# Patient Record
Sex: FEMALE | Race: BLACK OR AFRICAN AMERICAN | NOT HISPANIC OR LATINO | Employment: OTHER | URBAN - METROPOLITAN AREA
[De-identification: names, ages, dates, MRNs, and addresses within clinical notes are randomized per-mention and may not be internally consistent; named-entity substitution may affect disease eponyms.]

---

## 2020-03-04 ENCOUNTER — OFFICE VISIT (OUTPATIENT)
Dept: GASTROENTEROLOGY | Facility: CLINIC | Age: 55
End: 2020-03-04
Payer: MEDICARE

## 2020-03-04 VITALS
DIASTOLIC BLOOD PRESSURE: 65 MMHG | WEIGHT: 178 LBS | SYSTOLIC BLOOD PRESSURE: 112 MMHG | BODY MASS INDEX: 29.66 KG/M2 | HEIGHT: 65 IN | HEART RATE: 74 BPM | RESPIRATION RATE: 16 BRPM

## 2020-03-04 DIAGNOSIS — K21.9 GASTROESOPHAGEAL REFLUX DISEASE WITHOUT ESOPHAGITIS: ICD-10-CM

## 2020-03-04 DIAGNOSIS — R10.9 ABDOMINAL PAIN, UNSPECIFIED ABDOMINAL LOCATION: ICD-10-CM

## 2020-03-04 DIAGNOSIS — R19.7 DIARRHEA, UNSPECIFIED TYPE: Primary | ICD-10-CM

## 2020-03-04 DIAGNOSIS — Z90.49 HISTORY OF CHOLECYSTECTOMY: ICD-10-CM

## 2020-03-04 DIAGNOSIS — R11.2 NAUSEA AND VOMITING, INTRACTABILITY OF VOMITING NOT SPECIFIED, UNSPECIFIED VOMITING TYPE: ICD-10-CM

## 2020-03-04 PROCEDURE — 99204 PR OFFICE/OUTPT VISIT, NEW, LEVL IV, 45-59 MIN: ICD-10-PCS | Mod: S$PBB,,, | Performed by: INTERNAL MEDICINE

## 2020-03-04 PROCEDURE — 99999 PR PBB SHADOW E&M-NEW PATIENT-LVL III: CPT | Mod: PBBFAC,,, | Performed by: INTERNAL MEDICINE

## 2020-03-04 PROCEDURE — 99203 OFFICE O/P NEW LOW 30 MIN: CPT | Mod: PBBFAC,PN | Performed by: INTERNAL MEDICINE

## 2020-03-04 PROCEDURE — 99204 OFFICE O/P NEW MOD 45 MIN: CPT | Mod: S$PBB,,, | Performed by: INTERNAL MEDICINE

## 2020-03-04 PROCEDURE — 99999 PR PBB SHADOW E&M-NEW PATIENT-LVL III: ICD-10-PCS | Mod: PBBFAC,,, | Performed by: INTERNAL MEDICINE

## 2020-03-04 RX ORDER — ISOPROPYL ALCOHOL 70 ML/100ML
SWAB TOPICAL
COMMUNITY
Start: 2019-11-27 | End: 2021-09-22

## 2020-03-04 RX ORDER — METFORMIN HYDROCHLORIDE 500 MG/1
500 TABLET, EXTENDED RELEASE ORAL 2 TIMES DAILY WITH MEALS
COMMUNITY
Start: 2019-12-18

## 2020-03-04 RX ORDER — CANDESARTAN 32 MG/1
32 TABLET ORAL DAILY
COMMUNITY
Start: 2020-01-28

## 2020-03-04 RX ORDER — CETIRIZINE HYDROCHLORIDE 10 MG/1
TABLET ORAL
COMMUNITY
Start: 2020-01-24 | End: 2021-09-22

## 2020-03-04 RX ORDER — NORTRIPTYLINE HYDROCHLORIDE 25 MG/1
75 CAPSULE ORAL DAILY
COMMUNITY
Start: 2020-01-24 | End: 2022-05-06

## 2020-03-04 RX ORDER — BLOOD-GLUCOSE METER
KIT MISCELLANEOUS
COMMUNITY
Start: 2019-11-27 | End: 2021-09-22

## 2020-03-04 RX ORDER — ONDANSETRON 8 MG/1
TABLET, ORALLY DISINTEGRATING ORAL
COMMUNITY
Start: 2020-02-24 | End: 2021-01-07 | Stop reason: SDUPTHER

## 2020-03-04 RX ORDER — ROSUVASTATIN CALCIUM 40 MG/1
40 TABLET, FILM COATED ORAL DAILY
COMMUNITY
Start: 2020-02-07 | End: 2022-03-09 | Stop reason: SDUPTHER

## 2020-03-04 RX ORDER — AMLODIPINE BESYLATE 10 MG/1
10 TABLET ORAL DAILY
COMMUNITY
Start: 2019-12-23

## 2020-03-04 RX ORDER — ISOSORBIDE MONONITRATE 30 MG/1
30 TABLET, EXTENDED RELEASE ORAL DAILY
Status: ON HOLD | COMMUNITY
Start: 2020-01-28 | End: 2022-06-28 | Stop reason: HOSPADM

## 2020-03-04 RX ORDER — PRAZOSIN HYDROCHLORIDE 2 MG/1
2 CAPSULE ORAL DAILY
COMMUNITY

## 2020-03-04 RX ORDER — MECOBAL/LEVOMEFOLAT CA/B6 PHOS 2-3-35 MG
TABLET ORAL
Status: ON HOLD | COMMUNITY
Start: 2020-01-16 | End: 2023-04-10

## 2020-03-04 RX ORDER — DONEPEZIL HYDROCHLORIDE 10 MG/1
TABLET, FILM COATED ORAL
COMMUNITY
Start: 2020-02-07 | End: 2022-03-09

## 2020-03-04 RX ORDER — FLUTICASONE PROPIONATE AND SALMETEROL XINAFOATE 230; 21 UG/1; UG/1
AEROSOL, METERED RESPIRATORY (INHALATION)
COMMUNITY

## 2020-03-04 RX ORDER — ACETAMINOPHEN AND PHENYLEPHRINE HCL 325; 5 MG/1; MG/1
TABLET ORAL
COMMUNITY

## 2020-03-04 RX ORDER — CARVEDILOL 12.5 MG/1
TABLET ORAL
Status: ON HOLD | COMMUNITY
Start: 2019-12-23 | End: 2021-06-02 | Stop reason: CLARIF

## 2020-03-04 RX ORDER — SALINE NASAL SPRAY 1.5 OZ
SOLUTION NASAL
COMMUNITY
Start: 2019-12-18 | End: 2023-12-04

## 2020-03-04 RX ORDER — LINACLOTIDE 72 UG/1
CAPSULE, GELATIN COATED ORAL
COMMUNITY
Start: 2020-02-14 | End: 2020-03-05

## 2020-03-04 RX ORDER — ACYCLOVIR 400 MG/1
TABLET ORAL
COMMUNITY
End: 2022-03-04 | Stop reason: SDUPTHER

## 2020-03-04 RX ORDER — ERGOCALCIFEROL 1.25 MG/1
CAPSULE ORAL
COMMUNITY

## 2020-03-04 RX ORDER — ACETAMINOPHEN 500 MG/1
500 TABLET, FILM COATED ORAL
COMMUNITY
Start: 2019-12-03

## 2020-03-04 RX ORDER — ASPIRIN 81 MG/1
81 TABLET ORAL DAILY
COMMUNITY
Start: 2020-02-09

## 2020-03-04 RX ORDER — DIAZEPAM 2 MG/1
TABLET ORAL
Status: ON HOLD | COMMUNITY
End: 2021-06-02 | Stop reason: CLARIF

## 2020-03-04 RX ORDER — METHYLPHENIDATE HYDROCHLORIDE 10 MG/1
TABLET ORAL
COMMUNITY
Start: 2020-02-07 | End: 2021-09-22

## 2020-03-04 RX ORDER — FLUTICASONE PROPIONATE 50 MCG
SPRAY, SUSPENSION (ML) NASAL
COMMUNITY
Start: 2019-12-23

## 2020-03-04 RX ORDER — TRIAMTERENE AND HYDROCHLOROTHIAZIDE 75; 50 MG/1; MG/1
TABLET ORAL
COMMUNITY
Start: 2020-01-08 | End: 2022-03-09

## 2020-03-04 RX ORDER — MONTELUKAST SODIUM 4 MG/1
2 TABLET, CHEWABLE ORAL DAILY
Qty: 180 TABLET | Refills: 3 | Status: SHIPPED | OUTPATIENT
Start: 2020-03-04 | End: 2021-09-22

## 2020-03-04 RX ORDER — IPRATROPIUM BROMIDE 21 UG/1
SPRAY, METERED NASAL
COMMUNITY
Start: 2019-12-18 | End: 2021-09-22

## 2020-03-04 RX ORDER — TIZANIDINE 4 MG/1
4 TABLET ORAL EVERY 8 HOURS
COMMUNITY
Start: 2019-12-23 | End: 2023-12-04

## 2020-03-04 RX ORDER — CARBAMAZEPINE 400 MG/1
400 TABLET, EXTENDED RELEASE ORAL 2 TIMES DAILY
COMMUNITY
Start: 2019-12-10

## 2020-03-04 RX ORDER — DICLOFENAC SODIUM 10 MG/G
GEL TOPICAL
COMMUNITY

## 2020-03-04 RX ORDER — LIDOCAINE 50 MG/G
PATCH TOPICAL
COMMUNITY
Start: 2019-12-03

## 2020-03-04 RX ORDER — ZOLPIDEM TARTRATE 10 MG/1
TABLET ORAL
COMMUNITY
Start: 2020-02-17 | End: 2021-09-22

## 2020-03-04 NOTE — PROGRESS NOTES
Subjective:       Patient ID: Luz Arias is a 54 y.o. female.    Chief Complaint: Emesis; Nausea; and Bowel Urgency    She states she has been having upper abdominal discomfort symptoms of increasing iseverity.  In 2016 she had upper endoscopy showing hiatal hernia esophagitis and gastritis.  Colonoscopy was initially normal.  She has had a colonoscopy and upper endoscopy.  The colonoscopy was normal.  She has had frequent diarrhea with urgency and occasional incontinence.  She has severe epigastric pain with pyrosis and dyspepsia.  She describes as having gastroparesis and is scheduled to see a dietitian.  She has had nausea and vomiting without a precipitating cause.  She has had diarrhea.  She states frequent when she is is on the toilet with the urge to defecate with diarrhea sure of nausea and vomiting. She denies fever chills hematemesis hematochezia jaundice or bleeding.  She does not relate her symptoms to a specific food such as milk.  She cannot pinpoint a precipitating factor.  Recently she has been on doxycycline for sinusitis.  She has had a cholecystectomy and an appendectomy.  She is on the omeprazole 40 mg b.i.d..  She takes Maalox at night.  She does not associate her symptoms with a specific foods such as spicy foods.  She tries not to eat late in the evening.  She denies nocturnal aspiration fever chills. She has been told she probably has IBS.  Her weight remains stable. The only records available are from [facility] and they are the upper endoscopy and colonoscopy.  She describes persistent epigastric burning as substernal burning.  Her current medications have not resolved her symptoms but she and her  state that the current medications have been helpful.  She does not know whether the Maalox that she takes at night has influence the diarrhea.      Allergies:  Review of patient's allergies indicates:   Allergen Reactions    Pregabalin     Protonix [pantoprazole]     Sulfa  (sulfonamide antibiotics)        Medications:    Current Outpatient Medications:     acyclovir (ZOVIRAX) 400 MG tablet, acyclovir 400 mg tablet, Disp: , Rfl:     amLODIPine (NORVASC) 10 MG tablet, , Disp: , Rfl:     aspirin (ECOTRIN) 81 MG EC tablet, , Disp: , Rfl:     biotin 10,000 mcg Cap, Take by mouth., Disp: , Rfl:     candesartan (ATACAND) 32 MG tablet, , Disp: , Rfl:     carBAMazepine (TEGRETOL XR) 400 MG Tb12, , Disp: , Rfl:     carvediloL (COREG) 12.5 MG tablet, , Disp: , Rfl:     cetirizine (ZYRTEC) 10 MG tablet, , Disp: , Rfl:     CRESTOR 40 mg Tab, , Disp: , Rfl:     DEEP SEA NASAL 0.65 % nasal spray, , Disp: , Rfl:     diazePAM (VALIUM) 2 MG tablet, diazepam 2 mg tablet, Disp: , Rfl:     diclofenac sodium (VOLTAREN) 1 % Gel, Voltaren 1 % topical gel, Disp: , Rfl:     donepezil (ARICEPT) 10 MG tablet, , Disp: , Rfl:     EASY TOUCH ALCOHOL PREP PADS PadM, , Disp: , Rfl:     ergocalciferol (ERGOCALCIFEROL) 50,000 unit Cap, ergocalciferol (vitamin D2) 50,000 unit capsule, Disp: , Rfl:     fluticasone propionate (FLONASE) 50 mcg/actuation nasal spray, , Disp: , Rfl:     fluticasone-salmeterol 230-21 mcg/dose (ADVAIR HFA) 230-21 mcg/actuation HFAA inhaler, Advair  mcg-21 mcg/actuation aerosol inhaler, Disp: , Rfl:     FREESTYLE LITE STRIPS Strp, , Disp: , Rfl:     ipratropium (ATROVENT) 0.03 % nasal spray, , Disp: , Rfl:     isosorbide mononitrate (IMDUR) 30 MG 24 hr tablet, , Disp: , Rfl:     L-METHYL-B6-B12 3-35-2 mg Tab, , Disp: , Rfl:     lidocaine (LIDODERM) 5 %, , Disp: , Rfl:     metFORMIN (GLUCOPHAGE-XR) 500 MG XR 24hr tablet, , Disp: , Rfl:     methylphenidate HCl (RITALIN) 10 MG tablet, , Disp: , Rfl:     nortriptyline (PAMELOR) 25 MG capsule, , Disp: , Rfl:     ondansetron (ZOFRAN-ODT) 8 MG TbDL, , Disp: , Rfl:     prazosin (MINIPRESS) 2 MG Cap, prazosin 2 mg capsule, Disp: , Rfl:     tiZANidine (ZANAFLEX) 4 MG tablet, , Disp: , Rfl:      triamterene-hydrochlorothiazide 75-50 mg (MAXZIDE) 75-50 mg per tablet, , Disp: , Rfl:     TYLENOL EXTRA STRENGTH 500 mg tablet, , Disp: , Rfl:     zolpidem (AMBIEN) 10 mg Tab, , Disp: , Rfl:     colestipoL (COLESTID) 1 gram Tab, Take 2 tablets (2 g total) by mouth once daily., Disp: 180 tablet, Rfl: 3    Past Medical History:   Diagnosis Date    Dysphagia     Gastroparesis     GERD (gastroesophageal reflux disease)     Irritable bowel syndrome        Past Surgical History:   Procedure Laterality Date    COLONOSCOPY  05/17/2019    UPPER GASTROINTESTINAL ENDOSCOPY  05/17/2019         Review of Systems   Constitutional: Negative for appetite change, fever and unexpected weight change.   HENT: Negative for trouble swallowing.         No jaundice.   Respiratory: Negative for cough, shortness of breath and wheezing.         She has never used tobacco alcohol.  She denies aspiration or hemoptysis.  She denies chronic cough or sputum production.  She denies significant dyspnea although she occasionally will have some dyspnea and uses or pulmonary inhalers.  She cannot pinpoint a precipitating factor.   Cardiovascular: Negative for chest pain.        She denies exertional chest pain but she is not physically active.  She denies rhythm disturbance.  She states her hyperlipidemia and hypertension are well controlled.   Gastrointestinal: Positive for abdominal pain, diarrhea and nausea. Negative for abdominal distention, anal bleeding, blood in stool and constipation.        For some reason she was given the Linzess in the past.  She can't remember why.  She is not taking it now.   Endocrine:        She is on the metformin.  She is on the diabetic diet.  She states her diabetes is well controlled.   Musculoskeletal: Positive for arthralgias and back pain. Negative for neck pain.   Skin: Negative for pallor and rash.   Neurological: Negative for dizziness, seizures, syncope, speech difficulty, weakness and numbness.         A stroke syndrome with mild weakness the right side. She can ambulate with a cane without falling.   Hematological: Negative for adenopathy.   Psychiatric/Behavioral: Negative for confusion.       Objective:      Physical Exam   Constitutional: She is oriented to person, place, and time. She appears well-developed and well-nourished.   Well-nourished well-hydrated nonicteric  female.  She is oriented 3.  She can relate her history and answer questions appropriately.   HENT:   Head: Normocephalic.   Eyes: Pupils are equal, round, and reactive to light. EOM are normal.   Neck: Normal range of motion. Neck supple. No tracheal deviation present. No thyromegaly present.   Cardiovascular: Normal rate, regular rhythm and normal heart sounds.   Pulmonary/Chest: Effort normal and breath sounds normal.   Abdominal: Soft. She exhibits no distension and no mass. There is tenderness. There is no rebound and no guarding.   The abdomen is soft with minimal tenderness in the epigastrium.  Organomegaly masses are not detected.  Bowel sounds are normal.    Musculoskeletal: Normal range of motion.   She ambulates with a cane.  She ambulates slowly.  She can go from the standing sitting position. She needs assistance to get on the exam table.   Lymphadenopathy:     She has no cervical adenopathy.   Neurological: She is alert and oriented to person, place, and time. No cranial nerve deficit.   Skin: Skin is warm and dry.   Psychiatric: She has a normal mood and affect. Her behavior is normal.   Vitals reviewed.        Plan:       Diarrhea, unspecified type  -     Gastrointestinal Pathogens Panel, PCR; Future; Expected date: 03/04/2020  -     Calprotectin; Future; Expected date: 03/04/2020  -     CBC auto differential; Future; Expected date: 03/04/2020  -     Comprehensive metabolic panel; Future; Expected date: 03/04/2020  -     Pancreatic elastase, fecal; Future; Expected date: 03/04/2020  -     C-reactive protein;  Future; Expected date: 03/04/2020  -     colestipoL (COLESTID) 1 gram Tab; Take 2 tablets (2 g total) by mouth once daily.  Dispense: 180 tablet; Refill: 3    Abdominal pain, unspecified abdominal location    Nausea and vomiting, intractability of vomiting not specified, unspecified vomiting type    Gastroesophageal reflux disease without esophagitis    History of cholecystectomy     she will continue her reflux regimen current medications vitamins.  She follows up with her neurologist.  I have requested the [facility] records.  Because of the diarrhea and cholecystectomy she is started on the Colestid.  She may need to stop the Maalox at night.  She is not taking the Linzess.  She continues the diabetic diet and will make a food diary and avoid the offending foods.

## 2020-03-04 NOTE — PATIENT INSTRUCTIONS
The [facility] records have been requested.  Will have stool studies and to evaluate and nausea vomiting and diarrhea She will continue the omeprazole 40 mg twice a day.  She will make a food diary and avoid the offending foods but will stay on the diabetic diet.  She started on the Colestid 1 g to twice a day.

## 2020-03-05 ENCOUNTER — PATIENT OUTREACH (OUTPATIENT)
Dept: ADMINISTRATIVE | Facility: HOSPITAL | Age: 55
End: 2020-03-05

## 2020-03-05 PROBLEM — R10.9 ABDOMINAL PAIN: Status: ACTIVE | Noted: 2020-03-05

## 2020-03-05 PROBLEM — R11.2 NAUSEA AND VOMITING: Status: ACTIVE | Noted: 2020-03-05

## 2020-03-05 PROBLEM — R19.7 DIARRHEA: Status: ACTIVE | Noted: 2020-03-05

## 2020-03-05 PROBLEM — K21.9 GASTROESOPHAGEAL REFLUX DISEASE WITHOUT ESOPHAGITIS: Status: ACTIVE | Noted: 2020-03-05

## 2020-04-03 ENCOUNTER — TELEPHONE (OUTPATIENT)
Dept: GASTROENTEROLOGY | Facility: CLINIC | Age: 55
End: 2020-04-03

## 2020-04-03 NOTE — TELEPHONE ENCOUNTER
Notified patient that MD ordered labs are over due and encouraged patient to present to the lab to complete same.

## 2020-04-22 ENCOUNTER — TELEPHONE (OUTPATIENT)
Dept: GASTROENTEROLOGY | Facility: CLINIC | Age: 55
End: 2020-04-22

## 2020-04-22 NOTE — TELEPHONE ENCOUNTER
Returned pt phone call. No answer. LVM for pt explaining Dr. López is not doing virtual visits, but we can get her in with Dr. López in office tomorrow. Left cb number.

## 2020-04-22 NOTE — TELEPHONE ENCOUNTER
----- Message from Chelsie Thrasher sent at 4/22/2020  1:39 PM CDT -----  Type: Needs Medical Advice  Who Called:  Patient  Symptoms (please be specific):  Trouble controlling bowels  How long has patient had these symptoms:  Over a month  Pharmacy name and phone #:    [facility] [place] [place] - [address]  [address]  [address] [place] [address]  Phone: [phone] Fax: [phone]    Best Call Back Number: [phone] (home)     Additional Information: Please call to advise. Says she has a smart phone and would like to have a virtual visit if the doctor thinks it best.

## 2020-04-24 ENCOUNTER — LAB VISIT (OUTPATIENT)
Dept: LAB | Facility: HOSPITAL | Age: 55
End: 2020-04-24
Attending: INTERNAL MEDICINE
Payer: MEDICARE

## 2020-04-24 DIAGNOSIS — R19.7 DIARRHEA, UNSPECIFIED TYPE: ICD-10-CM

## 2020-04-24 LAB
ALBUMIN SERPL BCP-MCNC: 4.8 G/DL (ref 3.5–5.2)
ALP SERPL-CCNC: 67 U/L (ref 55–135)
ALT SERPL W/O P-5'-P-CCNC: 39 U/L (ref 10–44)
ANION GAP SERPL CALC-SCNC: 11 MMOL/L (ref 8–16)
AST SERPL-CCNC: 44 U/L (ref 10–40)
BASOPHILS # BLD AUTO: 0.02 K/UL (ref 0–0.2)
BASOPHILS NFR BLD: 0.3 % (ref 0–1.9)
BILIRUB SERPL-MCNC: 0.6 MG/DL (ref 0.1–1)
BUN SERPL-MCNC: 16 MG/DL (ref 6–20)
CALCIUM SERPL-MCNC: 9.3 MG/DL (ref 8.7–10.5)
CHLORIDE SERPL-SCNC: 100 MMOL/L (ref 95–110)
CO2 SERPL-SCNC: 25 MMOL/L (ref 23–29)
CREAT SERPL-MCNC: 0.7 MG/DL (ref 0.5–1.4)
DIFFERENTIAL METHOD: ABNORMAL
EOSINOPHIL # BLD AUTO: 0.1 K/UL (ref 0–0.5)
EOSINOPHIL NFR BLD: 0.9 % (ref 0–8)
ERYTHROCYTE [DISTWIDTH] IN BLOOD BY AUTOMATED COUNT: 13.2 % (ref 11.5–14.5)
EST. GFR  (AFRICAN AMERICAN): >60 ML/MIN/1.73 M^2
EST. GFR  (NON AFRICAN AMERICAN): >60 ML/MIN/1.73 M^2
GLUCOSE SERPL-MCNC: 164 MG/DL (ref 70–110)
HCT VFR BLD AUTO: 30.4 % (ref 37–48.5)
HGB BLD-MCNC: 9.6 G/DL (ref 12–16)
IMM GRANULOCYTES # BLD AUTO: 0.01 K/UL (ref 0–0.04)
IMM GRANULOCYTES NFR BLD AUTO: 0.1 % (ref 0–0.5)
LYMPHOCYTES # BLD AUTO: 3.6 K/UL (ref 1–4.8)
LYMPHOCYTES NFR BLD: 50.7 % (ref 18–48)
MCH RBC QN AUTO: 27.2 PG (ref 27–31)
MCHC RBC AUTO-ENTMCNC: 31.6 G/DL (ref 32–36)
MCV RBC AUTO: 86 FL (ref 82–98)
MONOCYTES # BLD AUTO: 0.4 K/UL (ref 0.3–1)
MONOCYTES NFR BLD: 6.1 % (ref 4–15)
NEUTROPHILS # BLD AUTO: 3 K/UL (ref 1.8–7.7)
NEUTROPHILS NFR BLD: 41.9 % (ref 38–73)
NRBC BLD-RTO: 0 /100 WBC
PLATELET # BLD AUTO: 301 K/UL (ref 150–350)
PMV BLD AUTO: 8.9 FL (ref 9.2–12.9)
POTASSIUM SERPL-SCNC: 3.8 MMOL/L (ref 3.5–5.1)
PROT SERPL-MCNC: 8.2 G/DL (ref 6–8.4)
RBC # BLD AUTO: 3.53 M/UL (ref 4–5.4)
SODIUM SERPL-SCNC: 136 MMOL/L (ref 136–145)
WBC # BLD AUTO: 7.04 K/UL (ref 3.9–12.7)

## 2020-04-24 PROCEDURE — 80053 COMPREHEN METABOLIC PANEL: CPT

## 2020-04-24 PROCEDURE — 85025 COMPLETE CBC W/AUTO DIFF WBC: CPT

## 2020-04-27 DIAGNOSIS — E53.8 FOLATE DEFICIENCY: ICD-10-CM

## 2020-04-27 DIAGNOSIS — D64.9 ANEMIA, UNSPECIFIED TYPE: Primary | ICD-10-CM

## 2020-04-27 DIAGNOSIS — E53.8 B12 DEFICIENCY: ICD-10-CM

## 2020-04-27 DIAGNOSIS — Z12.11 COLON CANCER SCREENING: ICD-10-CM

## 2020-04-29 ENCOUNTER — LAB VISIT (OUTPATIENT)
Dept: LAB | Facility: HOSPITAL | Age: 55
End: 2020-04-29
Attending: INTERNAL MEDICINE
Payer: MEDICARE

## 2020-04-29 ENCOUNTER — OFFICE VISIT (OUTPATIENT)
Dept: GASTROENTEROLOGY | Facility: CLINIC | Age: 55
End: 2020-04-29
Payer: MEDICARE

## 2020-04-29 VITALS
DIASTOLIC BLOOD PRESSURE: 61 MMHG | OXYGEN SATURATION: 95 % | HEART RATE: 90 BPM | WEIGHT: 179 LBS | SYSTOLIC BLOOD PRESSURE: 115 MMHG | RESPIRATION RATE: 16 BRPM | BODY MASS INDEX: 29.82 KG/M2 | TEMPERATURE: 98 F | HEIGHT: 65 IN

## 2020-04-29 DIAGNOSIS — R11.2 NAUSEA AND VOMITING, INTRACTABILITY OF VOMITING NOT SPECIFIED, UNSPECIFIED VOMITING TYPE: ICD-10-CM

## 2020-04-29 DIAGNOSIS — D64.9 ANEMIA, UNSPECIFIED TYPE: ICD-10-CM

## 2020-04-29 DIAGNOSIS — R15.9 INCONTINENCE OF FECES, UNSPECIFIED FECAL INCONTINENCE TYPE: ICD-10-CM

## 2020-04-29 DIAGNOSIS — K92.1 BLOODY STOOL: ICD-10-CM

## 2020-04-29 DIAGNOSIS — R19.7 DIARRHEA, UNSPECIFIED TYPE: ICD-10-CM

## 2020-04-29 DIAGNOSIS — E53.8 B12 DEFICIENCY: ICD-10-CM

## 2020-04-29 DIAGNOSIS — K21.9 GASTROESOPHAGEAL REFLUX DISEASE WITHOUT ESOPHAGITIS: ICD-10-CM

## 2020-04-29 DIAGNOSIS — E53.8 FOLATE DEFICIENCY: ICD-10-CM

## 2020-04-29 DIAGNOSIS — K21.9 GASTROESOPHAGEAL REFLUX DISEASE, ESOPHAGITIS PRESENCE NOT SPECIFIED: ICD-10-CM

## 2020-04-29 DIAGNOSIS — D64.9 ANEMIA: ICD-10-CM

## 2020-04-29 DIAGNOSIS — Z12.11 COLON CANCER SCREENING: ICD-10-CM

## 2020-04-29 DIAGNOSIS — Z90.49 HISTORY OF CHOLECYSTECTOMY: ICD-10-CM

## 2020-04-29 DIAGNOSIS — Z80.0 FAMILY HX OF COLON CANCER: ICD-10-CM

## 2020-04-29 DIAGNOSIS — D64.9 ANEMIA, UNSPECIFIED TYPE: Primary | ICD-10-CM

## 2020-04-29 LAB
FOLATE SERPL-MCNC: 16 NG/ML (ref 4–24)
IRON SERPL-MCNC: 48 UG/DL (ref 30–160)
SATURATED IRON: 11 % (ref 20–50)
TOTAL IRON BINDING CAPACITY: 420 UG/DL (ref 250–450)
TRANSFERRIN SERPL-MCNC: 284 MG/DL (ref 200–375)
VIT B12 SERPL-MCNC: >2000 PG/ML (ref 210–950)

## 2020-04-29 PROCEDURE — 99213 PR OFFICE/OUTPT VISIT, EST, LEVL III, 20-29 MIN: ICD-10-PCS | Mod: S$PBB,,, | Performed by: INTERNAL MEDICINE

## 2020-04-29 PROCEDURE — 99213 OFFICE O/P EST LOW 20 MIN: CPT | Mod: PBBFAC,PN | Performed by: INTERNAL MEDICINE

## 2020-04-29 PROCEDURE — 82746 ASSAY OF FOLIC ACID SERUM: CPT

## 2020-04-29 PROCEDURE — 83540 ASSAY OF IRON: CPT

## 2020-04-29 PROCEDURE — 99999 PR PBB SHADOW E&M-EST. PATIENT-LVL III: CPT | Mod: PBBFAC,,, | Performed by: INTERNAL MEDICINE

## 2020-04-29 PROCEDURE — 99999 PR PBB SHADOW E&M-EST. PATIENT-LVL III: ICD-10-PCS | Mod: PBBFAC,,, | Performed by: INTERNAL MEDICINE

## 2020-04-29 PROCEDURE — 99213 OFFICE O/P EST LOW 20 MIN: CPT | Mod: S$PBB,,, | Performed by: INTERNAL MEDICINE

## 2020-04-29 PROCEDURE — 36415 COLL VENOUS BLD VENIPUNCTURE: CPT

## 2020-04-29 PROCEDURE — 82607 VITAMIN B-12: CPT

## 2020-04-29 PROCEDURE — 83516 IMMUNOASSAY NONANTIBODY: CPT | Mod: 59

## 2020-04-29 RX ORDER — ALBUTEROL SULFATE 5 MG/ML
2.5 SOLUTION RESPIRATORY (INHALATION) EVERY 6 HOURS PRN
COMMUNITY
End: 2023-01-31

## 2020-04-29 NOTE — PATIENT INSTRUCTIONS
She was found to be anemic.  She will obtain the labs as requested.  She has a history gastroesophageal reflux and will continue the reflux regimen.  She continues her diabetic diet and current medications.  She will make a food diary and attempt to pinpoint a precipitating factor for the diarrhea.  She will continue the Colestid is ordered.  She has had abdominal pain that is not related to the Colestid usage.  She continues her vitamins and minerals.

## 2020-04-29 NOTE — PROGRESS NOTES
Subjective:       Patient ID: Luz Arias is a 54 y.o. female.    Chief Complaint: Follow-up    She complains of diarrhea.  Sometimes with urgency and occasional episodes incontinence.  She has had a colonoscopy which was unrevealing.  Upper endoscopy revealed gastroesophageal reflux.  She was found to be anemic.  She has not obtained the further labs as ordered.  She will do so today she states.  She denies hematemesis hematochezia jaundice or bleeding.  She is followed at [facility].  She states that they regulate her diabetes and is well controlled.  She has been on the metformin and she does not state that any of her medications have cause the diarrhea.  She does not ingest milk.  She is on the diabetic diet.  Her weights remain stable.  She denies fever chills.  She only takes medications as prescribed.      Allergies:  Review of patient's allergies indicates:   Allergen Reactions    Pregabalin     Protonix [pantoprazole]     Sulfa (sulfonamide antibiotics)        Medications:    Current Outpatient Medications:     acyclovir (ZOVIRAX) 400 MG tablet, acyclovir 400 mg tablet, Disp: , Rfl:     albuterol (PROVENTIL) 5 mg/mL nebulizer solution, Take 2.5 mg by nebulization every 6 (six) hours as needed for Wheezing. Rescue, Disp: , Rfl:     amLODIPine (NORVASC) 10 MG tablet, , Disp: , Rfl:     aspirin (ECOTRIN) 81 MG EC tablet, , Disp: , Rfl:     biotin 10,000 mcg Cap, Take by mouth., Disp: , Rfl:     candesartan (ATACAND) 32 MG tablet, , Disp: , Rfl:     carBAMazepine (TEGRETOL XR) 400 MG Tb12, , Disp: , Rfl:     carvediloL (COREG) 12.5 MG tablet, , Disp: , Rfl:     cetirizine (ZYRTEC) 10 MG tablet, , Disp: , Rfl:     colestipoL (COLESTID) 1 gram Tab, Take 2 tablets (2 g total) by mouth once daily., Disp: 180 tablet, Rfl: 3    CRESTOR 40 mg Tab, , Disp: , Rfl:     DEEP SEA NASAL 0.65 % nasal spray, , Disp: , Rfl:     diazePAM (VALIUM) 2 MG tablet, diazepam 2 mg tablet, Disp: , Rfl:      diclofenac sodium (VOLTAREN) 1 % Gel, Voltaren 1 % topical gel, Disp: , Rfl:     donepezil (ARICEPT) 10 MG tablet, , Disp: , Rfl:     EASY TOUCH ALCOHOL PREP PADS PadM, , Disp: , Rfl:     ergocalciferol (ERGOCALCIFEROL) 50,000 unit Cap, ergocalciferol (vitamin D2) 50,000 unit capsule, Disp: , Rfl:     fluticasone propionate (FLONASE) 50 mcg/actuation nasal spray, , Disp: , Rfl:     fluticasone-salmeterol 230-21 mcg/dose (ADVAIR HFA) 230-21 mcg/actuation HFAA inhaler, Advair  mcg-21 mcg/actuation aerosol inhaler, Disp: , Rfl:     FREESTYLE LITE STRIPS Strp, , Disp: , Rfl:     ipratropium (ATROVENT) 0.03 % nasal spray, , Disp: , Rfl:     isosorbide mononitrate (IMDUR) 30 MG 24 hr tablet, , Disp: , Rfl:     L-METHYL-B6-B12 3-35-2 mg Tab, , Disp: , Rfl:     lidocaine (LIDODERM) 5 %, , Disp: , Rfl:     metFORMIN (GLUCOPHAGE-XR) 500 MG XR 24hr tablet, , Disp: , Rfl:     methylphenidate HCl (RITALIN) 10 MG tablet, , Disp: , Rfl:     nortriptyline (PAMELOR) 25 MG capsule, , Disp: , Rfl:     ondansetron (ZOFRAN-ODT) 8 MG TbDL, , Disp: , Rfl:     prazosin (MINIPRESS) 2 MG Cap, prazosin 2 mg capsule, Disp: , Rfl:     tiZANidine (ZANAFLEX) 4 MG tablet, , Disp: , Rfl:     triamterene-hydrochlorothiazide 75-50 mg (MAXZIDE) 75-50 mg per tablet, , Disp: , Rfl:     TYLENOL EXTRA STRENGTH 500 mg tablet, , Disp: , Rfl:     zolpidem (AMBIEN) 10 mg Tab, , Disp: , Rfl:     Past Medical History:   Diagnosis Date    Chronic fatigue     Chronic pain     Depression     Diabetes     Dysphagia     Gastritis     Gastroparesis     GERD (gastroesophageal reflux disease)     Hypertension     Irritable bowel syndrome     Stroke        Past Surgical History:   Procedure Laterality Date    COLONOSCOPY  05/17/2019    UPPER GASTROINTESTINAL ENDOSCOPY  05/17/2019         Review of Systems   Constitutional: Negative for appetite change, fever and unexpected weight change.   HENT: Negative for trouble swallowing.          No jaundice.   Respiratory: Negative for cough, shortness of breath and wheezing.         She has never been a cigarette smoker.  She does not use alcohol.  She denies aspiration or hemoptysis.  She has episodes of dyspnea that her not well defined but respond to her pulmonary inhalers.  She denies significant coughing or sputum production.  She does not associate her pulmonary symptoms with oral intake of food or liquids.   Cardiovascular: Negative for chest pain.        She denies exertional chest pain or rhythm disturbance.  She is not particularly physically active because of her stroke syndrome.  She states her hypertension hyperlipidemia well controlled.   Gastrointestinal: Positive for abdominal pain, diarrhea and nausea. Negative for abdominal distention, anal bleeding, blood in stool, constipation, rectal pain and vomiting.        She has occasion quadrant discomfort which signals a bowel movement.  She has occasional nausea without vomiting.  She cannot pinpoint a precipitating factor.  She believes that she was told that she might have gastroparesis although I have been unable to obtain the records.   Endocrine:        Her PCP regulates her diabetes.  She believes that it is well controlled.  I have requested the labs.  She is on the ADA diet.   Musculoskeletal: Positive for arthralgias and gait problem. Negative for neck pain.   Skin: Negative for pallor and rash.   Neurological: Negative for dizziness, seizures, syncope, speech difficulty, weakness and numbness.        History of a stroke.  She ambulates with a cane and.  Her family member states that she is able to think normally.  She is able to perform her usual activities.  She has not had falling.   Hematological: Negative for adenopathy.   Psychiatric/Behavioral: Negative for confusion.       Objective:      Physical Exam   Constitutional: She is oriented to person, place, and time. She appears well-developed and well-nourished.    Well-nourished well-hydrated nonicteric afebrile  female.  She is oriented x3.  She can relate her history and answer questions appropriately.   HENT:   Head: Normocephalic.   Eyes: Pupils are equal, round, and reactive to light. EOM are normal.   Neck: Normal range of motion. Neck supple. No tracheal deviation present. No thyromegaly present.   Cardiovascular: Normal rate, regular rhythm and normal heart sounds.   Pulmonary/Chest: Effort normal and breath sounds normal.   Abdominal: Soft. Bowel sounds are normal. She exhibits no distension and no mass. There is no tenderness. There is no rebound and no guarding. No hernia.   Abdomen is soft without tenderness masses organomegaly.  Bowel sounds are normal.   Musculoskeletal:   He walks with the cane.  She ambulates slowly.  She appears to have a steady gait.  She can go from the sitting to the standing position without difficulty.   Lymphadenopathy:     She has no cervical adenopathy.   Neurological: She is alert and oriented to person, place, and time. No cranial nerve deficit.   Skin: Skin is warm and dry.   Psychiatric: She has a normal mood and affect. Her behavior is normal.   Vitals reviewed.        Plan:       Anemia, unspecified type    Diarrhea, unspecified type  -     Celiac Disease Panel; Future; Expected date: 04/29/2020  -     Pancreatic elastase, fecal; Future; Expected date: 04/29/2020  -     Gastrointestinal Pathogens Panel, PCR; Future; Expected date: 04/29/2020    History of cholecystectomy    Gastroesophageal reflux disease without esophagitis    Nausea and vomiting, intractability of vomiting not specified, unspecified vomiting type     she will continue her reflux regimen.  She continues her diabetic diet.  She will make a food diary and avoid the offending foods.  She follows up with her PCP for treatment for diabetes.  She will discuss with the neurologist her multiple medications.  She has polypharmacy and some of the  medications may cause gastrointestinal motility disturbances.  Evaluation of her diarrhea is in progress.  She will go to the lab to obtain the ordered test.  She will continue the Colestid vitamins and minerals.

## 2020-04-30 RX ORDER — SODIUM, POTASSIUM,MAG SULFATES 17.5-3.13G
1 SOLUTION, RECONSTITUTED, ORAL ORAL DAILY
Qty: 1 KIT | Refills: 0 | Status: SHIPPED | OUTPATIENT
Start: 2020-04-30 | End: 2020-05-02

## 2020-05-01 DIAGNOSIS — Z01.818 PRE-OP EXAMINATION: Primary | ICD-10-CM

## 2020-05-03 ENCOUNTER — LAB VISIT (OUTPATIENT)
Dept: FAMILY MEDICINE | Facility: CLINIC | Age: 55
End: 2020-05-03
Payer: MEDICARE

## 2020-05-03 DIAGNOSIS — Z01.818 PRE-OP EXAMINATION: ICD-10-CM

## 2020-05-03 PROCEDURE — U0002 COVID-19 LAB TEST NON-CDC: HCPCS

## 2020-05-04 ENCOUNTER — LAB VISIT (OUTPATIENT)
Dept: LAB | Facility: HOSPITAL | Age: 55
End: 2020-05-04
Attending: INTERNAL MEDICINE
Payer: MEDICARE

## 2020-05-04 ENCOUNTER — TELEPHONE (OUTPATIENT)
Dept: GASTROENTEROLOGY | Facility: CLINIC | Age: 55
End: 2020-05-04

## 2020-05-04 DIAGNOSIS — R19.7 DIARRHEA, UNSPECIFIED TYPE: ICD-10-CM

## 2020-05-04 LAB
GLIADIN PEPTIDE IGA SER-ACNC: 4 UNITS
GLIADIN PEPTIDE IGG SER-ACNC: 1 UNITS
IGA SERPL-MCNC: 208 MG/DL (ref 70–400)
SARS-COV-2 RNA RESP QL NAA+PROBE: NOT DETECTED
TTG IGA SER-ACNC: 6 UNITS
TTG IGG SER-ACNC: 2 UNITS

## 2020-05-04 PROCEDURE — 82656 EL-1 FECAL QUAL/SEMIQ: CPT

## 2020-05-04 PROCEDURE — 87507 IADNA-DNA/RNA PROBE TQ 12-25: CPT

## 2020-05-04 NOTE — TELEPHONE ENCOUNTER
Spoke with pt. She stated she did not drop a FIT off, but the orange top container. Pt told she did collect her feces in the correct container, but the system shows it has not been collected. Pt stated she will return to office to  another collection container.

## 2020-05-04 NOTE — TELEPHONE ENCOUNTER
Spoke with lab. They stated they are not seeing PCR or elastase as being dropped off in their system. They stated registration is supposed to link orders when pt drops off sample.    Lab also stated pt had attempted to drop off FIT at hospital. Orders were cancelled R/T unable to bill.

## 2020-05-04 NOTE — TELEPHONE ENCOUNTER
----- Message from Elizabeth Kelly LPN sent at 5/1/2020  2:17 PM CDT -----  Contact: pt  Pt states lab called her and stated specimen was collected in the wrong cup, she would like to know what she needs to do next. Please advise   ----- Message -----  From: Slim Mills  Sent: 5/1/2020   1:36 PM CDT  To: Rene Mccauley Staff    Type: Needs Medical Advice    Who Called:  pt    Best Call Back Number: [phone]  Additional Information: pt states that the lab called her and stated that the stool sample she provided was not in the right cup. Please call to advise.

## 2020-05-05 ENCOUNTER — ANESTHESIA (OUTPATIENT)
Dept: SURGERY | Facility: HOSPITAL | Age: 55
End: 2020-05-05
Payer: MEDICARE

## 2020-05-05 ENCOUNTER — ANESTHESIA EVENT (OUTPATIENT)
Dept: SURGERY | Facility: HOSPITAL | Age: 55
End: 2020-05-05
Payer: MEDICARE

## 2020-05-05 ENCOUNTER — HOSPITAL ENCOUNTER (OUTPATIENT)
Facility: HOSPITAL | Age: 55
Discharge: HOME | End: 2020-05-05
Attending: INTERNAL MEDICINE | Admitting: INTERNAL MEDICINE
Payer: MEDICARE

## 2020-05-05 VITALS
TEMPERATURE: 98 F | RESPIRATION RATE: 18 BRPM | HEIGHT: 65 IN | SYSTOLIC BLOOD PRESSURE: 108 MMHG | OXYGEN SATURATION: 98 % | DIASTOLIC BLOOD PRESSURE: 68 MMHG | BODY MASS INDEX: 28.99 KG/M2 | HEART RATE: 82 BPM | WEIGHT: 174 LBS

## 2020-05-05 DIAGNOSIS — D64.9 ANEMIA, UNSPECIFIED TYPE: ICD-10-CM

## 2020-05-05 DIAGNOSIS — Z80.0 FAMILY HX OF COLON CANCER: ICD-10-CM

## 2020-05-05 DIAGNOSIS — K92.1 BLOODY STOOL: ICD-10-CM

## 2020-05-05 DIAGNOSIS — Z01.818 PRE-OP EXAM: ICD-10-CM

## 2020-05-05 DIAGNOSIS — D64.9 ANEMIA: ICD-10-CM

## 2020-05-05 DIAGNOSIS — R15.9 INCONTINENCE OF FECES, UNSPECIFIED FECAL INCONTINENCE TYPE: ICD-10-CM

## 2020-05-05 DIAGNOSIS — K21.9 GASTROESOPHAGEAL REFLUX DISEASE, ESOPHAGITIS PRESENCE NOT SPECIFIED: ICD-10-CM

## 2020-05-05 LAB — POCT GLUCOSE: 174 MG/DL (ref 70–110)

## 2020-05-05 PROCEDURE — 63600175 PHARM REV CODE 636 W HCPCS: Performed by: NURSE ANESTHETIST, CERTIFIED REGISTERED

## 2020-05-05 PROCEDURE — D9220A PRA ANESTHESIA: ICD-10-PCS | Mod: ANES,,, | Performed by: ANESTHESIOLOGY

## 2020-05-05 PROCEDURE — 93010 ELECTROCARDIOGRAM REPORT: CPT | Mod: ,,, | Performed by: INTERNAL MEDICINE

## 2020-05-05 PROCEDURE — 88342 IMHCHEM/IMCYTCHM 1ST ANTB: CPT | Mod: 59 | Performed by: PATHOLOGY

## 2020-05-05 PROCEDURE — 25000003 PHARM REV CODE 250: Performed by: NURSE ANESTHETIST, CERTIFIED REGISTERED

## 2020-05-05 PROCEDURE — S0028 INJECTION, FAMOTIDINE, 20 MG: HCPCS

## 2020-05-05 PROCEDURE — D9220A PRA ANESTHESIA: Mod: CRNA,,, | Performed by: NURSE ANESTHETIST, CERTIFIED REGISTERED

## 2020-05-05 PROCEDURE — 88342 IMHCHEM/IMCYTCHM 1ST ANTB: CPT | Mod: 26,,, | Performed by: PATHOLOGY

## 2020-05-05 PROCEDURE — 88305 TISSUE EXAM BY PATHOLOGIST: CPT | Performed by: PATHOLOGY

## 2020-05-05 PROCEDURE — D9220A PRA ANESTHESIA: Mod: ANES,,, | Performed by: ANESTHESIOLOGY

## 2020-05-05 PROCEDURE — 88305 TISSUE EXAM BY PATHOLOGIST: ICD-10-PCS | Mod: 26,,, | Performed by: PATHOLOGY

## 2020-05-05 PROCEDURE — 45380 COLONOSCOPY AND BIOPSY: CPT | Performed by: INTERNAL MEDICINE

## 2020-05-05 PROCEDURE — 88342 CHG IMMUNOCYTOCHEMISTRY: ICD-10-PCS | Mod: 26,,, | Performed by: PATHOLOGY

## 2020-05-05 PROCEDURE — 37000008 HC ANESTHESIA 1ST 15 MINUTES: Performed by: INTERNAL MEDICINE

## 2020-05-05 PROCEDURE — 43239 EGD BIOPSY SINGLE/MULTIPLE: CPT | Performed by: INTERNAL MEDICINE

## 2020-05-05 PROCEDURE — 93005 ELECTROCARDIOGRAM TRACING: CPT

## 2020-05-05 PROCEDURE — 25000003 PHARM REV CODE 250

## 2020-05-05 PROCEDURE — 43239 PR EGD, FLEX, W/BIOPSY, SGL/MULTI: ICD-10-PCS | Mod: 59,,, | Performed by: INTERNAL MEDICINE

## 2020-05-05 PROCEDURE — 37000009 HC ANESTHESIA EA ADD 15 MINS: Performed by: INTERNAL MEDICINE

## 2020-05-05 PROCEDURE — 88305 TISSUE EXAM BY PATHOLOGIST: CPT | Mod: 26,,, | Performed by: PATHOLOGY

## 2020-05-05 PROCEDURE — 93010 EKG 12-LEAD: ICD-10-PCS | Mod: ,,, | Performed by: INTERNAL MEDICINE

## 2020-05-05 PROCEDURE — 87507 IADNA-DNA/RNA PROBE TQ 12-25: CPT

## 2020-05-05 PROCEDURE — D9220A PRA ANESTHESIA: ICD-10-PCS | Mod: CRNA,,, | Performed by: NURSE ANESTHETIST, CERTIFIED REGISTERED

## 2020-05-05 PROCEDURE — 45380 PR COLONOSCOPY,BIOPSY: ICD-10-PCS | Mod: ,,, | Performed by: INTERNAL MEDICINE

## 2020-05-05 PROCEDURE — 43239 EGD BIOPSY SINGLE/MULTIPLE: CPT | Mod: 59,,, | Performed by: INTERNAL MEDICINE

## 2020-05-05 PROCEDURE — 45380 COLONOSCOPY AND BIOPSY: CPT | Mod: ,,, | Performed by: INTERNAL MEDICINE

## 2020-05-05 PROCEDURE — 27201423 OPTIME MED/SURG SUP & DEVICES STERILE SUPPLY: Performed by: INTERNAL MEDICINE

## 2020-05-05 RX ORDER — SODIUM CHLORIDE, SODIUM LACTATE, POTASSIUM CHLORIDE, CALCIUM CHLORIDE 600; 310; 30; 20 MG/100ML; MG/100ML; MG/100ML; MG/100ML
125 INJECTION, SOLUTION INTRAVENOUS CONTINUOUS
Status: DISCONTINUED | OUTPATIENT
Start: 2020-05-05 | End: 2020-05-05 | Stop reason: HOSPADM

## 2020-05-05 RX ORDER — FAMOTIDINE 10 MG/ML
20 INJECTION INTRAVENOUS ONCE
Status: CANCELLED | OUTPATIENT
Start: 2020-05-05 | End: 2020-05-05

## 2020-05-05 RX ORDER — SODIUM CHLORIDE, SODIUM LACTATE, POTASSIUM CHLORIDE, CALCIUM CHLORIDE 600; 310; 30; 20 MG/100ML; MG/100ML; MG/100ML; MG/100ML
INJECTION, SOLUTION INTRAVENOUS CONTINUOUS
Status: CANCELLED | OUTPATIENT
Start: 2020-05-05

## 2020-05-05 RX ORDER — FAMOTIDINE 10 MG/ML
INJECTION INTRAVENOUS
Status: COMPLETED
Start: 2020-05-05 | End: 2020-05-05

## 2020-05-05 RX ORDER — MIDAZOLAM HYDROCHLORIDE 1 MG/ML
INJECTION, SOLUTION INTRAMUSCULAR; INTRAVENOUS
Status: DISCONTINUED | OUTPATIENT
Start: 2020-05-05 | End: 2020-05-05

## 2020-05-05 RX ORDER — SODIUM CHLORIDE, SODIUM LACTATE, POTASSIUM CHLORIDE, CALCIUM CHLORIDE 600; 310; 30; 20 MG/100ML; MG/100ML; MG/100ML; MG/100ML
INJECTION, SOLUTION INTRAVENOUS CONTINUOUS PRN
Status: DISCONTINUED | OUTPATIENT
Start: 2020-05-05 | End: 2020-05-05

## 2020-05-05 RX ORDER — ONDANSETRON 2 MG/ML
4 INJECTION INTRAMUSCULAR; INTRAVENOUS DAILY PRN
Status: DISCONTINUED | OUTPATIENT
Start: 2020-05-05 | End: 2020-05-05 | Stop reason: HOSPADM

## 2020-05-05 RX ORDER — PROPOFOL 10 MG/ML
VIAL (ML) INTRAVENOUS
Status: DISCONTINUED | OUTPATIENT
Start: 2020-05-05 | End: 2020-05-05

## 2020-05-05 RX ORDER — LIDOCAINE HYDROCHLORIDE 20 MG/ML
INJECTION, SOLUTION EPIDURAL; INFILTRATION; INTRACAUDAL; PERINEURAL
Status: DISCONTINUED | OUTPATIENT
Start: 2020-05-05 | End: 2020-05-05

## 2020-05-05 RX ORDER — DIPHENHYDRAMINE HYDROCHLORIDE 50 MG/ML
12.5 INJECTION INTRAMUSCULAR; INTRAVENOUS
Status: DISCONTINUED | OUTPATIENT
Start: 2020-05-05 | End: 2020-05-05 | Stop reason: HOSPADM

## 2020-05-05 RX ORDER — LIDOCAINE HYDROCHLORIDE 10 MG/ML
1 INJECTION, SOLUTION EPIDURAL; INFILTRATION; INTRACAUDAL; PERINEURAL ONCE
Status: CANCELLED | OUTPATIENT
Start: 2020-05-05 | End: 2020-05-05

## 2020-05-05 RX ADMIN — PROPOFOL 30 MG: 10 INJECTION, EMULSION INTRAVENOUS at 09:05

## 2020-05-05 RX ADMIN — MIDAZOLAM HYDROCHLORIDE 2 MG: 1 INJECTION, SOLUTION INTRAMUSCULAR; INTRAVENOUS at 09:05

## 2020-05-05 RX ADMIN — SODIUM CHLORIDE, POTASSIUM CHLORIDE, SODIUM LACTATE AND CALCIUM CHLORIDE: 600; 310; 30; 20 INJECTION, SOLUTION INTRAVENOUS at 09:05

## 2020-05-05 RX ADMIN — LIDOCAINE HYDROCHLORIDE 100 MG: 20 INJECTION, SOLUTION EPIDURAL; INFILTRATION; INTRACAUDAL; PERINEURAL at 09:05

## 2020-05-05 RX ADMIN — PROPOFOL 20 MG: 10 INJECTION, EMULSION INTRAVENOUS at 09:05

## 2020-05-05 RX ADMIN — FAMOTIDINE 20 MG: 10 INJECTION INTRAVENOUS at 08:05

## 2020-05-05 RX ADMIN — PROPOFOL 100 MG: 10 INJECTION, EMULSION INTRAVENOUS at 09:05

## 2020-05-05 NOTE — [employer] PATIENT INSTRUCTIONS
Discharge Summary/Instructions after an Endoscopic Procedure  Patient Name: Luz Arias  Patient MRN: [id number]  Patient YOB: 1965  Tuesday, May 5, 2020  Garrick López MD  RESTRICTIONS:  During your procedure today, you received medications for sedation.  These   medications may affect your judgment, balance and coordination.  Therefore,   for 24 hours, you have the following restrictions:   - DO NOT drive a car, operate machinery, make legal/financial decisions,   sign important papers or drink alcohol.    ACTIVITY:  Today: no heavy lifting, straining or running due to procedural   sedation/anesthesia.  The following day: return to full activity including work.  DIET:  Eat and drink normally unless instructed otherwise.     TREATMENT FOR COMMON SIDE EFFECTS:  - Mild abdominal pain, nausea, belching, bloating or excessive gas:  rest,   eat lightly and use a heating pad.  - Sore Throat: treat with throat lozenges and/or gargle with warm salt   water.  - Because air was used during the procedure, expelling large amounts of air   from your rectum or belching is normal.  - If a bowel prep was taken, you may not have a bowel movement for 1-3 days.    This is normal.  SYMPTOMS TO WATCH FOR AND REPORT TO YOUR PHYSICIAN:  1. Abdominal pain or bloating, other than gas cramps.  2. Chest pain.  3. Back pain.  4. Signs of infection such as: chills or fever occurring within 24 hours   after the procedure.  5. Rectal bleeding, which would show as bright red, maroon, or black stools.   (A tablespoon of blood from the rectum is not serious, especially if   hemorrhoids are present.)  6. Vomiting.  7. Weakness or dizziness.  GO DIRECTLY TO THE NEAREST EMERGENCY ROOM IF YOU HAVE ANY OF THE FOLLOWING:      Difficulty breathing              Chills and/or fever over 101 F   Persistent vomiting and/or vomiting blood   Severe abdominal pain   Severe chest pain   Black, tarry stools   Bleeding- more than one tablespoon   Any other  symptom or condition that you feel may need urgent attention  Your doctor recommends these additional instructions:  If any biopsies were taken, your doctors clinic will contact you in 1 to 2   weeks with any results.  - Discharge patient to home (ambulatory).   - Resume previous diet.  For questions, problems or results please call your physician - Garrick López MD at Work:  [phone].  [facility] EMERGENCY ROOM PHONE NUMBER: [phone]  IF A COMPLICATION OR EMERGENCY SITUATION ARISES AND YOU ARE UNABLE TO REACH   YOUR PHYSICIAN - GO DIRECTLY TO THE EMERGENCY ROOM.  MD Garrick Gonzalez MD  5/5/2020 9:31:21 AM  This report has been verified and signed electronically.  [employer]

## 2020-05-05 NOTE — DISCHARGE SUMMARY
Upper endoscopy revealed gastroesophageal reflux and gastritis.  The biopsies are pending for H pylori.  Colonoscopy revealed mild hyperemia the sigmoid colon.  Biopsies pending.  She will continue her current medications diet and activities.  She will continue her reflux regimen.  She has a followup upon the office.  She will make a food diary and avoid the offending foods.  Sure add more fiber to the diet.

## 2020-05-05 NOTE — ANESTHESIA POSTPROCEDURE EVALUATION
Anesthesia Post Evaluation    Patient: Luz Arias    Procedure(s) Performed: Procedure(s) (LRB):  EGD (ESOPHAGOGASTRODUODENOSCOPY) (N/A)  COLONOSCOPY (N/A)    Final Anesthesia Type: general    Patient location during evaluation: PACU  Patient participation: Yes- Able to Participate  Level of consciousness: awake and alert  Post-procedure vital signs: reviewed and stable  Pain management: adequate  Airway patency: patent    PONV status at discharge: No PONV  Anesthetic complications: no      Cardiovascular status: blood pressure returned to baseline  Respiratory status: unassisted  Hydration status: euvolemic  Follow-up not needed.          Vitals Value Taken Time   /68 5/5/2020 10:00 AM   Temp 36.8 °C (98.2 °F) 5/5/2020  8:19 AM   Pulse 82 5/5/2020 10:00 AM   Resp 18 5/5/2020 10:00 AM   SpO2 98 % 5/5/2020 10:00 AM         Event Time     Out of Recovery 09:45:00          Pain/Yamila Score: Yamila Score: 10 (5/5/2020 10:00 AM)  Modified Yamila Score: 20 (5/5/2020 10:00 AM)

## 2020-05-05 NOTE — [employer] PATIENT INSTRUCTIONS
Discharge Summary/Instructions after an Endoscopic Procedure  Patient Name: Luz Arias  Patient MRN: [id number]  Patient YOB: 1965  Tuesday, May 5, 2020  Garrick López MD  RESTRICTIONS:  During your procedure today, you received medications for sedation.  These   medications may affect your judgment, balance and coordination.  Therefore,   for 24 hours, you have the following restrictions:   - DO NOT drive a car, operate machinery, make legal/financial decisions,   sign important papers or drink alcohol.    ACTIVITY:  Today: no heavy lifting, straining or running due to procedural   sedation/anesthesia.  The following day: return to full activity including work.  DIET:  Eat and drink normally unless instructed otherwise.     TREATMENT FOR COMMON SIDE EFFECTS:  - Mild abdominal pain, nausea, belching, bloating or excessive gas:  rest,   eat lightly and use a heating pad.  - Sore Throat: treat with throat lozenges and/or gargle with warm salt   water.  - Because air was used during the procedure, expelling large amounts of air   from your rectum or belching is normal.  - If a bowel prep was taken, you may not have a bowel movement for 1-3 days.    This is normal.  SYMPTOMS TO WATCH FOR AND REPORT TO YOUR PHYSICIAN:  1. Abdominal pain or bloating, other than gas cramps.  2. Chest pain.  3. Back pain.  4. Signs of infection such as: chills or fever occurring within 24 hours   after the procedure.  5. Rectal bleeding, which would show as bright red, maroon, or black stools.   (A tablespoon of blood from the rectum is not serious, especially if   hemorrhoids are present.)  6. Vomiting.  7. Weakness or dizziness.  GO DIRECTLY TO THE NEAREST EMERGENCY ROOM IF YOU HAVE ANY OF THE FOLLOWING:      Difficulty breathing              Chills and/or fever over 101 F   Persistent vomiting and/or vomiting blood   Severe abdominal pain   Severe chest pain   Black, tarry stools   Bleeding- more than one tablespoon   Any other  symptom or condition that you feel may need urgent attention  Your doctor recommends these additional instructions:  If any biopsies were taken, your doctors clinic will contact you in 1 to 2   weeks with any results.  - Discharge patient to home (ambulatory).   - Resume previous diet.  For questions, problems or results please call your physician - Garrick López MD at Work:  [phone].  [facility] EMERGENCY ROOM PHONE NUMBER: [phone]  IF A COMPLICATION OR EMERGENCY SITUATION ARISES AND YOU ARE UNABLE TO REACH   YOUR PHYSICIAN - GO DIRECTLY TO THE EMERGENCY ROOM.  MD Garrick Gonzalez MD  5/5/2020 9:36:02 AM  This report has been verified and signed electronically.  [employer]

## 2020-05-05 NOTE — DISCHARGE INSTRUCTIONS
Lower GI Endoscopy     During endoscopy, a long, flexible tube is used to view the inside of your lower GI tract.      Lower GI endoscopy allows your healthcare provider to view your lower gastrointestinal (GI) tract. Your entire colon and rectum can be examined (colonoscopy). Or just the rectum and sigmoid colon can be examined (sigmoidoscopy).  Before the exam  Follow these and any other instructions you are given before your endoscopy. If you dont follow the healthcare providers instructions carefully, the test may need to be cancelled or done over.  · For a colonoscopy, you may be told not to eat and to drink only clear liquids for 1 to 3 days before the exam. Usually it is clear liquids for one day and sometimes other dietary changes even before that, based on your discussion with your healthcare provider.   · Take any laxatives that are prescribed for you. An enema may also be prescribed.  · Arrange for someone to drive you home after the exam if you will be sedated.  · Tell your healthcare provider before the exam if you are taking any medicines, vitamins, supplements, recreational drugs, or have any medical problems.  · Discuss possible alternatives to the procedure, and risks, with your healthcare provider.   The procedure  · Colonoscopy can take 30 minutes or longer. Sigmoidoscopy often takes about 20 minutes. The length of the procedure depends a great deal on how clean your intestines are, the reason for the procedure, and what treatments must be done.   · You lie on the stretcher or bed on your left side.  · For colonoscopy, you are given sedating (relaxing) medicine through an IV line. Sigmoidoscopy usually doesnt need sedation.  · The endoscope is inserted into your rectum. You may feel pressure and cramping. If you feel pain, tell your healthcare provider. You may receive more sedation, which includes pain medicine and an anti-anxiety medicine.   · The endoscope carries images of your colon to a  video screen. Prints of the images may be taken as a record of your exam.  · Biopsies (tissue samples), polyp removal, or other treatments may be performed.   · When the procedure is done, you rest for a time. You may have some discomfort right after the procedure from trapped air. This can be relieved by changing position and passing the air. If you have been sedated, you must have an adult drive you home.  When to call your healthcare provider  Call if you have any of the following after the procedure:  · Pain in your belly  · Fever  · Rectal bleeding   Date Last Reviewed: 7/1/2016 © 2000-2017 [employer]. [address] [place] [address], [place] [address]. All rights reserved. This information is not intended as a substitute for professional medical care. Always follow your healthcare professional's instructions.        Upper GI Endoscopy     During endoscopy, a long, flexible tube is used to view the inside of your upper GI tract.      Upper GI endoscopy allows your healthcare provider to look directly into the beginning of your gastrointestinal (GI) tract. The esophagus, stomach, and duodenum (the first part of the small intestine) make up the upper GI tract.   Before the exam  Follow these and any other instructions you are given before your endoscopy. If you dont follow the healthcare providers instructions carefully, the test may need to be canceled or done over:  · Don't eat or drink anything after midnight the night before your exam. If your exam is in the afternoon, drink only clear liquids in the morning. Don't eat or drink anything for 8 hours before the exam. In some cases, you may be able to take medicines with sips of water until 2 hours before the procedure. Speak with your healthcare provider about this.   · Bring your X-rays and any other test results you have.  · Because you will be sedated, arrange for an adult to drive you home after the exam.  · Tell your healthcare provider before the  exam if you are taking any medicines or have any medical problems.  The procedure  Here is what to expect:  · You will lie on the endoscopy table. Usually patients lie on the left side.  · You will be monitored and given oxygen.  · Your throat may be numbed with a spray or gargle. You are given medicine through an intravenous (IV) line that will help you relax and remain comfortable. You may be awake or asleep during the procedure.  · The healthcare provider will put the endoscope in your mouth and down your esophagus. It is thinner than most pieces of food that you swallow. It will not affect your breathing. The medicine helps keep you from gagging.  · Air is put into your GI tract to expand it. It can make you burp.  · During the procedure, the healthcare provider can take biopsies (tissue samples), remove abnormalities, such as polyps, or treat abnormalities through a variety of devices placed through the endoscope. You will not feel this.   · The endoscope carries images of your upper GI tract to a video screen. If you are awake, you may be able to look at the images.  · After the procedure is done, you will rest for a time. An adult must drive you home.  When to call your healthcare provider  Contact your healthcare provider if you have:  · Black or tarry stools, or blood in your stool  · Fever  · Pain in your belly that does not go away  · Nausea and vomiting, or vomiting blood   Date Last Reviewed: 7/1/2016  © 3048-1072 [employer]. [address] [place] [address]. All rights reserved. This information is not intended as a substitute for professional medical care. Always follow your healthcare professional's instructions.        Discharge Instructions: After Your Surgery  Youve just had surgery. During surgery, you were given medicine called anesthesia to keep you relaxed and free of pain. After surgery, you may have some pain or nausea. This is common. Here are some tips for feeling  better and getting well after surgery.     Stay on schedule with your medicine.   Going home  Your healthcare provider will show you how to take care of yourself when you go home. He or she will also answer your questions. Have an adult family member or friend drive you home. For the first 24 hours after your surgery:  · Do not drive or use heavy equipment.  · Do not make important decisions or sign legal papers.  · Do not drink alcohol.  · Have someone stay with you, if needed. He or she can watch for problems and help keep you safe.  Be sure to go to all follow-up visits with your healthcare provider. And rest after your surgery for as long as your healthcare provider tells you to.  Coping with pain  If you have pain after surgery, pain medicine will help you feel better. Take it as told, before pain becomes severe. Also, ask your healthcare provider or pharmacist about other ways to control pain. This might be with heat, ice, or relaxation. And follow any other instructions your surgeon or nurse gives you.  Tips for taking pain medicine  To get the best relief possible, remember these points:  · Pain medicines can upset your stomach. Taking them with a little food may help.  · Most pain relievers taken by mouth need at least 20 to 30 minutes to start to work.  · Taking medicine on a schedule can help you remember to take it. Try to time your medicine so that you can take it before starting an activity. This might be before you get dressed, go for a walk, or sit down for dinner.  · Constipation is a common side effect of pain medicines. Call your healthcare provider before taking any medicines such as laxatives or stool softeners to help ease constipation. Also ask if you should skip any foods. Drinking lots of fluids and eating foods such as fruits and vegetables that are high in fiber can also help. Remember, do not take laxatives unless your surgeon has prescribed them.  · Drinking alcohol and taking pain  medicine can cause dizziness and slow your breathing. It can even be deadly. Do not drink alcohol while taking pain medicine.  · Pain medicine can make you react more slowly to things. Do not drive or run machinery while taking pain medicine.  Your healthcare provider may tell you to take acetaminophen to help ease your pain. Ask him or her how much you are supposed to take each day. Acetaminophen or other pain relievers may interact with your prescription medicines or other over-the-counter (OTC) medicines. Some prescription medicines have acetaminophen and other ingredients. Using both prescription and OTC acetaminophen for pain can cause you to overdose. Read the labels on your OTC medicines with care. This will help you to clearly know the list of ingredients, how much to take, and any warnings. It may also help you not take too much acetaminophen. If you have questions or do not understand the information, ask your pharmacist or healthcare provider to explain it to you before you take the OTC medicine.  Managing nausea  Some people have an upset stomach after surgery. This is often because of anesthesia, pain, or pain medicine, or the stress of surgery. These tips will help you handle nausea and eat healthy foods as you get better. If you were on a special food plan before surgery, ask your healthcare provider if you should follow it while you get better. These tips may help:  · Do not push yourself to eat. Your body will tell you when to eat and how much.  · Start off with clear liquids and soup. They are easier to digest.  · Next try semi-solid foods, such as mashed potatoes, applesauce, and gelatin, as you feel ready.  · Slowly move to solid foods. Dont eat fatty, rich, or spicy foods at first.  · Do not force yourself to have 3 large meals a day. Instead eat smaller amounts more often.  · Take pain medicines with a small amount of solid food, such as crackers or toast, to avoid nausea.     Call your  surgeon if  · You still have pain an hour after taking medicine. The medicine may not be strong enough.  · You feel too sleepy, dizzy, or groggy. The medicine may be too strong.  · You have side effects like nausea, vomiting, or skin changes, such as rash, itching, or hives.       If you have obstructive sleep apnea  You were given anesthesia medicine during surgery to keep you comfortable and free of pain. After surgery, you may have more apnea spells because of this medicine and other medicines you were given. The spells may last longer than usual.   At home:  · Keep using the continuous positive airway pressure (CPAP) device when you sleep. Unless your healthcare provider tells you not to, use it when you sleep, day or night. CPAP is a common device used to treat obstructive sleep apnea.  · Talk with your provider before taking any pain medicine, muscle relaxants, or sedatives. Your provider will tell you about the possible dangers of taking these medicines.  Date Last Reviewed: 12/1/2016  © 3878-8905 [employer]. [address] [place] [address], [place] [address]. All rights reserved. This information is not intended as a substitute for professional medical care. Always follow your healthcare professional's instructions.

## 2020-05-05 NOTE — DISCHARGE SUMMARY
She underwent upper endoscopy and colonoscopy.  She has a history of dyspepsia pyrosis and anemia.  She has had chronic diarrhea.  She is a diabetic.  She has had a stroke syndrome and she has polypharmacy.  Her symptoms are not related to a specific food or medications that she can identify.

## 2020-05-05 NOTE — ANESTHESIA PREPROCEDURE EVALUATION
05/05/2020  Luz Arias is a 54 y.o., female.    Anesthesia Evaluation    I have reviewed the Patient Summary Reports.    I have reviewed the Nursing Notes.   I have reviewed the Medications.     Review of Systems  Social:  Non-Smoker    Hematology/Oncology:  Hematology Normal   Oncology Normal     EENT/Dental:EENT/Dental Normal   Cardiovascular:   Hypertension    Pulmonary:  Pulmonary Normal    Hepatic/GI:   GERD    Neurological:   CVA, residual symptoms   Chronic Pain Syndrome   Endocrine:   Diabetes    Psych:   Psychiatric History          Physical Exam  General:  Well nourished    Airway/Jaw/Neck:  Airway Findings: Mouth Opening: Normal Tongue: Normal  Mallampati: III  TM Distance: Normal, at least 6 cm  Jaw/Neck Findings:  Neck ROM: Normal ROM       Chest/Lungs:  Chest/Lungs Findings: Clear to auscultation     Heart/Vascular:  Heart Findings: Rate: Normal  Rhythm: Regular Rhythm        Mental Status:  Mental Status Findings:  Cooperative, Alert and Oriented         Anesthesia Plan  Type of Anesthesia, risks & benefits discussed:  Anesthesia Type:  general  Patient's Preference:   Intra-op Monitoring Plan: standard ASA monitors  Intra-op Monitoring Plan Comments:   Post Op Pain Control Plan: IV/PO Opioids PRN  Post Op Pain Control Plan Comments:   Induction:   IV  Beta Blocker:  Patient is not currently on a Beta-Blocker (No further documentation required).       Informed Consent: Patient understands risks and agrees with Anesthesia plan.  Questions answered. Anesthesia consent signed with patient.  ASA Score: 4     Day of Surgery Review of History & Physical: I have interviewed and examined the patient. I have reviewed the patient's H&P dated:            Ready For Surgery From Anesthesia Perspective.

## 2020-05-05 NOTE — PLAN OF CARE
Pt to PACU from OR,iv pt awake, Alert, denies pain,warm blanket give, iv intact , infusing, will continue to monitor

## 2020-05-05 NOTE — TRANSFER OF CARE
"Anesthesia Transfer of Care Note    Patient: Luz Arias    Procedure(s) Performed: Procedure(s) (LRB):  EGD (ESOPHAGOGASTRODUODENOSCOPY) (N/A)  COLONOSCOPY (N/A)    Patient location: PACU    Anesthesia Type: general    Transport from OR: Transported from OR on room air with adequate spontaneous ventilation    Post pain: adequate analgesia    Post assessment: no apparent anesthetic complications and tolerated procedure well    Post vital signs: stable    Level of consciousness: awake, alert and oriented    Nausea/Vomiting: no nausea/vomiting    Complications: none    Transfer of care protocol was followed      Last vitals:   Visit Vitals  BP (!) 150/66 (BP Location: Right arm, Patient Position: Lying)   Pulse 90   Temp 36.8 °C (98.2 °F) (Oral)   Resp 17   Ht 5' 5" (1.651 m)   Wt 78.9 kg (174 lb)   SpO2 100%   Breastfeeding? No   BMI 28.96 kg/m²     "

## 2020-05-05 NOTE — OP NOTE
Procedure.  Esophageal gastroduodenoscopy with biopsies.  Indications.  Dyspepsia pyrosis nausea and anemia.  She has good health knowledge.  She understands the procedure.  Specifically she realizes there is an extremely small incidence of bleeding perforation or aspiration.  Anesthesia.  Monitored anesthesia coverage.  Procedure.  With the patient in left lateral supine position in the procedure room.  The Olympus video upper endoscope was passed without difficulty.  The hyperemic gastroesophageal junction was at 37-38 cm.  The scope was passed into the stomach.  The cardia body and antrum was diffusely hyperemic.  There was minimal retained secretions in the stomach.  The friable pre-pyloric mucosa was biopsied for histology.  There was minimal deformity of the pylorus.  First and parts of the duodenum were normal.  Patient tolerated the procedure well.  Impression 1.  Esophagitis LA grade A 2.  Gastritis.  Procedure.  Colonoscopy.  Indications.  Diarrhea with left lower quadrant discomfort history of anemia.  She has good health knowledge and she understands the procedure.  Specifically she realizes there is an extremely small incidence of bleeding perforation or aspiration.  Anesthesia.  Monitored anesthesia.  Procedure.  With the patient in the procedure room.  The perianal area was examined there were palpable hemorrhoids.  The lives Olympus video upper endoscope was passed to the cecum identified by the ileocecal valve and triradiate fold.  Washing was required.  There was very careful circumferential inspection mucosa as the scope was withdrawn.  Stool was obtained for analysis.  The sigmoid colon was hyperemic and friable and biopsies were obtained.  The scope was retroflexed the rectum and the hemorrhoids were identified.  Patient tolerated the procedure well.  Impression 1.  Segmental hyperemia of the sigmoid colon possibly colitis 2.  Hemorrhoids.

## 2020-05-05 NOTE — H&P
"Office Visit     4/29/2020  [facility]astro      Garrick López MD   Gastroenterology   Anemia, unspecified type +6 more   Dx   Follow-up ; Referred by Aaareferral Self   Reason for Visit    Additional Documentation     Vitals:    /61    Pulse 90    Temp 98.4 °F (36.9 °C) (Oral)    Resp 16    Ht 5' 5" (1.651 m)    Wt 81.2 kg (179 lb)    SpO2 95%    BMI 29.79 kg/m²    BSA 1.93 m²    Pain Sc   4 (Loc: Abdomen) (Edu: Yes)    Flowsheets:    Anthropometrics       SmartForms:     [facility] AMB - FALL RISK       Encounter Info:    Billing Info,    History,    Allergies,    Detailed Report       Instructions         Follow up in about 1 month (around 5/29/2020).   She was found to be anemic.  She will obtain the labs as requested.  She has a history gastroesophageal reflux and will continue the reflux regimen.  She continues her diabetic diet and current medications.  She will make a food diary and attempt to pinpoint a precipitating factor for the diarrhea.  She will continue the Colestid is ordered.  She has had abdominal pain that is not related to the Colestid usage.  She continues her vitamins and minerals.          After Visit Summary (Printed 4/29/2020)   Progress Notes        Subjective:       Patient ID: Luz Arias is a 54 y.o. female.     Chief Complaint: Follow-up     She complains of diarrhea.  Sometimes with urgency and occasional episodes incontinence.  She has had a colonoscopy which was unrevealing.  Upper endoscopy revealed gastroesophageal reflux.  She was found to be anemic.  She has not obtained the further labs as ordered.  She will do so today she states.  She denies hematemesis hematochezia jaundice or bleeding.  She is followed at [facility].  She states that they regulate her diabetes and is well controlled.  She has been on the metformin and she does not state that any of her medications have cause the diarrhea.  She does not ingest milk.  She is on the " diabetic diet.  Her weights remain stable.  She denies fever chills.  She only takes medications as prescribed.        Allergies:       Review of patient's allergies indicates:   Allergen Reactions    Pregabalin      Protonix [pantoprazole]      Sulfa (sulfonamide antibiotics)           Medications:     Current Outpatient Medications:     acyclovir (ZOVIRAX) 400 MG tablet, acyclovir 400 mg tablet, Disp: , Rfl:     albuterol (PROVENTIL) 5 mg/mL nebulizer solution, Take 2.5 mg by nebulization every 6 (six) hours as needed for Wheezing. Rescue, Disp: , Rfl:     amLODIPine (NORVASC) 10 MG tablet, , Disp: , Rfl:     aspirin (ECOTRIN) 81 MG EC tablet, , Disp: , Rfl:     biotin 10,000 mcg Cap, Take by mouth., Disp: , Rfl:     candesartan (ATACAND) 32 MG tablet, , Disp: , Rfl:     carBAMazepine (TEGRETOL XR) 400 MG Tb12, , Disp: , Rfl:     carvediloL (COREG) 12.5 MG tablet, , Disp: , Rfl:     cetirizine (ZYRTEC) 10 MG tablet, , Disp: , Rfl:     colestipoL (COLESTID) 1 gram Tab, Take 2 tablets (2 g total) by mouth once daily., Disp: 180 tablet, Rfl: 3    CRESTOR 40 mg Tab, , Disp: , Rfl:     DEEP SEA NASAL 0.65 % nasal spray, , Disp: , Rfl:     diazePAM (VALIUM) 2 MG tablet, diazepam 2 mg tablet, Disp: , Rfl:     diclofenac sodium (VOLTAREN) 1 % Gel, Voltaren 1 % topical gel, Disp: , Rfl:     donepezil (ARICEPT) 10 MG tablet, , Disp: , Rfl:     EASY TOUCH ALCOHOL PREP PADS PadM, , Disp: , Rfl:     ergocalciferol (ERGOCALCIFEROL) 50,000 unit Cap, ergocalciferol (vitamin D2) 50,000 unit capsule, Disp: , Rfl:     fluticasone propionate (FLONASE) 50 mcg/actuation nasal spray, , Disp: , Rfl:     fluticasone-salmeterol 230-21 mcg/dose (ADVAIR HFA) 230-21 mcg/actuation HFAA inhaler, Advair  mcg-21 mcg/actuation aerosol inhaler, Disp: , Rfl:     FREESTYLE LITE STRIPS Strp, , Disp: , Rfl:     ipratropium (ATROVENT) 0.03 % nasal spray, , Disp: , Rfl:     isosorbide mononitrate (IMDUR) 30 MG 24 hr  tablet, , Disp: , Rfl:     L-METHYL-B6-B12 3-35-2 mg Tab, , Disp: , Rfl:     lidocaine (LIDODERM) 5 %, , Disp: , Rfl:     metFORMIN (GLUCOPHAGE-XR) 500 MG XR 24hr tablet, , Disp: , Rfl:     methylphenidate HCl (RITALIN) 10 MG tablet, , Disp: , Rfl:     nortriptyline (PAMELOR) 25 MG capsule, , Disp: , Rfl:     ondansetron (ZOFRAN-ODT) 8 MG TbDL, , Disp: , Rfl:     prazosin (MINIPRESS) 2 MG Cap, prazosin 2 mg capsule, Disp: , Rfl:     tiZANidine (ZANAFLEX) 4 MG tablet, , Disp: , Rfl:     triamterene-hydrochlorothiazide 75-50 mg (MAXZIDE) 75-50 mg per tablet, , Disp: , Rfl:     TYLENOL EXTRA STRENGTH 500 mg tablet, , Disp: , Rfl:     zolpidem (AMBIEN) 10 mg Tab, , Disp: , Rfl:           Past Medical History:   Diagnosis Date    Chronic fatigue      Chronic pain      Depression      Diabetes      Dysphagia      Gastritis      Gastroparesis      GERD (gastroesophageal reflux disease)      Hypertension      Irritable bowel syndrome      Stroke                 Past Surgical History:   Procedure Laterality Date    COLONOSCOPY   05/17/2019    UPPER GASTROINTESTINAL ENDOSCOPY   05/17/2019            Review of Systems   Constitutional: Negative for appetite change, fever and unexpected weight change.   HENT: Negative for trouble swallowing.         No jaundice.   Respiratory: Negative for cough, shortness of breath and wheezing.         She has never been a cigarette smoker.  She does not use alcohol.  She denies aspiration or hemoptysis.  She has episodes of dyspnea that her not well defined but respond to her pulmonary inhalers.  She denies significant coughing or sputum production.  She does not associate her pulmonary symptoms with oral intake of food or liquids.   Cardiovascular: Negative for chest pain.        She denies exertional chest pain or rhythm disturbance.  She is not particularly physically active because of her stroke syndrome.  She states her hypertension hyperlipidemia well  controlled.   Gastrointestinal: Positive for abdominal pain, diarrhea and nausea. Negative for abdominal distention, anal bleeding, blood in stool, constipation, rectal pain and vomiting.        She has occasion quadrant discomfort which signals a bowel movement.  She has occasional nausea without vomiting.  She cannot pinpoint a precipitating factor.  She believes that she was told that she might have gastroparesis although I have been unable to obtain the records.   Endocrine:        Her PCP regulates her diabetes.  She believes that it is well controlled.  I have requested the labs.  She is on the ADA diet.   Musculoskeletal: Positive for arthralgias and gait problem. Negative for neck pain.   Skin: Negative for pallor and rash.   Neurological: Negative for dizziness, seizures, syncope, speech difficulty, weakness and numbness.        History of a stroke.  She ambulates with a cane and.  Her family member states that she is able to think normally.  She is able to perform her usual activities.  She has not had falling.   Hematological: Negative for adenopathy.   Psychiatric/Behavioral: Negative for confusion.       Objective:   Physical Exam   Constitutional: She is oriented to person, place, and time. She appears well-developed and well-nourished.   Well-nourished well-hydrated nonicteric afebrile  female.  She is oriented x3.  She can relate her history and answer questions appropriately.   HENT:   Head: Normocephalic.   Eyes: Pupils are equal, round, and reactive to light. EOM are normal.   Neck: Normal range of motion. Neck supple. No tracheal deviation present. No thyromegaly present.   Cardiovascular: Normal rate, regular rhythm and normal heart sounds.   Pulmonary/Chest: Effort normal and breath sounds normal.   Abdominal: Soft. Bowel sounds are normal. She exhibits no distension and no mass. There is no tenderness. There is no rebound and no guarding. No hernia.   Abdomen is soft without  tenderness masses organomegaly.  Bowel sounds are normal.   Musculoskeletal:   He walks with the cane.  She ambulates slowly.  She appears to have a steady gait.  She can go from the sitting to the standing position without difficulty.   Lymphadenopathy:     She has no cervical adenopathy.   Neurological: She is alert and oriented to person, place, and time. No cranial nerve deficit.   Skin: Skin is warm and dry.   Psychiatric: She has a normal mood and affect. Her behavior is normal.   Vitals reviewed.         Plan:       Anemia, unspecified type     Diarrhea, unspecified type  -     Celiac Disease Panel; Future; Expected date: 04/29/2020  -     Pancreatic elastase, fecal; Future; Expected date: 04/29/2020  -     Gastrointestinal Pathogens Panel, PCR; Future; Expected date: 04/29/2020     History of cholecystectomy     Gastroesophageal reflux disease without esophagitis     Nausea and vomiting, intractability of vomiting not specified, unspecified vomiting type      she will continue her reflux regimen.  She continues her diabetic diet.  She will make a food diary and avoid the offending foods.  She follows up with her PCP for treatment for diabetes.  She will discuss with the neurologist her multiple medications.  She has polypharmacy and some of the medications may cause gastrointestinal motility disturbances.  Evaluation of her diarrhea is in progress.  She will go to the lab to obtain the ordered test.  She will continue the Colestid vitamins and minerals.          Other Notes   All notes       Addendum Note from Garrick López MD (Gastroenterology)       Addendum Note from Maile Son LPN   Not recorded   All Charges for This Encounter     Code Description Service Date Service Provider Modifiers Qty   03644453 HC E&M-EST. PATIENT - LVL III 4/29/2020 Garrick López MD PBBFAC, PN 1   [id number] OR PBB SHADOW E&M-EST. PATIENT-LVL III 4/29/2020 Garrick López MD PBBFAC 1   50250 OR OFFICE/OUTPT VISIT,EST,LEVL III  4/29/2020 Garrick López MD S$PBB 1   Level of Service     Level of Service   FL OFFICE/OUTPT VISIT,ESTLEVL III [64928]   BestPractice Advisories     Click to view BestPractice Advisory history   AVS Reports     Date/Time Report Action User   4/29/2020  9:50 AM After Visit Summary Printed Maile Son LPN   4/29/2020  9:49 AM After Visit Summary Automatically Generated Maile Son LPN   4/29/2020  9:44 AM After Visit Summary Automatically Generated Garrick López MD   Encounter-Level Documents - 04/29/2020:     After Visit Summary - Document on 4/29/2020 9:50 AM by Maile Son LPN: After Visit Summary   After Visit Summary - Document on 4/29/2020 9:49 AM by Maile Son LPN: After Visit Summary   After Visit Summary - Document on 4/29/2020 9:44 AM by Garrick López MD: After Visit Summary   Orders Placed         Celiac Disease Panel       Gastrointestinal Pathogens Panel, PCR       Pancreatic elastase, fecal      All Encounter Results    Medication Changes         sodium, potassium,mag sulfates 17.5-3.13-1.6 gram 177 mLs Oral Daily      Medication List    Fall Risk     Patient Mobility Status: Ambulatory w/ assistance   Number of falls in the past 12 months?: 1   Fall Risk?: No   Visit Diagnoses         Anemia, unspecified type      Diarrhea, unspecified type      History of cholecystectomy      Gastroesophageal reflux disease without esophagitis      Nausea and vomiting, intractability of vomiting not specified, unspecified vomiting type      Colon cancer screening      Incontinence of feces, unspecified fecal incontinence type      Problem List    She is here for upper endoscopy and colonoscopy.  She has anemia gastrointestinal pain with reflux and diarrhea.  She has good health knowledge.  She understands the procedures of upper endoscopy and colonoscopy.  Specifically she realizes there is an extremely small incidence of bleeding perforation or aspiration.  History and physical are unchanged.  Physical  examination reveals a a well-nourished well-hydrated thin nonicteric  female.  She is normocephalic.  Pupils are normal.  Lungs reveal symmetrical respirations.  Heart reveals regular rhythm.  The abdomen is soft with mild tenderness in the epigastrium.  Organomegaly masses are not detected.  Bowel sounds are normal.  Neurologic reveals a weakness on the right.  She is oriented x3.

## 2020-05-07 LAB
GPP - ADENOVIRUS 40/41: NOT DETECTED
GPP - CAMPYLOBACTER: NOT DETECTED
GPP - CLOSTRIDIUM DIFFICILE TOXIN A/B: NOT DETECTED
GPP - CRYPTOSPORIDIUM: NOT DETECTED
GPP - E COLI O157: NOT DETECTED
GPP - ENTAMOEBA HISTOLYTICA: NOT DETECTED
GPP - ENTEROTOXIGENIC E COLI (ETEC): NOT DETECTED
GPP - GIARDIA LAMBLIA: NOT DETECTED
GPP - NOROVIRUS GI/GII: NOT DETECTED
GPP - ROTAVIRUS A: NOT DETECTED
GPP - SALMONELLA: NOT DETECTED
GPP - SHIGELLA: NOT DETECTED
GPP - VIBRIO CHOLERA: NOT DETECTED
GPP - YERSINIA ENTEROCOLITICA: NOT DETECTED
LACTATE PLASV-SCNC: NOT DETECTED MMOL/L

## 2020-05-10 LAB — ELASTASE 1, FECAL: >500 MCG/G

## 2020-05-13 LAB
FINAL PATHOLOGIC DIAGNOSIS: NORMAL
GROSS: NORMAL
SUPPLEMENTAL DIAGNOSIS: NORMAL

## 2020-05-28 ENCOUNTER — OFFICE VISIT (OUTPATIENT)
Dept: GASTROENTEROLOGY | Facility: CLINIC | Age: 55
End: 2020-05-28
Payer: MEDICARE

## 2020-05-28 VITALS
SYSTOLIC BLOOD PRESSURE: 128 MMHG | DIASTOLIC BLOOD PRESSURE: 66 MMHG | BODY MASS INDEX: 28.32 KG/M2 | WEIGHT: 170 LBS | HEIGHT: 65 IN | TEMPERATURE: 97 F | OXYGEN SATURATION: 99 % | HEART RATE: 72 BPM | RESPIRATION RATE: 16 BRPM

## 2020-05-28 DIAGNOSIS — R19.7 DIARRHEA, UNSPECIFIED TYPE: ICD-10-CM

## 2020-05-28 DIAGNOSIS — R11.0 NAUSEA: ICD-10-CM

## 2020-05-28 DIAGNOSIS — K21.9 GASTROESOPHAGEAL REFLUX DISEASE, ESOPHAGITIS PRESENCE NOT SPECIFIED: Primary | ICD-10-CM

## 2020-05-28 DIAGNOSIS — Z80.0 FAMILY HX OF COLON CANCER: ICD-10-CM

## 2020-05-28 DIAGNOSIS — Z90.49 HISTORY OF CHOLECYSTECTOMY: ICD-10-CM

## 2020-05-28 DIAGNOSIS — R11.2 NAUSEA AND VOMITING, INTRACTABILITY OF VOMITING NOT SPECIFIED, UNSPECIFIED VOMITING TYPE: ICD-10-CM

## 2020-05-28 PROCEDURE — 99213 PR OFFICE/OUTPT VISIT, EST, LEVL III, 20-29 MIN: ICD-10-PCS | Mod: S$PBB,,, | Performed by: INTERNAL MEDICINE

## 2020-05-28 PROCEDURE — 99999 PR PBB SHADOW E&M-EST. PATIENT-LVL IV: CPT | Mod: PBBFAC,,, | Performed by: INTERNAL MEDICINE

## 2020-05-28 PROCEDURE — 99213 OFFICE O/P EST LOW 20 MIN: CPT | Mod: S$PBB,,, | Performed by: INTERNAL MEDICINE

## 2020-05-28 PROCEDURE — 99214 OFFICE O/P EST MOD 30 MIN: CPT | Mod: PBBFAC,PN | Performed by: INTERNAL MEDICINE

## 2020-05-28 PROCEDURE — 99999 PR PBB SHADOW E&M-EST. PATIENT-LVL IV: ICD-10-PCS | Mod: PBBFAC,,, | Performed by: INTERNAL MEDICINE

## 2020-05-28 RX ORDER — FAMOTIDINE 40 MG/1
40 TABLET, FILM COATED ORAL DAILY
Qty: 90 TABLET | Refills: 3 | Status: SHIPPED | OUTPATIENT
Start: 2020-05-28 | End: 2020-11-17 | Stop reason: SDUPTHER

## 2020-05-28 RX ORDER — AMITRIPTYLINE HYDROCHLORIDE 25 MG/1
TABLET, FILM COATED ORAL
Status: ON HOLD | COMMUNITY
Start: 2020-05-22 | End: 2021-06-02 | Stop reason: CLARIF

## 2020-05-28 RX ORDER — METOPROLOL TARTRATE 50 MG/1
TABLET ORAL
COMMUNITY
Start: 2020-05-22 | End: 2020-06-30

## 2020-05-28 RX ORDER — ONDANSETRON 4 MG/1
4 TABLET, FILM COATED ORAL EVERY 6 HOURS PRN
Qty: 50 TABLET | Refills: 2 | Status: SHIPPED | OUTPATIENT
Start: 2020-05-28 | End: 2020-11-17 | Stop reason: SDUPTHER

## 2020-05-28 NOTE — PATIENT INSTRUCTIONS
Upper endoscopy revealed gastroesophageal reflux.  The gastric biopsies were negative for H pylori.  Colonoscopy and stool studies were negative.  She has started to analyze her medications and stopped an antidepressant and the diarrhea is better.  She continues the Colestid.  She continues the Zofran for the nausea.  She started on the Pepcid for the reflux.  An ultrasound the upper abdomen is scheduled because of the persistent nausea.

## 2020-06-02 NOTE — PROGRESS NOTES
Subjective:       Patient ID: Luz Arias is a 54 y.o. female.    Chief Complaint: Follow-up (still having pain in stomach and nausea. No vomit or incont.)    She still has occasional diarrhea.  She stopped her antidepressant and is better.  She states her diabetes is well controlled.  She has been on the metformin.  She does not believe this caused symptoms.  All of her stool studies have been negative.  She has had a cholecystectomy.  She is on the Colestid.  She has had occasional vomiting but she denies dysphagia aspiration.  Upper endoscopy revealed gastroesophageal reflux.  She denies hematemesis hematochezia jaundice or bleeding.  She has iron deficiency but her hemoglobin is normal.  She has a long history of anemia.  The celiac disease panel was negative.  She is not as physically active.  She has been somewhat depressed.  She has 3-4 bowel movements per day.  She has tried increase fiber in the diet.  She made a food diary and is avoiding the offending foods although she stays on the diabetic diet.  [facility] at the [facility] obtains her labs and treats her diabetes.  The records were requested.      Allergies:  Review of patient's allergies indicates:   Allergen Reactions    Pregabalin     Protonix [pantoprazole]     Sulfa (sulfonamide antibiotics)        Medications:    Current Outpatient Medications:     acyclovir (ZOVIRAX) 400 MG tablet, acyclovir 400 mg tablet, Disp: , Rfl:     albuterol (PROVENTIL) 5 mg/mL nebulizer solution, Take 2.5 mg by nebulization every 6 (six) hours as needed for Wheezing. Rescue, Disp: , Rfl:     amitriptyline (ELAVIL) 25 MG tablet, , Disp: , Rfl:     amLODIPine (NORVASC) 10 MG tablet, , Disp: , Rfl:     aspirin (ECOTRIN) 81 MG EC tablet, , Disp: , Rfl:     biotin 10,000 mcg Cap, Take by mouth., Disp: , Rfl:     candesartan (ATACAND) 32 MG tablet, , Disp: , Rfl:     carBAMazepine (TEGRETOL XR) 400 MG Tb12, , Disp: , Rfl:     carvediloL (COREG) 12.5 MG tablet, ,  Disp: , Rfl:     cetirizine (ZYRTEC) 10 MG tablet, , Disp: , Rfl:     colestipoL (COLESTID) 1 gram Tab, Take 2 tablets (2 g total) by mouth once daily., Disp: 180 tablet, Rfl: 3    CRESTOR 40 mg Tab, , Disp: , Rfl:     DEEP SEA NASAL 0.65 % nasal spray, , Disp: , Rfl:     diazePAM (VALIUM) 2 MG tablet, diazepam 2 mg tablet, Disp: , Rfl:     diclofenac sodium (VOLTAREN) 1 % Gel, Voltaren 1 % topical gel, Disp: , Rfl:     donepezil (ARICEPT) 10 MG tablet, , Disp: , Rfl:     EASY TOUCH ALCOHOL PREP PADS PadM, , Disp: , Rfl:     ergocalciferol (ERGOCALCIFEROL) 50,000 unit Cap, ergocalciferol (vitamin D2) 50,000 unit capsule, Disp: , Rfl:     fluticasone propionate (FLONASE) 50 mcg/actuation nasal spray, , Disp: , Rfl:     fluticasone-salmeterol 230-21 mcg/dose (ADVAIR HFA) 230-21 mcg/actuation HFAA inhaler, Advair  mcg-21 mcg/actuation aerosol inhaler, Disp: , Rfl:     FREESTYLE LITE STRIPS Strp, , Disp: , Rfl:     ipratropium (ATROVENT) 0.03 % nasal spray, , Disp: , Rfl:     isosorbide mononitrate (IMDUR) 30 MG 24 hr tablet, , Disp: , Rfl:     L-METHYL-B6-B12 3-35-2 mg Tab, , Disp: , Rfl:     lidocaine (LIDODERM) 5 %, , Disp: , Rfl:     mag hydrox/aluminum hyd/simeth (MAALOX ORAL), , Disp: , Rfl:     metFORMIN (GLUCOPHAGE-XR) 500 MG XR 24hr tablet, , Disp: , Rfl:     methylphenidate HCl (RITALIN) 10 MG tablet, , Disp: , Rfl:     metoprolol tartrate (LOPRESSOR) 50 MG tablet, , Disp: , Rfl:     nortriptyline (PAMELOR) 25 MG capsule, , Disp: , Rfl:     ondansetron (ZOFRAN-ODT) 8 MG TbDL, , Disp: , Rfl:     prazosin (MINIPRESS) 2 MG Cap, prazosin 2 mg capsule, Disp: , Rfl:     tiZANidine (ZANAFLEX) 4 MG tablet, , Disp: , Rfl:     triamterene-hydrochlorothiazide 75-50 mg (MAXZIDE) 75-50 mg per tablet, , Disp: , Rfl:     TYLENOL EXTRA STRENGTH 500 mg tablet, , Disp: , Rfl:     zolpidem (AMBIEN) 10 mg Tab, , Disp: , Rfl:     famotidine (PEPCID) 40 MG tablet, Take 1 tablet (40 mg total) by  mouth once daily., Disp: 90 tablet, Rfl: 3    ondansetron (ZOFRAN) 4 MG tablet, Take 1 tablet (4 mg total) by mouth every 6 (six) hours as needed for Nausea., Disp: 50 tablet, Rfl: 2    Past Medical History:   Diagnosis Date    Chronic fatigue     Chronic pain     Depression     Diabetes     Dysphagia     Gastritis     Gastroparesis     GERD (gastroesophageal reflux disease)     Hypertension     Irritable bowel syndrome     Stroke        Past Surgical History:   Procedure Laterality Date    COLONOSCOPY  05/17/2019    COLONOSCOPY N/A 5/5/2020    Procedure: COLONOSCOPY;  Surgeon: Garrick López MD;  Location: [facility];  Service: Endoscopy;  Laterality: N/A;  with bx and stool specimen    ESOPHAGOGASTRODUODENOSCOPY N/A 5/5/2020    Procedure: EGD (ESOPHAGOGASTRODUODENOSCOPY);  Surgeon: Garrick López MD;  Location: [facility];  Service: Endoscopy;  Laterality: N/A;  with biopsy    UPPER GASTROINTESTINAL ENDOSCOPY  05/17/2019         Review of Systems   Constitutional: Negative for appetite change, fever and unexpected weight change.   HENT: Negative for trouble swallowing.         No jaundice.   Respiratory: Negative for cough, shortness of breath and wheezing.         She denies tobacco alcohol usage.  She denies aspiration him this.  She denies significant sputum production.  She has occasional episodes of dyspnea and has a pulmonary inhalers.  She believes possibly this is asthma.   Cardiovascular: Negative for chest pain.        She denies exertional chest pain or rhythm disturbance.  She states her hypertension is well controlled.  She states her hyperlipidemia is well controlled.   Endocrine:        She is on the ADA diet.  She states her diabetes hypertension hyperlipidemia well controlled.  They [facility]  She will continue the medications but I have requested the records.   Musculoskeletal: Negative for back pain and neck pain.   Skin: Negative for pallor and  rash.   Neurological: Negative for dizziness, seizures, syncope, speech difficulty, weakness and numbness.        She has had a stroke in the past.  She also or family states that she has been depressed and tired.  She is able perform her usual activity with minimal assistance she states.   Hematological: Negative for adenopathy.   Psychiatric/Behavioral: Negative for confusion.        Objective:      Physical Exam   Constitutional: She is oriented to person, place, and time. She appears well-developed and well-nourished.   Well-nourished well-hydrated afebrile nonicteric  female.  She is oriented x3.  She is normocephalic.  Pupils are normal.  She can relate her history and answer questions appropriately.   HENT:   Head: Normocephalic.   Eyes: Pupils are equal, round, and reactive to light. EOM are normal.   Neck: Normal range of motion. Neck supple. No tracheal deviation present. No thyromegaly present.   Cardiovascular: Normal rate, regular rhythm and normal heart sounds.   Pulmonary/Chest: Effort normal and breath sounds normal.   Abdominal: Soft. Bowel sounds are normal. She exhibits no distension and no mass. There is no tenderness. There is no rebound and no guarding. No hernia.   Musculoskeletal:   She ambulates slowly with assistance.  She can go from the sitting the standing position without difficulty.   Lymphadenopathy:     She has no cervical adenopathy.   Neurological: She is alert and oriented to person, place, and time. No cranial nerve deficit.   Skin: Skin is warm and dry.   Psychiatric: She has a normal mood and affect. Her behavior is normal.   Vitals reviewed.        Plan:       Gastroesophageal reflux disease, esophagitis presence not specified  -     US Abdomen Complete; Future; Expected date: 05/28/2020  -     famotidine (PEPCID) 40 MG tablet; Take 1 tablet (40 mg total) by mouth once daily.  Dispense: 90 tablet; Refill: 3    Nausea  -     ondansetron (ZOFRAN) 4 MG tablet;  Take 1 tablet (4 mg total) by mouth every 6 (six) hours as needed for Nausea.  Dispense: 50 tablet; Refill: 2    Diarrhea, unspecified type    Nausea and vomiting, intractability of vomiting not specified, unspecified vomiting type    History of cholecystectomy    Family hx of colon cancer     She will continue her reflux regimen.  She continues her current medications vitamins and minerals.  She continues the Colestid.  She will avoid the Maalox.  She will use the Tums.  She continues her current medications.  She continues the ADA diet and will make a food diary and avoid the offending foods.  Additionally she and her family are evaluating her medications.  Recently she stopped an antidepressant and is helped her GI symptoms in that she is having less diarrhea.  She will add more fiber to the diet.  She follows up with her PCP and I have requested the records and labs.

## 2020-06-09 ENCOUNTER — HOSPITAL ENCOUNTER (OUTPATIENT)
Dept: RADIOLOGY | Facility: HOSPITAL | Age: 55
Discharge: HOME | End: 2020-06-09
Attending: INTERNAL MEDICINE
Payer: MEDICARE

## 2020-06-09 DIAGNOSIS — K21.9 GASTROESOPHAGEAL REFLUX DISEASE, ESOPHAGITIS PRESENCE NOT SPECIFIED: ICD-10-CM

## 2020-06-09 PROCEDURE — 76700 US ABDOMEN COMPLETE: ICD-10-PCS | Mod: 26,,, | Performed by: RADIOLOGY

## 2020-06-09 PROCEDURE — 76700 US EXAM ABDOM COMPLETE: CPT | Mod: 26,,, | Performed by: RADIOLOGY

## 2020-06-09 PROCEDURE — 76700 US EXAM ABDOM COMPLETE: CPT | Mod: TC,PN

## 2020-06-30 ENCOUNTER — OFFICE VISIT (OUTPATIENT)
Dept: GASTROENTEROLOGY | Facility: CLINIC | Age: 55
End: 2020-06-30
Payer: MEDICARE

## 2020-06-30 VITALS
SYSTOLIC BLOOD PRESSURE: 136 MMHG | HEART RATE: 90 BPM | HEIGHT: 65 IN | OXYGEN SATURATION: 97 % | RESPIRATION RATE: 18 BRPM | BODY MASS INDEX: 30.16 KG/M2 | WEIGHT: 181 LBS | DIASTOLIC BLOOD PRESSURE: 68 MMHG | TEMPERATURE: 98 F

## 2020-06-30 DIAGNOSIS — K21.9 GASTROESOPHAGEAL REFLUX DISEASE WITHOUT ESOPHAGITIS: ICD-10-CM

## 2020-06-30 DIAGNOSIS — K21.9 GASTROESOPHAGEAL REFLUX DISEASE, ESOPHAGITIS PRESENCE NOT SPECIFIED: Primary | ICD-10-CM

## 2020-06-30 DIAGNOSIS — Z90.49 HISTORY OF CHOLECYSTECTOMY: ICD-10-CM

## 2020-06-30 DIAGNOSIS — K64.9 HEMORRHOIDS, UNSPECIFIED HEMORRHOID TYPE: Primary | ICD-10-CM

## 2020-06-30 DIAGNOSIS — R19.7 DIARRHEA, UNSPECIFIED TYPE: ICD-10-CM

## 2020-06-30 DIAGNOSIS — K57.30 DIVERTICULA OF COLON: ICD-10-CM

## 2020-06-30 PROCEDURE — 99999 PR PBB SHADOW E&M-EST. PATIENT-LVL V: CPT | Mod: PBBFAC,,, | Performed by: INTERNAL MEDICINE

## 2020-06-30 PROCEDURE — 99215 OFFICE O/P EST HI 40 MIN: CPT | Mod: PBBFAC,PN | Performed by: INTERNAL MEDICINE

## 2020-06-30 PROCEDURE — 99213 OFFICE O/P EST LOW 20 MIN: CPT | Mod: S$PBB,,, | Performed by: INTERNAL MEDICINE

## 2020-06-30 PROCEDURE — 99999 PR PBB SHADOW E&M-EST. PATIENT-LVL V: ICD-10-PCS | Mod: PBBFAC,,, | Performed by: INTERNAL MEDICINE

## 2020-06-30 PROCEDURE — 99213 PR OFFICE/OUTPT VISIT, EST, LEVL III, 20-29 MIN: ICD-10-PCS | Mod: S$PBB,,, | Performed by: INTERNAL MEDICINE

## 2020-06-30 RX ORDER — SUCRALFATE 1 G/1
1 TABLET ORAL NIGHTLY
Qty: 90 TABLET | Refills: 3 | Status: SHIPPED | OUTPATIENT
Start: 2020-06-30 | End: 2020-11-17 | Stop reason: SDUPTHER

## 2020-06-30 RX ORDER — HYDROCORTISONE ACETATE 25 MG/1
25 SUPPOSITORY RECTAL 2 TIMES DAILY PRN
Qty: 20 SUPPOSITORY | Refills: 0 | Status: SHIPPED | OUTPATIENT
Start: 2020-06-30 | End: 2020-07-10

## 2020-06-30 RX ORDER — METOPROLOL TARTRATE 25 MG/1
50 TABLET, FILM COATED ORAL
Status: ON HOLD | COMMUNITY
Start: 2020-06-05 | End: 2022-06-28 | Stop reason: HOSPADM

## 2020-06-30 NOTE — TELEPHONE ENCOUNTER
----- Message from Libby Rogers sent at 6/30/2020  3:02 PM CDT -----  Contact: Patient  Type: Needs Medical Advice  Who Called:  Patient  Pharmacy name and phone #:    [facility]  Best Call Back Number:   Additional Information: Calling to find out if she can get the suppositories called in to her pharmacy that was discussed earlier.

## 2020-06-30 NOTE — PATIENT INSTRUCTIONS
She will continue her reflux regimen.  She started on the Carafate 1 g HS.  She will try to avoid the liquid antacids because of their side effects.  She continues her other medications.  She will make a food diary and avoid the offending foods particularly fried and fatty foods.  She continues her diabetic diet.  She will add more fiber to the diet.  The constipation is better.  She follows up with her neurologist and continues her vitamins and minerals.

## 2020-06-30 NOTE — PROGRESS NOTES
Subjective:       Patient ID: Luz Arias is a 54 y.o. female.    Chief Complaint: Follow-up (1m) and Hemorrhoids (req Rx for relief, prep H is not working.)    She has occasional pyrosis.  She will stop the liquid antacids because they have caused abdominal discomfort.  She states that she has difficulty with fried and fatty foods and is trying to avoid the offending foods.  She is on her diabetic diet.  She states her glucose ranges in the 150s .  She denies the polys.  She states the constipation is markedly decreased.  She still is having semi solid bowel movements.  She does not believe she is ingesting adequate fiber.  She denies fever chills dysphagia aspiration hematemesis hematochezia jaundice or bleeding.      Allergies:  Review of patient's allergies indicates:   Allergen Reactions    Pregabalin     Protonix [pantoprazole]     Sulfa (sulfonamide antibiotics)        Medications:    Current Outpatient Medications:     acyclovir (ZOVIRAX) 400 MG tablet, acyclovir 400 mg tablet, Disp: , Rfl:     albuterol (PROVENTIL) 5 mg/mL nebulizer solution, Take 2.5 mg by nebulization every 6 (six) hours as needed for Wheezing. Rescue, Disp: , Rfl:     amitriptyline (ELAVIL) 25 MG tablet, , Disp: , Rfl:     amLODIPine (NORVASC) 10 MG tablet, , Disp: , Rfl:     aspirin (ECOTRIN) 81 MG EC tablet, , Disp: , Rfl:     biotin 10,000 mcg Cap, Take by mouth., Disp: , Rfl:     candesartan (ATACAND) 32 MG tablet, , Disp: , Rfl:     carBAMazepine (TEGRETOL XR) 400 MG Tb12, , Disp: , Rfl:     carvediloL (COREG) 12.5 MG tablet, , Disp: , Rfl:     cetirizine (ZYRTEC) 10 MG tablet, , Disp: , Rfl:     colestipoL (COLESTID) 1 gram Tab, Take 2 tablets (2 g total) by mouth once daily., Disp: 180 tablet, Rfl: 3    CRESTOR 40 mg Tab, , Disp: , Rfl:     DEEP SEA NASAL 0.65 % nasal spray, , Disp: , Rfl:     diazePAM (VALIUM) 2 MG tablet, diazepam 2 mg tablet, Disp: , Rfl:     diclofenac sodium (VOLTAREN) 1 % Gel, Voltaren 1 %  topical gel, Disp: , Rfl:     donepezil (ARICEPT) 10 MG tablet, , Disp: , Rfl:     EASY TOUCH ALCOHOL PREP PADS PadM, , Disp: , Rfl:     ergocalciferol (ERGOCALCIFEROL) 50,000 unit Cap, ergocalciferol (vitamin D2) 50,000 unit capsule, Disp: , Rfl:     famotidine (PEPCID) 40 MG tablet, Take 1 tablet (40 mg total) by mouth once daily., Disp: 90 tablet, Rfl: 3    fluticasone propionate (FLONASE) 50 mcg/actuation nasal spray, , Disp: , Rfl:     fluticasone-salmeterol 230-21 mcg/dose (ADVAIR HFA) 230-21 mcg/actuation HFAA inhaler, Advair  mcg-21 mcg/actuation aerosol inhaler, Disp: , Rfl:     FREESTYLE LITE STRIPS Strp, , Disp: , Rfl:     ipratropium (ATROVENT) 0.03 % nasal spray, , Disp: , Rfl:     isosorbide mononitrate (IMDUR) 30 MG 24 hr tablet, , Disp: , Rfl:     L-METHYL-B6-B12 3-35-2 mg Tab, , Disp: , Rfl:     lidocaine (LIDODERM) 5 %, , Disp: , Rfl:     mag hydrox/aluminum hyd/simeth (MAALOX ORAL), , Disp: , Rfl:     metFORMIN (GLUCOPHAGE-XR) 500 MG XR 24hr tablet, , Disp: , Rfl:     methylphenidate HCl (RITALIN) 10 MG tablet, , Disp: , Rfl:     metoprolol tartrate (LOPRESSOR) 25 MG tablet, , Disp: , Rfl:     nortriptyline (PAMELOR) 25 MG capsule, , Disp: , Rfl:     ondansetron (ZOFRAN) 4 MG tablet, Take 1 tablet (4 mg total) by mouth every 6 (six) hours as needed for Nausea., Disp: 50 tablet, Rfl: 2    ondansetron (ZOFRAN-ODT) 8 MG TbDL, , Disp: , Rfl:     prazosin (MINIPRESS) 2 MG Cap, prazosin 2 mg capsule, Disp: , Rfl:     tiZANidine (ZANAFLEX) 4 MG tablet, , Disp: , Rfl:     triamterene-hydrochlorothiazide 75-50 mg (MAXZIDE) 75-50 mg per tablet, , Disp: , Rfl:     TYLENOL EXTRA STRENGTH 500 mg tablet, , Disp: , Rfl:     zolpidem (AMBIEN) 10 mg Tab, , Disp: , Rfl:     sucralfate (CARAFATE) 1 gram tablet, Take 1 tablet (1 g total) by mouth every evening., Disp: 90 tablet, Rfl: 3    Past Medical History:   Diagnosis Date    Chronic fatigue     Chronic pain     Depression      Diabetes     Dysphagia     Gastritis     Gastroparesis     GERD (gastroesophageal reflux disease)     Hypertension     Irritable bowel syndrome     Stroke        Past Surgical History:   Procedure Laterality Date    COLONOSCOPY  05/17/2019    COLONOSCOPY N/A 5/5/2020    Procedure: COLONOSCOPY;  Surgeon: Garrick López MD;  Location: [facility] ENDO;  Service: Endoscopy;  Laterality: N/A;  with bx and stool specimen    ESOPHAGOGASTRODUODENOSCOPY N/A 5/5/2020    Procedure: EGD (ESOPHAGOGASTRODUODENOSCOPY);  Surgeon: Garrick López MD;  Location: [facility];  Service: Endoscopy;  Laterality: N/A;  with biopsy    UPPER GASTROINTESTINAL ENDOSCOPY  05/17/2019         Review of Systems   Constitutional: Negative for appetite change, fever and unexpected weight change.   HENT: Negative for trouble swallowing.         No jaundice.   Respiratory: Negative for cough, shortness of breath and wheezing.         She has never tobacco she denies current tobacco or alcohol usage.  She denies dysphagia aspiration hemoptysis chronic cough chronic sputum production.  She denies dyspnea on exertion but is not as physically active.   Cardiovascular: Negative for chest pain.        She denies exertional chest pain.  She states her hypertension hyperlipidemia well controlled.  She denies rhythm disturbance.   Gastrointestinal: Positive for diarrhea and nausea. Negative for abdominal distention, abdominal pain, anal bleeding, blood in stool, constipation, rectal pain and vomiting.        He denies abdominal pain.  She has occasional nausea without vomiting but cannot pinpoint a precipitating factor.   Musculoskeletal: Positive for gait problem. Negative for back pain and neck pain.        She ambulates with the cane.  She has had a stroke but neurologically she is doing well.  Her family states that she has a little bit forgetful and has some trouble with memory but otherwise can function normally.   Skin: Negative for pallor and rash.    Neurological: Negative for dizziness, seizures, syncope, speech difficulty, weakness and numbness.        She has had a stroke up but her family states that she has had low the difficulty with memory but otherwise is functioning normally.  She can ambulate with a cane and she denies falling.   Hematological: Negative for adenopathy.   Psychiatric/Behavioral: Negative for confusion.       Objective:      Physical Exam  Vitals signs reviewed.   Constitutional:       Appearance: She is well-developed.      Comments: Well-nourished well-hydrated nonicteric afebrile  female.  She is oriented x3.  She can relate her history and answer questions appropriately.  She is sitting comfortably in the chair.  She is normocephalic.  Pupils are normal.   HENT:      Head: Normocephalic.   Eyes:      Pupils: Pupils are equal, round, and reactive to light.   Neck:      Musculoskeletal: Normal range of motion and neck supple.      Thyroid: No thyromegaly.      Trachea: No tracheal deviation.   Cardiovascular:      Rate and Rhythm: Normal rate and regular rhythm.      Heart sounds: Normal heart sounds.   Pulmonary:      Effort: Pulmonary effort is normal.      Breath sounds: Normal breath sounds.   Abdominal:      General: Bowel sounds are normal. There is no distension.      Palpations: Abdomen is soft.      Tenderness: There is no abdominal tenderness. There is no right CVA tenderness or guarding.   Musculoskeletal:      Comments: She ambulates slowly with a cane.  She can go from the sitting to the standing position   Lymphadenopathy:      Cervical: No cervical adenopathy.   Skin:     General: Skin is warm and dry.   Neurological:      Mental Status: She is alert and oriented to person, place, and time.      Cranial Nerves: No cranial nerve deficit.   Psychiatric:         Behavior: Behavior normal.           Plan:       Gastroesophageal reflux disease, esophagitis presence not specified  -     sucralfate (CARAFATE)  1 gram tablet; Take 1 tablet (1 g total) by mouth every evening.  Dispense: 90 tablet; Refill: 3    Diarrhea, unspecified type    Gastroesophageal reflux disease without esophagitis    History of cholecystectomy    Diverticula of colon     She will continue her diabetic diet.  She will make a food diary and avoid the offending foods such as spicy and fatty foods.  She will add fiber supplements to the diet.  She started on the Carafate at night for breakthrough symptoms.  She will avoid the liquid and had antacids because of side effects.  She continues her reflux regimen current medications vitamins and minerals.  She will follow-up with her neurologist.

## 2020-09-25 ENCOUNTER — PATIENT MESSAGE (OUTPATIENT)
Dept: GASTROENTEROLOGY | Facility: CLINIC | Age: 55
End: 2020-09-25

## 2020-11-17 ENCOUNTER — OFFICE VISIT (OUTPATIENT)
Dept: GASTROENTEROLOGY | Facility: CLINIC | Age: 55
End: 2020-11-17
Payer: MEDICARE

## 2020-11-17 VITALS
TEMPERATURE: 98 F | BODY MASS INDEX: 30.69 KG/M2 | HEIGHT: 65 IN | RESPIRATION RATE: 16 BRPM | HEART RATE: 76 BPM | WEIGHT: 184.19 LBS | DIASTOLIC BLOOD PRESSURE: 68 MMHG | OXYGEN SATURATION: 98 % | SYSTOLIC BLOOD PRESSURE: 117 MMHG

## 2020-11-17 DIAGNOSIS — K21.9 GASTROESOPHAGEAL REFLUX DISEASE, UNSPECIFIED WHETHER ESOPHAGITIS PRESENT: ICD-10-CM

## 2020-11-17 DIAGNOSIS — Z90.49 HISTORY OF CHOLECYSTECTOMY: ICD-10-CM

## 2020-11-17 DIAGNOSIS — K57.30 DIVERTICULA OF COLON: ICD-10-CM

## 2020-11-17 DIAGNOSIS — K21.9 GASTROESOPHAGEAL REFLUX DISEASE, UNSPECIFIED WHETHER ESOPHAGITIS PRESENT: Primary | ICD-10-CM

## 2020-11-17 DIAGNOSIS — R11.0 NAUSEA: ICD-10-CM

## 2020-11-17 PROCEDURE — 99214 OFFICE O/P EST MOD 30 MIN: CPT | Mod: PBBFAC,PN | Performed by: INTERNAL MEDICINE

## 2020-11-17 PROCEDURE — 99214 OFFICE O/P EST MOD 30 MIN: CPT | Mod: S$PBB,,, | Performed by: INTERNAL MEDICINE

## 2020-11-17 PROCEDURE — 99999 PR PBB SHADOW E&M-EST. PATIENT-LVL IV: CPT | Mod: PBBFAC,,, | Performed by: INTERNAL MEDICINE

## 2020-11-17 PROCEDURE — 99214 PR OFFICE/OUTPT VISIT, EST, LEVL IV, 30-39 MIN: ICD-10-PCS | Mod: S$PBB,,, | Performed by: INTERNAL MEDICINE

## 2020-11-17 PROCEDURE — 99999 PR PBB SHADOW E&M-EST. PATIENT-LVL IV: ICD-10-PCS | Mod: PBBFAC,,, | Performed by: INTERNAL MEDICINE

## 2020-11-17 RX ORDER — ONDANSETRON 4 MG/1
4 TABLET, FILM COATED ORAL EVERY 6 HOURS PRN
Qty: 50 TABLET | Refills: 2 | Status: SHIPPED | OUTPATIENT
Start: 2020-11-17 | End: 2021-09-22

## 2020-11-17 RX ORDER — SUCRALFATE 1 G/1
1 TABLET ORAL NIGHTLY
Qty: 90 TABLET | Refills: 3 | Status: SHIPPED | OUTPATIENT
Start: 2020-11-17 | End: 2021-09-22

## 2020-11-17 RX ORDER — FAMOTIDINE 40 MG/1
40 TABLET, FILM COATED ORAL DAILY
Qty: 90 TABLET | Refills: 3 | Status: SHIPPED | OUTPATIENT
Start: 2020-11-17 | End: 2021-05-18 | Stop reason: SDUPTHER

## 2020-11-17 NOTE — PROGRESS NOTES
Subjective:       Patient ID: Luz Arias is a 55 y.o. female.    Chief Complaint: Other (nausea and vomiting/medication management)    She has history of diverticulosis and bowel irregularity namely diarrhea..  She has a history gastroesophageal reflux and gastritis.  She denies dysphagia.  She has chronic nausea but denies vomiting.  She denies hematemesis hematochezia jaundice or bleeding.  Upper endoscopy revealed gastroesophageal reflux with gastritis.  The biopsies were negative for H pylori.  Colonoscopy revealed hemorrhoids.  Stool analysis been negative although in April the fecal elastase was decreased but went was later repeated it was normal.  The stool analysis otherwise was normal.  She tried to use and acids for the pyrosis and this increase the constipation.  She tried increase the fiber in her diet.  The ultrasound revealed she had a cholecystectomy and a fatty liver.  She states that she had a gastric attack .  Last night.  Apparently she ingested peds and spicy foods and then was awake and during her sleep with atypical chest pain.  She had nausea with regurgitation but denies vomiting or aspiration.  She took Mylanta which helped her.  She has been evaluated by her cardiologist.  Three months ago she was told that her heart is fine.  She is now having constipation which sometimes alternates with the diarrhea.  The Mylanta helped her last night.  The Carafate has caused gastrointestinal upset and she stopped it.  The Pepcid has helped the symptoms in general.      Allergies:  Review of patient's allergies indicates:   Allergen Reactions    Pregabalin     Protonix [pantoprazole]     Sulfa (sulfonamide antibiotics)        Medications:    Current Outpatient Medications:     albuterol (PROVENTIL) 5 mg/mL nebulizer solution, Take 2.5 mg by nebulization every 6 (six) hours as needed for Wheezing. Rescue, Disp: , Rfl:     amitriptyline (ELAVIL) 25 MG tablet, , Disp: , Rfl:     amLODIPine  (NORVASC) 10 MG tablet, , Disp: , Rfl:     aspirin (ECOTRIN) 81 MG EC tablet, , Disp: , Rfl:     biotin 10,000 mcg Cap, Take by mouth., Disp: , Rfl:     candesartan (ATACAND) 32 MG tablet, , Disp: , Rfl:     carBAMazepine (TEGRETOL XR) 400 MG Tb12, , Disp: , Rfl:     carvediloL (COREG) 12.5 MG tablet, , Disp: , Rfl:     cetirizine (ZYRTEC) 10 MG tablet, , Disp: , Rfl:     colestipoL (COLESTID) 1 gram Tab, Take 2 tablets (2 g total) by mouth once daily., Disp: 180 tablet, Rfl: 3    CRESTOR 40 mg Tab, , Disp: , Rfl:     DEEP SEA NASAL 0.65 % nasal spray, , Disp: , Rfl:     diazePAM (VALIUM) 2 MG tablet, diazepam 2 mg tablet, Disp: , Rfl:     diclofenac sodium (VOLTAREN) 1 % Gel, Voltaren 1 % topical gel, Disp: , Rfl:     donepezil (ARICEPT) 10 MG tablet, , Disp: , Rfl:     EASY TOUCH ALCOHOL PREP PADS PadM, , Disp: , Rfl:     ergocalciferol (ERGOCALCIFEROL) 50,000 unit Cap, ergocalciferol (vitamin D2) 50,000 unit capsule, Disp: , Rfl:     fluticasone propionate (FLONASE) 50 mcg/actuation nasal spray, , Disp: , Rfl:     fluticasone-salmeterol 230-21 mcg/dose (ADVAIR HFA) 230-21 mcg/actuation HFAA inhaler, Advair  mcg-21 mcg/actuation aerosol inhaler, Disp: , Rfl:     FREESTYLE LITE STRIPS Strp, , Disp: , Rfl:     ipratropium (ATROVENT) 0.03 % nasal spray, , Disp: , Rfl:     isosorbide mononitrate (IMDUR) 30 MG 24 hr tablet, , Disp: , Rfl:     L-METHYL-B6-B12 3-35-2 mg Tab, , Disp: , Rfl:     lidocaine (LIDODERM) 5 %, , Disp: , Rfl:     mag hydrox/aluminum hyd/simeth (MAALOX ORAL), , Disp: , Rfl:     metFORMIN (GLUCOPHAGE-XR) 500 MG XR 24hr tablet, , Disp: , Rfl:     methylphenidate HCl (RITALIN) 10 MG tablet, , Disp: , Rfl:     metoprolol tartrate (LOPRESSOR) 25 MG tablet, , Disp: , Rfl:     nortriptyline (PAMELOR) 25 MG capsule, , Disp: , Rfl:     ondansetron (ZOFRAN-ODT) 8 MG TbDL, , Disp: , Rfl:     prazosin (MINIPRESS) 2 MG Cap, prazosin 2 mg capsule, Disp: , Rfl:     tiZANidine  (ZANAFLEX) 4 MG tablet, , Disp: , Rfl:     triamterene-hydrochlorothiazide 75-50 mg (MAXZIDE) 75-50 mg per tablet, , Disp: , Rfl:     TYLENOL EXTRA STRENGTH 500 mg tablet, , Disp: , Rfl:     zolpidem (AMBIEN) 10 mg Tab, , Disp: , Rfl:     acyclovir (ZOVIRAX) 400 MG tablet, acyclovir 400 mg tablet, Disp: , Rfl:     famotidine (PEPCID) 40 MG tablet, Take 1 tablet (40 mg total) by mouth once daily., Disp: 90 tablet, Rfl: 3    ondansetron (ZOFRAN) 4 MG tablet, Take 1 tablet (4 mg total) by mouth every 6 (six) hours as needed for Nausea., Disp: 50 tablet, Rfl: 2    sucralfate (CARAFATE) 1 gram tablet, Take 1 tablet (1 g total) by mouth every evening., Disp: 90 tablet, Rfl: 3    Past Medical History:   Diagnosis Date    Chronic fatigue     Chronic pain     Depression     Diabetes     Dysphagia     Gastritis     Gastroparesis     GERD (gastroesophageal reflux disease)     Hypertension     Irritable bowel syndrome     Stroke        Past Surgical History:   Procedure Laterality Date    COLONOSCOPY  05/17/2019    COLONOSCOPY N/A 5/5/2020    Procedure: COLONOSCOPY;  Surgeon: Garrick López MD;  Location: [facility];  Service: Endoscopy;  Laterality: N/A;  with bx and stool specimen    ESOPHAGOGASTRODUODENOSCOPY N/A 5/5/2020    Procedure: EGD (ESOPHAGOGASTRODUODENOSCOPY);  Surgeon: Garrick López MD;  Location: [facility];  Service: Endoscopy;  Laterality: N/A;  with biopsy    UPPER GASTROINTESTINAL ENDOSCOPY  05/17/2019         Review of Systems   Constitutional: Negative for appetite change, fever and unexpected weight change.   HENT: Negative for trouble swallowing.         No jaundice.   Respiratory: Negative for cough, shortness of breath and wheezing.         She has never been a tobacco user.  She does consume minimal alcohol.  She denies dysphagia aspiration.  She denies significant coughing or sputum production.  She denies dyspnea on exertion but is not physically active.   Cardiovascular: Negative  for chest pain.        She denies exertional chest pain or rhythm disturbance.   Gastrointestinal: Positive for constipation, diarrhea and nausea. Negative for abdominal distention, abdominal pain, anal bleeding, blood in stool and rectal pain.   Endocrine:        She states her diabetes is well controlled.  She tries stay on the diabetic diet and take her medicines.  She does not believe her medications were diet have influenced her symptoms.   Musculoskeletal: Negative for back pain and neck pain.   Skin: Negative for pallor and rash.   Neurological: Negative for dizziness, seizures, syncope, speech difficulty, weakness and numbness.        She has had a stroke but she ambulates with a cane.  Her family states that she is neurologically doing well.   Hematological: Negative for adenopathy.   Psychiatric/Behavioral: Negative for confusion.       Objective:      Physical Exam  Vitals signs reviewed.   Constitutional:       Appearance: She is well-developed.      Comments: Well-nourished well-hydrated afebrile nonicteric  female.  She is sitting comfortably in the chair.  She is breathing normally.  She denies chest pain or GI symptoms.  She appears to be oriented x3 and can relate her history and answer questions appropriately.  She is normocephalic.  Pupils are   HENT:      Head: Normocephalic.   Eyes:      Pupils: Pupils are equal, round, and reactive to light.   Neck:      Musculoskeletal: Normal range of motion and neck supple.      Thyroid: No thyromegaly.      Trachea: No tracheal deviation.   Cardiovascular:      Rate and Rhythm: Normal rate and regular rhythm.      Heart sounds: Normal heart sounds.   Pulmonary:      Effort: Pulmonary effort is normal.      Breath sounds: Normal breath sounds.   Abdominal:      General: There is no distension.      Palpations: There is no mass.      Tenderness: There is no abdominal tenderness. There is no right CVA tenderness, guarding or rebound.       Hernia: No hernia is present.   Musculoskeletal: Normal range of motion.      Comments: She ambulates slowly.  She can go from the sitting the standing position without difficulty.   Lymphadenopathy:      Cervical: No cervical adenopathy.   Skin:     General: Skin is warm and dry.   Neurological:      Mental Status: She is alert and oriented to person, place, and time.      Cranial Nerves: No cranial nerve deficit.   Psychiatric:         Behavior: Behavior normal.           Plan:       Gastroesophageal reflux disease, unspecified whether esophagitis present  -     [place] Upper GI; Future; Expected date: 11/17/2020  -     [place] Esophagram Complete; Future; Expected date: 11/17/2020    Diverticula of colon    History of cholecystectomy     She will continue her reflux regimen.  She will avoid the offending foods particularly the fatty foods and also the tomato gravies.  I have encouraged weight reduction.  She will decrease her caloric intake.  She continues her current medications vitamins and minerals.  She has diverticulosis but is having daily bowel movements with her bowel program.  She will follow-up with her cardiologist.  Initially a barium swallow and upper GI series.  She will elevate the head of the bed.  She avoids oral intake with than 2-3 hours of lying supine.  She continues her current medications vitamins and minerals.

## 2020-11-17 NOTE — PATIENT INSTRUCTIONS
She will continue her reflux regimen and diabetic diet.  She will make a food diary and avoid the offending foods particularly tomato gravies.  She will avoid oral intake within 3-4 hours of going to sleep.  She make sure that she elevates the head of the bed.  She can use the Mylanta for breakthrough symptoms and stop the Carafate.  She will continue her other medications.  She is scheduled for barium swallow and upper GI series to evaluate the recent episode of abdominal and substernal chest pain.  She follows up with her cardiologist.

## 2020-12-29 ENCOUNTER — HOSPITAL ENCOUNTER (OUTPATIENT)
Dept: RADIOLOGY | Facility: HOSPITAL | Age: 55
Discharge: HOME | End: 2020-12-29
Attending: INTERNAL MEDICINE
Payer: MEDICARE

## 2020-12-29 DIAGNOSIS — K21.9 GASTROESOPHAGEAL REFLUX DISEASE, UNSPECIFIED WHETHER ESOPHAGITIS PRESENT: ICD-10-CM

## 2020-12-29 PROCEDURE — 74246 X-RAY XM UPR GI TRC 2CNTRST: CPT | Mod: 26,,, | Performed by: RADIOLOGY

## 2020-12-29 PROCEDURE — A9698 NON-RAD CONTRAST MATERIALNOC: HCPCS | Performed by: INTERNAL MEDICINE

## 2020-12-29 PROCEDURE — 25500020 PHARM REV CODE 255: Performed by: INTERNAL MEDICINE

## 2020-12-29 PROCEDURE — 74246 FL UPPER GI AIR CONTRAST WITH ESOPHAGRAM: ICD-10-PCS | Mod: 26,,, | Performed by: RADIOLOGY

## 2020-12-29 PROCEDURE — 74246 X-RAY XM UPR GI TRC 2CNTRST: CPT | Mod: TC,FY

## 2020-12-29 RX ADMIN — BARIUM SULFATE 100 ML: 0.6 SUSPENSION ORAL at 11:12

## 2020-12-29 RX ADMIN — BARIUM SULFATE 135 ML: 980 POWDER, FOR SUSPENSION ORAL at 11:12

## 2021-01-07 ENCOUNTER — OFFICE VISIT (OUTPATIENT)
Dept: GASTROENTEROLOGY | Facility: CLINIC | Age: 56
End: 2021-01-07
Payer: MEDICARE

## 2021-01-07 VITALS
DIASTOLIC BLOOD PRESSURE: 73 MMHG | BODY MASS INDEX: 30.82 KG/M2 | TEMPERATURE: 98 F | SYSTOLIC BLOOD PRESSURE: 113 MMHG | HEIGHT: 65 IN | WEIGHT: 185 LBS | HEART RATE: 77 BPM

## 2021-01-07 DIAGNOSIS — K21.9 GASTROESOPHAGEAL REFLUX DISEASE, UNSPECIFIED WHETHER ESOPHAGITIS PRESENT: ICD-10-CM

## 2021-01-07 DIAGNOSIS — Z90.49 HISTORY OF CHOLECYSTECTOMY: ICD-10-CM

## 2021-01-07 DIAGNOSIS — R11.0 NAUSEA: ICD-10-CM

## 2021-01-07 DIAGNOSIS — K59.00 CONSTIPATION, UNSPECIFIED CONSTIPATION TYPE: Primary | ICD-10-CM

## 2021-01-07 PROCEDURE — 99214 OFFICE O/P EST MOD 30 MIN: CPT | Mod: S$PBB,,, | Performed by: INTERNAL MEDICINE

## 2021-01-07 PROCEDURE — 99999 PR PBB SHADOW E&M-EST. PATIENT-LVL V: CPT | Mod: PBBFAC,,, | Performed by: INTERNAL MEDICINE

## 2021-01-07 PROCEDURE — 99214 PR OFFICE/OUTPT VISIT, EST, LEVL IV, 30-39 MIN: ICD-10-PCS | Mod: S$PBB,,, | Performed by: INTERNAL MEDICINE

## 2021-01-07 PROCEDURE — 99215 OFFICE O/P EST HI 40 MIN: CPT | Mod: PBBFAC,PN | Performed by: INTERNAL MEDICINE

## 2021-01-07 PROCEDURE — 99999 PR PBB SHADOW E&M-EST. PATIENT-LVL V: ICD-10-PCS | Mod: PBBFAC,,, | Performed by: INTERNAL MEDICINE

## 2021-01-07 RX ORDER — ONDANSETRON 8 MG/1
8 TABLET, ORALLY DISINTEGRATING ORAL EVERY 6 HOURS PRN
Qty: 50 TABLET | Refills: 2 | Status: SHIPPED | OUTPATIENT
Start: 2021-01-07 | End: 2021-01-07 | Stop reason: SDUPTHER

## 2021-01-07 RX ORDER — HYDROCORTISONE ACETATE 25 MG/1
25 SUPPOSITORY RECTAL 2 TIMES DAILY
Qty: 20 SUPPOSITORY | Refills: 2 | Status: SHIPPED | OUTPATIENT
Start: 2021-01-07 | End: 2021-01-17

## 2021-01-07 RX ORDER — OMEPRAZOLE 40 MG/1
CAPSULE, DELAYED RELEASE ORAL
COMMUNITY
Start: 2020-10-26 | End: 2021-02-09 | Stop reason: SDUPTHER

## 2021-01-08 DIAGNOSIS — R11.0 NAUSEA: ICD-10-CM

## 2021-01-08 PROBLEM — K59.00 CONSTIPATION: Status: ACTIVE | Noted: 2021-01-08

## 2021-01-08 RX ORDER — ONDANSETRON 8 MG/1
8 TABLET, ORALLY DISINTEGRATING ORAL EVERY 6 HOURS PRN
Qty: 50 TABLET | Refills: 2 | Status: SHIPPED | OUTPATIENT
Start: 2021-01-08 | End: 2021-01-08 | Stop reason: SDUPTHER

## 2021-01-08 RX ORDER — ONDANSETRON 8 MG/1
8 TABLET, ORALLY DISINTEGRATING ORAL EVERY 6 HOURS PRN
Qty: 50 TABLET | Refills: 2 | Status: SHIPPED | OUTPATIENT
Start: 2021-01-08 | End: 2022-03-09 | Stop reason: SDUPTHER

## 2021-01-08 RX ORDER — POLYETHYLENE GLYCOL 3350 17 G/17G
17 POWDER, FOR SOLUTION ORAL DAILY
Qty: 1530 G | Refills: 1 | Status: SHIPPED | OUTPATIENT
Start: 2021-01-08 | End: 2021-05-18 | Stop reason: SDUPTHER

## 2021-02-09 ENCOUNTER — OFFICE VISIT (OUTPATIENT)
Dept: GASTROENTEROLOGY | Facility: CLINIC | Age: 56
End: 2021-02-09
Payer: MEDICARE

## 2021-02-09 VITALS
RESPIRATION RATE: 15 BRPM | WEIGHT: 185 LBS | SYSTOLIC BLOOD PRESSURE: 123 MMHG | BODY MASS INDEX: 30.82 KG/M2 | TEMPERATURE: 97 F | DIASTOLIC BLOOD PRESSURE: 72 MMHG | OXYGEN SATURATION: 98 % | HEIGHT: 65 IN | HEART RATE: 75 BPM

## 2021-02-09 DIAGNOSIS — K21.9 GASTROESOPHAGEAL REFLUX DISEASE, UNSPECIFIED WHETHER ESOPHAGITIS PRESENT: Primary | ICD-10-CM

## 2021-02-09 DIAGNOSIS — K57.30 DIVERTICULA OF COLON: ICD-10-CM

## 2021-02-09 DIAGNOSIS — K59.00 CONSTIPATION, UNSPECIFIED CONSTIPATION TYPE: ICD-10-CM

## 2021-02-09 DIAGNOSIS — R11.0 NAUSEA: ICD-10-CM

## 2021-02-09 PROBLEM — R19.7 DIARRHEA: Status: RESOLVED | Noted: 2020-03-05 | Resolved: 2021-02-09

## 2021-02-09 PROCEDURE — 99999 PR PBB SHADOW E&M-EST. PATIENT-LVL V: ICD-10-PCS | Mod: PBBFAC,,, | Performed by: INTERNAL MEDICINE

## 2021-02-09 PROCEDURE — 99214 OFFICE O/P EST MOD 30 MIN: CPT | Mod: S$PBB,,, | Performed by: INTERNAL MEDICINE

## 2021-02-09 PROCEDURE — 99215 OFFICE O/P EST HI 40 MIN: CPT | Mod: PBBFAC,PN | Performed by: INTERNAL MEDICINE

## 2021-02-09 PROCEDURE — 99999 PR PBB SHADOW E&M-EST. PATIENT-LVL V: CPT | Mod: PBBFAC,,, | Performed by: INTERNAL MEDICINE

## 2021-02-09 PROCEDURE — 99214 PR OFFICE/OUTPT VISIT, EST, LEVL IV, 30-39 MIN: ICD-10-PCS | Mod: S$PBB,,, | Performed by: INTERNAL MEDICINE

## 2021-02-09 RX ORDER — OMEPRAZOLE 40 MG/1
40 CAPSULE, DELAYED RELEASE ORAL EVERY MORNING
Qty: 90 CAPSULE | Refills: 3 | Status: SHIPPED | OUTPATIENT
Start: 2021-02-09 | End: 2021-05-18 | Stop reason: SDUPTHER

## 2021-02-09 RX ORDER — METOPROLOL SUCCINATE 50 MG/1
TABLET, EXTENDED RELEASE ORAL
COMMUNITY
Start: 2021-01-26 | End: 2021-09-22

## 2021-04-28 ENCOUNTER — TELEPHONE (OUTPATIENT)
Dept: GASTROENTEROLOGY | Facility: CLINIC | Age: 56
End: 2021-04-28

## 2021-05-18 ENCOUNTER — OFFICE VISIT (OUTPATIENT)
Dept: GASTROENTEROLOGY | Facility: CLINIC | Age: 56
End: 2021-05-18
Payer: MEDICARE

## 2021-05-18 VITALS
HEIGHT: 65 IN | BODY MASS INDEX: 28.93 KG/M2 | RESPIRATION RATE: 15 BRPM | OXYGEN SATURATION: 99 % | DIASTOLIC BLOOD PRESSURE: 68 MMHG | WEIGHT: 173.63 LBS | HEART RATE: 72 BPM | SYSTOLIC BLOOD PRESSURE: 98 MMHG

## 2021-05-18 DIAGNOSIS — K21.9 GASTROESOPHAGEAL REFLUX DISEASE, UNSPECIFIED WHETHER ESOPHAGITIS PRESENT: Primary | ICD-10-CM

## 2021-05-18 DIAGNOSIS — Z90.49 HISTORY OF CHOLECYSTECTOMY: ICD-10-CM

## 2021-05-18 DIAGNOSIS — Z01.818 PREOP TESTING: Primary | ICD-10-CM

## 2021-05-18 DIAGNOSIS — K21.9 GERD (GASTROESOPHAGEAL REFLUX DISEASE): ICD-10-CM

## 2021-05-18 DIAGNOSIS — K59.00 CONSTIPATION, UNSPECIFIED CONSTIPATION TYPE: ICD-10-CM

## 2021-05-18 DIAGNOSIS — R11.0 NAUSEA: ICD-10-CM

## 2021-05-18 DIAGNOSIS — R10.9 ABDOMINAL PAIN, UNSPECIFIED ABDOMINAL LOCATION: ICD-10-CM

## 2021-05-18 DIAGNOSIS — K57.30 DIVERTICULA OF COLON: ICD-10-CM

## 2021-05-18 PROCEDURE — 99214 OFFICE O/P EST MOD 30 MIN: CPT | Mod: S$PBB,,, | Performed by: INTERNAL MEDICINE

## 2021-05-18 PROCEDURE — 99214 PR OFFICE/OUTPT VISIT, EST, LEVL IV, 30-39 MIN: ICD-10-PCS | Mod: S$PBB,,, | Performed by: INTERNAL MEDICINE

## 2021-05-18 PROCEDURE — 99999 PR PBB SHADOW E&M-EST. PATIENT-LVL V: ICD-10-PCS | Mod: PBBFAC,,, | Performed by: INTERNAL MEDICINE

## 2021-05-18 PROCEDURE — 99999 PR PBB SHADOW E&M-EST. PATIENT-LVL V: CPT | Mod: PBBFAC,,, | Performed by: INTERNAL MEDICINE

## 2021-05-18 PROCEDURE — 99215 OFFICE O/P EST HI 40 MIN: CPT | Mod: PBBFAC,PN | Performed by: INTERNAL MEDICINE

## 2021-05-18 RX ORDER — BENZONATATE 100 MG/1
100 CAPSULE ORAL 3 TIMES DAILY PRN
COMMUNITY
Start: 2021-04-22

## 2021-05-18 RX ORDER — POLYETHYLENE GLYCOL 3350 17 G/17G
17 POWDER, FOR SOLUTION ORAL DAILY
Qty: 1530 G | Refills: 3 | Status: SHIPPED | OUTPATIENT
Start: 2021-05-18 | End: 2022-03-09 | Stop reason: SDUPTHER

## 2021-05-18 RX ORDER — SODIUM CHLORIDE 0.9 % (FLUSH) 0.9 %
10 SYRINGE (ML) INJECTION
Status: CANCELLED | OUTPATIENT
Start: 2021-05-18

## 2021-05-18 RX ORDER — FAMOTIDINE 40 MG/1
40 TABLET, FILM COATED ORAL DAILY
Qty: 90 TABLET | Refills: 3 | Status: SHIPPED | OUTPATIENT
Start: 2021-05-18 | End: 2021-06-22

## 2021-05-18 RX ORDER — OMEPRAZOLE 40 MG/1
40 CAPSULE, DELAYED RELEASE ORAL EVERY MORNING
Qty: 90 CAPSULE | Refills: 3 | Status: SHIPPED | OUTPATIENT
Start: 2021-05-18 | End: 2021-05-31

## 2021-05-18 RX ORDER — POLYETHYLENE GLYCOL 3350 17 G/17G
17 POWDER, FOR SOLUTION ORAL DAILY
Qty: 1530 G | Refills: 1 | Status: SHIPPED | OUTPATIENT
Start: 2021-05-18 | End: 2021-11-14

## 2021-05-18 RX ORDER — ALBUTEROL SULFATE 90 UG/1
AEROSOL, METERED RESPIRATORY (INHALATION)
COMMUNITY
Start: 2021-04-22 | End: 2023-01-31

## 2021-05-18 RX ORDER — OMEPRAZOLE 20 MG/1
20 CAPSULE, DELAYED RELEASE ORAL 2 TIMES DAILY
Qty: 60 CAPSULE | Refills: 11 | Status: SHIPPED | OUTPATIENT
Start: 2021-05-18 | End: 2021-05-31 | Stop reason: HOSPADM

## 2021-05-18 RX ORDER — FAMOTIDINE 40 MG/1
40 TABLET, FILM COATED ORAL NIGHTLY
Qty: 30 TABLET | Refills: 11 | Status: SHIPPED | OUTPATIENT
Start: 2021-05-18 | End: 2021-06-22

## 2021-05-30 ENCOUNTER — HOSPITAL ENCOUNTER (OUTPATIENT)
Dept: CARDIOLOGY | Facility: HOSPITAL | Age: 56
Discharge: HOME | End: 2021-05-30
Attending: INTERNAL MEDICINE
Payer: MEDICARE

## 2021-05-30 ENCOUNTER — LAB VISIT (OUTPATIENT)
Dept: FAMILY MEDICINE | Facility: CLINIC | Age: 56
End: 2021-05-30
Payer: MEDICARE

## 2021-05-30 DIAGNOSIS — Z01.818 PREOP TESTING: ICD-10-CM

## 2021-05-30 PROCEDURE — 93005 ELECTROCARDIOGRAM TRACING: CPT

## 2021-05-30 PROCEDURE — U0003 INFECTIOUS AGENT DETECTION BY NUCLEIC ACID (DNA OR RNA); SEVERE ACUTE RESPIRATORY SYNDROME CORONAVIRUS 2 (SARS-COV-2) (CORONAVIRUS DISEASE [COVID-19]), AMPLIFIED PROBE TECHNIQUE, MAKING USE OF HIGH THROUGHPUT TECHNOLOGIES AS DESCRIBED BY CMS-2020-01-R: HCPCS | Performed by: INTERNAL MEDICINE

## 2021-05-30 PROCEDURE — U0005 INFEC AGEN DETEC AMPLI PROBE: HCPCS | Performed by: INTERNAL MEDICINE

## 2021-05-31 DIAGNOSIS — K21.9 GASTROESOPHAGEAL REFLUX DISEASE, UNSPECIFIED WHETHER ESOPHAGITIS PRESENT: ICD-10-CM

## 2021-05-31 LAB — SARS-COV-2 RNA RESP QL NAA+PROBE: NOT DETECTED

## 2021-06-01 RX ORDER — OMEPRAZOLE 40 MG/1
40 CAPSULE, DELAYED RELEASE ORAL 2 TIMES DAILY
Qty: 180 CAPSULE | Refills: 3 | Status: SHIPPED | OUTPATIENT
Start: 2021-06-01 | End: 2021-06-22

## 2021-06-02 ENCOUNTER — ANESTHESIA (OUTPATIENT)
Dept: SURGERY | Facility: HOSPITAL | Age: 56
End: 2021-06-02
Payer: MEDICARE

## 2021-06-02 ENCOUNTER — HOSPITAL ENCOUNTER (OUTPATIENT)
Facility: HOSPITAL | Age: 56
Discharge: HOME | End: 2021-06-02
Attending: INTERNAL MEDICINE | Admitting: INTERNAL MEDICINE
Payer: MEDICARE

## 2021-06-02 ENCOUNTER — ANESTHESIA EVENT (OUTPATIENT)
Dept: SURGERY | Facility: HOSPITAL | Age: 56
End: 2021-06-02
Payer: MEDICARE

## 2021-06-02 VITALS
HEART RATE: 64 BPM | DIASTOLIC BLOOD PRESSURE: 63 MMHG | SYSTOLIC BLOOD PRESSURE: 102 MMHG | BODY MASS INDEX: 30.82 KG/M2 | HEIGHT: 65 IN | TEMPERATURE: 99 F | WEIGHT: 185 LBS | RESPIRATION RATE: 20 BRPM | OXYGEN SATURATION: 97 %

## 2021-06-02 DIAGNOSIS — K21.9 GERD (GASTROESOPHAGEAL REFLUX DISEASE): ICD-10-CM

## 2021-06-02 DIAGNOSIS — K21.9 GASTROESOPHAGEAL REFLUX DISEASE, UNSPECIFIED WHETHER ESOPHAGITIS PRESENT: ICD-10-CM

## 2021-06-02 LAB — POCT GLUCOSE: 148 MG/DL (ref 70–110)

## 2021-06-02 PROCEDURE — 88342 IMHCHEM/IMCYTCHM 1ST ANTB: CPT | Mod: 91 | Performed by: PATHOLOGY

## 2021-06-02 PROCEDURE — 88341 IMHCHEM/IMCYTCHM EA ADD ANTB: CPT | Performed by: PATHOLOGY

## 2021-06-02 PROCEDURE — 37000008 HC ANESTHESIA 1ST 15 MINUTES: Performed by: INTERNAL MEDICINE

## 2021-06-02 PROCEDURE — 27201423 OPTIME MED/SURG SUP & DEVICES STERILE SUPPLY: Performed by: INTERNAL MEDICINE

## 2021-06-02 PROCEDURE — 88305 TISSUE EXAM BY PATHOLOGIST: ICD-10-PCS | Mod: 26,,, | Performed by: PATHOLOGY

## 2021-06-02 PROCEDURE — 63600175 PHARM REV CODE 636 W HCPCS: Performed by: ANESTHESIOLOGY

## 2021-06-02 PROCEDURE — 88342 IMHCHEM/IMCYTCHM 1ST ANTB: CPT | Mod: 26,,, | Performed by: PATHOLOGY

## 2021-06-02 PROCEDURE — D9220A PRA ANESTHESIA: ICD-10-PCS | Mod: ,,, | Performed by: ANESTHESIOLOGY

## 2021-06-02 PROCEDURE — 88305 TISSUE EXAM BY PATHOLOGIST: CPT | Performed by: PATHOLOGY

## 2021-06-02 PROCEDURE — 43239 PR EGD, FLEX, W/BIOPSY, SGL/MULTI: ICD-10-PCS | Mod: ,,, | Performed by: INTERNAL MEDICINE

## 2021-06-02 PROCEDURE — 43239 EGD BIOPSY SINGLE/MULTIPLE: CPT | Mod: ,,, | Performed by: INTERNAL MEDICINE

## 2021-06-02 PROCEDURE — 37000009 HC ANESTHESIA EA ADD 15 MINS: Performed by: INTERNAL MEDICINE

## 2021-06-02 PROCEDURE — 25000003 PHARM REV CODE 250: Performed by: NURSE ANESTHETIST, CERTIFIED REGISTERED

## 2021-06-02 PROCEDURE — 88305 TISSUE EXAM BY PATHOLOGIST: CPT | Mod: 26,,, | Performed by: PATHOLOGY

## 2021-06-02 PROCEDURE — 88342 CHG IMMUNOCYTOCHEMISTRY: ICD-10-PCS | Mod: 26,,, | Performed by: PATHOLOGY

## 2021-06-02 PROCEDURE — 63600175 PHARM REV CODE 636 W HCPCS: Performed by: NURSE ANESTHETIST, CERTIFIED REGISTERED

## 2021-06-02 PROCEDURE — 88341 IMHCHEM/IMCYTCHM EA ADD ANTB: CPT | Mod: 26,,, | Performed by: PATHOLOGY

## 2021-06-02 PROCEDURE — 43239 EGD BIOPSY SINGLE/MULTIPLE: CPT | Performed by: INTERNAL MEDICINE

## 2021-06-02 PROCEDURE — 25000003 PHARM REV CODE 250

## 2021-06-02 PROCEDURE — 88341 PR IHC OR ICC EACH ADD'L SINGLE ANTIBODY  STAINPR: ICD-10-PCS | Mod: 26,,, | Performed by: PATHOLOGY

## 2021-06-02 PROCEDURE — D9220A PRA ANESTHESIA: Mod: ,,, | Performed by: ANESTHESIOLOGY

## 2021-06-02 PROCEDURE — 88342 IMHCHEM/IMCYTCHM 1ST ANTB: CPT | Performed by: PATHOLOGY

## 2021-06-02 RX ORDER — SODIUM CHLORIDE, SODIUM LACTATE, POTASSIUM CHLORIDE, CALCIUM CHLORIDE 600; 310; 30; 20 MG/100ML; MG/100ML; MG/100ML; MG/100ML
125 INJECTION, SOLUTION INTRAVENOUS CONTINUOUS
Status: DISCONTINUED | OUTPATIENT
Start: 2021-06-02 | End: 2021-06-02 | Stop reason: HOSPADM

## 2021-06-02 RX ORDER — SODIUM CHLORIDE, SODIUM LACTATE, POTASSIUM CHLORIDE, CALCIUM CHLORIDE 600; 310; 30; 20 MG/100ML; MG/100ML; MG/100ML; MG/100ML
INJECTION, SOLUTION INTRAVENOUS CONTINUOUS
Status: DISCONTINUED | OUTPATIENT
Start: 2021-06-02 | End: 2021-06-02 | Stop reason: HOSPADM

## 2021-06-02 RX ORDER — SODIUM CHLORIDE 0.9 % (FLUSH) 0.9 %
10 SYRINGE (ML) INJECTION
Status: DISCONTINUED | OUTPATIENT
Start: 2021-06-02 | End: 2021-06-02 | Stop reason: HOSPADM

## 2021-06-02 RX ORDER — LIDOCAINE HYDROCHLORIDE 10 MG/ML
1 INJECTION, SOLUTION EPIDURAL; INFILTRATION; INTRACAUDAL; PERINEURAL ONCE
Status: DISCONTINUED | OUTPATIENT
Start: 2021-06-02 | End: 2021-06-02 | Stop reason: HOSPADM

## 2021-06-02 RX ORDER — PROPOFOL 10 MG/ML
VIAL (ML) INTRAVENOUS
Status: DISCONTINUED | OUTPATIENT
Start: 2021-06-02 | End: 2021-06-02

## 2021-06-02 RX ORDER — ONDANSETRON 2 MG/ML
4 INJECTION INTRAMUSCULAR; INTRAVENOUS DAILY PRN
Status: DISCONTINUED | OUTPATIENT
Start: 2021-06-02 | End: 2021-06-02 | Stop reason: HOSPADM

## 2021-06-02 RX ORDER — LIDOCAINE HYDROCHLORIDE 20 MG/ML
INJECTION, SOLUTION EPIDURAL; INFILTRATION; INTRACAUDAL; PERINEURAL
Status: DISCONTINUED | OUTPATIENT
Start: 2021-06-02 | End: 2021-06-02

## 2021-06-02 RX ORDER — FAMOTIDINE 10 MG/ML
20 INJECTION INTRAVENOUS ONCE
Status: DISCONTINUED | OUTPATIENT
Start: 2021-06-02 | End: 2021-06-02 | Stop reason: HOSPADM

## 2021-06-02 RX ORDER — FAMOTIDINE 10 MG/ML
INJECTION INTRAVENOUS
Status: COMPLETED
Start: 2021-06-02 | End: 2021-06-02

## 2021-06-02 RX ORDER — DIPHENHYDRAMINE HYDROCHLORIDE 50 MG/ML
12.5 INJECTION INTRAMUSCULAR; INTRAVENOUS
Status: DISCONTINUED | OUTPATIENT
Start: 2021-06-02 | End: 2021-06-02 | Stop reason: HOSPADM

## 2021-06-02 RX ADMIN — PROPOFOL 30 MG: 10 INJECTION, EMULSION INTRAVENOUS at 12:06

## 2021-06-02 RX ADMIN — LIDOCAINE HYDROCHLORIDE 50 MG: 20 INJECTION, SOLUTION EPIDURAL; INFILTRATION; INTRACAUDAL; PERINEURAL at 12:06

## 2021-06-02 RX ADMIN — FAMOTIDINE 20 MG: 10 INJECTION INTRAVENOUS at 11:06

## 2021-06-02 RX ADMIN — SODIUM CHLORIDE, POTASSIUM CHLORIDE, SODIUM LACTATE AND CALCIUM CHLORIDE: 600; 310; 30; 20 INJECTION, SOLUTION INTRAVENOUS at 12:06

## 2021-06-15 LAB
COMMENT: NORMAL
FINAL PATHOLOGIC DIAGNOSIS: NORMAL
GROSS: NORMAL
Lab: NORMAL

## 2021-06-22 ENCOUNTER — OFFICE VISIT (OUTPATIENT)
Dept: GASTROENTEROLOGY | Facility: CLINIC | Age: 56
End: 2021-06-22
Payer: MEDICARE

## 2021-06-22 VITALS
SYSTOLIC BLOOD PRESSURE: 105 MMHG | HEIGHT: 65 IN | HEART RATE: 69 BPM | OXYGEN SATURATION: 98 % | RESPIRATION RATE: 14 BRPM | BODY MASS INDEX: 30.82 KG/M2 | WEIGHT: 185 LBS | DIASTOLIC BLOOD PRESSURE: 61 MMHG

## 2021-06-22 DIAGNOSIS — K59.00 CONSTIPATION, UNSPECIFIED CONSTIPATION TYPE: ICD-10-CM

## 2021-06-22 DIAGNOSIS — R11.0 NAUSEA: ICD-10-CM

## 2021-06-22 DIAGNOSIS — K57.30 DIVERTICULA OF COLON: ICD-10-CM

## 2021-06-22 DIAGNOSIS — Z90.49 HISTORY OF CHOLECYSTECTOMY: ICD-10-CM

## 2021-06-22 DIAGNOSIS — K21.9 GASTROESOPHAGEAL REFLUX DISEASE, UNSPECIFIED WHETHER ESOPHAGITIS PRESENT: Primary | ICD-10-CM

## 2021-06-22 PROCEDURE — 99215 OFFICE O/P EST HI 40 MIN: CPT | Mod: PBBFAC,PN | Performed by: INTERNAL MEDICINE

## 2021-06-22 PROCEDURE — 99999 PR PBB SHADOW E&M-EST. PATIENT-LVL V: CPT | Mod: PBBFAC,,, | Performed by: INTERNAL MEDICINE

## 2021-06-22 PROCEDURE — 99999 PR PBB SHADOW E&M-EST. PATIENT-LVL V: ICD-10-PCS | Mod: PBBFAC,,, | Performed by: INTERNAL MEDICINE

## 2021-06-22 PROCEDURE — 99214 PR OFFICE/OUTPT VISIT, EST, LEVL IV, 30-39 MIN: ICD-10-PCS | Mod: S$PBB,,, | Performed by: INTERNAL MEDICINE

## 2021-06-22 PROCEDURE — 99214 OFFICE O/P EST MOD 30 MIN: CPT | Mod: S$PBB,,, | Performed by: INTERNAL MEDICINE

## 2021-06-22 RX ORDER — ESOMEPRAZOLE MAGNESIUM 40 MG/1
40 CAPSULE, DELAYED RELEASE ORAL 2 TIMES DAILY
Qty: 60 CAPSULE | Refills: 11 | Status: SHIPPED | OUTPATIENT
Start: 2021-06-22 | End: 2021-09-22 | Stop reason: SDUPTHER

## 2021-06-22 RX ORDER — POLYETHYLENE GLYCOL 3350 17 G/17G
17 POWDER, FOR SOLUTION ORAL DAILY
Qty: 510 G | Refills: 11 | Status: SHIPPED | OUTPATIENT
Start: 2021-06-22 | End: 2021-09-22

## 2021-06-22 RX ORDER — SUCRALFATE 1 G/1
1 TABLET ORAL
Qty: 90 TABLET | Refills: 11 | Status: SHIPPED | OUTPATIENT
Start: 2021-06-22 | End: 2021-09-22

## 2021-06-28 PROBLEM — R10.9 ABDOMINAL PAIN: Status: RESOLVED | Noted: 2020-03-05 | Resolved: 2021-06-28

## 2021-08-09 ENCOUNTER — TELEPHONE (OUTPATIENT)
Dept: GASTROENTEROLOGY | Facility: CLINIC | Age: 56
End: 2021-08-09

## 2021-08-11 ENCOUNTER — PROCEDURE VISIT (OUTPATIENT)
Dept: OBSTETRICS AND GYNECOLOGY | Facility: CLINIC | Age: 56
End: 2021-08-11
Payer: MEDICARE

## 2021-08-11 ENCOUNTER — OFFICE VISIT (OUTPATIENT)
Dept: OBSTETRICS AND GYNECOLOGY | Facility: CLINIC | Age: 56
End: 2021-08-11
Payer: MEDICARE

## 2021-08-11 VITALS
BODY MASS INDEX: 29.19 KG/M2 | DIASTOLIC BLOOD PRESSURE: 59 MMHG | HEART RATE: 71 BPM | WEIGHT: 175.19 LBS | SYSTOLIC BLOOD PRESSURE: 110 MMHG | HEIGHT: 65 IN

## 2021-08-11 DIAGNOSIS — A60.9 HSV (HERPES SIMPLEX VIRUS) ANOGENITAL INFECTION: ICD-10-CM

## 2021-08-11 DIAGNOSIS — R10.2 PELVIC PAIN: Primary | ICD-10-CM

## 2021-08-11 DIAGNOSIS — R10.2 PELVIC PAIN: ICD-10-CM

## 2021-08-11 PROCEDURE — 99203 OFFICE O/P NEW LOW 30 MIN: CPT | Mod: 25,S$GLB,, | Performed by: OBSTETRICS & GYNECOLOGY

## 2021-08-11 PROCEDURE — 76856 PR  ECHO,PELVIC (NONOBSTETRIC): ICD-10-PCS | Mod: S$GLB,,, | Performed by: OBSTETRICS & GYNECOLOGY

## 2021-08-11 PROCEDURE — 99203 PR OFFICE/OUTPT VISIT, NEW, LEVL III, 30-44 MIN: ICD-10-PCS | Mod: 25,S$GLB,, | Performed by: OBSTETRICS & GYNECOLOGY

## 2021-08-11 PROCEDURE — 76856 US EXAM PELVIC COMPLETE: CPT | Mod: S$GLB,,, | Performed by: OBSTETRICS & GYNECOLOGY

## 2021-08-11 RX ORDER — CANDESARTAN 32 MG/1
32 TABLET ORAL
COMMUNITY
Start: 2021-07-15 | End: 2021-09-22

## 2021-08-11 RX ORDER — ISOSORBIDE MONONITRATE 30 MG/1
30 TABLET, EXTENDED RELEASE ORAL
COMMUNITY
Start: 2021-07-15 | End: 2021-09-22 | Stop reason: SDUPTHER

## 2021-08-11 RX ORDER — IPRATROPIUM BROMIDE 42 UG/1
2 SPRAY, METERED NASAL 2 TIMES DAILY
COMMUNITY
Start: 2021-07-23

## 2021-08-11 RX ORDER — NORTRIPTYLINE HYDROCHLORIDE 50 MG/1
CAPSULE ORAL
COMMUNITY
Start: 2021-08-03 | End: 2022-05-06

## 2021-08-11 RX ORDER — ROSUVASTATIN CALCIUM 40 MG/1
40 TABLET, COATED ORAL DAILY
COMMUNITY
Start: 2021-07-15

## 2021-08-11 RX ORDER — TRIAMTERENE AND HYDROCHLOROTHIAZIDE 75; 50 MG/1; MG/1
TABLET ORAL
COMMUNITY
Start: 2021-07-15 | End: 2023-05-03 | Stop reason: HOSPADM

## 2021-08-11 RX ORDER — AMLODIPINE BESYLATE 10 MG/1
10 TABLET ORAL
COMMUNITY
Start: 2021-07-15 | End: 2021-09-22

## 2021-08-24 ENCOUNTER — TELEPHONE (OUTPATIENT)
Dept: GASTROENTEROLOGY | Facility: CLINIC | Age: 56
End: 2021-08-24

## 2021-09-22 ENCOUNTER — OFFICE VISIT (OUTPATIENT)
Dept: GASTROENTEROLOGY | Facility: CLINIC | Age: 56
End: 2021-09-22
Payer: MEDICARE

## 2021-09-22 VITALS
HEIGHT: 65 IN | RESPIRATION RATE: 14 BRPM | SYSTOLIC BLOOD PRESSURE: 130 MMHG | WEIGHT: 175 LBS | OXYGEN SATURATION: 98 % | BODY MASS INDEX: 29.16 KG/M2 | HEART RATE: 75 BPM | DIASTOLIC BLOOD PRESSURE: 76 MMHG

## 2021-09-22 DIAGNOSIS — K21.9 GASTROESOPHAGEAL REFLUX DISEASE, UNSPECIFIED WHETHER ESOPHAGITIS PRESENT: ICD-10-CM

## 2021-09-22 DIAGNOSIS — K57.30 DIVERTICULA OF COLON: ICD-10-CM

## 2021-09-22 DIAGNOSIS — K59.00 CONSTIPATION, UNSPECIFIED CONSTIPATION TYPE: ICD-10-CM

## 2021-09-22 DIAGNOSIS — R10.9 ABDOMINAL PAIN, UNSPECIFIED ABDOMINAL LOCATION: Primary | ICD-10-CM

## 2021-09-22 DIAGNOSIS — Z90.49 HISTORY OF CHOLECYSTECTOMY: ICD-10-CM

## 2021-09-22 DIAGNOSIS — R11.0 NAUSEA: ICD-10-CM

## 2021-09-22 PROCEDURE — 99999 PR PBB SHADOW E&M-EST. PATIENT-LVL V: CPT | Mod: PBBFAC,,, | Performed by: INTERNAL MEDICINE

## 2021-09-22 PROCEDURE — 99214 PR OFFICE/OUTPT VISIT, EST, LEVL IV, 30-39 MIN: ICD-10-PCS | Mod: S$PBB,,, | Performed by: INTERNAL MEDICINE

## 2021-09-22 PROCEDURE — 99999 PR PBB SHADOW E&M-EST. PATIENT-LVL V: ICD-10-PCS | Mod: PBBFAC,,, | Performed by: INTERNAL MEDICINE

## 2021-09-22 PROCEDURE — 99215 OFFICE O/P EST HI 40 MIN: CPT | Mod: PBBFAC,PN | Performed by: INTERNAL MEDICINE

## 2021-09-22 PROCEDURE — 99214 OFFICE O/P EST MOD 30 MIN: CPT | Mod: S$PBB,,, | Performed by: INTERNAL MEDICINE

## 2021-09-22 RX ORDER — CEFUROXIME AXETIL 500 MG/1
500 TABLET ORAL 2 TIMES DAILY
COMMUNITY
Start: 2021-09-16 | End: 2022-03-09

## 2021-09-22 RX ORDER — ESOMEPRAZOLE MAGNESIUM 40 MG/1
40 CAPSULE, DELAYED RELEASE ORAL 2 TIMES DAILY
Qty: 180 CAPSULE | Refills: 3 | Status: SHIPPED | OUTPATIENT
Start: 2021-09-22 | End: 2022-03-09 | Stop reason: SDUPTHER

## 2021-09-22 RX ORDER — TEMAZEPAM 15 MG/1
15 CAPSULE ORAL NIGHTLY PRN
COMMUNITY
Start: 2021-08-25 | End: 2022-03-09

## 2021-10-29 ENCOUNTER — HOSPITAL ENCOUNTER (OUTPATIENT)
Dept: RADIOLOGY | Facility: HOSPITAL | Age: 56
Discharge: HOME | End: 2021-10-29
Attending: INTERNAL MEDICINE
Payer: MEDICARE

## 2021-10-29 ENCOUNTER — TELEPHONE (OUTPATIENT)
Dept: GASTROENTEROLOGY | Facility: CLINIC | Age: 56
End: 2021-10-29
Payer: MEDICARE

## 2021-10-29 DIAGNOSIS — R10.9 ABDOMINAL PAIN, UNSPECIFIED ABDOMINAL LOCATION: ICD-10-CM

## 2021-10-29 PROCEDURE — 76700 US ABDOMEN COMPLETE: ICD-10-PCS | Mod: 26,,, | Performed by: RADIOLOGY

## 2021-10-29 PROCEDURE — 76700 US EXAM ABDOM COMPLETE: CPT | Mod: 26,,, | Performed by: RADIOLOGY

## 2021-10-29 PROCEDURE — 76700 US EXAM ABDOM COMPLETE: CPT | Mod: TC

## 2022-03-04 NOTE — TELEPHONE ENCOUNTER
----- Message from Delicia Steel sent at 3/4/2022  2:32 PM CST -----  Contact: pt  Type:  RX Refill Request    Who Called:  pt  Refill or New Rx:  refill  RX Name and Strength:  acyclovir (ZOVIRAX) 400 MG tablet  How is the patient currently taking it? (ex. 1XDay):  as ordered  Is this a 30 day or 90 day RX:  90  Preferred Pharmacy with phone number:    [facility] [place] [place] [facility] [address]  [address] [place] [address]  Phone: [phone] Fax: [phone]    Local or Mail Order:  local  Ordering Provider:  earlene Duff Call Back Number:  [phone]  Additional Information:

## 2022-03-04 NOTE — TELEPHONE ENCOUNTER
Pt is requesting this to be electronically  to [facility] pharmacy.

## 2022-03-07 ENCOUNTER — PATIENT MESSAGE (OUTPATIENT)
Dept: GASTROENTEROLOGY | Facility: CLINIC | Age: 57
End: 2022-03-07
Payer: MEDICARE

## 2022-03-07 DIAGNOSIS — B00.9 HSV INFECTION: Primary | ICD-10-CM

## 2022-03-07 RX ORDER — ACYCLOVIR 400 MG/1
400 TABLET ORAL 3 TIMES DAILY
Qty: 30 TABLET | Refills: 3 | Status: SHIPPED | OUTPATIENT
Start: 2022-03-07 | End: 2022-03-09

## 2022-03-07 RX ORDER — ACYCLOVIR 400 MG/1
TABLET ORAL
Qty: 30 TABLET | Refills: 3 | Status: SHIPPED | OUTPATIENT
Start: 2022-03-07 | End: 2022-12-26 | Stop reason: SDUPTHER

## 2022-03-08 NOTE — TELEPHONE ENCOUNTER
Call placed to patient and scheduled appointment for 3/9 at 8am per Dr. López.  No further questions or concerns at this time.

## 2022-03-09 ENCOUNTER — OFFICE VISIT (OUTPATIENT)
Dept: GASTROENTEROLOGY | Facility: CLINIC | Age: 57
End: 2022-03-09
Payer: MEDICARE

## 2022-03-09 VITALS
WEIGHT: 184 LBS | BODY MASS INDEX: 30.66 KG/M2 | OXYGEN SATURATION: 96 % | RESPIRATION RATE: 14 BRPM | DIASTOLIC BLOOD PRESSURE: 64 MMHG | HEART RATE: 77 BPM | HEIGHT: 65 IN | SYSTOLIC BLOOD PRESSURE: 105 MMHG

## 2022-03-09 DIAGNOSIS — K57.30 DIVERTICULA OF COLON: ICD-10-CM

## 2022-03-09 DIAGNOSIS — R11.0 NAUSEA: ICD-10-CM

## 2022-03-09 DIAGNOSIS — K59.00 CONSTIPATION, UNSPECIFIED CONSTIPATION TYPE: ICD-10-CM

## 2022-03-09 DIAGNOSIS — K21.9 GASTROESOPHAGEAL REFLUX DISEASE, UNSPECIFIED WHETHER ESOPHAGITIS PRESENT: ICD-10-CM

## 2022-03-09 DIAGNOSIS — Z90.49 HISTORY OF CHOLECYSTECTOMY: Primary | ICD-10-CM

## 2022-03-09 DIAGNOSIS — R13.10 DYSPHAGIA, UNSPECIFIED TYPE: ICD-10-CM

## 2022-03-09 PROCEDURE — 99999 PR PBB SHADOW E&M-EST. PATIENT-LVL V: CPT | Mod: PBBFAC,,, | Performed by: INTERNAL MEDICINE

## 2022-03-09 PROCEDURE — 99214 OFFICE O/P EST MOD 30 MIN: CPT | Mod: S$PBB,,, | Performed by: INTERNAL MEDICINE

## 2022-03-09 PROCEDURE — 99214 PR OFFICE/OUTPT VISIT, EST, LEVL IV, 30-39 MIN: ICD-10-PCS | Mod: S$PBB,,, | Performed by: INTERNAL MEDICINE

## 2022-03-09 PROCEDURE — 99215 OFFICE O/P EST HI 40 MIN: CPT | Mod: PBBFAC,PN | Performed by: INTERNAL MEDICINE

## 2022-03-09 PROCEDURE — 99999 PR PBB SHADOW E&M-EST. PATIENT-LVL V: ICD-10-PCS | Mod: PBBFAC,,, | Performed by: INTERNAL MEDICINE

## 2022-03-09 RX ORDER — POLYETHYLENE GLYCOL 3350 17 G/17G
17 POWDER, FOR SOLUTION ORAL DAILY
Qty: 1530 G | Refills: 3 | Status: SHIPPED | OUTPATIENT
Start: 2022-03-09 | End: 2023-03-04

## 2022-03-09 RX ORDER — METHYLPHENIDATE HYDROCHLORIDE 10 MG/1
10 TABLET ORAL DAILY
COMMUNITY
Start: 2021-07-08

## 2022-03-09 RX ORDER — ESOMEPRAZOLE MAGNESIUM 40 MG/1
40 CAPSULE, DELAYED RELEASE ORAL 2 TIMES DAILY
Qty: 180 CAPSULE | Refills: 3 | Status: SHIPPED | OUTPATIENT
Start: 2022-03-09 | End: 2022-07-07 | Stop reason: SDUPTHER

## 2022-03-09 RX ORDER — ONDANSETRON 8 MG/1
8 TABLET, ORALLY DISINTEGRATING ORAL EVERY 6 HOURS PRN
Qty: 50 TABLET | Refills: 2 | Status: SHIPPED | OUTPATIENT
Start: 2022-03-09 | End: 2022-05-06 | Stop reason: SDUPTHER

## 2022-03-09 RX ORDER — ZOLPIDEM TARTRATE 10 MG/1
10 TABLET ORAL NIGHTLY PRN
COMMUNITY
Start: 2022-01-20 | End: 2023-01-31

## 2022-03-09 RX ORDER — EMPAGLIFLOZIN 10 MG/1
10 TABLET, FILM COATED ORAL DAILY
COMMUNITY
Start: 2022-02-11 | End: 2023-01-31

## 2022-03-09 RX ORDER — ISOSORBIDE MONONITRATE 30 MG/1
30 TABLET, EXTENDED RELEASE ORAL
COMMUNITY
Start: 2022-02-03

## 2022-03-09 RX ORDER — METOPROLOL TARTRATE 75 MG/1
TABLET, FILM COATED ORAL
COMMUNITY
Start: 2021-03-26 | End: 2023-12-04

## 2022-03-09 RX ORDER — RIVASTIGMINE 9.5 MG/24H
PATCH, EXTENDED RELEASE TRANSDERMAL
COMMUNITY
Start: 2022-01-27 | End: 2023-06-13

## 2022-03-09 NOTE — PROGRESS NOTES
Subjective:       Patient ID: Luz Arias is a 56 y.o. female.    Chief Complaint: Gastroesophageal Reflux (Pt is taking her nexium daily. She admits to not doing a full gerd friendly diet ) and Dysphagia (Severe past 3 weeks )    She has a history of gastroesophageal reflux.  She is on the Nexium twice a day which is helped the pyrosis and dyspepsia.  She has tried the other PPI such is the Protonix which was not effective.  The Carafate caused gastrointestinal upset.  She is currently on the Nexium twice a day which has helped the pyrosis and dyspepsia but she has developed dysphagia.  She has developed atypical chest pain.  The MiraLax has produced daily bowel movements.  She is receiving healthcare in the [facility] with the PCP who is regulating her general medical care and diabetes.  She states her diabetes is not well controlled.  She is trying to stay on the diabetic diet.  There is a history of gastroesophageal reflux gastric mucosal metaplasia and the gastro paresis and constipation but she has had a normal colonoscopy in 2019.  She has also seen in the [facility] by the neurologist for the stroke syndrome and the pulmonologist for the sarcoidosis.  She is followed in the current clinic.  She has been followed in 3 healthcare systems.  Extensive discussion about the need to coordinate her health care in 1 system.  She and her  are looking to consolidate the system into the [facility] because that is available to them.      Allergies:  Review of patient's allergies indicates:   Allergen Reactions    Darunavir Anaphylaxis    Restoril [temazepam]      Sleep walking     Sulfamethoxazole-trimethoprim Anaphylaxis and Diarrhea     Other reaction(s): Anaphylaxis, Finding of vomiting, Diarrhea    Adhesive tape-silicones Other (See Comments)    Carafate [sucralfate]     Pregabalin     Protonix [pantoprazole]     Sulfa (sulfonamide antibiotics)     Verapamil      Other reaction(s): Constipation     Zonisamide      Other reaction(s): Anaphylaxis, Diarrhea, Finding of vomiting, Anaphylaxis, Diarrhea, Finding of vomiting, Anaphylaxis, Diarrhea, Finding of vomiting    Aspirin      Other reaction(s): Anemia    Metoclopramide Rash and Anxiety       Medications:    Current Outpatient Medications:     acyclovir (ZOVIRAX) 400 MG tablet, acyclovir 400 mg tablet (Patient taking differently: Take 400 mg by mouth once daily. acyclovir 400 mg tablet), Disp: 30 tablet, Rfl: 3    amLODIPine (NORVASC) 10 MG tablet, Take 10 mg by mouth once daily., Disp: , Rfl:     aspirin (ECOTRIN) 81 MG EC tablet, Take 81 mg by mouth once daily., Disp: , Rfl:     benzonatate (TESSALON) 100 MG capsule, Take 100 mg by mouth 3 (three) times daily as needed., Disp: , Rfl:     biotin 10,000 mcg Cap, Take by mouth., Disp: , Rfl:     candesartan (ATACAND) 32 MG tablet, Take 32 mg by mouth once daily., Disp: , Rfl:     carBAMazepine (TEGRETOL XR) 400 MG Tb12, Take 400 mg by mouth 2 (two) times daily., Disp: , Rfl:     DEEP SEA NASAL 0.65 % nasal spray, , Disp: , Rfl:     diclofenac sodium (VOLTAREN) 1 % Gel, Voltaren 1 % topical gel, Disp: , Rfl:     ergocalciferol (ERGOCALCIFEROL) 50,000 unit Cap, ergocalciferol (vitamin D2) 50,000 unit capsule, Disp: , Rfl:     fluticasone propionate (FLONASE) 50 mcg/actuation nasal spray, , Disp: , Rfl:     fluticasone-salmeterol 230-21 mcg/dose (ADVAIR HFA) 230-21 mcg/actuation HFAA inhaler, Advair  mcg-21 mcg/actuation aerosol inhaler, Disp: , Rfl:     ipratropium (ATROVENT) 42 mcg (0.06 %) nasal spray, 2 sprays 2 (two) times daily., Disp: , Rfl:     isosorbide mononitrate (IMDUR) 30 MG 24 hr tablet, Take 30 mg by mouth once daily., Disp: , Rfl:     isosorbide mononitrate (IMDUR) 30 MG 24 hr tablet, 30 mg., Disp: , Rfl:     JARDIANCE 10 mg tablet, Take 10 mg by mouth once daily., Disp: , Rfl:     L-METHYL-B6-B12 3-35-2 mg Tab, , Disp: , Rfl:     lidocaine (LIDODERM) 5 %, , Disp: ,  "Rfl:     mag hydrox/aluminum hyd/simeth (MAALOX ORAL), , Disp: , Rfl:     metFORMIN (GLUCOPHAGE-XR) 500 MG XR 24hr tablet, Take 500 mg by mouth 2 (two) times daily with meals., Disp: , Rfl:     methylphenidate HCl (RITALIN) 10 MG tablet, Take 10 mg by mouth once daily., Disp: , Rfl:     metoprolol tartrate (LOPRESSOR) 25 MG tablet, 50 mg. Pt cuts tablet in half, Disp: , Rfl:     metoprolol tartrate 75 mg Tab, <span ID="[id number]" style="Bold">zzMetoprolol Tartrate 50 mg oral tablet</span><br/>Start Date: 3/26/21<br/>Status: Ordered, Disp: , Rfl:     nortriptyline (PAMELOR) 25 MG capsule, 75 mg once daily., Disp: , Rfl:     nortriptyline (PAMELOR) 50 MG capsule,  TAKE 1 CAPSULE BY MOUTH TWICE DAILY, Disp: , Rfl:     prazosin (MINIPRESS) 2 MG Cap, Take 2 mg by mouth once daily., Disp: , Rfl:     PROAIR HFA 90 mcg/actuation inhaler, , Disp: , Rfl:     rivastigmine (EXELON) 9.5 mg/24 hour PT24, Place onto the skin., Disp: , Rfl:     rosuvastatin (CRESTOR) 40 MG Tab, Take 40 mg by mouth once daily., Disp: , Rfl:     tiZANidine (ZANAFLEX) 4 MG tablet, Take 4 mg by mouth every 8 (eight) hours., Disp: , Rfl:     triamterene-hydrochlorothiazide 75-50 mg (MAXZIDE) 75-50 mg per tablet,  0.5 tab, Oral, Daily, # 45 tab, 3 Refill(s), Disp: , Rfl:     TYLENOL EXTRA STRENGTH 500 mg tablet, Take 500 mg by mouth., Disp: , Rfl:     zolpidem (AMBIEN) 10 mg Tab, Take 10 mg by mouth nightly as needed., Disp: , Rfl:     albuterol (PROVENTIL) 5 mg/mL nebulizer solution, Take 2.5 mg by nebulization every 6 (six) hours as needed for Wheezing. Rescue, Disp: , Rfl:     diphenhydrAMINE-aluminum-magnesium hydroxide-simethicone-LIDOcaine HCl 2%, Swish and swallow 5 mLs every 6 (six) hours as needed (throat irritation)., Disp: 120 mL, Rfl: 2    esomeprazole (NEXIUM) 40 MG capsule, Take 1 capsule (40 mg total) by mouth 2 (two) times daily., Disp: 180 capsule, Rfl: 3    ondansetron (ZOFRAN-ODT) 8 MG TbDL, Take 1 tablet (8 " mg total) by mouth every 6 (six) hours as needed (nausea)., Disp: 50 tablet, Rfl: 2    polyethylene glycol (GLYCOLAX) 17 gram/dose powder, Take 17 g by mouth once daily., Disp: 1530 g, Rfl: 3    Past Medical History:   Diagnosis Date    Aortic regurgitation     Chronic fatigue     Chronic pain     Depression     Diabetes     Dysphagia     Gastritis     Gastroparesis     GERD (gastroesophageal reflux disease)     Hypertension     Intestinal metaplasia of gastric cardia     Irritable bowel syndrome     Sarcoidosis     Stroke        Past Surgical History:   Procedure Laterality Date    APPENDECTOMY      CHOLECYSTECTOMY      COLONOSCOPY  05/17/2019    COLONOSCOPY N/A 5/5/2020    Procedure: COLONOSCOPY;  Surgeon: Garrick López MD;  Location: [facility] ENDO;  Service: Endoscopy;  Laterality: N/A;  with bx and stool specimen    ELBOW SURGERY      ESOPHAGOGASTRODUODENOSCOPY N/A 5/5/2020    Procedure: EGD (ESOPHAGOGASTRODUODENOSCOPY);  Surgeon: Garrick López MD;  Location: [facility] ENDO;  Service: Endoscopy;  Laterality: N/A;  with biopsy    ESOPHAGOGASTRODUODENOSCOPY N/A 6/2/2021    Procedure: EGD (ESOPHAGOGASTRODUODENOSCOPY);  Surgeon: Garrick López MD;  Location: [facility] ENDO;  Service: Endoscopy;  Laterality: N/A;    HAND SURGERY      HYSTERECTOMY      2005, age 39, KRISTAN fibroids    MEDIASTINOSCOPY      NECK SURGERY      UPPER GASTROINTESTINAL ENDOSCOPY  05/17/2019    WRIST SURGERY           Review of Systems   Constitutional: Negative for appetite change, fever and unexpected weight change.   HENT: Positive for trouble swallowing.         She has had to ENT evaluations at the [facility].  She was told that the dysphagia is not related to her ENT or the postnasal drip.  It is related to the gastrointestinal reflux.   Respiratory: Negative for cough, shortness of breath and wheezing.         [facility] pulmonologist for the sarcoidosis.  She denies  aspiration.  She is not physically  active so she denies dyspnea on exertion.  She denies aspiration hemoptysis chronic cough chronic sputum production.  The [facility] records were requested.  She does not use tobacco or alcohol.   Cardiovascular: Negative for chest pain.        She was evaluated by her keys her cardiologist and was told her cardiac status is stable.  The chest pain is due to the GI reflux she states.  She states her hyperlipidemia hypertension are well controlled on the current medications.   Gastrointestinal: Positive for constipation and nausea. Negative for abdominal distention, abdominal pain, anal bleeding, blood in stool and diarrhea.        Colonoscopy at [facility] in 2019. The evaluation was normal.  She could not remember told the recent gastroenterologist on the virtual visit that she has not had a colonoscopy.  She is having episodes of chest pain.  He had wakens her at night but her cardiac evaluation was normal.  She is having episodes of which she thinks that there is something in her throat.  She has had dysphagia but does not related to a specific food.  She denies acute obstruction or aspiration.  She denies jaundice or bleeding.  She has tried to avoid the offending foods particularly the fried in the fatty foods.  She has had a cholecystectomy.  She has chronic constipation.  The MiraLax is produced daily bowel movements.  She has chronic nausea and probably gastroparesis.  She has tried reduce her weight by decreasing her caloric intake.  She knows that she is overweight and has a fatty liver and has been on her vitamins.  Previous upper endoscopy revealed gastroesophageal reflux and also intestinal metaplasia.  She will be scheduled for upper endoscopy.  She was treated for H pylori gastritis in 2011.    Musculoskeletal: Positive for arthralgias. Negative for back pain and neck pain.   Skin: Negative for pallor and rash.   Neurological: Negative for dizziness, seizures, syncope, speech difficulty, weakness and  numbness.        She has been followed by the [facility] neurologist.  She has had a stroke.  She is ambulating with a cane is states she steadily improving.  She has had some difficulty with memory.   Hematological: Negative for adenopathy.   Psychiatric/Behavioral: Negative for confusion.       Objective:      Physical Exam  Vitals reviewed.   Constitutional:       Appearance: She is well-developed.      Comments: Well-nourished well-hydrated afebrile nonicteric  female.  She is sitting comfortably in the chair in breathing normally.  She can handle her oral secretions without difficulty.  She is normocephalic.  Pupils are normal.  She appears to be oriented x3 and can relate her history and answer questions appropriately.   HENT:      Head: Normocephalic.   Eyes:      Pupils: Pupils are equal, round, and reactive to light.   Neck:      Thyroid: No thyromegaly.      Trachea: No tracheal deviation.   Cardiovascular:      Rate and Rhythm: Normal rate and regular rhythm.      Heart sounds: Normal heart sounds.   Pulmonary:      Effort: Pulmonary effort is normal.      Breath sounds: Normal breath sounds.   Abdominal:      General: Bowel sounds are normal. There is no distension.      Palpations: Abdomen is soft. There is no mass.      Tenderness: There is no abdominal tenderness. There is no right CVA tenderness, left CVA tenderness, guarding or rebound.      Hernia: No hernia is present.      Comments: The abdomen is soft without tenderness masses organomegaly.  Bowel sounds are normal.   Musculoskeletal:      Cervical back: Normal range of motion and neck supple.      Comments: She ambulates slowly with assistance.  She can get on the exam table with assistance.  She has decrease strength on the right.   Lymphadenopathy:      Cervical: No cervical adenopathy.   Skin:     General: Skin is warm and dry.   Neurological:      Mental Status: She is alert and oriented to person, place, and time.       Cranial Nerves: No cranial nerve deficit.   Psychiatric:         Behavior: Behavior normal.           Plan:       History of cholecystectomy    Constipation, unspecified constipation type  -     esomeprazole (NEXIUM) 40 MG capsule; Take 1 capsule (40 mg total) by mouth 2 (two) times daily.  Dispense: 180 capsule; Refill: 3  -     polyethylene glycol (GLYCOLAX) 17 gram/dose powder; Take 17 g by mouth once daily.  Dispense: 1530 g; Refill: 3    Nausea  -     ondansetron (ZOFRAN-ODT) 8 MG TbDL; Take 1 tablet (8 mg total) by mouth every 6 (six) hours as needed (nausea).  Dispense: 50 tablet; Refill: 2    Gastroesophageal reflux disease, unspecified whether esophagitis present  -     polyethylene glycol (GLYCOLAX) 17 gram/dose powder; Take 17 g by mouth once daily.  Dispense: 1530 g; Refill: 3  -     NM Gastric Emptying; Future; Expected date: 03/09/2022    Diverticula of colon    Dysphagia, unspecified type    She will continue her reflux regimen.  She continues the MiraLax.  A nuclear medicine for gastric emptying is scheduled also an esophageal motility study.  She will make a food diary avoid the offending foods particularly the fried in the fatty foods.  She follows up with her [facility] physicians and the records were requested.  She continues her current medications vitamins and minerals.

## 2022-03-09 NOTE — PATIENT INSTRUCTIONS
She will continue her reflux regimen and the Nexium.  The [facility] records were requested.  A nuclear medicine scan for gastric emptying will be scheduled.  She will try to stay on the diabetic diet and avoid the offending foods particularly the fried and the fatty foods.  She is overweight and she will try to decrease her caloric intake to reduce her weight.  She continues the MiraLax and the Zofran.  Esophageal motility study will be scheduled to evaluate the dysphagia.  She will continue her current medications vitamins and minerals particularly the vitamin-E for the fatty liver.

## 2022-03-10 DIAGNOSIS — J39.2 THROAT IRRITATION: Primary | ICD-10-CM

## 2022-03-10 NOTE — TELEPHONE ENCOUNTER
----- Message from Ayana  sent at 3/10/2022 12:05 PM CST -----  Regarding: advice  Type: Needs Medical Advice    Who Called:  German hopper    Best Call Back Number:   Additional Information: Requesting a call back from Nurse, regarding pt needs a Rx for sore throat pt has name of Rx she wants called in ,please advise and call back

## 2022-03-10 NOTE — TELEPHONE ENCOUNTER
Returned call to patient and patient states she is still having pain in her esophagus and is requesting a prescription for magic mouthwash be sent to [facility].  Please advise.

## 2022-03-11 NOTE — TELEPHONE ENCOUNTER
Medication called into [facility] per provider's order.  Call placed to patient and informed patient of order being sent in.  Patient verbalized understanding and has no further questions or concerns at this time. Instructed patient to return call with any further questions or concerns.

## 2022-03-15 ENCOUNTER — PATIENT MESSAGE (OUTPATIENT)
Dept: GASTROENTEROLOGY | Facility: CLINIC | Age: 57
End: 2022-03-15
Payer: MEDICARE

## 2022-03-16 ENCOUNTER — TELEPHONE (OUTPATIENT)
Dept: GASTROENTEROLOGY | Facility: CLINIC | Age: 57
End: 2022-03-16
Payer: MEDICARE

## 2022-03-16 NOTE — TELEPHONE ENCOUNTER
----- Message from Prema Kenyon sent at 3/16/2022 12:24 PM CDT -----  Contact: pt  Type: Needs Medical Advice    Who Called: pt  Best Call Back Number:[phone]  Additional  Information: pt requesting call back to discuss appt on 3/18/22 @ 12pm. That needs to be rescheduled due to a scheduling conflict  Please Advise- Thank you

## 2022-03-16 NOTE — TELEPHONE ENCOUNTER
Pt is going to call [facility] again to get referral as well as another script of ABX as she lost the previous to treat UTI

## 2022-03-16 NOTE — TELEPHONE ENCOUNTER
Spoke to PT who has to reschedule her EMS due to seeing her internist same day as 18th. Will inform us of when rescheduled.

## 2022-03-29 ENCOUNTER — TELEPHONE (OUTPATIENT)
Dept: GASTROENTEROLOGY | Facility: CLINIC | Age: 57
End: 2022-03-29
Payer: MEDICARE

## 2022-03-29 NOTE — TELEPHONE ENCOUNTER
----- Message from Madhavi Brown sent at 3/29/2022 10:21 AM CDT -----  Type: Needs Medical Advice  Who Called:  Pt  Best Call Back Number: [phone]   Additional Information: Pt returning call to r/s test--please advise--Thank you

## 2022-03-29 NOTE — TELEPHONE ENCOUNTER
Returned call to patient.  Patient states that she did not request to speak with office or leave message for office.  Patient states she had requested to speak with scheduling regarding a missed call related to rescheduling procedure.  Message forwarded to Ioana in scheduling for assistance.

## 2022-04-06 ENCOUNTER — HOSPITAL ENCOUNTER (OUTPATIENT)
Dept: RADIOLOGY | Facility: HOSPITAL | Age: 57
Discharge: HOME | End: 2022-04-06
Attending: INTERNAL MEDICINE
Payer: MEDICARE

## 2022-04-06 DIAGNOSIS — K21.9 GASTROESOPHAGEAL REFLUX DISEASE, UNSPECIFIED WHETHER ESOPHAGITIS PRESENT: ICD-10-CM

## 2022-04-06 PROCEDURE — A9541 TC99M SULFUR COLLOID: HCPCS

## 2022-04-06 PROCEDURE — 78264 GASTRIC EMPTYING IMG STUDY: CPT | Mod: 26,,, | Performed by: RADIOLOGY

## 2022-04-06 PROCEDURE — 78264 NM GASTRIC EMPTYING: ICD-10-PCS | Mod: 26,,, | Performed by: RADIOLOGY

## 2022-05-06 ENCOUNTER — OFFICE VISIT (OUTPATIENT)
Dept: GASTROENTEROLOGY | Facility: CLINIC | Age: 57
End: 2022-05-06
Payer: MEDICARE

## 2022-05-06 VITALS
SYSTOLIC BLOOD PRESSURE: 132 MMHG | RESPIRATION RATE: 17 BRPM | WEIGHT: 180.69 LBS | OXYGEN SATURATION: 97 % | DIASTOLIC BLOOD PRESSURE: 69 MMHG | HEIGHT: 65 IN | BODY MASS INDEX: 30.1 KG/M2 | HEART RATE: 75 BPM

## 2022-05-06 DIAGNOSIS — K22.2 ESOPHAGEAL STRICTURE: ICD-10-CM

## 2022-05-06 DIAGNOSIS — K21.9 GASTROESOPHAGEAL REFLUX DISEASE, UNSPECIFIED WHETHER ESOPHAGITIS PRESENT: ICD-10-CM

## 2022-05-06 DIAGNOSIS — Z90.49 HISTORY OF CHOLECYSTECTOMY: ICD-10-CM

## 2022-05-06 DIAGNOSIS — M48.9 CERVICAL SPINE DISEASE: Primary | ICD-10-CM

## 2022-05-06 DIAGNOSIS — K59.00 CONSTIPATION, UNSPECIFIED CONSTIPATION TYPE: ICD-10-CM

## 2022-05-06 DIAGNOSIS — R13.10 DYSPHAGIA, UNSPECIFIED TYPE: ICD-10-CM

## 2022-05-06 DIAGNOSIS — R11.0 NAUSEA: ICD-10-CM

## 2022-05-06 DIAGNOSIS — R10.9 ABDOMINAL PAIN, UNSPECIFIED ABDOMINAL LOCATION: ICD-10-CM

## 2022-05-06 DIAGNOSIS — K57.30 DIVERTICULA OF COLON: ICD-10-CM

## 2022-05-06 PROCEDURE — 99214 PR OFFICE/OUTPT VISIT, EST, LEVL IV, 30-39 MIN: ICD-10-PCS | Mod: S$PBB,,, | Performed by: INTERNAL MEDICINE

## 2022-05-06 PROCEDURE — 99214 OFFICE O/P EST MOD 30 MIN: CPT | Mod: S$PBB,,, | Performed by: INTERNAL MEDICINE

## 2022-05-06 PROCEDURE — 99999 PR PBB SHADOW E&M-EST. PATIENT-LVL V: CPT | Mod: PBBFAC,,, | Performed by: INTERNAL MEDICINE

## 2022-05-06 PROCEDURE — 99215 OFFICE O/P EST HI 40 MIN: CPT | Mod: PBBFAC,PN | Performed by: INTERNAL MEDICINE

## 2022-05-06 PROCEDURE — 99999 PR PBB SHADOW E&M-EST. PATIENT-LVL V: ICD-10-PCS | Mod: PBBFAC,,, | Performed by: INTERNAL MEDICINE

## 2022-05-06 RX ORDER — ISOPROPYL ALCOHOL 70 ML/100ML
1 SWAB TOPICAL 3 TIMES DAILY
COMMUNITY
Start: 2022-04-29

## 2022-05-06 RX ORDER — HYDROCODONE BITARTRATE AND ACETAMINOPHEN 5; 325 MG/1; MG/1
1-2 TABLET ORAL EVERY 4 HOURS PRN
COMMUNITY
Start: 2022-03-13 | End: 2023-01-31

## 2022-05-06 RX ORDER — DONEPEZIL HYDROCHLORIDE 10 MG/1
TABLET, FILM COATED ORAL
COMMUNITY
Start: 2021-05-13 | End: 2023-01-31

## 2022-05-06 RX ORDER — CEPHALEXIN 500 MG/1
500 CAPSULE ORAL 3 TIMES DAILY
COMMUNITY
Start: 2022-03-13 | End: 2023-01-31 | Stop reason: ALTCHOICE

## 2022-05-06 RX ORDER — SUCRALFATE 1 G/1
TABLET ORAL
COMMUNITY
Start: 2021-06-22 | End: 2023-01-31

## 2022-05-06 RX ORDER — PREDNISONE 20 MG/1
40 TABLET ORAL DAILY
COMMUNITY
Start: 2022-05-02 | End: 2023-01-31

## 2022-05-06 RX ORDER — TEMAZEPAM 15 MG/1
CAPSULE ORAL
COMMUNITY
Start: 2021-08-25 | End: 2023-01-31

## 2022-05-06 RX ORDER — ONDANSETRON 8 MG/1
8 TABLET, ORALLY DISINTEGRATING ORAL EVERY 6 HOURS PRN
Qty: 50 TABLET | Refills: 2 | Status: SHIPPED | OUTPATIENT
Start: 2022-05-06

## 2022-05-06 RX ORDER — OMEPRAZOLE 40 MG/1
CAPSULE, DELAYED RELEASE ORAL
COMMUNITY
Start: 2021-05-13 | End: 2023-01-31

## 2022-05-06 RX ORDER — CEFUROXIME AXETIL 500 MG/1
TABLET ORAL
COMMUNITY
Start: 2021-09-16 | End: 2023-01-31 | Stop reason: ALTCHOICE

## 2022-05-06 RX ORDER — L-METHYLFOLATE-ALGAE-VIT B12-B6 CAP 3-90.314-2-35 MG 3-90.314-2-35 MG
1 CAP ORAL 2 TIMES DAILY
COMMUNITY
Start: 2022-05-02 | End: 2023-04-04

## 2022-05-06 RX ORDER — NORTRIPTYLINE HYDROCHLORIDE 75 MG/1
CAPSULE ORAL
COMMUNITY
Start: 2022-03-30

## 2022-05-06 RX ORDER — LANCETS 28 GAUGE
EACH MISCELLANEOUS 3 TIMES DAILY
COMMUNITY
Start: 2022-04-29

## 2022-05-06 RX ORDER — BLOOD-GLUCOSE METER
1 KIT MISCELLANEOUS 3 TIMES DAILY
COMMUNITY
Start: 2022-04-29

## 2022-05-06 NOTE — PROGRESS NOTES
Subjective:       Patient ID: Luz Arias is a 56 y.o. female.    Chief Complaint: Nausea and Emesis    She complains of indigestion heartburn and nausea.  The Zofran ODT has been the most helpful.  She does not associate her symptoms with the specific food.  She is having episodic dysphagia but denies acute obstruction aspiration hematemesis hematochezia jaundice or bleeding.  She generally has daily bowel movements.  She states her diabetes is well controlled on the current medications and diet.  She has tried to avoid the offending foods particularly the fried and fatty foods.  She denies aspiration.  The esophageal motility study is abnormal with an abnormal gastroesophageal outlet.  She has had pyrosis and dyspepsia and is on the Nexium b.i.d. which has controlled the majority of her symptoms.  Abdominal ultrasound reveals a fatty liver and cholecystectomy.  She has been on the vitamins including vitamin-E.  The barium swallow reveals a small hiatal hernia.  The nuclear medicine scan showed mild decreased in gastric emptying.      Allergies:  Review of patient's allergies indicates:   Allergen Reactions    Darunavir Anaphylaxis    Sulfa (sulfonamide antibiotics) Anaphylaxis    Sulfamethoxazole-trimethoprim Anaphylaxis and Diarrhea     Other reaction(s): Anaphylaxis, Finding of vomiting, Diarrhea    Temazepam      Sleep walking   Other reaction(s): Sleep related hallucinations    Pantoprazole Nausea And Vomiting    Adhesive tape-silicones Other (See Comments)    Pregabalin Other (See Comments)    Verapamil      Other reaction(s): Constipation    Zonisamide      Other reaction(s): Anaphylaxis, Diarrhea, Finding of vomiting, Anaphylaxis, Diarrhea, Finding of vomiting, Anaphylaxis, Diarrhea, Finding of vomiting    Aspirin      Other reaction(s): Anemia    Metoclopramide Rash and Anxiety    Sucralfate Other (See Comments) and Nausea Only       Medications:    Current Outpatient Medications:      cefUROXime (CEFTIN) 500 MG tablet, , Disp: , Rfl:     donepeziL (ARICEPT) 10 MG tablet, donepezil 10 mg oral tablet Start Date: 5/13/21 Status: Ordered, Disp: , Rfl:     omeprazole (PRILOSEC) 40 MG capsule, omeprazole 40 mg oral delayed release capsule Start Date: 5/13/21 Status: Ordered, Disp: , Rfl:     sucralfate (CARAFATE) 1 gram tablet, sucralfate 1 g oral tablet Start Date: 6/24/21 Status: Ordered, Disp: , Rfl:     temazepam (RESTORIL) 15 mg Cap, temazepam 15 mg oral capsule Start Date: 8/25/21 Status: Ordered, Disp: , Rfl:     acyclovir (ZOVIRAX) 400 MG tablet, acyclovir 400 mg tablet (Patient taking differently: Take 400 mg by mouth once daily. acyclovir 400 mg tablet), Disp: 30 tablet, Rfl: 3    albuterol (PROVENTIL) 5 mg/mL nebulizer solution, Take 2.5 mg by nebulization every 6 (six) hours as needed for Wheezing. Rescue, Disp: , Rfl:     amLODIPine (NORVASC) 10 MG tablet, Take 10 mg by mouth once daily., Disp: , Rfl:     aspirin (ECOTRIN) 81 MG EC tablet, Take 81 mg by mouth once daily., Disp: , Rfl:     benzonatate (TESSALON) 100 MG capsule, Take 100 mg by mouth 3 (three) times daily as needed., Disp: , Rfl:     biotin 10,000 mcg Cap, Take by mouth., Disp: , Rfl:     candesartan (ATACAND) 32 MG tablet, Take 32 mg by mouth once daily., Disp: , Rfl:     carBAMazepine (TEGRETOL XR) 400 MG Tb12, Take 400 mg by mouth 2 (two) times daily., Disp: , Rfl:     cephALEXin (KEFLEX) 500 MG capsule, Take 500 mg by mouth 3 (three) times daily., Disp: , Rfl:     DEEP SEA NASAL 0.65 % nasal spray, , Disp: , Rfl:     diclofenac sodium (VOLTAREN) 1 % Gel, Voltaren 1 % topical gel, Disp: , Rfl:     diphenhydrAMINE-aluminum-magnesium hydroxide-simethicone-LIDOcaine HCl 2%, Swish and swallow 5 mLs every 6 (six) hours as needed (throat irritation)., Disp: 120 mL, Rfl: 2    EASY TOUCH ALCOHOL PREP PADS PadM, Apply 1 each topically 3 (three) times daily., Disp: , Rfl:     ergocalciferol (ERGOCALCIFEROL) 50,000  "unit Cap, ergocalciferol (vitamin D2) 50,000 unit capsule, Disp: , Rfl:     esomeprazole (NEXIUM) 40 MG capsule, Take 1 capsule (40 mg total) by mouth 2 (two) times daily., Disp: 180 capsule, Rfl: 3    fluticasone propionate (FLONASE) 50 mcg/actuation nasal spray, , Disp: , Rfl:     fluticasone-salmeterol 230-21 mcg/dose (ADVAIR HFA) 230-21 mcg/actuation HFAA inhaler, Advair  mcg-21 mcg/actuation aerosol inhaler, Disp: , Rfl:     FREESTYLE LANCETS 28 gauge lancets, Apply topically 3 (three) times daily., Disp: , Rfl:     FREESTYLE LITE STRIPS Strp, 1 strip 3 (three) times daily., Disp: , Rfl:     HYDROcodone-acetaminophen (NORCO) 5-325 mg per tablet, Take 1-2 tablets by mouth every 4 (four) hours as needed., Disp: , Rfl:     ipratropium (ATROVENT) 42 mcg (0.06 %) nasal spray, 2 sprays 2 (two) times daily., Disp: , Rfl:     isosorbide mononitrate (IMDUR) 30 MG 24 hr tablet, Take 30 mg by mouth once daily., Disp: , Rfl:     isosorbide mononitrate (IMDUR) 30 MG 24 hr tablet, 30 mg., Disp: , Rfl:     JARDIANCE 10 mg tablet, Take 10 mg by mouth once daily., Disp: , Rfl:     L-METHYL-B6-B12 3-35-2 mg Tab, , Disp: , Rfl:     lidocaine (LIDODERM) 5 %, , Disp: , Rfl:     mag hydrox/aluminum hyd/simeth (MAALOX ORAL), , Disp: , Rfl:     METANX, ALGAL OIL, 3 mg-35 mg-2 mg -90.314 mg Cap, Take 1 capsule by mouth 2 (two) times daily., Disp: , Rfl:     metFORMIN (GLUCOPHAGE-XR) 500 MG XR 24hr tablet, Take 500 mg by mouth 2 (two) times daily with meals., Disp: , Rfl:     methylphenidate HCl (RITALIN) 10 MG tablet, Take 10 mg by mouth once daily., Disp: , Rfl:     metoprolol tartrate (LOPRESSOR) 25 MG tablet, 50 mg. Pt cuts tablet in half, Disp: , Rfl:     metoprolol tartrate 75 mg Tab, <span ID="[id number]" style="Bold">zzMetoprolol Tartrate 50 mg oral tablet</span><br/>Start Date: 3/26/21<br/>Status: Ordered, Disp: , Rfl:     nortriptyline (PAMELOR) 75 MG Cap, TAKE 1 CAPSULE BY MOUTH EVERY NIGHT 1 " HOUR BEFORE BEDTIME, Disp: , Rfl:     ondansetron (ZOFRAN-ODT) 8 MG TbDL, Take 1 tablet (8 mg total) by mouth every 6 (six) hours as needed (nausea)., Disp: 50 tablet, Rfl: 2    polyethylene glycol (GLYCOLAX) 17 gram/dose powder, Take 17 g by mouth once daily., Disp: 1530 g, Rfl: 3    prazosin (MINIPRESS) 2 MG Cap, Take 2 mg by mouth once daily., Disp: , Rfl:     predniSONE (DELTASONE) 20 MG tablet, Take 40 mg by mouth once daily., Disp: , Rfl:     PROAIR HFA 90 mcg/actuation inhaler, , Disp: , Rfl:     rivastigmine (EXELON) 9.5 mg/24 hour PT24, Place onto the skin., Disp: , Rfl:     rosuvastatin (CRESTOR) 40 MG Tab, Take 40 mg by mouth once daily., Disp: , Rfl:     tiZANidine (ZANAFLEX) 4 MG tablet, Take 4 mg by mouth every 8 (eight) hours., Disp: , Rfl:     triamterene-hydrochlorothiazide 75-50 mg (MAXZIDE) 75-50 mg per tablet,  0.5 tab, Oral, Daily, # 45 tab, 3 Refill(s), Disp: , Rfl:     TYLENOL EXTRA STRENGTH 500 mg tablet, Take 500 mg by mouth., Disp: , Rfl:     zolpidem (AMBIEN) 10 mg Tab, Take 10 mg by mouth nightly as needed., Disp: , Rfl:     Past Medical History:   Diagnosis Date    Aortic regurgitation     Chronic fatigue     Chronic pain     Depression     Diabetes     Dysphagia     Gastritis     Gastroparesis     GERD (gastroesophageal reflux disease)     Hypertension     Intestinal metaplasia of gastric cardia     Irritable bowel syndrome     Sarcoidosis     Stroke        Past Surgical History:   Procedure Laterality Date    APPENDECTOMY      CHOLECYSTECTOMY      COLONOSCOPY  05/17/2019    COLONOSCOPY N/A 5/5/2020    Procedure: COLONOSCOPY;  Surgeon: Garrick López MD;  Location: [facility] ENDO;  Service: Endoscopy;  Laterality: N/A;  with bx and stool specimen    ELBOW SURGERY      ESOPHAGOGASTRODUODENOSCOPY N/A 5/5/2020    Procedure: EGD (ESOPHAGOGASTRODUODENOSCOPY);  Surgeon: Garrick López MD;  Location: [facility] ENDO;  Service: Endoscopy;  Laterality: N/A;  with biopsy     ESOPHAGOGASTRODUODENOSCOPY N/A 6/2/2021    Procedure: EGD (ESOPHAGOGASTRODUODENOSCOPY);  Surgeon: Garrick López MD;  Location: [facility];  Service: Endoscopy;  Laterality: N/A;    HAND SURGERY      HYSTERECTOMY      2005, age 39, KRISTAN fibroids    MEDIASTINOSCOPY      NECK SURGERY      UPPER GASTROINTESTINAL ENDOSCOPY  05/17/2019    WRIST SURGERY           Review of Systems   Constitutional: Negative for appetite change, fever and unexpected weight change.   HENT: Positive for trouble swallowing.         No jaundice.   Respiratory: Negative for cough, shortness of breath and wheezing.         She is followed by the [facility] pulmonologist with a history of sarcoidosis.  She has occasional coughing but denies aspiration hemoptysis chronic cough chronic sputum production or dyspnea on exertion.   Cardiovascular: Negative for chest pain.        She denies exertional chest pain.  She states her hypertension and hyperlipidemia well controlled with the current medications.   Gastrointestinal: Positive for nausea. Negative for abdominal distention, abdominal pain, anal bleeding, blood in stool, constipation, diarrhea and rectal pain.        She generally has 1-2 bowel per day.  She has history of diverticulosis and at this point does not want another colonoscopy.   Endocrine:        She states her diabetes is well controlled.  She has tried to avoid the offending foods such as the fried and fatty foods.  She takes her vitamins and minerals.  She realizes she has a fatty liver and she is overweight.  She has tried to decrease her weight by decreasing her caloric intake.   Musculoskeletal: Positive for arthralgias, back pain and neck pain.        Her PCP suggested physical therapy for the cervical spine pain but she states he did not complete the paperwork and would like to be referred for physical therapy.   Skin: Negative for pallor and rash.   Neurological: Negative for dizziness, seizures, syncope, speech difficulty,  weakness and numbness.        She has had a stroke syndrome.  She has minimal abnormalities.  She is able to ambulate with minimal assistance and sometimes uses the cane.  Her family states neurologically she is markedly improved.   Hematological: Negative for adenopathy.   Psychiatric/Behavioral: Negative for confusion.       Objective:      Physical Exam  Vitals reviewed.   Constitutional:       Appearance: She is well-developed.      Comments: Well-nourished well-hydrated afebrile nonicteric  female.  She is sitting comfortably in the chair.  She is breathing normally.  She is not coughing.  She appears to be oriented x3 and can relate her history and answer questions appropriately.  She is normocephalic.  Pupils are normal   HENT:      Head: Normocephalic.   Eyes:      Pupils: Pupils are equal, round, and reactive to light.   Neck:      Thyroid: No thyromegaly.      Trachea: No tracheal deviation.   Cardiovascular:      Rate and Rhythm: Normal rate and regular rhythm.      Heart sounds: Normal heart sounds.   Pulmonary:      Effort: Pulmonary effort is normal.      Breath sounds: Normal breath sounds.   Abdominal:      General: Bowel sounds are normal. There is no distension.      Palpations: Abdomen is soft. There is no mass.      Tenderness: There is abdominal tenderness. There is no right CVA tenderness, left CVA tenderness, guarding or rebound.      Hernia: No hernia is present.   Musculoskeletal:      Cervical back: Normal range of motion and neck supple.      Comments: Ambulates slowly.  She can go from the sitting to the standing position without difficulty.   Lymphadenopathy:      Cervical: No cervical adenopathy.   Skin:     General: Skin is warm and dry.   Neurological:      Mental Status: She is alert and oriented to person, place, and time.      Cranial Nerves: No cranial nerve deficit.   Psychiatric:         Behavior: Behavior normal.           Plan:       Cervical spine disease  -      Ambulatory referral/consult to Physical/Occupational Therapy; Future; Expected date: 05/13/2022    Dysphagia, unspecified type  -     Ambulatory referral/consult to Gastroenterology; Future; Expected date: 05/13/2022    Nausea  -     ondansetron (ZOFRAN-ODT) 8 MG TbDL; Take 1 tablet (8 mg total) by mouth every 6 (six) hours as needed (nausea).  Dispense: 50 tablet; Refill: 2    Gastroesophageal reflux disease, unspecified whether esophagitis present    Abdominal pain, unspecified abdominal location    History of cholecystectomy    Diverticula of colon    Constipation, unspecified constipation type    Esophageal stricture    Can you her flux regimen current medications vitamins and minerals.  She will continue the diabetic diet and avoid the offending foods.  She continues her bowel program to produced daily bowel movements.  She is referred to the gastrointestinal physiologist.  She will follow-up with her home [facility] physicians and the records were requested.

## 2022-05-06 NOTE — PATIENT INSTRUCTIONS
She will follow-up with her [facility] physicians and the records were requested.  The esophageal motility study revealed an  abnormal gastroesophageal junction and she is referred to gastrointestinal physiologist Dr Odonnell.  She will be scheduled for physical therapy for the cervical spine discomfort.  She continues her reflux regimen current medications vitamins minerals and also the diabetic diet

## 2022-05-09 ENCOUNTER — TELEPHONE (OUTPATIENT)
Dept: GASTROENTEROLOGY | Facility: CLINIC | Age: 57
End: 2022-05-09
Payer: MEDICARE

## 2022-05-09 NOTE — TELEPHONE ENCOUNTER
----- Message from Juliann Snider MA sent at 5/9/2022  4:37 PM CDT -----  Regarding: FW: appt  Contact: Pt    ----- Message -----  From: Ayana Phipps, Patient Care Assistant  Sent: 5/9/2022   4:26 PM CDT  To: [facility] Staff  Subject: appt                                             Pt is requesting a call back in regards to scheduling an appt w/referral.       Pt @ [phone]

## 2022-05-17 ENCOUNTER — TELEPHONE (OUTPATIENT)
Dept: GASTROENTEROLOGY | Facility: CLINIC | Age: 57
End: 2022-05-17
Payer: MEDICARE

## 2022-05-18 ENCOUNTER — TELEPHONE (OUTPATIENT)
Dept: GASTROENTEROLOGY | Facility: CLINIC | Age: 57
End: 2022-05-18
Payer: MEDICARE

## 2022-05-19 ENCOUNTER — TELEPHONE (OUTPATIENT)
Dept: GASTROENTEROLOGY | Facility: CLINIC | Age: 57
End: 2022-05-19
Payer: MEDICARE

## 2022-05-19 NOTE — TELEPHONE ENCOUNTER
Called pt and scheduled NP appointment for July 28 @ 10am    Discussed with pt that Dr. Odonnell is a consultant that does not accept patients as a primary GI provider.  Discussed that she will send them back to their primary GI provider once their symptoms improved or the plan of care is established.     Pt was told the logistics of the appointment (arrival time, length of visit, total time spent at facility).     Pt was also told that Dr. Odonnell will review their previous workup but may order additional test and perform her own procedures and that this may require an overnight stay at a local hot to complete the workup. Patient agreed and verbalized understanding.     Pt interested in call back to be seen sooner with a 24 hr notice.

## 2022-05-20 ENCOUNTER — PATIENT MESSAGE (OUTPATIENT)
Dept: GASTROENTEROLOGY | Facility: CLINIC | Age: 57
End: 2022-05-20
Payer: MEDICARE

## 2022-05-27 ENCOUNTER — TELEPHONE (OUTPATIENT)
Dept: GASTROENTEROLOGY | Facility: CLINIC | Age: 57
End: 2022-05-27
Payer: MEDICARE

## 2022-06-03 ENCOUNTER — TELEPHONE (OUTPATIENT)
Dept: GASTROENTEROLOGY | Facility: CLINIC | Age: 57
End: 2022-06-03
Payer: MEDICARE

## 2022-06-03 ENCOUNTER — TELEPHONE (OUTPATIENT)
Dept: GASTROENTEROLOGY | Facility: CLINIC | Age: 57
End: 2022-06-03

## 2022-06-03 ENCOUNTER — OFFICE VISIT (OUTPATIENT)
Dept: GASTROENTEROLOGY | Facility: CLINIC | Age: 57
End: 2022-06-03
Payer: MEDICARE

## 2022-06-03 VITALS — OXYGEN SATURATION: 99 % | WEIGHT: 185 LBS | HEART RATE: 77 BPM | BODY MASS INDEX: 30.82 KG/M2 | HEIGHT: 65 IN

## 2022-06-03 DIAGNOSIS — R14.0 BLOATING: ICD-10-CM

## 2022-06-03 DIAGNOSIS — R11.2 NAUSEA AND VOMITING, INTRACTABILITY OF VOMITING NOT SPECIFIED, UNSPECIFIED VOMITING TYPE: ICD-10-CM

## 2022-06-03 DIAGNOSIS — R19.7 DIARRHEA, UNSPECIFIED TYPE: ICD-10-CM

## 2022-06-03 DIAGNOSIS — R68.81 EARLY SATIETY: ICD-10-CM

## 2022-06-03 DIAGNOSIS — K21.9 GASTROESOPHAGEAL REFLUX DISEASE, UNSPECIFIED WHETHER ESOPHAGITIS PRESENT: ICD-10-CM

## 2022-06-03 DIAGNOSIS — K59.00 CONSTIPATION, UNSPECIFIED CONSTIPATION TYPE: ICD-10-CM

## 2022-06-03 DIAGNOSIS — R13.10 DYSPHAGIA, UNSPECIFIED TYPE: ICD-10-CM

## 2022-06-03 DIAGNOSIS — K22.4 JACKHAMMER ESOPHAGUS: ICD-10-CM

## 2022-06-03 DIAGNOSIS — R07.89 NON-CARDIAC CHEST PAIN: Primary | ICD-10-CM

## 2022-06-03 PROCEDURE — 99215 OFFICE O/P EST HI 40 MIN: CPT | Mod: S$PBB,,, | Performed by: INTERNAL MEDICINE

## 2022-06-03 PROCEDURE — 99999 PR PBB SHADOW E&M-EST. PATIENT-LVL V: ICD-10-PCS | Mod: PBBFAC,,, | Performed by: INTERNAL MEDICINE

## 2022-06-03 PROCEDURE — 99215 PR OFFICE/OUTPT VISIT, EST, LEVL V, 40-54 MIN: ICD-10-PCS | Mod: S$PBB,,, | Performed by: INTERNAL MEDICINE

## 2022-06-03 PROCEDURE — 99215 OFFICE O/P EST HI 40 MIN: CPT | Mod: PBBFAC | Performed by: INTERNAL MEDICINE

## 2022-06-03 PROCEDURE — 99999 PR PBB SHADOW E&M-EST. PATIENT-LVL V: CPT | Mod: PBBFAC,,, | Performed by: INTERNAL MEDICINE

## 2022-06-03 RX ORDER — IBUPROFEN/PSEUDOEPHEDRINE HCL 200MG-30MG
TABLET ORAL
COMMUNITY
End: 2023-12-04

## 2022-06-03 NOTE — PROGRESS NOTES
[facility] Consultation Note    Reason for Consult:    Chief Complaint   Patient presents with    Chest Pain    Dysphagia    Nausea    Constipation    Emesis    Abdominal Pain    Gas    Bloated    Diarrhea    Heartburn         PCP:   Gab Levy   [address] [facility] [id number]    Referring MD:  Garrick López Md  [address] [facility] [address]      HPI:  Luz Arias is a 56 y.o. female referred to motility clinic for second opinion regarding the following problems:    GERD.  HH    Retrosternal pyrosis: daily  Regurgitation: once per week  Improve with: PPI   Upright symptoms: yes  Nocturnal symptoms: worse    Onset years ago     Sleeps with head of the bed elevated. Block     Avoids eating prior to bedtime.  yes    Improved w diet   nexium 40mg BID   malox prn    Dysphagia.    Problems with solids: few per week  Problems with liquids: no    Globus discomfort in throat   Sometimes hard to initiate a swallow  Choking while eating: no  Coughing while eating: no   CVA 2015  Location: neck   Onset of symptoms: few years ago, worse recently     History of food impactions requiring ED visit: no  History of allergies/eczema. Yes    No narcotics  No marijuana    Chest pain. Occasional    Onset: 1 year, worse recently   Seen in ED  Negative cardiac self.  Told that this is not cardiac per cardiology    Imdur per cardiology w improvement  Triggers (cold fluids, caffeine, smoking, ETOH): no  Caffeine intake:none     Eckardt Symptom Score  Dysphagia: 1  Regurgitation: 1  Retrosternal pain: 1  Weight Loss: 0  Total: 3    Rumination Syndrome  Symptoms occur within 10 minutes of finishing a meal: yes  Regurgitate lacks sour or bitter taste: no  Regurgitate described as tasting similar to the food recently ingested: no  Little or no improvement after GERD or nausea directed treatment: no  No symptoms during sleep or fasting: anika  Weight loss (40% of  patients): no  Related to reflux: yes  Related to belching: yes    Nausea.  Daily   Early satiety.  Occasional   Vomiting: rare   Worse in the past     Cyclical episodes of n/v with symptom free intervals between episodes:  Prodrome: yes  History of migraines: yes  Marijuana use: no   Unusual bathing behaviors:no    Dm gastroparesis     MEDS:   zofran   nortriptyline 75 for pain w some improvement  nexium w improvement     Unable to tolerate reglan     RUQ/epig pain     Bloating     Diarrhea and constipation   On PEG    G IM.  AM.  No fam of gastric cancer       Denies BRBPR, melena, weight loss.       Fatty liver     FH CRC   Thinks that had a colonoscopy relatively recently     Total visit time was 61   minutes, more than 50% of which was spent in face-to-face counseling with patient regarding symptoms, diagnostic results, prognosis, risks and benefits of treatment options, instructions for management, importance of compliance with chosen treatment options and coping strategies.      Previous Studies:   Manometry 04/08/2022:  Poor copy.  Normal relaxation of LES with swallows with normal IRP.  IRP 11. DCI 25608. 100% hypercontractile.  0% premature.  0% pan esophageal.  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  Gastric emptying study 04/06/2022:  Delayed.  228 minutes retention of 32%.  CT abdomen 03/13/2022:  Hepatic steatosis.  Mild atelectasis scarring or pneumonia of the lung bases.  Correlate clinically.  EGD 6/2/2021 Gr A esophagitis.  Erythema in the stomach.  Biopsied.  Normal duodenum.  Pathology 06/03/2021:  Stomach random chemical reactive gastropathy, mild chronic gastritis and intestinal metaplasia.  EGD 05/17/2019:  Esophagus hernia with significant reflux esophagitis with edema erythema.  I am biopsies obtained.  Stomach antral portion of the stomach pre-pyloric edema with obvious slowly inflammation moderate severe gastritis histology as well as SANDRA test.  Duodenum mild edema and  irritation duodenitis as well as dilation of the 2nd portion of duodenum biopsied visible portion taken above.  Gastric emptying study 10/09/2019:  Delayed.  9% at 4 H have time exceeds 125 minutes.  EGD 05/05/2020:  LA grade a esophagitis.  Erythema in the antrum biopsied.  Normal duodenum.  Pathology:  H pylori negative.  Mild chronic gastritis.  Colonoscopy 05/05/2020:  Hemorrhoids.  Abnormal mucosa in sigmoid colon (lymphoid aggregate).    Relevant Surgical History:    NA    ROS:  ROS   Constitutional: No fevers, no chills, + night sweats, no weight loss  ENT: No congestion, no rhinorrhea, no chronic sinus problems  CV: + chest pain, no palpitations  Pulm: No cough, no shortness of breath  Ophtho: + blurry vision, + eye redness  GI: see HPI  Derm: No rash  Heme: No lymphadenopathy, no bruising  MSK: No arthritis, no joint swelling, no Raynauds  : No dysuria, no frequent urination, no blood in urine  Endo: No hot or cold intolerance  Neuro: No dizziness, no syncope, no seizure  Psych: + anxiety, + depression  Complete ROS negative except as above    Medical History:   Past Medical History:   Diagnosis Date    Aortic regurgitation     Chronic fatigue     Chronic pain     Depression     Diabetes     Dysphagia     Gastritis     Gastroparesis     GERD (gastroesophageal reflux disease)     Hypertension     Intestinal metaplasia of gastric cardia     Irritable bowel syndrome     Sarcoidosis     Stroke         Surgical History:   Past Surgical History:   Procedure Laterality Date    APPENDECTOMY      CHOLECYSTECTOMY      COLONOSCOPY  05/17/2019    COLONOSCOPY N/A 5/5/2020    Procedure: COLONOSCOPY;  Surgeon: Garrick López MD;  Location: [facility];  Service: Endoscopy;  Laterality: N/A;  with bx and stool specimen    ELBOW SURGERY      ESOPHAGOGASTRODUODENOSCOPY N/A 5/5/2020    Procedure: EGD (ESOPHAGOGASTRODUODENOSCOPY);  Surgeon: Garrick López MD;  Location: [facility];  Service: Endoscopy;   Laterality: N/A;  with biopsy    ESOPHAGOGASTRODUODENOSCOPY N/A 6/2/2021    Procedure: EGD (ESOPHAGOGASTRODUODENOSCOPY);  Surgeon: Garrick López MD;  Location: [facility];  Service: Endoscopy;  Laterality: N/A;    HAND SURGERY      HYSTERECTOMY      2005, age 39, KRISTAN fibroids    MEDIASTINOSCOPY      NECK SURGERY      UPPER GASTROINTESTINAL ENDOSCOPY  05/17/2019    WRIST SURGERY          Family History:   Family History   Problem Relation Age of Onset    Diabetes Mother     Colon cancer Mother 65    Bladder Cancer Mother     Heart disease Mother     Heart disease Father     Kidney failure Father     Diabetes Father     Stroke Father     Heart attack Father     Colon cancer Sister 65    Diabetes Sister     Hypertension Sister     Seizures Brother     Colon polyps Sister     Diabetes Sister     Hypertension Sister     Irritable bowel syndrome Sister     Diabetes Sister     Hypertension Sister     Diabetes Sister     Hypertension Sister     Diabetes Brother     Breast cancer Neg Hx     Esophageal cancer Neg Hx     Stomach cancer Neg Hx     Liver cancer Neg Hx     Liver disease Neg Hx     Crohn's disease Neg Hx     Celiac disease Neg Hx     Pancreatic cancer Neg Hx         Social History:   Social History     Socioeconomic History    Marital status:    Tobacco Use    Smoking status: Never Smoker    Smokeless tobacco: Never Used   Substance and Sexual Activity    Alcohol use: Not Currently     Comment: no drinking anymore    Drug use: Never    Sexual activity: Not Currently        Review of patient's allergies indicates:   Allergen Reactions    Darunavir Anaphylaxis    Sulfa (sulfonamide antibiotics) Anaphylaxis    Sulfamethoxazole-trimethoprim Anaphylaxis and Diarrhea     Other reaction(s): Anaphylaxis, Finding of vomiting, Diarrhea    Temazepam      Sleep walking   Other reaction(s): Sleep related hallucinations    Pantoprazole Nausea And Vomiting    Adhesive  tape-silicones Other (See Comments)    Pregabalin Other (See Comments)    Verapamil      Other reaction(s): Constipation    Zonisamide      Other reaction(s): Anaphylaxis, Diarrhea, Finding of vomiting, Anaphylaxis, Diarrhea, Finding of vomiting, Anaphylaxis, Diarrhea, Finding of vomiting    Aspirin      Other reaction(s): Anemia  6/3/22  Pt states is not allergic to ASA, is taking 81 mg daily    Metoclopramide Rash and Anxiety    Sucralfate Other (See Comments) and Nausea Only       Current Outpatient Medications   Medication Sig Dispense Refill    acyclovir (ZOVIRAX) 400 MG tablet acyclovir 400 mg tablet (Patient taking differently: Take 400 mg by mouth once daily. acyclovir 400 mg tablet) 30 tablet 3    ALBUTEROL INHL Inhale into the lungs. Every 6 hrs prn      amLODIPine (NORVASC) 10 MG tablet Take 10 mg by mouth once daily.      aspirin (ECOTRIN) 81 MG EC tablet Take 81 mg by mouth once daily.      biotin 10,000 mcg Cap Take by mouth.      candesartan (ATACAND) 32 MG tablet Take 32 mg by mouth once daily.      carBAMazepine (TEGRETOL XR) 400 MG Tb12 Take 400 mg by mouth 2 (two) times daily.      DEEP SEA NASAL 0.65 % nasal spray       diclofenac sodium (VOLTAREN) 1 % Gel Voltaren 1 % topical gel      diphenhydrAMINE-aluminum-magnesium hydroxide-simethicone-LIDOcaine HCl 2% Swish and swallow 5 mLs every 6 (six) hours as needed (throat irritation). 120 mL 2    donepeziL (ARICEPT) 10 MG tablet donepezil 10 mg oral tablet  Start Date: 5/13/21  Status: Ordered      EASY TOUCH ALCOHOL PREP PADS PadM Apply 1 each topically 3 (three) times daily.      ergocalciferol, vitamin D2, (VITAMIN D ORAL) Take by mouth. Taking 2000 mg daily      esomeprazole (NEXIUM) 40 MG capsule Take 1 capsule (40 mg total) by mouth 2 (two) times daily. 180 capsule 3    fluticasone propionate (FLONASE) 50 mcg/actuation nasal spray       fluticasone-salmeterol 230-21 mcg/dose (ADVAIR HFA) 230-21 mcg/actuation HFAA inhaler  "Advair  mcg-21 mcg/actuation aerosol inhaler      FREESTYLE LANCETS 28 gauge lancets Apply topically 3 (three) times daily.      FREESTYLE LITE STRIPS Strp 1 strip 3 (three) times daily.      ipratropium (ATROVENT) 42 mcg (0.06 %) nasal spray 2 sprays 2 (two) times daily.      isosorbide mononitrate (IMDUR) 30 MG 24 hr tablet Take 30 mg by mouth once daily.      L-METHYL-B6-B12 3-35-2 mg Tab       lidocaine (LIDODERM) 5 %       mag hydrox/aluminum hyd/simeth (MAALOX ORAL)       melatonin 3 mg TbDL Take by mouth. prn      METANX, ALGAL OIL, 3 mg-35 mg-2 mg -90.314 mg Cap Take 1 capsule by mouth 2 (two) times daily.      metFORMIN (GLUCOPHAGE-XR) 500 MG XR 24hr tablet Take 500 mg by mouth 2 (two) times daily with meals.      methylphenidate HCl (RITALIN) 10 MG tablet Take 10 mg by mouth once daily.      metoprolol tartrate 75 mg Tab <span ID="[id number]" style="Bold">zzMetoprolol Tartrate 50 mg oral tablet</span><br/>Start Date: 3/26/21<br/>Status: Ordered      nortriptyline (PAMELOR) 75 MG Cap TAKE 1 CAPSULE BY MOUTH EVERY NIGHT 1 HOUR BEFORE BEDTIME      ondansetron (ZOFRAN-ODT) 8 MG TbDL Take 1 tablet (8 mg total) by mouth every 6 (six) hours as needed (nausea). 50 tablet 2    polyethylene glycol (GLYCOLAX) 17 gram/dose powder Take 17 g by mouth once daily. 1530 g 3    prazosin (MINIPRESS) 2 MG Cap Take 2 mg by mouth once daily.      PROAIR HFA 90 mcg/actuation inhaler       rivastigmine (EXELON) 9.5 mg/24 hour PT24 Place onto the skin.      rosuvastatin (CRESTOR) 40 MG Tab Take 40 mg by mouth once daily.      tiZANidine (ZANAFLEX) 4 MG tablet Take 4 mg by mouth every 8 (eight) hours.      triamterene-hydrochlorothiazide 75-50 mg (MAXZIDE) 75-50 mg per tablet   0.5 tab, Oral, Daily, # 45 tab, 3 Refill(s)      TYLENOL EXTRA STRENGTH 500 mg tablet Take 500 mg by mouth.      vitamin E 600 UNIT capsule Take 600 Units by mouth once daily.      albuterol (PROVENTIL) 5 mg/mL nebulizer " "solution Take 2.5 mg by nebulization every 6 (six) hours as needed for Wheezing. Rescue      benzonatate (TESSALON) 100 MG capsule Take 100 mg by mouth 3 (three) times daily as needed.      cefUROXime (CEFTIN) 500 MG tablet       cephALEXin (KEFLEX) 500 MG capsule Take 500 mg by mouth 3 (three) times daily.      ergocalciferol (ERGOCALCIFEROL) 50,000 unit Cap ergocalciferol (vitamin D2) 50,000 unit capsule      HYDROcodone-acetaminophen (NORCO) 5-325 mg per tablet Take 1-2 tablets by mouth every 4 (four) hours as needed.      isosorbide mononitrate (IMDUR) 30 MG 24 hr tablet 30 mg.      JARDIANCE 10 mg tablet Take 10 mg by mouth once daily.      metoprolol tartrate (LOPRESSOR) 25 MG tablet 50 mg. Pt cuts tablet in half      omeprazole (PRILOSEC) 40 MG capsule omeprazole 40 mg oral delayed release capsule  Start Date: 5/13/21  Status: Ordered      predniSONE (DELTASONE) 20 MG tablet Take 40 mg by mouth once daily.      sucralfate (CARAFATE) 1 gram tablet sucralfate 1 g oral tablet  Start Date: 6/24/21  Status: Ordered      temazepam (RESTORIL) 15 mg Cap temazepam 15 mg oral capsule  Start Date: 8/25/21  Status: Ordered      zolpidem (AMBIEN) 10 mg Tab Take 10 mg by mouth nightly as needed.       No current facility-administered medications for this visit.        Objective Findings:  Vital Signs:  Pulse 77   Ht 5' 5" (1.651 m)   Wt 83.9 kg (185 lb)   SpO2 99%   BMI 30.79 kg/m²   Body mass index is 30.79 kg/m².    Physical Exam:  General appearance: alert, cooperative, no distress  HENT: Normocephalic, atraumatic, neck symmetrical, no nasal discharge  Eyes: conjunctivae/corneas clear, PERRL, EOM's intact  Lungs: clear to auscultation bilaterally, no dullness to percussion bilaterally  Heart: regular rate and rhythm without rub; no displacement of the PMI  Abdomen: soft, non-tender; bowel sounds normoactive; no organomegaly  Extremities: extremities symmetric; no clubbing, cyanosis, or " edema  Integument: Skin color, texture, turgor normal; no rashes; hair distrubution normal  Neurologic: Alert and oriented X 3, normal strength, in wheel chair  Psychiatric: no pressured speech; normal affect; no evidence of impaired cognition    Labs:   Reviewed in Epic/record      Assessment and Plan:  Luz Arias is a 56 y.o. female with referred to [facility] for 2nd opinion regarding following problems:    GERD. 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  Early satiety.  Vomiting  Possibly cyclical episodes of n/v  DM gastroparesis   Unable to tolerate reglan   On zofran   On nortriptyline 75 for pain w some improvement  On nexium w improvement     -Def to ref GI     Dm   Jardiance, metformin    RUQ/epig pain   -Def to ref GI     Bloating   -Def to ref GI     Diarrhea and constipation   On PEG  -Def to ref GI     GIM.  AM.  No fam of gastric cancer   -EGD w EOE, mapping, Flip   -Def to ref GI     Fatty liver   -Def to ref GI     FH CRC   Negative Colonoscopy 2020   Repeat 5/ 2025 w local GI     Chronic pain. Neuropathy   On zanaflex  On nortriptyline 75   On restoril     Follow up in about 3 months (around 9/3/2022).    1. Non-cardiac chest pain    2. Dysphagia, unspecified type    3. Gastroesophageal reflux disease, unspecified whether esophagitis present    4. Jackhammer esophagus    5. Early satiety    6. Nausea and vomiting, intractability of vomiting not specified, unspecified vomiting type    7. Bloating    8. Constipation, unspecified constipation type    9. Diarrhea, unspecified type          Order summary:  Orders Placed This Encounter    FL Esophagram Complete    Case Request Endoscopy: ESOPHAGOGASTRODUODENOSCOPY  (EGD)    Case Request Endoscopy: MANOMETRY-ESOPHAGEAL-WITH IMPEDANCE       Discussed with PT that I act as a consult service and do not accept patients to be their primary GI provider. Discussed that the goal of our visits is to address relevant motility problems while deferring other GI problems as well as screening and surveillance to his/her primary GI provider.   Discussed that he/she needs to continue to follow with his local primary GI provider.  Discussed that we will complete his/her workup, clarify diagnosis and attempt to optimize his/her symptoms with intention of him/her returning to referring GI provider for long term GI care.   Pt verbalized understanding.        Thank you so much for allowing me to participate in the care of Luz Odonnell MD      This note was created using voice recognition software, and may contain some unrecognized transcriptional errors.

## 2022-06-03 NOTE — TELEPHONE ENCOUNTER
AVS reviewed with pt and pt's spouse.  TBS pre-procedure instructions reviewed and copy given to pt.    Asked pt to call Melinda VALLE when ready to schedule procedures.  Direct ph no given.        Pt agreed.       information on Advanced Directives given to pt.

## 2022-06-03 NOTE — H&P (VIEW-ONLY)
[facility] Consultation Note    Reason for Consult:    Chief Complaint   Patient presents with    Chest Pain    Dysphagia    Nausea    Constipation    Emesis    Abdominal Pain    Gas    Bloated    Diarrhea    Heartburn         PCP:   Gab Levy   [address] [facility] [id number]    Referring MD:  Garrick López Md  [address] [facility] [place] [id number]      HPI:  Luz Arias is a 56 y.o. female referred to motility clinic for second opinion regarding the following problems:    GERD.  HH    Retrosternal pyrosis: daily  Regurgitation: once per week  Improve with: PPI   Upright symptoms: yes  Nocturnal symptoms: worse    Onset years ago     Sleeps with head of the bed elevated. Block     Avoids eating prior to bedtime.  yes    Improved w diet   nexium 40mg BID   malox prn    Dysphagia.    Problems with solids: few per week  Problems with liquids: no    Globus discomfort in throat   Sometimes hard to initiate a swallow  Choking while eating: no  Coughing while eating: no   CVA 2015  Location: neck   Onset of symptoms: few years ago, worse recently     History of food impactions requiring ED visit: no  History of allergies/eczema. Yes    No narcotics  No marijuana    Chest pain. Occasional    Onset: 1 year, worse recently   Seen in ED  Negative cardiac self.  Told that this is not cardiac per cardiology    Imdur per cardiology w improvement  Triggers (cold fluids, caffeine, smoking, ETOH): no  Caffeine intake:none     Eckardt Symptom Score  Dysphagia: 1  Regurgitation: 1  Retrosternal pain: 1  Weight Loss: 0  Total: 3    Rumination Syndrome  Symptoms occur within 10 minutes of finishing a meal: yes  Regurgitate lacks sour or bitter taste: no  Regurgitate described as tasting similar to the food recently ingested: no  Little or no improvement after GERD or nausea directed treatment: no  No symptoms during sleep or fasting: anika  Weight loss (40% of  patients): no  Related to reflux: yes  Related to belching: yes    Nausea.  Daily   Early satiety.  Occasional   Vomiting: rare   Worse in the past     Cyclical episodes of n/v with symptom free intervals between episodes:  Prodrome: yes  History of migraines: yes  Marijuana use: no   Unusual bathing behaviors:no    Dm gastroparesis     MEDS:   zofran   nortriptyline 75 for pain w some improvement  nexium w improvement     Unable to tolerate reglan     RUQ/epig pain     Bloating     Diarrhea and constipation   On PEG    G IM.  AM.  No fam of gastric cancer       Denies BRBPR, melena, weight loss.       Fatty liver     FH CRC   Thinks that had a colonoscopy relatively recently     Total visit time was 61   minutes, more than 50% of which was spent in face-to-face counseling with patient regarding symptoms, diagnostic results, prognosis, risks and benefits of treatment options, instructions for management, importance of compliance with chosen treatment options and coping strategies.      Previous Studies:   Manometry 04/08/2022:  Poor copy.  Normal relaxation of LES with swallows with normal IRP.  IRP 11. DCI 15167. 100% hypercontractile.  0% premature.  0% pan esophageal.  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  Gastric emptying study 04/06/2022:  Delayed.  228 minutes retention of 32%.  CT abdomen 03/13/2022:  Hepatic steatosis.  Mild atelectasis scarring or pneumonia of the lung bases.  Correlate clinically.  EGD 6/2/2021 Gr A esophagitis.  Erythema in the stomach.  Biopsied.  Normal duodenum.  Pathology 06/03/2021:  Stomach random chemical reactive gastropathy, mild chronic gastritis and intestinal metaplasia.  EGD 05/17/2019:  Esophagus hernia with significant reflux esophagitis with edema erythema.  I am biopsies obtained.  Stomach antral portion of the stomach pre-pyloric edema with obvious slowly inflammation moderate severe gastritis histology as well as SANDRA test.  Duodenum mild edema and  irritation duodenitis as well as dilation of the 2nd portion of duodenum biopsied visible portion taken above.  Gastric emptying study 10/09/2019:  Delayed.  9% at 4 H have time exceeds 125 minutes.  EGD 05/05/2020:  LA grade a esophagitis.  Erythema in the antrum biopsied.  Normal duodenum.  Pathology:  H pylori negative.  Mild chronic gastritis.  Colonoscopy 05/05/2020:  Hemorrhoids.  Abnormal mucosa in sigmoid colon (lymphoid aggregate).    Relevant Surgical History:    NA    ROS:  ROS   Constitutional: No fevers, no chills, + night sweats, no weight loss  ENT: No congestion, no rhinorrhea, no chronic sinus problems  CV: + chest pain, no palpitations  Pulm: No cough, no shortness of breath  Ophtho: + blurry vision, + eye redness  GI: see HPI  Derm: No rash  Heme: No lymphadenopathy, no bruising  MSK: No arthritis, no joint swelling, no Raynauds  : No dysuria, no frequent urination, no blood in urine  Endo: No hot or cold intolerance  Neuro: No dizziness, no syncope, no seizure  Psych: + anxiety, + depression  Complete ROS negative except as above    Medical History:   Past Medical History:   Diagnosis Date    Aortic regurgitation     Chronic fatigue     Chronic pain     Depression     Diabetes     Dysphagia     Gastritis     Gastroparesis     GERD (gastroesophageal reflux disease)     Hypertension     Intestinal metaplasia of gastric cardia     Irritable bowel syndrome     Sarcoidosis     Stroke         Surgical History:   Past Surgical History:   Procedure Laterality Date    APPENDECTOMY      CHOLECYSTECTOMY      COLONOSCOPY  05/17/2019    COLONOSCOPY N/A 5/5/2020    Procedure: COLONOSCOPY;  Surgeon: Garrick López MD;  Location: [facility];  Service: Endoscopy;  Laterality: N/A;  with bx and stool specimen    ELBOW SURGERY      ESOPHAGOGASTRODUODENOSCOPY N/A 5/5/2020    Procedure: EGD (ESOPHAGOGASTRODUODENOSCOPY);  Surgeon: Garrick López MD;  Location: [facility];  Service: Endoscopy;   Laterality: N/A;  with biopsy    ESOPHAGOGASTRODUODENOSCOPY N/A 6/2/2021    Procedure: EGD (ESOPHAGOGASTRODUODENOSCOPY);  Surgeon: Garrick López MD;  Location: [facility];  Service: Endoscopy;  Laterality: N/A;    HAND SURGERY      HYSTERECTOMY      2005, age 39, KRISTAN fibroids    MEDIASTINOSCOPY      NECK SURGERY      UPPER GASTROINTESTINAL ENDOSCOPY  05/17/2019    WRIST SURGERY          Family History:   Family History   Problem Relation Age of Onset    Diabetes Mother     Colon cancer Mother 65    Bladder Cancer Mother     Heart disease Mother     Heart disease Father     Kidney failure Father     Diabetes Father     Stroke Father     Heart attack Father     Colon cancer Sister 65    Diabetes Sister     Hypertension Sister     Seizures Brother     Colon polyps Sister     Diabetes Sister     Hypertension Sister     Irritable bowel syndrome Sister     Diabetes Sister     Hypertension Sister     Diabetes Sister     Hypertension Sister     Diabetes Brother     Breast cancer Neg Hx     Esophageal cancer Neg Hx     Stomach cancer Neg Hx     Liver cancer Neg Hx     Liver disease Neg Hx     Crohn's disease Neg Hx     Celiac disease Neg Hx     Pancreatic cancer Neg Hx         Social History:   Social History     Socioeconomic History    Marital status:    Tobacco Use    Smoking status: Never Smoker    Smokeless tobacco: Never Used   Substance and Sexual Activity    Alcohol use: Not Currently     Comment: no drinking anymore    Drug use: Never    Sexual activity: Not Currently        Review of patient's allergies indicates:   Allergen Reactions    Darunavir Anaphylaxis    Sulfa (sulfonamide antibiotics) Anaphylaxis    Sulfamethoxazole-trimethoprim Anaphylaxis and Diarrhea     Other reaction(s): Anaphylaxis, Finding of vomiting, Diarrhea    Temazepam      Sleep walking   Other reaction(s): Sleep related hallucinations    Pantoprazole Nausea And Vomiting    Adhesive  tape-silicones Other (See Comments)    Pregabalin Other (See Comments)    Verapamil      Other reaction(s): Constipation    Zonisamide      Other reaction(s): Anaphylaxis, Diarrhea, Finding of vomiting, Anaphylaxis, Diarrhea, Finding of vomiting, Anaphylaxis, Diarrhea, Finding of vomiting    Aspirin      Other reaction(s): Anemia  6/3/22  Pt states is not allergic to ASA, is taking 81 mg daily    Metoclopramide Rash and Anxiety    Sucralfate Other (See Comments) and Nausea Only       Current Outpatient Medications   Medication Sig Dispense Refill    acyclovir (ZOVIRAX) 400 MG tablet acyclovir 400 mg tablet (Patient taking differently: Take 400 mg by mouth once daily. acyclovir 400 mg tablet) 30 tablet 3    ALBUTEROL INHL Inhale into the lungs. Every 6 hrs prn      amLODIPine (NORVASC) 10 MG tablet Take 10 mg by mouth once daily.      aspirin (ECOTRIN) 81 MG EC tablet Take 81 mg by mouth once daily.      biotin 10,000 mcg Cap Take by mouth.      candesartan (ATACAND) 32 MG tablet Take 32 mg by mouth once daily.      carBAMazepine (TEGRETOL XR) 400 MG Tb12 Take 400 mg by mouth 2 (two) times daily.      DEEP SEA NASAL 0.65 % nasal spray       diclofenac sodium (VOLTAREN) 1 % Gel Voltaren 1 % topical gel      diphenhydrAMINE-aluminum-magnesium hydroxide-simethicone-LIDOcaine HCl 2% Swish and swallow 5 mLs every 6 (six) hours as needed (throat irritation). 120 mL 2    donepeziL (ARICEPT) 10 MG tablet donepezil 10 mg oral tablet  Start Date: 5/13/21  Status: Ordered      EASY TOUCH ALCOHOL PREP PADS PadM Apply 1 each topically 3 (three) times daily.      ergocalciferol, vitamin D2, (VITAMIN D ORAL) Take by mouth. Taking 2000 mg daily      esomeprazole (NEXIUM) 40 MG capsule Take 1 capsule (40 mg total) by mouth 2 (two) times daily. 180 capsule 3    fluticasone propionate (FLONASE) 50 mcg/actuation nasal spray       fluticasone-salmeterol 230-21 mcg/dose (ADVAIR HFA) 230-21 mcg/actuation HFAA inhaler  "Advair  mcg-21 mcg/actuation aerosol inhaler      FREESTYLE LANCETS 28 gauge lancets Apply topically 3 (three) times daily.      FREESTYLE LITE STRIPS Strp 1 strip 3 (three) times daily.      ipratropium (ATROVENT) 42 mcg (0.06 %) nasal spray 2 sprays 2 (two) times daily.      isosorbide mononitrate (IMDUR) 30 MG 24 hr tablet Take 30 mg by mouth once daily.      L-METHYL-B6-B12 3-35-2 mg Tab       lidocaine (LIDODERM) 5 %       mag hydrox/aluminum hyd/simeth (MAALOX ORAL)       melatonin 3 mg TbDL Take by mouth. prn      METANX, ALGAL OIL, 3 mg-35 mg-2 mg -90.314 mg Cap Take 1 capsule by mouth 2 (two) times daily.      metFORMIN (GLUCOPHAGE-XR) 500 MG XR 24hr tablet Take 500 mg by mouth 2 (two) times daily with meals.      methylphenidate HCl (RITALIN) 10 MG tablet Take 10 mg by mouth once daily.      metoprolol tartrate 75 mg Tab <span ID="[id number]" style="Bold">zzMetoprolol Tartrate 50 mg oral tablet</span><br/>Start Date: 3/26/21<br/>Status: Ordered      nortriptyline (PAMELOR) 75 MG Cap TAKE 1 CAPSULE BY MOUTH EVERY NIGHT 1 HOUR BEFORE BEDTIME      ondansetron (ZOFRAN-ODT) 8 MG TbDL Take 1 tablet (8 mg total) by mouth every 6 (six) hours as needed (nausea). 50 tablet 2    polyethylene glycol (GLYCOLAX) 17 gram/dose powder Take 17 g by mouth once daily. 1530 g 3    prazosin (MINIPRESS) 2 MG Cap Take 2 mg by mouth once daily.      PROAIR HFA 90 mcg/actuation inhaler       rivastigmine (EXELON) 9.5 mg/24 hour PT24 Place onto the skin.      rosuvastatin (CRESTOR) 40 MG Tab Take 40 mg by mouth once daily.      tiZANidine (ZANAFLEX) 4 MG tablet Take 4 mg by mouth every 8 (eight) hours.      triamterene-hydrochlorothiazide 75-50 mg (MAXZIDE) 75-50 mg per tablet   0.5 tab, Oral, Daily, # 45 tab, 3 Refill(s)      TYLENOL EXTRA STRENGTH 500 mg tablet Take 500 mg by mouth.      vitamin E 600 UNIT capsule Take 600 Units by mouth once daily.      albuterol (PROVENTIL) 5 mg/mL nebulizer " "solution Take 2.5 mg by nebulization every 6 (six) hours as needed for Wheezing. Rescue      benzonatate (TESSALON) 100 MG capsule Take 100 mg by mouth 3 (three) times daily as needed.      cefUROXime (CEFTIN) 500 MG tablet       cephALEXin (KEFLEX) 500 MG capsule Take 500 mg by mouth 3 (three) times daily.      ergocalciferol (ERGOCALCIFEROL) 50,000 unit Cap ergocalciferol (vitamin D2) 50,000 unit capsule      HYDROcodone-acetaminophen (NORCO) 5-325 mg per tablet Take 1-2 tablets by mouth every 4 (four) hours as needed.      isosorbide mononitrate (IMDUR) 30 MG 24 hr tablet 30 mg.      JARDIANCE 10 mg tablet Take 10 mg by mouth once daily.      metoprolol tartrate (LOPRESSOR) 25 MG tablet 50 mg. Pt cuts tablet in half      omeprazole (PRILOSEC) 40 MG capsule omeprazole 40 mg oral delayed release capsule  Start Date: 5/13/21  Status: Ordered      predniSONE (DELTASONE) 20 MG tablet Take 40 mg by mouth once daily.      sucralfate (CARAFATE) 1 gram tablet sucralfate 1 g oral tablet  Start Date: 6/24/21  Status: Ordered      temazepam (RESTORIL) 15 mg Cap temazepam 15 mg oral capsule  Start Date: 8/25/21  Status: Ordered      zolpidem (AMBIEN) 10 mg Tab Take 10 mg by mouth nightly as needed.       No current facility-administered medications for this visit.        Objective Findings:  Vital Signs:  Pulse 77   Ht 5' 5" (1.651 m)   Wt 83.9 kg (185 lb)   SpO2 99%   BMI 30.79 kg/m²   Body mass index is 30.79 kg/m².    Physical Exam:  General appearance: alert, cooperative, no distress  HENT: Normocephalic, atraumatic, neck symmetrical, no nasal discharge  Eyes: conjunctivae/corneas clear, PERRL, EOM's intact  Lungs: clear to auscultation bilaterally, no dullness to percussion bilaterally  Heart: regular rate and rhythm without rub; no displacement of the PMI  Abdomen: soft, non-tender; bowel sounds normoactive; no organomegaly  Extremities: extremities symmetric; no clubbing, cyanosis, or " edema  Integument: Skin color, texture, turgor normal; no rashes; hair distrubution normal  Neurologic: Alert and oriented X 3, normal strength, in wheel chair  Psychiatric: no pressured speech; normal affect; no evidence of impaired cognition    Labs:   Reviewed in Epic/record      Assessment and Plan:  Luz Arias is a 56 y.o. female with referred to Es[facility]ility Clinic for 2nd opinion regarding following problems:    GERD. 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  Early satiety.  Vomiting  Possibly cyclical episodes of n/v  DM gastroparesis   Unable to tolerate reglan   On zofran   On nortriptyline 75 for pain w some improvement  On nexium w improvement     -Def to ref GI     Dm   Jardiance, metformin    RUQ/epig pain   -Def to ref GI     Bloating   -Def to ref GI     Diarrhea and constipation   On PEG  -Def to ref GI     GIM.  AM.  No fam of gastric cancer   -EGD w EOE, mapping, Flip   -Def to ref GI     Fatty liver   -Def to ref GI     FH CRC   Negative Colonoscopy 2020   Repeat 5/ 2025 w local GI     Chronic pain. Neuropathy   On zanaflex  On nortriptyline 75   On restoril     Follow up in about 3 months (around 9/3/2022).    1. Non-cardiac chest pain    2. Dysphagia, unspecified type    3. Gastroesophageal reflux disease, unspecified whether esophagitis present    4. Jackhammer esophagus    5. Early satiety    6. Nausea and vomiting, intractability of vomiting not specified, unspecified vomiting type    7. Bloating    8. Constipation, unspecified constipation type    9. Diarrhea, unspecified type          Order summary:  Orders Placed This Encounter    FL Esophagram Complete    Case Request Endoscopy: ESOPHAGOGASTRODUODENOSCOPY  (EGD)    Case Request Endoscopy: MANOMETRY-ESOPHAGEAL-WITH IMPEDANCE       Discussed with PT that I act as a consult service and do not accept patients to be their primary GI provider. Discussed that the goal of our visits is to address relevant motility problems while deferring other GI problems as well as screening and surveillance to his/her primary GI provider.   Discussed that he/she needs to continue to follow with his local primary GI provider.  Discussed that we will complete his/her workup, clarify diagnosis and attempt to optimize his/her symptoms with intention of him/her returning to referring GI provider for long term GI care.   Pt verbalized understanding.        Thank you so much for allowing me to participate in the care of Luz Odonnell MD      This note was created using voice recognition software, and may contain some unrecognized transcriptional errors.

## 2022-06-03 NOTE — PATIENT INSTRUCTIONS
-Check with your primary Gi doctor that your colonoscopy is up to date     -Complete EGD with EndoFlip    EGD is an endoscopic procedure that allows your doctor to examine your esophagus, stomach and duodenum (part of your small intestine). EGD is an outpatient procedure, meaning you can go home that same day. It takes approximately 30 to 60 minutes to perform.  EndoFLIP is a technology that simultaneously measures the area across the inside of a gastrointestinal organ (for example, the esophagus) and the pressure inside that organ. The ratio of the two measurements is called distensibility (stiffness).    -Complete esophageal manometry   During esophageal manometry, a thin, flexible tube (catheter) that contains pressure sensors is passed through your nose, down your esophagus and into your stomach. Esophageal manometry can be helpful in diagnosing certain disorders that can affect your esophagus.  Esophageal manometry provides information about the movement of food through the esophagus into the stomach. The test measures how well the muscles at the top and bottom of your esophagus (sphincter muscles) open and close, as well as the pressure, speed and pattern of the wave of esophageal muscle contractions that moves food along.    -Complete timed barium esophagogram   Barium esophagram is an X-ray of the esophagus. The patient drinks a liquid that contains barium (a silver-white metallic compound). The liquid coats the esophagus and X-rays are taken.

## 2022-06-03 NOTE — TELEPHONE ENCOUNTER
MOTILITY CLINIC PROCEDURE ORDERS    CLEARANCE FOR PROCEDURES:  [x] Not needed   [] Cardiology   [] Pulmonary  [] PCP    PROCEDURES  [] EGD   [] Colonoscopy   [x] EGD with EndoFlip   [x] Esophageal manometry with impedance - dysphagia   [x] Esophageal manometry with impedance - rumination  [x] Esophageal manometry with impedance - belching   [] EGD with BRAVO 48 hours   [] EGD with BRAVO 96 hours   [] pH Impedance 24 hours   [] Anorectal manometry   [] Rectal Ultrasound     Does manometry need to be placed endoscopically:   [] Yes    FLOOR:  [x] 4th Floor   [] 2nd Floor    Reason for 2nd Floor:   [] Gastroparesis   [] End stage achalasia  [] Pre lung transplant   [] Pre hear transplant   [] Pulmonary HTN (PAP>50 or on meds)   [] Severe pulmonary Disease   [] Complex medical problems   [] BMI>50  [] History of anesthesia issues  [] Other:    PREP  [] Standard Prep  [] Severe Constipation Prep (Low residue diet 7 days.  1.5 prep the day prior, 0.5 prep the day of procedure)  [] Full liquid diet 5 days   [x] Full liquid diet 3 days   [] Full liquid diet 2 days   [] Full liquid diet 1 day   [] Other:     MEDICATIONS    pH Studies  [] ON PPI/H2 Blocker   [] OFF PPI/H2 Blocker     Metal allergy (stainless steal w chromium, nickel, copper, cobalt and iron).:   []  Yes    Pacemaker/defibrillator:  [] Yes    Motility Studies (esophageal manometry/anorectal manometry)  Hold narcotics x 1 days if able   Hold TCA x 1 days if able  Propofol/lidocaine only during sedation.  Discuss with Dr. Odonnell if additional sedation needed.   Hold baclofen for thee days (at least one day) if able   Hold muscle relaxants x 1 day if able   Hold zanalflex, restoril,nortriptyline     Anticoagulation/antiplt agents:   []  Yes    ORDER OF TESTING:  Day 1: EGD, Flip +/- manometry    Day 2: manometry, TBS     [] No special requirements - pt lives locally     SCHEDULING PRIORITY  [] Urgent  (<7 days)  [] Priority (<30 days)  [x] Routine 1 (schedule  ASAP)  [] Routine 2 (next available, < 3 months)   [] Routine 3 (ok if > 3 months)       PHYSICIAN  []  Ok with Dr. Julian   []  Ok with any GI MD

## 2022-06-10 ENCOUNTER — TELEPHONE (OUTPATIENT)
Dept: ENDOSCOPY | Facility: HOSPITAL | Age: 57
End: 2022-06-10
Payer: MEDICARE

## 2022-06-10 NOTE — TELEPHONE ENCOUNTER
Contacted patient to schedule procedure. As per our conversation,  patient is scheduled for EGD on 6/28/22 at 2:00 pm and Esophageal Manometry on 6/29/22 at 8:00 am. Instructions reviewed with patient and informed instructions will be on patient portal for review. Patient is ready to schedule TBS.

## 2022-06-16 ENCOUNTER — PATIENT MESSAGE (OUTPATIENT)
Dept: GASTROENTEROLOGY | Facility: CLINIC | Age: 57
End: 2022-06-16
Payer: MEDICARE

## 2022-06-17 ENCOUNTER — TELEPHONE (OUTPATIENT)
Dept: ENDOSCOPY | Facility: HOSPITAL | Age: 57
End: 2022-06-17
Payer: MEDICARE

## 2022-06-17 NOTE — TELEPHONE ENCOUNTER
Contacted patient to schedule procedure. As per our conversation,  patient is scheduled for EGD/Endoflip on 6/28/22 at 2:00pm and Esophageal Manometry on 6/29/22 at 8:00 am. Instructions reviewed with patient and informed instructions will be emailed to [email or web address] for review.

## 2022-06-20 ENCOUNTER — TELEPHONE (OUTPATIENT)
Dept: GASTROENTEROLOGY | Facility: CLINIC | Age: 57
End: 2022-06-20
Payer: MEDICARE

## 2022-06-20 ENCOUNTER — PATIENT MESSAGE (OUTPATIENT)
Dept: ENDOSCOPY | Facility: HOSPITAL | Age: 57
End: 2022-06-20
Payer: MEDICARE

## 2022-06-20 ENCOUNTER — PATIENT MESSAGE (OUTPATIENT)
Dept: GASTROENTEROLOGY | Facility: CLINIC | Age: 57
End: 2022-06-20
Payer: MEDICARE

## 2022-06-20 NOTE — TELEPHONE ENCOUNTER
----- Message from Yassine Petersen sent at 6/20/2022  1:56 PM CDT -----  Type:  Sooner Appointment Request    Caller is requesting a sooner appointment.  Caller declined first available appointment listed below.  Caller will not accept being placed on the waitlist and is requesting a message be sent to doctor.    Name of Caller:  Pt  When is the first available appointment?  7/22  Symptoms:   nausea and pain in my abdomen and right side that has gotten progressively worse and review a test results  Best Call Back Number:  [phone]   Additional Information:  Please advise - thank you

## 2022-06-23 ENCOUNTER — OFFICE VISIT (OUTPATIENT)
Dept: GASTROENTEROLOGY | Facility: CLINIC | Age: 57
End: 2022-06-23
Payer: MEDICARE

## 2022-06-23 ENCOUNTER — HOSPITAL ENCOUNTER (EMERGENCY)
Facility: HOSPITAL | Age: 57
Discharge: HOME | End: 2022-06-23
Attending: EMERGENCY MEDICINE
Payer: MEDICARE

## 2022-06-23 VITALS
SYSTOLIC BLOOD PRESSURE: 105 MMHG | WEIGHT: 184.94 LBS | HEART RATE: 67 BPM | DIASTOLIC BLOOD PRESSURE: 62 MMHG | HEIGHT: 65 IN | BODY MASS INDEX: 30.81 KG/M2

## 2022-06-23 VITALS
OXYGEN SATURATION: 98 % | TEMPERATURE: 98 F | HEART RATE: 65 BPM | BODY MASS INDEX: 29.16 KG/M2 | RESPIRATION RATE: 18 BRPM | WEIGHT: 175 LBS | SYSTOLIC BLOOD PRESSURE: 103 MMHG | DIASTOLIC BLOOD PRESSURE: 53 MMHG | HEIGHT: 65 IN

## 2022-06-23 DIAGNOSIS — R11.0 NAUSEA: ICD-10-CM

## 2022-06-23 DIAGNOSIS — K59.00 CONSTIPATION, UNSPECIFIED CONSTIPATION TYPE: ICD-10-CM

## 2022-06-23 DIAGNOSIS — K57.30 DIVERTICULA OF COLON: ICD-10-CM

## 2022-06-23 DIAGNOSIS — R10.9 ABDOMINAL PAIN: ICD-10-CM

## 2022-06-23 DIAGNOSIS — K21.9 GASTROESOPHAGEAL REFLUX DISEASE, UNSPECIFIED WHETHER ESOPHAGITIS PRESENT: Primary | ICD-10-CM

## 2022-06-23 DIAGNOSIS — Z90.49 HISTORY OF CHOLECYSTECTOMY: ICD-10-CM

## 2022-06-23 DIAGNOSIS — R10.9 ABDOMINAL PAIN, UNSPECIFIED ABDOMINAL LOCATION: ICD-10-CM

## 2022-06-23 DIAGNOSIS — R13.10 DYSPHAGIA, UNSPECIFIED TYPE: ICD-10-CM

## 2022-06-23 LAB
ALBUMIN SERPL BCP-MCNC: 4.3 G/DL (ref 3.5–5.2)
ALP SERPL-CCNC: 71 U/L (ref 55–135)
ALT SERPL W/O P-5'-P-CCNC: 28 U/L (ref 10–44)
ANION GAP SERPL CALC-SCNC: 12 MMOL/L (ref 8–16)
AST SERPL-CCNC: 25 U/L (ref 10–40)
BACTERIA #/AREA URNS HPF: ABNORMAL /HPF
BASOPHILS # BLD AUTO: 0.01 K/UL (ref 0–0.2)
BASOPHILS NFR BLD: 0.1 % (ref 0–1.9)
BILIRUB SERPL-MCNC: 0.3 MG/DL (ref 0.1–1)
BILIRUB UR QL STRIP: ABNORMAL
BUN SERPL-MCNC: 16 MG/DL (ref 6–20)
CALCIUM SERPL-MCNC: 9.7 MG/DL (ref 8.7–10.5)
CHLORIDE SERPL-SCNC: 101 MMOL/L (ref 95–110)
CLARITY UR: CLEAR
CO2 SERPL-SCNC: 25 MMOL/L (ref 23–29)
COLOR UR: YELLOW
CREAT SERPL-MCNC: 0.8 MG/DL (ref 0.5–1.4)
DIFFERENTIAL METHOD: ABNORMAL
EOSINOPHIL # BLD AUTO: 0 K/UL (ref 0–0.5)
EOSINOPHIL NFR BLD: 0.3 % (ref 0–8)
ERYTHROCYTE [DISTWIDTH] IN BLOOD BY AUTOMATED COUNT: 13.5 % (ref 11.5–14.5)
EST. GFR  (AFRICAN AMERICAN): >60 ML/MIN/1.73 M^2
EST. GFR  (NON AFRICAN AMERICAN): >60 ML/MIN/1.73 M^2
GLUCOSE SERPL-MCNC: 187 MG/DL (ref 70–110)
GLUCOSE UR QL STRIP: NEGATIVE
HCT VFR BLD AUTO: 31.2 % (ref 37–48.5)
HGB BLD-MCNC: 10 G/DL (ref 12–16)
HGB UR QL STRIP: NEGATIVE
HYALINE CASTS #/AREA URNS LPF: 0 /LPF
IMM GRANULOCYTES # BLD AUTO: 0.02 K/UL (ref 0–0.04)
IMM GRANULOCYTES NFR BLD AUTO: 0.2 % (ref 0–0.5)
KETONES UR QL STRIP: ABNORMAL
LEUKOCYTE ESTERASE UR QL STRIP: NEGATIVE
LIPASE SERPL-CCNC: 17 U/L (ref 4–60)
LYMPHOCYTES # BLD AUTO: 3.2 K/UL (ref 1–4.8)
LYMPHOCYTES NFR BLD: 35.9 % (ref 18–48)
MCH RBC QN AUTO: 27 PG (ref 27–31)
MCHC RBC AUTO-ENTMCNC: 32.1 G/DL (ref 32–36)
MCV RBC AUTO: 84 FL (ref 82–98)
MICROSCOPIC COMMENT: ABNORMAL
MONOCYTES # BLD AUTO: 0.6 K/UL (ref 0.3–1)
MONOCYTES NFR BLD: 7 % (ref 4–15)
NEUTROPHILS # BLD AUTO: 5.1 K/UL (ref 1.8–7.7)
NEUTROPHILS NFR BLD: 56.5 % (ref 38–73)
NITRITE UR QL STRIP: NEGATIVE
NRBC BLD-RTO: 0 /100 WBC
PH UR STRIP: 6 [PH] (ref 5–8)
PLATELET # BLD AUTO: 276 K/UL (ref 150–450)
PMV BLD AUTO: 9 FL (ref 9.2–12.9)
POTASSIUM SERPL-SCNC: 3.7 MMOL/L (ref 3.5–5.1)
PROT SERPL-MCNC: 7.9 G/DL (ref 6–8.4)
PROT UR QL STRIP: ABNORMAL
RBC # BLD AUTO: 3.71 M/UL (ref 4–5.4)
RBC #/AREA URNS HPF: 3 /HPF (ref 0–4)
SODIUM SERPL-SCNC: 138 MMOL/L (ref 136–145)
SP GR UR STRIP: >=1.03 (ref 1–1.03)
URN SPEC COLLECT METH UR: ABNORMAL
UROBILINOGEN UR STRIP-ACNC: NEGATIVE EU/DL
WBC # BLD AUTO: 8.97 K/UL (ref 3.9–12.7)
WBC #/AREA URNS HPF: 2 /HPF (ref 0–5)

## 2022-06-23 PROCEDURE — 81000 URINALYSIS NONAUTO W/SCOPE: CPT | Performed by: EMERGENCY MEDICINE

## 2022-06-23 PROCEDURE — 74176 CT ABD & PELVIS W/O CONTRAST: CPT | Mod: TC

## 2022-06-23 PROCEDURE — 74176 CT ABDOMEN PELVIS WITHOUT CONTRAST: ICD-10-PCS | Mod: 26,,, | Performed by: RADIOLOGY

## 2022-06-23 PROCEDURE — 74176 CT ABD & PELVIS W/O CONTRAST: CPT | Mod: 26,,, | Performed by: RADIOLOGY

## 2022-06-23 PROCEDURE — 99285 EMERGENCY DEPT VISIT HI MDM: CPT | Mod: 25

## 2022-06-23 PROCEDURE — 99214 PR OFFICE/OUTPT VISIT, EST, LEVL IV, 30-39 MIN: ICD-10-PCS | Mod: S$PBB,,, | Performed by: INTERNAL MEDICINE

## 2022-06-23 PROCEDURE — 25000003 PHARM REV CODE 250: Performed by: EMERGENCY MEDICINE

## 2022-06-23 PROCEDURE — 63600175 PHARM REV CODE 636 W HCPCS: Performed by: EMERGENCY MEDICINE

## 2022-06-23 PROCEDURE — 74019 RADEX ABDOMEN 2 VIEWS: CPT | Mod: 26,,, | Performed by: RADIOLOGY

## 2022-06-23 PROCEDURE — 96361 HYDRATE IV INFUSION ADD-ON: CPT

## 2022-06-23 PROCEDURE — 96372 THER/PROPH/DIAG INJ SC/IM: CPT | Mod: 59 | Performed by: EMERGENCY MEDICINE

## 2022-06-23 PROCEDURE — 99214 OFFICE O/P EST MOD 30 MIN: CPT | Mod: S$PBB,,, | Performed by: INTERNAL MEDICINE

## 2022-06-23 PROCEDURE — 85025 COMPLETE CBC W/AUTO DIFF WBC: CPT | Performed by: EMERGENCY MEDICINE

## 2022-06-23 PROCEDURE — 99999 PR PBB SHADOW E&M-EST. PATIENT-LVL V: ICD-10-PCS | Mod: PBBFAC,,, | Performed by: INTERNAL MEDICINE

## 2022-06-23 PROCEDURE — 96375 TX/PRO/DX INJ NEW DRUG ADDON: CPT

## 2022-06-23 PROCEDURE — 74019 RADEX ABDOMEN 2 VIEWS: CPT | Mod: TC,FY

## 2022-06-23 PROCEDURE — 99999 PR PBB SHADOW E&M-EST. PATIENT-LVL V: CPT | Mod: PBBFAC,,, | Performed by: INTERNAL MEDICINE

## 2022-06-23 PROCEDURE — 74019 XR ABDOMEN FLAT AND ERECT: ICD-10-PCS | Mod: 26,,, | Performed by: RADIOLOGY

## 2022-06-23 PROCEDURE — 83690 ASSAY OF LIPASE: CPT | Performed by: EMERGENCY MEDICINE

## 2022-06-23 PROCEDURE — 80053 COMPREHEN METABOLIC PANEL: CPT | Performed by: EMERGENCY MEDICINE

## 2022-06-23 PROCEDURE — 96374 THER/PROPH/DIAG INJ IV PUSH: CPT

## 2022-06-23 RX ORDER — HYDROCODONE BITARTRATE AND ACETAMINOPHEN 10; 325 MG/1; MG/1
1 TABLET ORAL
Status: COMPLETED | OUTPATIENT
Start: 2022-06-23 | End: 2022-06-23

## 2022-06-23 RX ORDER — ONDANSETRON 2 MG/ML
4 INJECTION INTRAMUSCULAR; INTRAVENOUS
Status: COMPLETED | OUTPATIENT
Start: 2022-06-23 | End: 2022-06-23

## 2022-06-23 RX ORDER — TRAMADOL HYDROCHLORIDE 50 MG/1
50 TABLET ORAL EVERY 6 HOURS PRN
Qty: 20 TABLET | Refills: 0 | Status: SHIPPED | OUTPATIENT
Start: 2022-06-23 | End: 2023-01-31

## 2022-06-23 RX ORDER — KETOROLAC TROMETHAMINE 30 MG/ML
30 INJECTION, SOLUTION INTRAMUSCULAR; INTRAVENOUS
Status: COMPLETED | OUTPATIENT
Start: 2022-06-23 | End: 2022-06-23

## 2022-06-23 RX ORDER — PROMETHAZINE HYDROCHLORIDE 25 MG/ML
25 INJECTION, SOLUTION INTRAMUSCULAR; INTRAVENOUS
Status: COMPLETED | OUTPATIENT
Start: 2022-06-23 | End: 2022-06-23

## 2022-06-23 RX ADMIN — SODIUM CHLORIDE 1000 ML: 9 INJECTION, SOLUTION INTRAVENOUS at 10:06

## 2022-06-23 RX ADMIN — ONDANSETRON 4 MG: 2 INJECTION INTRAMUSCULAR; INTRAVENOUS at 10:06

## 2022-06-23 RX ADMIN — KETOROLAC TROMETHAMINE 30 MG: 30 INJECTION, SOLUTION INTRAMUSCULAR; INTRAVENOUS at 10:06

## 2022-06-23 RX ADMIN — PROMETHAZINE HYDROCHLORIDE 25 MG: 25 INJECTION INTRAMUSCULAR; INTRAVENOUS at 12:06

## 2022-06-23 RX ADMIN — HYDROCODONE BITARTRATE AND ACETAMINOPHEN 1 TABLET: 10; 325 TABLET ORAL at 12:06

## 2022-06-23 NOTE — ED PROVIDER NOTES
Encounter Date: 6/23/2022       History     Chief Complaint   Patient presents with    Abdominal Pain     Constipation for several days. Pt has been taking michelle lax and senna, had 2 episodes of stool this morning. Pt states the pain is to the upper right side.       Pt here with right flank pain off and on since Monday. She reports NVD as well. Seen by her GI today and sent here for eval constipation. She has hx of cholecystectomy and appendectomy. She says she had this pain in the past and was treated for UTI.     The history is provided by the patient.     Review of patient's allergies indicates:   Allergen Reactions    Darunavir Anaphylaxis    Sulfa (sulfonamide antibiotics) Anaphylaxis    Sulfamethoxazole-trimethoprim Anaphylaxis and Diarrhea     Other reaction(s): Anaphylaxis, Finding of vomiting, Diarrhea    Temazepam      Sleep walking   Other reaction(s): Sleep related hallucinations    Pantoprazole Nausea And Vomiting    Pregabalin Other (See Comments)    Verapamil      Other reaction(s): Constipation    Zonisamide      Other reaction(s): Anaphylaxis, Diarrhea, Finding of vomiting, Anaphylaxis, Diarrhea, Finding of vomiting, Anaphylaxis, Diarrhea, Finding of vomiting    Metoclopramide Rash and Anxiety    Sucralfate Other (See Comments) and Nausea Only     Past Medical History:   Diagnosis Date    Aortic regurgitation     Chronic fatigue     Chronic pain     Depression     Diabetes     Dysphagia     Gastritis     Gastroparesis     GERD (gastroesophageal reflux disease)     Hypertension     Intestinal metaplasia of gastric cardia     Irritable bowel syndrome     Sarcoidosis     Stroke      Past Surgical History:   Procedure Laterality Date    APPENDECTOMY      CHOLECYSTECTOMY      COLONOSCOPY  05/17/2019    COLONOSCOPY N/A 5/5/2020    Procedure: COLONOSCOPY;  Surgeon: Garrick López MD;  Location[facility];  Service: Endoscopy;  Laterality: N/A;  with bx and stool specimen     ELBOW SURGERY      ESOPHAGOGASTRODUODENOSCOPY N/A 5/5/2020    Procedure: EGD (ESOPHAGOGASTRODUODENOSCOPY);  Surgeon: Garrick López MD;  Location: [facility];  Service: Endoscopy;  Laterality: N/A;  with biopsy    ESOPHAGOGASTRODUODENOSCOPY N/A 6/2/2021    Procedure: EGD (ESOPHAGOGASTRODUODENOSCOPY);  Surgeon: Garrick López MD;  Location: [facility];  Service: Endoscopy;  Laterality: N/A;    HAND SURGERY      HYSTERECTOMY      2005, age 39, KRISTAN fibroids    MEDIASTINOSCOPY      NECK SURGERY      UPPER GASTROINTESTINAL ENDOSCOPY  05/17/2019    WRIST SURGERY       Family History   Problem Relation Age of Onset    Diabetes Mother     Colon cancer Mother 65    Bladder Cancer Mother     Heart disease Mother     Heart disease Father     Kidney failure Father     Diabetes Father     Stroke Father     Heart attack Father     Colon cancer Sister 65    Diabetes Sister     Hypertension Sister     Seizures Brother     Colon polyps Sister     Diabetes Sister     Hypertension Sister     Irritable bowel syndrome Sister     Diabetes Sister     Hypertension Sister     Diabetes Sister     Hypertension Sister     Diabetes Brother     Breast cancer Neg Hx     Esophageal cancer Neg Hx     Stomach cancer Neg Hx     Liver cancer Neg Hx     Liver disease Neg Hx     Crohn's disease Neg Hx     Celiac disease Neg Hx     Pancreatic cancer Neg Hx      Social History     Tobacco Use    Smoking status: Never Smoker    Smokeless tobacco: Never Used   Substance Use Topics    Alcohol use: Not Currently     Comment: no drinking anymore    Drug use: Never     Review of Systems   Constitutional: Negative for chills and fever.   Gastrointestinal: Positive for abdominal pain, diarrhea, nausea and vomiting. Negative for blood in stool, constipation and rectal pain.   Genitourinary: Negative for dysuria.   Skin: Negative for color change and rash.   All other systems reviewed and are negative.      Physical Exam      Initial Vitals [06/23/22 1030]   BP Pulse Resp Temp SpO2   (!) 104/59 65 18 97.8 °F (36.6 °C) 100 %      MAP       --         Physical Exam    Constitutional: She appears well-developed and well-nourished.   HENT:   Head: Normocephalic and atraumatic.   Mouth/Throat: Oropharynx is clear and moist.   Eyes: Conjunctivae and EOM are normal. No scleral icterus.   Neck: Neck supple.   Normal range of motion.  Cardiovascular: Normal rate, regular rhythm, normal heart sounds and intact distal pulses.   Pulmonary/Chest: Breath sounds normal.   Abdominal: Abdomen is soft. Bowel sounds are normal. She exhibits no distension. There is abdominal tenderness.   Mild ttp right flank. No rash. No bruising. There is no rebound and no guarding.   Musculoskeletal:         General: No tenderness or edema. Normal range of motion.      Cervical back: Normal range of motion and neck supple.     Neurological: She is alert and oriented to person, place, and time. GCS score is 15. GCS eye subscore is 4. GCS verbal subscore is 5. GCS motor subscore is 6.   Skin: Skin is warm and dry. Capillary refill takes less than 2 seconds. No rash noted. No pallor.         ED Course   Procedures  Labs Reviewed   CBC W/ AUTO DIFFERENTIAL - Abnormal; Notable for the following components:       Result Value    RBC 3.71 (*)     Hemoglobin 10.0 (*)     Hematocrit 31.2 (*)     MPV 9.0 (*)     All other components within normal limits   COMPREHENSIVE METABOLIC PANEL - Abnormal; Notable for the following components:    Glucose 187 (*)     All other components within normal limits   URINALYSIS, REFLEX TO URINE CULTURE - Abnormal; Notable for the following components:    Specific Gravity, UA >=1.030 (*)     Protein, UA 1+ (*)     Ketones, UA Trace (*)     Bilirubin (UA) 1+ (*)     All other components within normal limits    Narrative:     Preferred Collection Type->Urine, Clean Catch  Specimen Source->Urine   URINALYSIS MICROSCOPIC - Abnormal; Notable for the  following components:    Bacteria Few (*)     All other components within normal limits    Narrative:     Preferred Collection Type->Urine, Clean Catch  Specimen Source->Urine   LIPASE          Imaging Results          CT Abdomen Pelvis  Without Contrast (Final result)  Result time 06/23/22 12:16:41    Final result by Toyin Santos MD (06/23/22 12:16:41)                 Impression:      No acute abnormality.  Previous cholecystectomy and hysterectomy.  Fatty infiltration of the liver.  Prior granulomatous infection.  No constipation nor bowel obstruction.      Electronically signed by: Alix Danielle  Date:    06/23/2022  Time:    12:16             Narrative:    EXAMINATION:  CT ABDOMEN PELVIS WITHOUT CONTRAST    CLINICAL HISTORY:  Flank pain, kidney stone suspected; abdominal pain and constipation    TECHNIQUE:  Low dose axial images, sagittal and coronal reformations were obtained from the lung bases to the pubic symphysis.  Neither oral nor IV contrast was administered    COMPARISON:  Plain films today.  Ultrasound 10/29/2021    FINDINGS:  There is a calcified granuloma in the anterior right lung base.  There are bands of atelectasis or scarring in the posterior lung bases.    The gallbladder is surgically absent.  There is fatty infiltration of the liver.  The spleen, adrenal glands, pancreas are normal.    There are no renal or ureteral calculi.  No hydronephrosis is present.    The aorta is normal in caliber with scattered calcific plaque.    Urinary bladder collapsed.  Uterus surgically absent.    There is no bowel obstruction or inflammation.  No significant constipation noted.  There are surgical clips from prior appendectomy.  Tiny fat containing umbilical hernia noted.  There is no free intraperitoneal fluid or air.                               X-Ray Abdomen Flat And Erect (Final result)  Result time 06/23/22 11:27:35    Final result by Toyin Santos MD (06/23/22 11:27:35)                  Impression:      Nonobstructive bowel gas pattern.      Electronically signed by: Toyin Santos  Date:    06/23/2022  Time:    11:27             Narrative:    EXAMINATION:  XR ABDOMEN FLAT AND ERECT    CLINICAL HISTORY:  Unspecified abdominal pain    TECHNIQUE:  Flat and erect AP views of the abdomen were performed.    COMPARISON:  Upper GI examination 12/29/2020    FINDINGS:  Upright and supine views of the abdomen demonstrate a nonobstructive bowel gas pattern with no free air.  Cholecystectomy clips are noted.  Lung bases are clear.  No acute findings are seen in the bones.                                 Medications   sodium chloride 0.9% bolus 1,000 mL (1,000 mLs Intravenous New Bag 6/23/22 1039)   ketorolac injection 30 mg (30 mg Intravenous Given 6/23/22 1048)   ondansetron injection 4 mg (4 mg Intravenous Given 6/23/22 1049)     Medical Decision Making:   ED Management:  Pt presented with right flank/RUQ pain sent for eval constipation by her GI. Xray neg. Labs unremarkable. CT done to look for ureteral stone and other intra-abd pathology and it was normal per rads. Pt advised to f/u with her GI for further eval.                      Clinical Impression:   Final diagnoses:  [R10.9] Abdominal pain          ED Disposition Condition    Discharge Stable        ED Prescriptions     Medication Sig Dispense Start Date End Date Auth. Provider    traMADoL (ULTRAM) 50 mg tablet Take 1 tablet (50 mg total) by mouth every 6 (six) hours as needed for Pain. 20 tablet 6/23/2022  Yassine Trejo Jr., MD        Follow-up Information     Follow up With Specialties Details Why Contact Info    Gab Levy MD Internal Medicine Schedule an appointment as soon as possible for a visit   [address] [place] [id number]  [phone]             Yassine Trejo Jr., MD  06/23/22 5651

## 2022-06-23 NOTE — PROGRESS NOTES
Subjective:       Patient ID: Luz Arias is a 56 y.o. female.    Chief Complaint: Abdominal Pain (Upper right quadrant into back) and Follow-up        Office Visit    6/3/2022  [facility]    Concetta Odonnell MD      Gastroenterology Non-cardiac chest pain +8 more      Dx Chest Pain  Dysphagia  Nausea  Constipation  Emesis  Abdominal Pain  Gas  Bloated  Diarrhea  Heartburn; Referred by Garrick López MD      Reason for Visit      Progress Notes  Concetta Odonnell MD (Physician)   Gastroenterology  Expand AllCollapse All [facility] Consultation Note     Reason for Consult:      Chief Complaint  Patient presents with   Chest Pain   Dysphagia   Nausea   Constipation   Emesis   Abdominal Pain   Gas   Bloated   Diarrhea   Heartburn           PCP:   Gab Levy   [address] [facility] [id number]     Referring MD:  Garrick López Md  [address] [facility] [place] [id number]        HPI:  Luz Arias is a 56 y.o. female referred to motility clinic for second opinion regarding the following problems:     GERD.  HH    Retrosternal pyrosis: daily  Regurgitation: once per week  Improve with: PPI   Upright symptoms: yes  Nocturnal symptoms: worse    Onset years ago      Sleeps with head of the bed elevated. Block     Avoids eating prior to bedtime.  yes     Improved w diet   nexium 40mg BID   malox prn     Dysphagia.    Problems with solids: few per week  Problems with liquids: no     Globus discomfort in throat   Sometimes hard to initiate a swallow  Choking while eating: no  Coughing while eating: no   CVA 2015  Location: neck   Onset of symptoms: few years ago, worse recently      History of food impactions requiring ED visit: no  History of allergies/eczema. Yes     No narcotics  No marijuana     Chest pain. Occasional    Onset: 1 year, worse recently   Seen in ED  Negative cardiac  self.  Told that this is not cardiac per cardiology    Imdur per cardiology w improvement  Triggers (cold fluids, caffeine, smoking, ETOH): no  Caffeine intake:none      Eckardt Symptom Score  Dysphagia: 1  Regurgitation: 1  Retrosternal pain: 1  Weight Loss: 0  Total: 3     Rumination Syndrome  Symptoms occur within 10 minutes of finishing a meal: yes  Regurgitate lacks sour or bitter taste: no  Regurgitate described as tasting similar to the food recently ingested: no  Little or no improvement after GERD or nausea directed treatment: no  No symptoms during sleep or fasting: anika  Weight loss (40% of patients): no  Related to reflux: yes  Related to belching: yes     Nausea.  Daily   Early satiety.  Occasional   Vomiting: rare   Worse in the past      Cyclical episodes of n/v with symptom free intervals between episodes:  Prodrome: yes  History of migraines: yes  Marijuana use: no   Unusual bathing behaviors:no     Dm gastroparesis      MEDS:   zofran   nortriptyline 75 for pain w some improvement  nexium w improvement     Unable to tolerate reglan      RUQ/epig pain      Bloating      Diarrhea and constipation   On PEG     G IM.  AM.  No fam of gastric cancer         Denies BRBPR, melena, weight loss.        Fatty liver      FH CRC   Thinks that had a colonoscopy relatively recently      Total visit time was 61   minutes, more than 50% of which was spent in face-to-face counseling with patient regarding symptoms, diagnostic results, prognosis, risks and benefits of treatment options, instructions for management, importance of compliance with chosen treatment options and coping strategies.       Previous Studies:   Manometry 04/08/2022:  Poor copy.  Normal relaxation of LES with swallows with normal IRP.  IRP 11. DCI 49675. 100% hypercontractile.  0% premature.  0% pan esophageal.  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  Gastric emptying study 04/06/2022:  Delayed.  228 minutes retention of 32%.  CT abdomen  03/13/2022:  Hepatic steatosis.  Mild atelectasis scarring or pneumonia of the lung bases.  Correlate clinically.  EGD 6/2/2021 Gr A esophagitis.  Erythema in the stomach.  Biopsied.  Normal duodenum.  Pathology 06/03/2021:  Stomach random chemical reactive gastropathy, mild chronic gastritis and intestinal metaplasia.  EGD 05/17/2019:  Esophagus hernia with significant reflux esophagitis with edema erythema.  I am biopsies obtained.  Stomach antral portion of the stomach pre-pyloric edema with obvious slowly inflammation moderate severe gastritis histology as well as SANDRA test.  Duodenum mild edema and irritation duodenitis as well as dilation of the 2nd portion of duodenum biopsied visible portion taken above.  Gastric emptying study 10/09/2019:  Delayed.  9% at 4 H have time exceeds 125 minutes.  EGD 05/05/2020:  LA grade a esophagitis.  Erythema in the antrum biopsied.  Normal duodenum.  Pathology:  H pylori negative.  Mild chronic gastritis.  Colonoscopy 05/05/2020:  Hemorrhoids.  Abnormal mucosa in sigmoid colon (lymphoid aggregate).     Relevant Surgical History:    NA     ROS:  ROS   Constitutional: No fevers, no chills, + night sweats, no weight loss  ENT: No congestion, no rhinorrhea, no chronic sinus problems  CV: + chest pain, no palpitations  Pulm: No cough, no shortness of breath  Ophtho: + blurry vision, + eye redness  GI: see HPI  Derm: No rash  Heme: No lymphadenopathy, no bruising  MSK: No arthritis, no joint swelling, no Raynauds  : No dysuria, no frequent urination, no blood in urine  Endo: No hot or cold intolerance  Neuro: No dizziness, no syncope, no seizure  Psych: + anxiety, + depression  Complete ROS negative except as above     Medical History:     Past Medical History:  Diagnosis Date   Aortic regurgitation     Chronic fatigue     Chronic pain     Depression     Diabetes     Dysphagia     Gastritis     Gastroparesis     GERD (gastroesophageal reflux  disease)     Hypertension     Intestinal metaplasia of gastric cardia     Irritable bowel syndrome     Sarcoidosis     Stroke          Surgical History:     Past Surgical History:  Procedure Laterality Date   APPENDECTOMY       CHOLECYSTECTOMY       COLONOSCOPY   05/17/2019   COLONOSCOPY N/A 5/5/2020    Procedure: COLONOSCOPY;  Surgeon: Garrick López MD;  Location: [facility] ENDO;  Service: Endoscopy;  Laterality: N/A;  with bx and stool specimen   ELBOW SURGERY       ESOPHAGOGASTRODUODENOSCOPY N/A 5/5/2020    Procedure: EGD (ESOPHAGOGASTRODUODENOSCOPY);  Surgeon: Garrick López MD;  Location: [facility] ENDO;  Service: Endoscopy;  Laterality: N/A;  with biopsy   ESOPHAGOGASTRODUODENOSCOPY N/A 6/2/2021    Procedure: EGD (ESOPHAGOGASTRODUODENOSCOPY);  Surgeon: Garrick López MD;  Location: [facility] ENDO;  Service: Endoscopy;  Laterality: N/A;   HAND SURGERY       HYSTERECTOMY        2005, age 39, KRISTAN fibroids   MEDIASTINOSCOPY       NECK SURGERY       UPPER GASTROINTESTINAL ENDOSCOPY   05/17/2019   WRIST SURGERY            Family History:     Family History  Problem Relation Age of Onset   Diabetes Mother     Colon cancer Mother 65   Bladder Cancer Mother     Heart disease Mother     Heart disease Father     Kidney failure Father     Diabetes Father     Stroke Father     Heart attack Father     Colon cancer Sister 65   Diabetes Sister     Hypertension Sister     Seizures Brother     Colon polyps Sister     Diabetes Sister     Hypertension Sister     Irritable bowel syndrome Sister     Diabetes Sister     Hypertension Sister     Diabetes Sister     Hypertension Sister     Diabetes Brother     Breast cancer Neg Hx     Esophageal cancer Neg Hx     Stomach cancer Neg Hx     Liver cancer Neg Hx     Liver disease Neg Hx     Crohn's disease Neg Hx     Celiac disease Neg Hx     Pancreatic cancer Neg Hx          Social History:     Social History       Socioeconomic History   Marital  status:   Tobacco Use   Smoking status: Never Smoker   Smokeless tobacco: Never Used  Substance and Sexual Activity   Alcohol use: Not Currently      Comment: no drinking anymore   Drug use: Never   Sexual activity: Not Currently          Review of patient's allergies indicates:  Allergen Reactions   Darunavir Anaphylaxis   Sulfa (sulfonamide antibiotics) Anaphylaxis   Sulfamethoxazole-trimethoprim Anaphylaxis and Diarrhea      Other reaction(s): Anaphylaxis, Finding of vomiting, Diarrhea   Temazepam        Sleep walking   Other reaction(s): Sleep related hallucinations   Pantoprazole Nausea And Vomiting   Adhesive tape-silicones Other (See Comments)   Pregabalin Other (See Comments)   Verapamil        Other reaction(s): Constipation   Zonisamide        Other reaction(s): Anaphylaxis, Diarrhea, Finding of vomiting, Anaphylaxis, Diarrhea, Finding of vomiting, Anaphylaxis, Diarrhea, Finding of vomiting   Aspirin        Other reaction(s): Anemia  6/3/22  Pt states is not allergic to ASA, is taking 81 mg daily   Metoclopramide Rash and Anxiety   Sucralfate Other (See Comments) and Nausea Only          Current Medications  Current Outpatient Medications  Medication Sig Dispense Refill   acyclovir (ZOVIRAX) 400 MG tablet acyclovir 400 mg tablet (Patient taking differently: Take 400 mg by mouth once daily. acyclovir 400 mg tablet) 30 tablet 3   ALBUTEROL INHL Inhale into the lungs. Every 6 hrs prn       amLODIPine (NORVASC) 10 MG tablet Take 10 mg by mouth once daily.       aspirin (ECOTRIN) 81 MG EC tablet Take 81 mg by mouth once daily.       biotin 10,000 mcg Cap Take by mouth.       candesartan (ATACAND) 32 MG tablet Take 32 mg by mouth once daily.       carBAMazepine (TEGRETOL XR) 400 MG Tb12 Take 400 mg by mouth 2 (two) times daily.       DEEP SEA NASAL 0.65 % nasal spray         diclofenac sodium (VOLTAREN) 1 % Gel Voltaren 1 % topical gel       diphenhydrAMINE-aluminum-magnesium  "hydroxide-simethicone-LIDOcaine HCl 2% Swish and swallow 5 mLs every 6 (six) hours as needed (throat irritation). 120 mL 2   donepeziL (ARICEPT) 10 MG tablet donepezil 10 mg oral tablet  Start Date: 5/13/21  Status: Ordered       EASY TOUCH ALCOHOL PREP PADS PadM Apply 1 each topically 3 (three) times daily.       ergocalciferol, vitamin D2, (VITAMIN D ORAL) Take by mouth. Taking 2000 mg daily       esomeprazole (NEXIUM) 40 MG capsule Take 1 capsule (40 mg total) by mouth 2 (two) times daily. 180 capsule 3   fluticasone propionate (FLONASE) 50 mcg/actuation nasal spray         fluticasone-salmeterol 230-21 mcg/dose (ADVAIR HFA) 230-21 mcg/actuation HFAA inhaler Advair  mcg-21 mcg/actuation aerosol inhaler       FREESTYLE LANCETS 28 gauge lancets Apply topically 3 (three) times daily.       FREESTYLE LITE STRIPS Strp 1 strip 3 (three) times daily.       ipratropium (ATROVENT) 42 mcg (0.06 %) nasal spray 2 sprays 2 (two) times daily.       isosorbide mononitrate (IMDUR) 30 MG 24 hr tablet Take 30 mg by mouth once daily.       L-METHYL-B6-B12 3-35-2 mg Tab         lidocaine (LIDODERM) 5 %         mag hydrox/aluminum hyd/simeth (MAALOX ORAL)         melatonin 3 mg TbDL Take by mouth. prn       METANX, ALGAL OIL, 3 mg-35 mg-2 mg -90.314 mg Cap Take 1 capsule by mouth 2 (two) times daily.       metFORMIN (GLUCOPHAGE-XR) 500 MG XR 24hr tablet Take 500 mg by mouth 2 (two) times daily with meals.       methylphenidate HCl (RITALIN) 10 MG tablet Take 10 mg by mouth once daily.       metoprolol tartrate 75 mg Tab <span ID="[id number]" style="Bold">zzMetoprolol Tartrate 50 mg oral tablet</span><br/>Start Date: 3/26/21<br/>Status: Ordered       nortriptyline (PAMELOR) 75 MG Cap TAKE 1 CAPSULE BY MOUTH EVERY NIGHT 1 HOUR BEFORE BEDTIME       ondansetron (ZOFRAN-ODT) 8 MG TbDL Take 1 tablet (8 mg total) by mouth every 6 (six) hours as needed (nausea). 50 tablet 2   polyethylene glycol (GLYCOLAX) " 17 gram/dose powder Take 17 g by mouth once daily. 1530 g 3   prazosin (MINIPRESS) 2 MG Cap Take 2 mg by mouth once daily.       PROAIR HFA 90 mcg/actuation inhaler         rivastigmine (EXELON) 9.5 mg/24 hour PT24 Place onto the skin.       rosuvastatin (CRESTOR) 40 MG Tab Take 40 mg by mouth once daily.       tiZANidine (ZANAFLEX) 4 MG tablet Take 4 mg by mouth every 8 (eight) hours.       triamterene-hydrochlorothiazide 75-50 mg (MAXZIDE) 75-50 mg per tablet    0.5 tab, Oral, Daily, # 45 tab, 3 Refill(s)       TYLENOL EXTRA STRENGTH 500 mg tablet Take 500 mg by mouth.       vitamin E 600 UNIT capsule Take 600 Units by mouth once daily.       albuterol (PROVENTIL) 5 mg/mL nebulizer solution Take 2.5 mg by nebulization every 6 (six) hours as needed for Wheezing. Rescue       benzonatate (TESSALON) 100 MG capsule Take 100 mg by mouth 3 (three) times daily as needed.       cefUROXime (CEFTIN) 500 MG tablet         cephALEXin (KEFLEX) 500 MG capsule Take 500 mg by mouth 3 (three) times daily.       ergocalciferol (ERGOCALCIFEROL) 50,000 unit Cap ergocalciferol (vitamin D2) 50,000 unit capsule       HYDROcodone-acetaminophen (NORCO) 5-325 mg per tablet Take 1-2 tablets by mouth every 4 (four) hours as needed.       isosorbide mononitrate (IMDUR) 30 MG 24 hr tablet 30 mg.       JARDIANCE 10 mg tablet Take 10 mg by mouth once daily.       metoprolol tartrate (LOPRESSOR) 25 MG tablet 50 mg. Pt cuts tablet in half       omeprazole (PRILOSEC) 40 MG capsule omeprazole 40 mg oral delayed release capsule  Start Date: 5/13/21  Status: Ordered       predniSONE (DELTASONE) 20 MG tablet Take 40 mg by mouth once daily.       sucralfate (CARAFATE) 1 gram tablet sucralfate 1 g oral tablet  Start Date: 6/24/21  Status: Ordered       temazepam (RESTORIL) 15 mg Cap temazepam 15 mg oral capsule  Start Date: 8/25/21  Status: Ordered       zolpidem (AMBIEN) 10 mg Tab Take 10 mg by mouth nightly as needed.           No  "current facility-administered medications for this visit.          Objective Findings:  Vital Signs:  Pulse 77   Ht 5' 5" (1.651 m)   Wt 83.9 kg (185 lb)   SpO2 99%   BMI 30.79 kg/m²   Body mass index is 30.79 kg/m².     Physical Exam:  General appearance: alert, cooperative, no distress  HENT: Normocephalic, atraumatic, neck symmetrical, no nasal discharge  Eyes: conjunctivae/corneas clear, PERRL, EOM's intact  Lungs: clear to auscultation bilaterally, no dullness to percussion bilaterally  Heart: regular rate and rhythm without rub; no displacement of the PMI  Abdomen: soft, non-tender; bowel sounds normoactive; no organomegaly  Extremities: extremities symmetric; no clubbing, cyanosis, or edema  Integument: Skin color, texture, turgor normal; no rashes; hair distrubution normal  Neurologic: Alert and oriented X 3, normal strength, in wheel chair  Psychiatric: no pressured speech; normal affect; no evidence of impaired cognition     Labs:   Reviewed in Epic/record        Assessment and Plan:  Luz Arias is a 56 y.o. female with referred to [facility] for 2nd opinion regarding following problems:     GERD. 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  Early satiety.  Vomiting  Possibly cyclical episodes of n/v  DM gastroparesis   Unable to tolerate reglan   On zofran   On nortriptyline 75 for pain w some improvement  On nexium w improvement     -Def to ref GI      Dm   Jardiance, metformin     RUQ/epig pain   -Def to ref GI      Bloating   -Def to ref GI      Diarrhea and constipation   On PEG  -Def to ref GI      GIM.  AM.  No fam of gastric cancer   -EGD w EOE, mapping, Flip   -Def to ref GI      Fatty liver   -Def to ref " GI      FH CRC   Negative Colonoscopy 2020   Repeat 5/ 2025 w local GI      Chronic pain. Neuropathy   On zanaflex  On nortriptyline 75   On restoril      Follow up in about 3 months (around 9/3/2022).       1. Non-cardiac chest pain   2. Dysphagia, unspecified type   3. Gastroesophageal reflux disease, unspecified whether esophagitis present   4. Jackhammer esophagus   5. Early satiety   6. Nausea and vomiting, intractability of vomiting not specified, unspecified vomiting type   7. Bloating   8. Constipation, unspecified constipation type   9. Diarrhea, unspecified type            Order summary:    Orders Placed This Encounter   FL Esophagram Complete   Case Request Endoscopy: ESOPHAGOGASTRODUODENOSCOPY (EGD)   Case Request Endoscopy: MANOMETRY-ESOPHAGEAL-WITH IMPEDANCE        Discussed with PT that I act as a consult service and do not accept patients to be their primary GI provider. Discussed that the goal of our visits is to address relevant motility problems while deferring other GI problems as well as screening and surveillance to his/her primary GI provider.   Discussed that he/she needs to continue to follow with his local primary GI provider.  Discussed that we will complete his/her workup, clarify diagnosis and attempt to optimize his/her symptoms with intention of him/her returning to referring GI provider for long term GI care.   Pt verbalized understanding.          Thank you so much for allowing me to participate in the care of Luz Odonnell MD        This note was created using voice recognition software, and may contain some unrecognized transcriptional errors.                      Other Notes    All notes        Instructions        Follow up in about 3 months (around 9/3/2022).    -Check with your primary Gi doctor that your colonoscopy is up to date      -Complete EGD with EndoFlip     EGD is an endoscopic procedure that allows your doctor to examine your esophagus,  "stomach and duodenum (part of your small intestine). EGD is an outpatient procedure, meaning you can go home that same day. It takes approximately 30 to 60 minutes to perform.  EndoFLIP is a technology that simultaneously measures the area across the inside of a gastrointestinal organ (for example, the esophagus) and the pressure inside that organ. The ratio of the two measurements is called distensibility (stiffness).     -Complete esophageal manometry   During esophageal manometry, a thin, flexible tube (catheter) that contains pressure sensors is passed through your nose, down your esophagus and into your stomach. Esophageal manometry can be helpful in diagnosing certain disorders that can affect your esophagus.  Esophageal manometry provides information about the movement of food through the esophagus into the stomach. The test measures how well the muscles at the top and bottom of your esophagus (sphincter muscles) open and close, as well as the pressure, speed and pattern of the wave of esophageal muscle contractions that moves food along.     -Complete timed barium esophagogram   Barium esophagram is an X-ray of the esophagus. The patient drinks a liquid that contains barium (a silver-white metallic compound). The liquid coats the esophagus and X-rays are taken.                 After Visit Summary (Printed 6/3/2022)      Additional Documentation    Vitals: Pulse 77    Ht 5' 5" (1.651 m)    Wt 83.9 kg (185 lb)    SpO2 99%    BMI 30.79 kg/m²    BSA 1.96 m²    Pain Sc   5 (Loc: Abdomen)     Flowsheets: Anthropometrics,    Advance Directive,    Eckardt Symptom Score For Achalasia      SmartForms:  [facility] AMB - FALL RISK      Encounter Info: Billing Info,    History,    Allergies,    Detailed Report        Communications        AMB Visit Summary: Provider Version sent to Garrick López MD      Not recorded      All Charges for This Encounter    Code Description Service Date Service Provider Modifiers Qty  73160 OK " OFFICE/OUTPT VISIT, EST, LEVL V, 40-54 MIN 6/3/2022 Concetta Odonnell MD S$PBB 1  78242527 HC OFFICE/OUTPT VISIT, EST, LEVL V, 40-54 MIN 6/3/2022 Concetta Odonnell MD PBBFAC 1  [id number] ID PBB SHADOW E&M-EST. PATIENT-LVL V 6/3/2022 Concetta Odonnell MD PBBFAC 1    Level of Service    Level of Service  ID OFFICE/OUTPT VISIT, EST, LEVL V, 40-54 MIN (06444)    BestPractice Advisories    Click to view BestPractice Advisory history    AVS Reports    Date/Time Report Action User  6/3/2022  4:25 PM After Visit Summary Printed Rina Lowery RN  6/3/2022  4:22 PM After Visit Summary Printed Rina Lowery RN    Encounter-Level Documents on 06/03/2022:    After Visit Summary - Document on 6/3/2022 4:25 PM by Rina Lowery RN: After Visit Summary  After Visit Summary - Document on 6/3/2022 4:22 PM by iRna Lowery RN: After Visit Summary      Orders Placed    FL Esophagram Complete  Case Request Endoscopy: ESOPHAGOGASTRODUODENOSCOPY (EGD)  Case Request Endoscopy: MANOMETRY-ESOPHAGEAL-WITH IMPEDANCE      Medication Changes        None      Medication List    Visit Diagnoses        Non-cardiac chest pain        Dysphagia, unspecified type        Gastroesophageal reflux disease, unspecified whether esophagitis present        Jackhammer esophagus        Early satiety        Nausea and vomiting, intractability of vomiting not specified, unspecified vomiting type    She has a long history of diarrhea alternating with constipation.  In the past the Linzess has caused diarrhea.  Two days ago she developed constipation and she has used all the OTC medications and also the enemas.  She  has passed a small amount of liquid stool.  She has had nausea and vomiting because of the constipation.  She has a history gastroesophageal reflux and motility disorder and is scheduled to see the gastro intestinal physiologist.  She denies hematemesis hematochezia jaundice or bleeding.    Bloating        Constipation,  unspecified constipation type        Diarrhea, unspecified type      Problem List            Allergies:  Review of patient's allergies indicates:   Allergen Reactions    Darunavir Anaphylaxis    Sulfa (sulfonamide antibiotics) Anaphylaxis    Sulfamethoxazole-trimethoprim Anaphylaxis and Diarrhea     Other reaction(s): Anaphylaxis, Finding of vomiting, Diarrhea    Temazepam      Sleep walking   Other reaction(s): Sleep related hallucinations    Pantoprazole Nausea And Vomiting    Pregabalin Other (See Comments)    Verapamil      Other reaction(s): Constipation    Zonisamide      Other reaction(s): Anaphylaxis, Diarrhea, Finding of vomiting, Anaphylaxis, Diarrhea, Finding of vomiting, Anaphylaxis, Diarrhea, Finding of vomiting    Metoclopramide Rash and Anxiety    Sucralfate Other (See Comments) and Nausea Only       Medications:    Current Outpatient Medications:     acyclovir (ZOVIRAX) 400 MG tablet, acyclovir 400 mg tablet (Patient taking differently: Take 400 mg by mouth once daily. acyclovir 400 mg tablet), Disp: 30 tablet, Rfl: 3    albuterol (PROVENTIL) 5 mg/mL nebulizer solution, Take 2.5 mg by nebulization every 6 (six) hours as needed for Wheezing. Rescue, Disp: , Rfl:     ALBUTEROL INHL, Inhale into the lungs. Every 6 hrs prn, Disp: , Rfl:     amLODIPine (NORVASC) 10 MG tablet, Take 10 mg by mouth once daily., Disp: , Rfl:     aspirin (ECOTRIN) 81 MG EC tablet, Take 81 mg by mouth once daily., Disp: , Rfl:     benzonatate (TESSALON) 100 MG capsule, Take 100 mg by mouth 3 (three) times daily as needed., Disp: , Rfl:     biotin 10,000 mcg Cap, Take by mouth., Disp: , Rfl:     candesartan (ATACAND) 32 MG tablet, Take 32 mg by mouth once daily., Disp: , Rfl:     carBAMazepine (TEGRETOL XR) 400 MG Tb12, Take 400 mg by mouth 2 (two) times daily., Disp: , Rfl:     cefUROXime (CEFTIN) 500 MG tablet, , Disp: , Rfl:     cephALEXin (KEFLEX) 500 MG capsule, Take 500 mg by mouth 3 (three) times  daily., Disp: , Rfl:     DEEP SEA NASAL 0.65 % nasal spray, , Disp: , Rfl:     diclofenac sodium (VOLTAREN) 1 % Gel, Voltaren 1 % topical gel, Disp: , Rfl:     diphenhydrAMINE-aluminum-magnesium hydroxide-simethicone-LIDOcaine HCl 2%, Swish and swallow 5 mLs every 6 (six) hours as needed (throat irritation)., Disp: 120 mL, Rfl: 2    donepeziL (ARICEPT) 10 MG tablet, donepezil 10 mg oral tablet Start Date: 5/13/21 Status: Ordered, Disp: , Rfl:     EASY TOUCH ALCOHOL PREP PADS PadM, Apply 1 each topically 3 (three) times daily., Disp: , Rfl:     ergocalciferol (ERGOCALCIFEROL) 50,000 unit Cap, ergocalciferol (vitamin D2) 50,000 unit capsule, Disp: , Rfl:     ergocalciferol, vitamin D2, (VITAMIN D ORAL), Take by mouth. Taking 2000 mg daily, Disp: , Rfl:     esomeprazole (NEXIUM) 40 MG capsule, Take 1 capsule (40 mg total) by mouth 2 (two) times daily., Disp: 180 capsule, Rfl: 3    fluticasone propionate (FLONASE) 50 mcg/actuation nasal spray, , Disp: , Rfl:     fluticasone-salmeterol 230-21 mcg/dose (ADVAIR HFA) 230-21 mcg/actuation HFAA inhaler, Advair  mcg-21 mcg/actuation aerosol inhaler, Disp: , Rfl:     FREESTYLE LANCETS 28 gauge lancets, Apply topically 3 (three) times daily., Disp: , Rfl:     FREESTYLE LITE STRIPS Strp, 1 strip 3 (three) times daily., Disp: , Rfl:     HYDROcodone-acetaminophen (NORCO) 5-325 mg per tablet, Take 1-2 tablets by mouth every 4 (four) hours as needed., Disp: , Rfl:     ipratropium (ATROVENT) 42 mcg (0.06 %) nasal spray, 2 sprays 2 (two) times daily., Disp: , Rfl:     isosorbide mononitrate (IMDUR) 30 MG 24 hr tablet, Take 30 mg by mouth once daily., Disp: , Rfl:     isosorbide mononitrate (IMDUR) 30 MG 24 hr tablet, 30 mg., Disp: , Rfl:     JARDIANCE 10 mg tablet, Take 10 mg by mouth once daily., Disp: , Rfl:     L-METHYL-B6-B12 3-35-2 mg Tab, , Disp: , Rfl:     lidocaine (LIDODERM) 5 %, , Disp: , Rfl:     mag hydrox/aluminum hyd/simeth (MAALOX ORAL), ,  "Disp: , Rfl:     melatonin 3 mg TbDL, Take by mouth. prn, Disp: , Rfl:     METANX, ALGAL OIL, 3 mg-35 mg-2 mg -90.314 mg Cap, Take 1 capsule by mouth 2 (two) times daily., Disp: , Rfl:     metFORMIN (GLUCOPHAGE-XR) 500 MG XR 24hr tablet, Take 500 mg by mouth 2 (two) times daily with meals., Disp: , Rfl:     methylphenidate HCl (RITALIN) 10 MG tablet, Take 10 mg by mouth once daily., Disp: , Rfl:     metoprolol tartrate (LOPRESSOR) 25 MG tablet, 50 mg. Pt cuts tablet in half, Disp: , Rfl:     metoprolol tartrate 75 mg Tab, <span ID="[id number]" style="Bold">zzMetoprolol Tartrate 50 mg oral tablet</span><br/>Start Date: 3/26/21<br/>Status: Ordered, Disp: , Rfl:     nortriptyline (PAMELOR) 75 MG Cap, TAKE 1 CAPSULE BY MOUTH EVERY NIGHT 1 HOUR BEFORE BEDTIME, Disp: , Rfl:     omeprazole (PRILOSEC) 40 MG capsule, omeprazole 40 mg oral delayed release capsule Start Date: 5/13/21 Status: Ordered, Disp: , Rfl:     ondansetron (ZOFRAN-ODT) 8 MG TbDL, Take 1 tablet (8 mg total) by mouth every 6 (six) hours as needed (nausea)., Disp: 50 tablet, Rfl: 2    polyethylene glycol (GLYCOLAX) 17 gram/dose powder, Take 17 g by mouth once daily., Disp: 1530 g, Rfl: 3    prazosin (MINIPRESS) 2 MG Cap, Take 2 mg by mouth once daily., Disp: , Rfl:     predniSONE (DELTASONE) 20 MG tablet, Take 40 mg by mouth once daily., Disp: , Rfl:     PROAIR HFA 90 mcg/actuation inhaler, , Disp: , Rfl:     rivastigmine (EXELON) 9.5 mg/24 hour PT24, Place onto the skin., Disp: , Rfl:     rosuvastatin (CRESTOR) 40 MG Tab, Take 40 mg by mouth once daily., Disp: , Rfl:     sucralfate (CARAFATE) 1 gram tablet, sucralfate 1 g oral tablet Start Date: 6/24/21 Status: Ordered, Disp: , Rfl:     temazepam (RESTORIL) 15 mg Cap, temazepam 15 mg oral capsule Start Date: 8/25/21 Status: Ordered, Disp: , Rfl:     tiZANidine (ZANAFLEX) 4 MG tablet, Take 4 mg by mouth every 8 (eight) hours., Disp: , Rfl:     triamterene-hydrochlorothiazide 75-50 " mg (MAXZIDE) 75-50 mg per tablet,  0.5 tab, Oral, Daily, # 45 tab, 3 Refill(s), Disp: , Rfl:     TYLENOL EXTRA STRENGTH 500 mg tablet, Take 500 mg by mouth., Disp: , Rfl:     vitamin E 600 UNIT capsule, Take 600 Units by mouth once daily., Disp: , Rfl:     zolpidem (AMBIEN) 10 mg Tab, Take 10 mg by mouth nightly as needed., Disp: , Rfl:     Past Medical History:   Diagnosis Date    Aortic regurgitation     Chronic fatigue     Chronic pain     Depression     Diabetes     Dysphagia     Gastritis     Gastroparesis     GERD (gastroesophageal reflux disease)     Hypertension     Intestinal metaplasia of gastric cardia     Irritable bowel syndrome     Sarcoidosis     Stroke        Past Surgical History:   Procedure Laterality Date    APPENDECTOMY      CHOLECYSTECTOMY      COLONOSCOPY  05/17/2019    COLONOSCOPY N/A 5/5/2020    Procedure: COLONOSCOPY;  Surgeon: Garrick López MD;  Location: [facility];  Service: Endoscopy;  Laterality: N/A;  with bx and stool specimen    ELBOW SURGERY      ESOPHAGOGASTRODUODENOSCOPY N/A 5/5/2020    Procedure: EGD (ESOPHAGOGASTRODUODENOSCOPY);  Surgeon: Garrick López MD;  Location: [facility];  Service: Endoscopy;  Laterality: N/A;  with biopsy    ESOPHAGOGASTRODUODENOSCOPY N/A 6/2/2021    Procedure: EGD (ESOPHAGOGASTRODUODENOSCOPY);  Surgeon: Garrick López MD;  Location: [facility];  Service: Endoscopy;  Laterality: N/A;    HAND SURGERY      HYSTERECTOMY      2005, age 39, KRISTAN fibroids    MEDIASTINOSCOPY      NECK SURGERY      UPPER GASTROINTESTINAL ENDOSCOPY  05/17/2019    WRIST SURGERY           Review of Systems   Constitutional: Negative for appetite change, fever and unexpected weight change.   HENT: Positive for trouble swallowing.         No jaundice.   Respiratory: Negative for cough, shortness of breath and wheezing.         She has been evaluated by the [facility] pulmonologist.  She has a history of sarcoidosis.  The records have been downloaded.  She  denies aspiration.  She is not particularly physically active but she denies dyspnea on exertion.  She denies aspiration dysphagia hemoptysis significant coughing or sputum production.   Cardiovascular: Negative for chest pain.        She denies exertional chest pain or rhythm disturbance.   Gastrointestinal: Positive for abdominal pain, constipation, diarrhea, nausea and vomiting. Negative for abdominal distention, anal bleeding, blood in stool and rectal pain.   Musculoskeletal: Positive for arthralgias and back pain. Negative for neck pain.        She ambulates with a cane.   Skin: Negative for pallor and rash.   Neurological: Negative for dizziness, seizures, syncope, speech difficulty, weakness and numbness.        She has had a stroke syndrome.  She is followed by the neurologist.  According to her family she is clinically stable in can ambulate with assistance.  She states her memory is good.  She can perform her usual activities.   Hematological: Negative for adenopathy.   Psychiatric/Behavioral: Negative for confusion.       Objective:      Physical Exam  Vitals reviewed.   Constitutional:       Appearance: She is well-developed.      Comments: Well-nourished well-hydrated afebrile nonicteric  female.  She is sitting comfortably in the chair.  She is breathing normally.  She is normocephalic.  Pupils are normal.  She appears to be oriented x3 and can relate her history and answer questions appropriately.   HENT:      Head: Normocephalic.   Eyes:      Pupils: Pupils are equal, round, and reactive to light.   Neck:      Thyroid: No thyromegaly.      Trachea: No tracheal deviation.   Cardiovascular:      Rate and Rhythm: Normal rate and regular rhythm.      Heart sounds: Normal heart sounds.   Pulmonary:      Effort: Pulmonary effort is normal.      Breath sounds: Normal breath sounds.   Abdominal:      General: Bowel sounds are normal. There is no distension.      Palpations: Abdomen is soft.  There is no mass.      Tenderness: There is abdominal tenderness. There is no right CVA tenderness, left CVA tenderness, guarding or rebound.      Hernia: No hernia is present.      Comments: The abdomen is soft mildly obese.  There is mild tenderness to palpation in the epigastrium.  Bowel sounds are normal.   Musculoskeletal:         General: Normal range of motion.      Cervical back: Normal range of motion and neck supple.      Comments: She ambulates slowly with a cane.  She can go from the sitting to the standing position slowly.  She can get on the exam table with minimal difficulty and assistance   Lymphadenopathy:      Cervical: No cervical adenopathy.   Skin:     General: Skin is warm and dry.   Neurological:      Mental Status: She is alert and oriented to person, place, and time.      Cranial Nerves: No cranial nerve deficit.   Psychiatric:         Behavior: Behavior normal.           Plan:       Gastroesophageal reflux disease, unspecified whether esophagitis present    Nausea    Abdominal pain, unspecified abdominal location    History of cholecystectomy    Diverticula of colon    Constipation, unspecified constipation type    Dysphagia, unspecified type    She will be started on the low quiros Linzess for the episodes of constipation.  She will add more fiber to the diet.  She continues her reflux regimen current medications vitamins and minerals.  She will follow up with her other physicians.  Today she will be sent to the emergency room.  She will need to have x-rays labs and evaluation of the constipation and abdominal pain and nausea and vomiting.  It will be quicker to send her to the emergency room so that she can be evaluated today.

## 2022-06-28 ENCOUNTER — ANESTHESIA (OUTPATIENT)
Dept: ENDOSCOPY | Facility: HOSPITAL | Age: 57
End: 2022-06-28
Payer: MEDICARE

## 2022-06-28 ENCOUNTER — HOSPITAL ENCOUNTER (OUTPATIENT)
Facility: HOSPITAL | Age: 57
Discharge: HOME | End: 2022-06-28
Attending: INTERNAL MEDICINE | Admitting: INTERNAL MEDICINE
Payer: MEDICARE

## 2022-06-28 ENCOUNTER — ANESTHESIA EVENT (OUTPATIENT)
Dept: ENDOSCOPY | Facility: HOSPITAL | Age: 57
End: 2022-06-28
Payer: MEDICARE

## 2022-06-28 VITALS
HEIGHT: 65 IN | RESPIRATION RATE: 20 BRPM | WEIGHT: 175 LBS | HEART RATE: 68 BPM | DIASTOLIC BLOOD PRESSURE: 67 MMHG | TEMPERATURE: 98 F | SYSTOLIC BLOOD PRESSURE: 139 MMHG | BODY MASS INDEX: 29.16 KG/M2 | OXYGEN SATURATION: 99 %

## 2022-06-28 DIAGNOSIS — R13.10 DYSPHAGIA, UNSPECIFIED TYPE: Primary | ICD-10-CM

## 2022-06-28 DIAGNOSIS — R13.10 DYSPHAGIA: ICD-10-CM

## 2022-06-28 LAB
POCT GLUCOSE: 133 MG/DL (ref 70–110)
POCT GLUCOSE: 184 MG/DL (ref 70–110)

## 2022-06-28 PROCEDURE — D9220A PRA ANESTHESIA: ICD-10-PCS | Mod: ANES,,, | Performed by: ANESTHESIOLOGY

## 2022-06-28 PROCEDURE — 27201012 HC FORCEPS, HOT/COLD, DISP: Performed by: INTERNAL MEDICINE

## 2022-06-28 PROCEDURE — 37000008 HC ANESTHESIA 1ST 15 MINUTES: Performed by: INTERNAL MEDICINE

## 2022-06-28 PROCEDURE — 25000003 PHARM REV CODE 250: Performed by: INTERNAL MEDICINE

## 2022-06-28 PROCEDURE — 88305 TISSUE EXAM BY PATHOLOGIST: ICD-10-PCS | Mod: 26,,, | Performed by: PATHOLOGY

## 2022-06-28 PROCEDURE — D9220A PRA ANESTHESIA: Mod: ANES,,, | Performed by: ANESTHESIOLOGY

## 2022-06-28 PROCEDURE — 37000009 HC ANESTHESIA EA ADD 15 MINS: Performed by: INTERNAL MEDICINE

## 2022-06-28 PROCEDURE — 88305 TISSUE EXAM BY PATHOLOGIST: CPT | Mod: 26,,, | Performed by: PATHOLOGY

## 2022-06-28 PROCEDURE — 63600175 PHARM REV CODE 636 W HCPCS: Performed by: NURSE ANESTHETIST, CERTIFIED REGISTERED

## 2022-06-28 PROCEDURE — C1726 CATH, BAL DIL, NON-VASCULAR: HCPCS | Performed by: INTERNAL MEDICINE

## 2022-06-28 PROCEDURE — 43239 EGD BIOPSY SINGLE/MULTIPLE: CPT | Performed by: INTERNAL MEDICINE

## 2022-06-28 PROCEDURE — 91040 PR ESOPH BALLOON DISTENSION TST: ICD-10-PCS | Mod: 26,,, | Performed by: INTERNAL MEDICINE

## 2022-06-28 PROCEDURE — 43239 EGD BIOPSY SINGLE/MULTIPLE: CPT | Mod: 59,,, | Performed by: INTERNAL MEDICINE

## 2022-06-28 PROCEDURE — C1726 CATH, BAL DIL, NON-VASCULAR: HCPCS

## 2022-06-28 PROCEDURE — D9220A PRA ANESTHESIA: ICD-10-PCS | Mod: CRNA,,, | Performed by: NURSE ANESTHETIST, CERTIFIED REGISTERED

## 2022-06-28 PROCEDURE — 43239 PR EGD, FLEX, W/BIOPSY, SGL/MULTI: ICD-10-PCS | Mod: 59,,, | Performed by: INTERNAL MEDICINE

## 2022-06-28 PROCEDURE — D9220A PRA ANESTHESIA: Mod: CRNA,,, | Performed by: NURSE ANESTHETIST, CERTIFIED REGISTERED

## 2022-06-28 PROCEDURE — 91040 ESOPH BALLOON DISTENSION TST: CPT | Mod: 59 | Performed by: INTERNAL MEDICINE

## 2022-06-28 PROCEDURE — 91040 ESOPH BALLOON DISTENSION TST: CPT | Mod: 26,,, | Performed by: INTERNAL MEDICINE

## 2022-06-28 PROCEDURE — 88305 TISSUE EXAM BY PATHOLOGIST: CPT | Mod: 59 | Performed by: PATHOLOGY

## 2022-06-28 PROCEDURE — 82962 GLUCOSE BLOOD TEST: CPT | Performed by: INTERNAL MEDICINE

## 2022-06-28 RX ORDER — SODIUM CHLORIDE 9 MG/ML
INJECTION, SOLUTION INTRAVENOUS CONTINUOUS
Status: DISCONTINUED | OUTPATIENT
Start: 2022-06-28 | End: 2022-06-28 | Stop reason: HOSPADM

## 2022-06-28 RX ORDER — SODIUM CHLORIDE 0.9 % (FLUSH) 0.9 %
10 SYRINGE (ML) INJECTION
Status: DISCONTINUED | OUTPATIENT
Start: 2022-06-28 | End: 2022-06-28 | Stop reason: HOSPADM

## 2022-06-28 RX ORDER — ONDANSETRON 2 MG/ML
4 INJECTION INTRAMUSCULAR; INTRAVENOUS DAILY PRN
Status: CANCELLED | OUTPATIENT
Start: 2022-06-28

## 2022-06-28 RX ORDER — PROPOFOL 10 MG/ML
VIAL (ML) INTRAVENOUS
Status: DISCONTINUED | OUTPATIENT
Start: 2022-06-28 | End: 2022-06-28

## 2022-06-28 RX ORDER — PROPOFOL 10 MG/ML
VIAL (ML) INTRAVENOUS CONTINUOUS PRN
Status: DISCONTINUED | OUTPATIENT
Start: 2022-06-28 | End: 2022-06-28

## 2022-06-28 RX ORDER — FENTANYL CITRATE 50 UG/ML
25 INJECTION, SOLUTION INTRAMUSCULAR; INTRAVENOUS EVERY 5 MIN PRN
Status: CANCELLED | OUTPATIENT
Start: 2022-06-28

## 2022-06-28 RX ADMIN — Medication 200 MCG/KG/MIN: at 02:06

## 2022-06-28 RX ADMIN — PROPOFOL 100 MG: 10 INJECTION, EMULSION INTRAVENOUS at 02:06

## 2022-06-28 RX ADMIN — SODIUM CHLORIDE: 0.9 INJECTION, SOLUTION INTRAVENOUS at 02:06

## 2022-06-28 RX ADMIN — PROPOFOL 50 MG: 10 INJECTION, EMULSION INTRAVENOUS at 02:06

## 2022-06-28 NOTE — INTERVAL H&P NOTE
The patient has been examined and the H&P has been reviewed:    I concur with the findings and no changes have occurred since H&P was written.    Anesthesia risks, benefits and alternative options discussed and understood by patient/family.    Pre-Procedure H and P Addendum    Patient seen and examined.  History and exam unchanged from prior history and physical.      Procedure: EGD/Flip  Indication: dysphagia  ASA Class: per anesthesiology  Airway: per anesthesia  Neck Mobility: full range of motion  Mallampatti score: per anesthesia  History of anesthesia problems: no  Family history of anesthesia problems: no  Anesthesia Plan: per anesthesia        There are no hospital problems to display for this patient.

## 2022-06-28 NOTE — ANESTHESIA PREPROCEDURE EVALUATION
2022  Luz Arias is a 56 y.o., female.  Pre-operative evaluation for ESOPHAGOGASTRODUODENOSCOPY (EGD) (N/A)    Chief Complaint: dysphagia    PMH  DM,type 2 with gastroparesis  Sarcoid, pulmonary- on inhalers- stable  CVA  with right sided weakness-uses cane  Past Surgical History:   Procedure Laterality Date    APPENDECTOMY      CHOLECYSTECTOMY      COLONOSCOPY  2019    COLONOSCOPY N/A 2020    Procedure: COLONOSCOPY;  Surgeon: Garrick López MD;  Location: [facility];  Service: Endoscopy;  Laterality: N/A;  with bx and stool specimen    ELBOW SURGERY      ESOPHAGOGASTRODUODENOSCOPY N/A 2020    Procedure: EGD (ESOPHAGOGASTRODUODENOSCOPY);  Surgeon: Garrick López MD;  Location: [facility];  Service: Endoscopy;  Laterality: N/A;  with biopsy    ESOPHAGOGASTRODUODENOSCOPY N/A 2021    Procedure: EGD (ESOPHAGOGASTRODUODENOSCOPY);  Surgeon: Garrick López MD;  Location: [facility] END[facility];  Service: Endoscopy;  Laterality: N/A;    HAND SURGERY      HYSTERECTOMY      , age 39, KRISTAN fibroids    MEDIASTINOSCOPY      NECK SURGERY      UPPER GASTROINTESTINAL ENDOSCOPY  2019    WRIST SURGERY           Vital Signs Range (Last 24H):         CBC:     Recent Labs   Lab 22  1039   WBC 8.97   RBC 3.71*   HGB 10.0*   HCT 31.2*      MCV 84   MCH 27.0   MCHC 32.1       CMP:   Recent Labs   Lab 22  1039      K 3.7      CO2 25   BUN 16   CREATININE 0.8   *   CALCIUM 9.7   ALBUMIN 4.3   PROT 7.9   ALKPHOS 71   ALT 28   AST 25   BILITOT 0.3       INR:  No results for input(s): PT, INR, PROTIME, APTT in the last 720 hours.      Diagnostic Studies:      EKD Echo:    Pre-op Assessment    I have reviewed the Patient Summary Reports.     I have reviewed the Nursing Notes. I have reviewed the NPO Status.   I have reviewed the Medications.     Review  of Systems  Anesthesia Hx:  No problems with previous Anesthesia    Pulmonary:  Pulmonary Normal           Anesthesia Plan  Type of Anesthesia, risks & benefits discussed:    Anesthesia Type: Gen Natural Airway, Gen ETT  Intra-op Monitoring Plan: Standard ASA Monitors  Post Op Pain Control Plan: multimodal analgesia  Induction:  IV  Informed Consent: Informed consent signed with the Patient and all parties understand the risks and agree with anesthesia plan.  All questions answered.   ASA Score: 3  Day of Surgery Review of History & Physical: H&P Update referred to the surgeon/provider.    Ready For Surgery From Anesthesia Perspective.     .

## 2022-06-28 NOTE — [employer] PATIENT INSTRUCTIONS
Discharge Summary/Instructions after an Endoscopic Procedure  Patient Name: Luz Arias  Patient MRN: [id number]  Patient YOB: 1965 Tuesday, June 28, 2022  Concetta Odonnell MD  Dear patient,  As a result of recent federal legislation (The Federal Cures Act), you may   receive lab or pathology results from your procedure in your [facility]   account before your physician is able to contact you. Your physician or   their representative will relay the results to you with their   recommendations at their soonest availability.  Thank you,  RESTRICTIONS:  During your procedure today, you received medications for sedation.  These   medications may affect your judgment, balance and coordination.  Therefore,   for 24 hours, you have the following restrictions:   - DO NOT drive a car, operate machinery, make legal/financial decisions,   sign important papers or drink alcohol.    ACTIVITY:  Today: no heavy lifting, straining or running due to procedural   sedation/anesthesia.  The following day: return to full activity including work.  DIET:  Eat and drink normally unless instructed otherwise.     TREATMENT FOR COMMON SIDE EFFECTS:  - Mild abdominal pain, nausea, belching, bloating or excessive gas:  rest,   eat lightly and use a heating pad.  - Sore Throat: treat with throat lozenges and/or gargle with warm salt   water.  - Because air was used during the procedure, expelling large amounts of air   from your rectum or belching is normal.  - If a bowel prep was taken, you may not have a bowel movement for 1-3 days.    This is normal.  SYMPTOMS TO WATCH FOR AND REPORT TO YOUR PHYSICIAN:  1. Abdominal pain or bloating, other than gas cramps.  2. Chest pain.  3. Back pain.  4. Signs of infection such as: chills or fever occurring within 24 hours   after the procedure.  5. Rectal bleeding, which would show as bright red, maroon, or black stools.   (A tablespoon of blood from the rectum is not serious, especially if    hemorrhoids are present.)  6. Vomiting.  7. Weakness or dizziness.  GO DIRECTLY TO THE NEAREST EMERGENCY ROOM IF YOU HAVE ANY OF THE FOLLOWING:      Difficulty breathing              Chills and/or fever over 101 F   Persistent vomiting and/or vomiting blood   Severe abdominal pain   Severe chest pain   Black, tarry stools   Bleeding- more than one tablespoon   Any other symptom or condition that you feel may need urgent attention  Your doctor recommends these additional instructions:  If any biopsies were taken, your doctors clinic will contact you in 1 to 2   weeks with any results.  - Await pathology results.   - Discharge patient to home (with escort).   - Resume previous diet.   - Continue present medications.   - The findings and recommendations were discussed with the patient.   - Patient has a contact number available for emergencies.  The signs and   symptoms of potential delayed complications were discussed with the   patient.  Return to normal activities tomorrow.  Written discharge   instructions were provided to the patient.  For questions, problems or results please call your physician - Concetta Odonnell MD at Work:  [phone].  [facility], EMERGENCY ROOM PHONE NUMBER: [phone]  IF A COMPLICATION OR EMERGENCY SITUATION ARISES AND YOU ARE UNABLE TO REACH   YOUR PHYSICIAN - GO DIRECTLY TO THE EMERGENCY ROOM.  Concetta Odonnell MD  6/28/2022 3:02:32 PM  This report has been verified and signed electronically.  Dear patient,  As a result of recent federal legislation (The Federal Cures Act), you may   receive lab or pathology results from your procedure in your [facility]   account before your physician is able to contact you. Your physician or   their representative will relay the results to you with their   recommendations at their soonest availability.  Thank you,  PROVATION

## 2022-06-28 NOTE — ANESTHESIA POSTPROCEDURE EVALUATION
Anesthesia Post Evaluation    Patient: Luz Arias    Procedure(s) Performed: Procedure(s) (LRB):  ESOPHAGOGASTRODUODENOSCOPY (EGD) (N/A)    Final Anesthesia Type: general      Patient location during evaluation: PACU  Patient participation: Yes- Able to Participate  Level of consciousness: awake and alert  Post-procedure vital signs: reviewed and stable  Pain management: adequate  Airway patency: patent    PONV status at discharge: No PONV  Anesthetic complications: no      Cardiovascular status: blood pressure returned to baseline  Respiratory status: unassisted, spontaneous ventilation and room air  Hydration status: euvolemic  Follow-up not needed.          Vitals Value Taken Time   /67 06/28/22 1516   Temp 36.5 °C (97.7 °F) 06/28/22 1453   Pulse 68 06/28/22 1517   Resp 31 06/28/22 1516   SpO2 95 % 06/28/22 1517   Vitals shown include unvalidated device data.      No case tracking events are documented in the log.      Pain/Yamila Score: Yamila Score: 9 (6/28/2022  2:53 PM)

## 2022-06-29 ENCOUNTER — HOSPITAL ENCOUNTER (OUTPATIENT)
Dept: RADIOLOGY | Facility: HOSPITAL | Age: 57
Discharge: HOME | End: 2022-06-29
Attending: INTERNAL MEDICINE | Admitting: INTERNAL MEDICINE
Payer: MEDICARE

## 2022-06-29 ENCOUNTER — HOSPITAL ENCOUNTER (OUTPATIENT)
Facility: HOSPITAL | Age: 57
Discharge: HOME | End: 2022-06-29
Attending: INTERNAL MEDICINE | Admitting: INTERNAL MEDICINE
Payer: MEDICARE

## 2022-06-29 VITALS
BODY MASS INDEX: 29.16 KG/M2 | DIASTOLIC BLOOD PRESSURE: 72 MMHG | TEMPERATURE: 98 F | SYSTOLIC BLOOD PRESSURE: 158 MMHG | WEIGHT: 175 LBS | HEIGHT: 65 IN | RESPIRATION RATE: 15 BRPM | OXYGEN SATURATION: 99 % | HEART RATE: 75 BPM

## 2022-06-29 DIAGNOSIS — R13.10 DYSPHAGIA, UNSPECIFIED TYPE: ICD-10-CM

## 2022-06-29 DIAGNOSIS — R13.10 DYSPHAGIA: ICD-10-CM

## 2022-06-29 LAB — POCT GLUCOSE: 199 MG/DL (ref 70–110)

## 2022-06-29 PROCEDURE — 74220 FL ESOPHAGRAM COMPLETE: ICD-10-PCS | Mod: 26,,, | Performed by: INTERNAL MEDICINE

## 2022-06-29 PROCEDURE — 25000003 PHARM REV CODE 250: Performed by: INTERNAL MEDICINE

## 2022-06-29 PROCEDURE — A9698 NON-RAD CONTRAST MATERIALNOC: HCPCS | Performed by: INTERNAL MEDICINE

## 2022-06-29 PROCEDURE — 91010 ESOPHAGUS MOTILITY STUDY: CPT | Mod: TC | Performed by: INTERNAL MEDICINE

## 2022-06-29 PROCEDURE — 91037 ESOPH IMPED FUNCTION TEST: CPT | Mod: TC | Performed by: INTERNAL MEDICINE

## 2022-06-29 PROCEDURE — 74220 X-RAY XM ESOPHAGUS 1CNTRST: CPT | Mod: TC

## 2022-06-29 PROCEDURE — 25500020 PHARM REV CODE 255: Performed by: INTERNAL MEDICINE

## 2022-06-29 PROCEDURE — 74220 X-RAY XM ESOPHAGUS 1CNTRST: CPT | Mod: 26,,, | Performed by: INTERNAL MEDICINE

## 2022-06-29 RX ORDER — LIDOCAINE HYDROCHLORIDE 20 MG/ML
JELLY TOPICAL ONCE
Status: COMPLETED | OUTPATIENT
Start: 2022-06-29 | End: 2022-06-29

## 2022-06-29 RX ADMIN — BARIUM SULFATE 200 ML: 0.81 POWDER, FOR SUSPENSION ORAL at 11:06

## 2022-06-29 RX ADMIN — BARIUM SULFATE 80 ML: 0.6 SUSPENSION ORAL at 11:06

## 2022-06-29 RX ADMIN — LIDOCAINE HYDROCHLORIDE 10 ML: 20 JELLY TOPICAL at 08:06

## 2022-06-30 ENCOUNTER — TELEPHONE (OUTPATIENT)
Dept: GASTROENTEROLOGY | Facility: CLINIC | Age: 57
End: 2022-06-30
Payer: MEDICARE

## 2022-06-30 PROCEDURE — 91037 ESOPH IMPED FUNCTION TEST: CPT | Mod: 26,,, | Performed by: INTERNAL MEDICINE

## 2022-06-30 PROCEDURE — 91010 PR ESOPHAGEAL MOTILITY STUDY, MA2METRY: ICD-10-PCS | Mod: 26,,, | Performed by: INTERNAL MEDICINE

## 2022-06-30 PROCEDURE — 91037 PR GERD TST W/ NASAL IMPEDENCE ELECTROD: ICD-10-PCS | Mod: 26,,, | Performed by: INTERNAL MEDICINE

## 2022-06-30 PROCEDURE — 91010 ESOPHAGUS MOTILITY STUDY: CPT | Mod: 26,,, | Performed by: INTERNAL MEDICINE

## 2022-06-30 NOTE — TELEPHONE ENCOUNTER
Manometry Results:    High LES pressure with incomplete relaxation   Hypercontractile ([place]) esophagus  50% incomplete bolus clearance  Hiatal hernia   Abnormal multiple rapid swallows test  No significant difference with provocative maneuvers   No evidence of residual at the end of 200cc bolus    No evidence of Rumination Syndrome       Please let patient know that the manometry shows    Very strong contractions in esophagus     Recommendation:  Follow up with Dr. Odonnell after all studies completed

## 2022-06-30 NOTE — PROVATION PATIENT INSTRUCTIONS
Discharge Summary/Instructions after an Endoscopic Procedure  Patient Name: Luz Arias  Patient MRN: [id number]  Patient YOB: 1965 Thursday, June 30, 2022  Concetta Odonnell MD  Dear patient,  As a result of recent federal legislation (The Federal Cures Act), you may   receive lab or pathology results from your procedure in your [facility]   account before your physician is able to contact you. Your physician or   their representative will relay the results to you with their   recommendations at their soonest availability.  Thank you,  RESTRICTIONS:  During your procedure today, you received medications for sedation.  These   medications may affect your judgment, balance and coordination.  Therefore,   for 24 hours, you have the following restrictions:   - DO NOT drive a car, operate machinery, make legal/financial decisions,   sign important papers or drink alcohol.    ACTIVITY:  Today: no heavy lifting, straining or running due to procedural   sedation/anesthesia.  The following day: return to full activity including work.  DIET:  Eat and drink normally unless instructed otherwise.     TREATMENT FOR COMMON SIDE EFFECTS:  - Mild abdominal pain, nausea, belching, bloating or excessive gas:  rest,   eat lightly and use a heating pad.  - Sore Throat: treat with throat lozenges and/or gargle with warm salt   water.  - Because air was used during the procedure, expelling large amounts of air   from your rectum or belching is normal.  - If a bowel prep was taken, you may not have a bowel movement for 1-3 days.    This is normal.  SYMPTOMS TO WATCH FOR AND REPORT TO YOUR PHYSICIAN:  1. Abdominal pain or bloating, other than gas cramps.  2. Chest pain.  3. Back pain.  4. Signs of infection such as: chills or fever occurring within 24 hours   after the procedure.  5. Rectal bleeding, which would show as bright red, maroon, or black stools.   (A tablespoon of blood from the rectum is not serious, especially if    hemorrhoids are present.)  6. Vomiting.  7. Weakness or dizziness.  GO DIRECTLY TO THE NEAREST EMERGENCY ROOM IF YOU HAVE ANY OF THE FOLLOWING:      Difficulty breathing              Chills and/or fever over 101 F   Persistent vomiting and/or vomiting blood   Severe abdominal pain   Severe chest pain   Black, tarry stools   Bleeding- more than one tablespoon   Any other symptom or condition that you feel may need urgent attention  Your doctor recommends these additional instructions:  If any biopsies were taken, your doctors clinic will contact you in 1 to 2   weeks with any results.  - Return to my office as previously scheduled.  For questions, problems or results please call your physician - Concetta Odonnell MD at Work:  [phone].  [facility], EMERGENCY ROOM PHONE NUMBER: [phone]  IF A COMPLICATION OR EMERGENCY SITUATION ARISES AND YOU ARE UNABLE TO REACH   YOUR PHYSICIAN - GO DIRECTLY TO THE EMERGENCY ROOM.  Concetta Odonnell MD  6/30/2022 6:22:08 PM  This report has been verified and signed electronically.  Dear patient,  As a result of recent federal legislation (The Federal Cures Act), you may   receive lab or pathology results from your procedure in your [facility]   account before your physician is able to contact you. Your physician or   their representative will relay the results to you with their   recommendations at their soonest availability.  Thank you,  PROVATION

## 2022-07-01 ENCOUNTER — PATIENT MESSAGE (OUTPATIENT)
Dept: GASTROENTEROLOGY | Facility: CLINIC | Age: 57
End: 2022-07-01
Payer: MEDICARE

## 2022-07-05 LAB
FINAL PATHOLOGIC DIAGNOSIS: NORMAL
GROSS: NORMAL
Lab: NORMAL

## 2022-07-07 ENCOUNTER — PATIENT MESSAGE (OUTPATIENT)
Dept: GASTROENTEROLOGY | Facility: CLINIC | Age: 57
End: 2022-07-07
Payer: MEDICARE

## 2022-07-07 DIAGNOSIS — K59.00 CONSTIPATION, UNSPECIFIED CONSTIPATION TYPE: ICD-10-CM

## 2022-07-07 RX ORDER — ESOMEPRAZOLE MAGNESIUM 40 MG/1
40 CAPSULE, DELAYED RELEASE ORAL 2 TIMES DAILY
Qty: 180 CAPSULE | Refills: 3 | Status: SHIPPED | OUTPATIENT
Start: 2022-07-07 | End: 2022-07-12 | Stop reason: SDUPTHER

## 2022-07-09 ENCOUNTER — HOSPITAL ENCOUNTER (EMERGENCY)
Facility: HOSPITAL | Age: 57
Discharge: HOME | End: 2022-07-09
Attending: EMERGENCY MEDICINE
Payer: MEDICARE

## 2022-07-09 VITALS
RESPIRATION RATE: 15 BRPM | OXYGEN SATURATION: 98 % | DIASTOLIC BLOOD PRESSURE: 74 MMHG | TEMPERATURE: 98 F | HEART RATE: 75 BPM | BODY MASS INDEX: 29.16 KG/M2 | SYSTOLIC BLOOD PRESSURE: 165 MMHG | WEIGHT: 175 LBS | HEIGHT: 65 IN

## 2022-07-09 DIAGNOSIS — T17.908A INHALED FOREIGN OBJECT, INITIAL ENCOUNTER: ICD-10-CM

## 2022-07-09 PROCEDURE — 70360 X-RAY EXAM OF NECK: CPT | Mod: 26,,, | Performed by: RADIOLOGY

## 2022-07-09 PROCEDURE — 70360 XR NECK SOFT TISSUE: ICD-10-PCS | Mod: 26,,, | Performed by: RADIOLOGY

## 2022-07-09 PROCEDURE — 70360 X-RAY EXAM OF NECK: CPT | Mod: TC

## 2022-07-09 PROCEDURE — 99283 EMERGENCY DEPT VISIT LOW MDM: CPT | Mod: 25

## 2022-07-09 NOTE — DISCHARGE INSTRUCTIONS
As we discussed, follow up with the primary care provider on Monday.  Take Tylenol and Motrin for any discomfort.  You can swallow or use a throat spray such as Chloraseptic.  Return here if he had difficulty swallowing, abdominal pain,

## 2022-07-09 NOTE — ED PROVIDER NOTES
Encounter Date: 7/9/2022       History     Chief Complaint   Patient presents with    Foreign Body In Throat     Patient reports swallowed a piece of plastic earlier while eating and has discomfort to throat. Reports sharp scratchy feeling to throat. Denies any difficulty breathing.      Patient is a 56-year-old female, here complaining of foreign body sensation in her throat.  She states she was eating some gumbo approximately 45 minutes prior to arrival here, and she felt something solid when she swallowed.  She has been able to swallow without difficulty since then.  She later realized that part of the plastic lid which  was used to cover the gumbo had melted into the combo.  She denies any stridor.  No wheezing or breathing difficulties.  No abdominal pains.  Her only complaint is of a scratchy sensation in her throat.        Review of patient's allergies indicates:   Allergen Reactions    Darunavir Anaphylaxis    Sulfa (sulfonamide antibiotics) Anaphylaxis    Sulfamethoxazole-trimethoprim Anaphylaxis and Diarrhea     Other reaction(s): Anaphylaxis, Finding of vomiting, Diarrhea    Temazepam      Sleep walking   Other reaction(s): Sleep related hallucinations    Pantoprazole Nausea And Vomiting    Pregabalin Other (See Comments)    Verapamil      Other reaction(s): Constipation    Zonisamide      Other reaction(s): Anaphylaxis, Diarrhea, Finding of vomiting, Anaphylaxis, Diarrhea, Finding of vomiting, Anaphylaxis, Diarrhea, Finding of vomiting    Metoclopramide Rash and Anxiety    Sucralfate Other (See Comments) and Nausea Only     Past Medical History:   Diagnosis Date    Aortic regurgitation     Chronic fatigue     Chronic pain     Depression     Diabetes     Dysphagia     Gastritis     Gastroparesis     GERD (gastroesophageal reflux disease)     Hypertension     Intestinal metaplasia of gastric cardia     Irritable bowel syndrome     Sarcoidosis     Stroke      Past Surgical History:    Procedure Laterality Date    APPENDECTOMY      CHOLECYSTECTOMY      COLONOSCOPY  05/17/2019    COLONOSCOPY N/A 5/5/2020    Procedure: COLONOSCOPY;  Surgeon: Garrick López MD;  Location: [facility];  Service: Endoscopy;  Laterality: N/A;  with bx and stool specimen    ELBOW SURGERY      ESOPHAGEAL MANOMETRY WITH MEASUREMENT OF IMPEDANCE N/A 6/29/2022    Procedure: MANOMETRY-ESOPHAGEAL-WITH IMPEDANCE;  Surgeon: Concetta Odonnell MD;  Location: [facility] (4TH FLR);  Service: Endoscopy;  Laterality: N/A;  r/o rumination and supragastric belching  hold nortriptyline and norco 24 hours prior to procedure  fully vaccinated, instructions emailed to [email or web address]    ESOPHAGOGASTRODUODENOSCOPY N/A 5/5/2020    Procedure: EGD (ESOPHAGOGASTRODUODENOSCOPY);  Surgeon: Garrick López MD;  Location: [facility];  Service: Endoscopy;  Laterality: N/A;  with biopsy    ESOPHAGOGASTRODUODENOSCOPY N/A 6/2/2021    Procedure: EGD (ESOPHAGOGASTRODUODENOSCOPY);  Surgeon: Garrick López MD;  Location: [facility];  Service: Endoscopy;  Laterality: N/A;    ESOPHAGOGASTRODUODENOSCOPY N/A 6/28/2022    Procedure: ESOPHAGOGASTRODUODENOSCOPY (EGD);  Surgeon: Concetta Odonnell MD;  Location: [facility] (2ND FLR);  Service: Endoscopy;  Laterality: N/A;  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  full liquid diet x3 days, clear liquid diet x1 day prior to procedure  fully vaccinated, instructions emailed to [email or web address]    HAND SURGERY      HYSTERECTOMY      2005, age 39, KRISTAN fibroids    MEDIASTINOSCOPY      NECK SURGERY      UPPER GASTROINTESTINAL ENDOSCOPY  05/17/2019    WRIST SURGERY       Family History   Problem Relation Age of Onset    Diabetes Mother     Colon cancer Mother 65    Bladder Cancer Mother     Heart disease Mother     Heart disease Father     Kidney failure Father     Diabetes Father     Stroke Father     Heart attack Father     Colon cancer Sister 65    Diabetes Sister      Hypertension Sister     Seizures Brother     Colon polyps Sister     Diabetes Sister     Hypertension Sister     Irritable bowel syndrome Sister     Diabetes Sister     Hypertension Sister     Diabetes Sister     Hypertension Sister     Diabetes Brother     Breast cancer Neg Hx     Esophageal cancer Neg Hx     Stomach cancer Neg Hx     Liver cancer Neg Hx     Liver disease Neg Hx     Crohn's disease Neg Hx     Celiac disease Neg Hx     Pancreatic cancer Neg Hx      Social History     Tobacco Use    Smoking status: Never Smoker    Smokeless tobacco: Never Used   Substance Use Topics    Alcohol use: Not Currently     Comment: no drinking anymore    Drug use: Never     Review of Systems   Constitutional: Negative.    HENT: Positive for sore throat.    Respiratory: Negative.    Cardiovascular: Negative.    Gastrointestinal: Negative.    Endocrine: Negative.    Musculoskeletal: Negative.    Skin: Negative.    Neurological: Negative.        Physical Exam     Initial Vitals [07/09/22 0049]   BP Pulse Resp Temp SpO2   (!) 165/74 75 15 97.8 °F (36.6 °C) 98 %      MAP       --         Physical Exam    Nursing note and vitals reviewed.  Constitutional: She appears well-developed and well-nourished. No distress.   HENT:   Head: Normocephalic and atraumatic.   Nose: Nose normal.   Mouth/Throat: Oropharynx is clear and moist. No oropharyngeal exudate.   Eyes: Conjunctivae and EOM are normal. Pupils are equal, round, and reactive to light. No scleral icterus.   Neck: Neck supple. No JVD present.   Normal range of motion.  Cardiovascular: Normal rate, regular rhythm, normal heart sounds and intact distal pulses.   No murmur heard.  Pulmonary/Chest: Breath sounds normal. No stridor. No respiratory distress. She has no wheezes. She has no rhonchi. She has no rales.   Abdominal: Abdomen is soft. Bowel sounds are normal. She exhibits no distension. There is no abdominal tenderness.   Musculoskeletal:          General: No tenderness or edema. Normal range of motion.      Cervical back: Normal range of motion and neck supple.     Neurological: She is alert and oriented to person, place, and time. She has normal strength. No cranial nerve deficit or sensory deficit. GCS score is 15. GCS eye subscore is 4. GCS verbal subscore is 5. GCS motor subscore is 6.   Skin: Skin is warm and dry. Capillary refill takes less than 2 seconds. No rash noted. No erythema.   Psychiatric: She has a normal mood and affect. Her behavior is normal.         ED Course   Procedures  Labs Reviewed - No data to display       Imaging Results          X-Ray Neck Soft Tissue (In process)               X-Rays:   Independently Interpreted Readings:   Other Readings:  Soft tissue neck x-ray, personally reviewed by me, shows no obvious foreign bodies in the esophagus or trachea.  Normal anatomy.  Hardware in cervical spine appears to be intact.    Medications - No data to display  Medical Decision Making:   Differential Diagnosis:   Foreign body, foreign body sensation, abrasion, laceration, etc  ED Management:  X-ray reveals no obvious foreign body.  Airway appears to be intact and normal.  Patient is swallowing without difficulty and states she has had some bread and liquids on swallow these without difficulty.  I believe the patient is safe for discharge home.  She will return here for any worsening signs or symptoms.  She will follow up with her PCP on Monday.                      Clinical Impression:   Final diagnoses:  [T17.908A] Swallowed foreign body          ED Disposition Condition    Discharge Stable        ED Prescriptions     None        Follow-up Information     Follow up With Specialties Details Why Contact Info    Your primary care provider  Call in 3 days      [facility] Emergency Dept Emergency Medicine  As needed, If symptoms worsen [address] [address] [phone]           Juan José Camejo MD  07/09/22  0818

## 2022-07-12 DIAGNOSIS — K59.00 CONSTIPATION, UNSPECIFIED CONSTIPATION TYPE: ICD-10-CM

## 2022-07-13 RX ORDER — ESOMEPRAZOLE MAGNESIUM 40 MG/1
40 CAPSULE, DELAYED RELEASE ORAL 2 TIMES DAILY
Qty: 180 CAPSULE | Refills: 3 | Status: SHIPPED | OUTPATIENT
Start: 2022-07-13 | End: 2022-07-14 | Stop reason: SDUPTHER

## 2022-07-14 ENCOUNTER — CLINICAL SUPPORT (OUTPATIENT)
Dept: REHABILITATION | Facility: HOSPITAL | Age: 57
End: 2022-07-14
Attending: INTERNAL MEDICINE
Payer: MEDICARE

## 2022-07-14 DIAGNOSIS — K59.00 CONSTIPATION, UNSPECIFIED CONSTIPATION TYPE: ICD-10-CM

## 2022-07-14 DIAGNOSIS — M48.9 CERVICAL SPINE DISEASE: ICD-10-CM

## 2022-07-14 PROCEDURE — 97163 PT EVAL HIGH COMPLEX 45 MIN: CPT | Mod: PN

## 2022-07-14 RX ORDER — ESOMEPRAZOLE MAGNESIUM 40 MG/1
40 CAPSULE, DELAYED RELEASE ORAL 2 TIMES DAILY
Qty: 180 CAPSULE | Refills: 3 | Status: SHIPPED | OUTPATIENT
Start: 2022-07-14 | End: 2023-07-25

## 2022-07-14 NOTE — TELEPHONE ENCOUNTER
----- Message from Yassine Petersen sent at 7/14/2022  1:20 PM CDT -----  Type: Needs Medical Advice  Who Called:  Pt    Pharmacy name and phone #:    [facility] [place] [place] - [address]  [address]  [address] [place] [address]  Phone: [phone] Fax: [phone]    Best Call Back Number: [phone]   Additional Information: Pt is at the pharm looking for her RX esomeprazole (NEXIUM) 40 MG capsule and the pharm still hasn't received it but she was told it was sent.  Please advise -- Thank you

## 2022-07-14 NOTE — PLAN OF CARE
"[facility]   Physical Therapy Initial Evaluation     Date: 7/14/2022   Name: Luz Arias  Clinic Number: [id number]    Therapy Diagnosis:   Encounter Diagnosis   Name Primary?    Cervical spine disease      Physician: Garrick López MD    Physician Orders: PT Eval and Treat   Medical Diagnosis from Referral: Cervical spine disease  Evaluation Date: 7/14/2022  Authorization Period Expiration: 7/14/2023  Plan of Care Expiration: 09/30/2022  Visit # / Visits authorized: 1/ 1   FOTO: 1/3    Precautions: Standard and Fall     Time In: 0915  Time Out: 1000  Total Appointment Time (timed & untimed codes): 45 minutes      SUBJECTIVE     Date of onset: Approx 2/2022    History of current condition - Jenny reports: She has been having neck pain for a long time, but about 5 months ago she fell out of bed and felt a "crunch" in her neck. Pt says she has had a lot of stiffness and pain in her neck and shoulders. Pt has difficulty looking in all directions most with looking down or trying to look back.     Falls: Yes, 5-6 months ago    Imaging, none:     Prior Therapy: n/a  Social History:  lives with their family  Occupation: retired  Prior Level of Function: mod A  Current Level of Function: Mod A    Pain:  Current 6/10, worst 10/10, best 5/10   Location:  neck    Description: Aching, Grabbing and Tight  Aggravating Factors: Sitting, Standing, Extension, Flexing, Lifting and Getting out of bed/chair  Easing Factors: pain medication and rest    Patients goals: decrease pain     Medical History:   Past Medical History:   Diagnosis Date    Aortic regurgitation     Chronic fatigue     Chronic pain     Depression     Diabetes     Dysphagia     Gastritis     Gastroparesis     GERD (gastroesophageal reflux disease)     Hypertension     Intestinal metaplasia of gastric cardia     Irritable bowel syndrome     Sarcoidosis     Stroke        Surgical History:   Luz Arias  has a past " surgical history that includes Colonoscopy (05/17/2019); Upper gastrointestinal endoscopy (05/17/2019); Esophagogastroduodenoscopy (N/A, 5/5/2020); Colonoscopy (N/A, 5/5/2020); Esophagogastroduodenoscopy (N/A, 6/2/2021); Neck surgery; Cholecystectomy; Appendectomy; Hand surgery; Wrist surgery; Elbow surgery; Mediastinoscopy; Hysterectomy; Esophagogastroduodenoscopy (N/A, 6/28/2022); and Esophageal manometry with measurement of impedance (N/A, 6/29/2022).    Medications:   Luz has a current medication list which includes the following prescription(s): acyclovir, albuterol, albuterol, amlodipine, aspirin, benzonatate, biotin, candesartan, carbamazepine, cefuroxime, cephalexin, deep sea nasal, diclofenac sodium, diphenhydrAMINE-aluminum-magnesium hydroxide-simethicone-LIDOcaine HCl 2%, donepezil, easy touch alcohol prep pads, ergocalciferol, ergocalciferol (vitamin d2), esomeprazole, fluticasone propionate, fluticasone-salmeterol 230-21 mcg/dose, freestyle lancets, freestyle lite strips, hydrocodone-acetaminophen, ipratropium, isosorbide mononitrate, jardiance, l-methyl-b6-b12, lidocaine, linaclotide, mag hydrox/aluminum hyd/simeth, melatonin, metanx (algal oil), metformin, methylphenidate hcl, metoprolol tartrate, nortriptyline, omeprazole, ondansetron, polyethylene glycol, prazosin, prednisone, proair hfa, rivastigmine, rosuvastatin, sucralfate, temazepam, tizanidine, tramadol, triamterene-hydrochlorothiazide 75-50 mg, tylenol extra strength, vitamin e, and zolpidem.    Allergies:   Review of patient's allergies indicates:   Allergen Reactions    Darunavir Anaphylaxis    Sulfa (sulfonamide antibiotics) Anaphylaxis    Sulfamethoxazole-trimethoprim Anaphylaxis and Diarrhea     Other reaction(s): Anaphylaxis, Finding of vomiting, Diarrhea    Temazepam      Sleep walking   Other reaction(s): Sleep related hallucinations    Pantoprazole Nausea And Vomiting    Pregabalin Other (See Comments)    Verapamil       Other reaction(s): Constipation    Zonisamide      Other reaction(s): Anaphylaxis, Diarrhea, Finding of vomiting, Anaphylaxis, Diarrhea, Finding of vomiting, Anaphylaxis, Diarrhea, Finding of vomiting    Metoclopramide Rash and Anxiety    Sucralfate Other (See Comments) and Nausea Only          OBJECTIVE     Cervical ROM: (painful in all planes)  Rot B: 30%  Flex: 3 fingers from chin to chest  Ext: negligible   SB B: negilible    Special Tests:  Pt too stiff/guarded to get accurate results          Limitation/Restriction for FOTO Neck Survey    Therapist reviewed FOTO scores for Luz Arias on 7/14/2022.   FOTO documents entered into [employer] - see Media section.    Limitation Score: 62%         TREATMENT     Total Treatment time (time-based codes) separate from Evaluation:  minutes      Jenny received the treatments listed below:      PATIENT EDUCATION AND HOME EXERCISES     Education provided:   - HEP    Written Home Exercises Provided: No written HEP given at this time. Exercises were reviewed and Jenny was able to demonstrate them prior to the end of the session.  Jenny demonstrated fair  understanding of the education provided. See EMR under Patient Instructions for exercises provided during therapy sessions.    ASSESSMENT     Luz is a 56 y.o. female referred to outpatient Physical Therapy with a medical diagnosis of Cervical pain. Patient presents with increased pain, increased tightness, difficulty with ADLs, requires assistance with ADLs from , poor activity tolerance, and frequent headaches.    Patient prognosis is Good.   Patient will benefit from skilled outpatient Physical Therapy to address the deficits stated above and in the chart below, provide patient /family education, and to maximize patientt's level of independence.     Plan of care discussed with patient: Yes  Patient's spiritual, cultural and educational needs considered and patient is agreeable to the plan of care and goals as stated  below:     Anticipated Barriers for therapy: n/a    Medical Necessity is demonstrated by the following  History  Co-morbidities and personal factors that may impact the plan of care Co-morbidities:   diabetes and See health hx    Personal Factors:   lifestyle     high   Examination  Body Structures and Functions, activity limitations and participation restrictions that may impact the plan of care Body Regions:   head  neck    Body Systems:    gross symmetry  ROM  strength  gross coordinated movement  posture    Participation Restrictions:       Activity limitations:   Learning and applying knowledge  no deficits    General Tasks and Commands  no deficits    Communication  no deficits    Mobility  lifting and carrying objects  moving around using equipment (WC)  driving (bike, car, motorcycle)    Self care  washing oneself (bathing, drying, washing hands)  caring for body parts (brushing teeth, shaving, grooming)  dressing  looking after one's health    Domestic Life  shopping  cooking  doing house work (cleaning house, washing dishes, laundry)  assisting others    Interactions/Relationships  no deficits    Life Areas  no deficits    Community and Social Life  community life  recreation and leisure         high   Clinical Presentation evolving clinical presentation with changing clinical characteristics high   Decision Making/ Complexity Score: high     Goals:  Short Term Goals: 4 weeks   1) Pt ind with HEP to promote progress between treatment sessions  2) Pt able to ride in the car >30 minutes without an increase in pain    Long Term Goals: 8 weeks   1) Pt able to achieve 70% of cervical ROM in all planes   2) Pt able to read/look at phone for >60 min without a sharp increase in pain  3) Facilitate increase in flexibility to improve posture  4) Pt improve score on FOTO by 10% or better    PLAN   Plan of care Certification: 7/14/2022 to 09/30/2022.    Outpatient Physical Therapy 1-2 times weekly for 10 weeks to  include the following interventions: Aquatic Therapy, Cervical/Lumbar Traction, Electrical Stimulation  , Gait Training, Manual Therapy, Neuromuscular Re-ed, Patient Education, Self Care, Therapeutic Activities and Therapeutic Exercise.     Paul Ortiz, PT      I CERTIFY THE NEED FOR THESE SERVICES FURNISHED UNDER THIS PLAN OF TREATMENT AND WHILE UNDER MY CARE   Physician's comments:     Physician's Signature: ___________________________________________________

## 2022-07-22 ENCOUNTER — PATIENT MESSAGE (OUTPATIENT)
Dept: GASTROENTEROLOGY | Facility: CLINIC | Age: 57
End: 2022-07-22
Payer: MEDICARE

## 2022-08-21 ENCOUNTER — PATIENT MESSAGE (OUTPATIENT)
Dept: GASTROENTEROLOGY | Facility: CLINIC | Age: 57
End: 2022-08-21
Payer: MEDICARE

## 2022-08-22 ENCOUNTER — TELEPHONE (OUTPATIENT)
Dept: GASTROENTEROLOGY | Facility: CLINIC | Age: 57
End: 2022-08-22
Payer: MEDICARE

## 2022-08-22 ENCOUNTER — OFFICE VISIT (OUTPATIENT)
Dept: GASTROENTEROLOGY | Facility: CLINIC | Age: 57
End: 2022-08-22
Payer: MEDICARE

## 2022-08-22 ENCOUNTER — PATIENT MESSAGE (OUTPATIENT)
Dept: GASTROENTEROLOGY | Facility: CLINIC | Age: 57
End: 2022-08-22

## 2022-08-22 DIAGNOSIS — R13.10 DYSPHAGIA, UNSPECIFIED TYPE: ICD-10-CM

## 2022-08-22 DIAGNOSIS — R09.A2 GLOBUS SENSATION: ICD-10-CM

## 2022-08-22 DIAGNOSIS — K21.9 GASTROESOPHAGEAL REFLUX DISEASE, UNSPECIFIED WHETHER ESOPHAGITIS PRESENT: ICD-10-CM

## 2022-08-22 DIAGNOSIS — K22.4 JACKHAMMER ESOPHAGUS: Primary | ICD-10-CM

## 2022-08-22 PROCEDURE — 99215 OFFICE O/P EST HI 40 MIN: CPT | Mod: 95,,, | Performed by: INTERNAL MEDICINE

## 2022-08-22 PROCEDURE — 99215 PR OFFICE/OUTPT VISIT, EST, LEVL V, 40-54 MIN: ICD-10-PCS | Mod: 95,,, | Performed by: INTERNAL MEDICINE

## 2022-08-22 NOTE — TELEPHONE ENCOUNTER
----- Message from Yolanda Bull sent at 8/22/2022  9:12 AM CDT -----  Name Of Caller: Jenny    Provider Name: Concetta Odonnell,    Does patient feel the need to be seen today?has one at 1:40pm    Relationship to the Pt?: pt    Contact Preference?: [phone]    What is the nature of the call?:Pt called and would like to see if she can change visit to virtual please call back regarding that. I tried to change it but its not giving me appt

## 2022-08-22 NOTE — TELEPHONE ENCOUNTER
Spoke with pt   Pt  States that she will be in [place] how would like to to do virtual because she feel like she to sick to come in person.

## 2022-08-22 NOTE — PATIENT INSTRUCTIONS
-Try gaviscon with alginate at night.  Chew 1-2 tablets after meals and at bedtime as needed (up to 4x a day).  For best results follow by a half glass of water or other liquid.   Gaviscon is available in liquid formulation.  Take 1-4 tablespoons 4x a day for Regular Strength and 1-4 teaspoonfuls 4x a day for Extra Strength.  The special european formulation with alginate appears more effective and is usually available on [employer].  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 López for management of your chronic GI problems, including abdominal and rectal pain   -Zanaflex may be contributing to esophageal muscle spasms.  Please try to eliminate this medication as best as you can

## 2022-08-22 NOTE — PROGRESS NOTES
The patient location is: home  The chief complaint leading to consultation is: rumination, gerd, dysphagia    Visit type: audiovisual    Face to Face time with patient: 43  50 minutes of total time spent on the encounter, which includes face to face time and non-face to face time preparing to see the patient (eg, review of tests), Obtaining and/or reviewing separately obtained history, Documenting clinical information in the electronic or other health record, Independently interpreting results (not separately reported) and communicating results to the patient/family/caregiver, or Care coordination (not separately reported).         Each patient to whom he or she provides medical services by telemedicine is:  (1) informed of the relationship between the physician and patient and the respective role of any other health care provider with respect to management of the patient; and (2) notified that he or she may decline to receive medical services by telemedicine and may withdraw from such care at any time.    Notes:        [facility] Consultation Note    Reason for Consult:    No chief complaint on file.        PCP:   Gab Levy       Referring MD:  Garrick López Md  [address] [place] [address]    HPI:  Luz Arias is a 56 y.o. female referred to motility clinic for second opinion regarding the following problems:    GERD.  HH    Retrosternal pyrosis: few per week   Regurgitation: rare     MEDs:  nexium 40mg BID   pepcid QHS  malox prn    Dysphagia.  Improved   Problems with solids: few per week  Problems with liquids: no    Globus discomfort in throat    Chest pain and spasm.  Daily   Negative cardiac self.          Nausea.  Few per week   Early satiety.  Occasional   Vomiting: every few wweeks    Cyclical episodes of n/v with symptom free intervals between episodes:  Prodrome: yes  History of migraines: yes  Marijuana use: no   Unusual bathing behaviors:no    Dm  gastroparesis     MEDS:   zofran   nortriptyline 75 for pain w some improvement    RUQ/epig pain/diffuse abd     Bloating     Diarrhea and constipation   PEG prn     G IM.  AM.  No fam of gastric cancer   Recent EGD without GIM   Recommended repeat in 3 yrs     Denies BRBPR, melena, weight loss.       Fatty liver     FH CRC   Thinks that had a colonoscopy relatively recently     Total visit time was 61   minutes, more than 50% of which was spent in face-to-face counseling with patient regarding symptoms, diagnostic results, prognosis, risks and benefits of treatment options, instructions for management, importance of compliance with chosen treatment options and coping strategies.      Previous Studies:   CT abdomen 06/23/2022:  No acute abnormality.  Previous cholecystectomy and hysterectomy.  Fatty infiltration of the liver.  Prior granulomatous infection.  No constipation a bowel obstruction.  Timed barium swallow 06/29/2022: Normal. 13 mm tablet passed is easily into the stomach without obstruction.  Contrast emptied within 1 minutes.  Manometry 06/30/2022: High LES pressure with incomplete relaxation.  Hypercontractile esophagus.  50% complete bolus clearance.  Hiatal hernia.  Abnormal multiple rapid swallow test.  No significant difference with provocative maneuvers.  No evidence of residual liquid at the end of 200 cc bolus.  No evidence of rumination.  EGD 06/28/2022: Normal esophagus (-).  Normal GE junction.  No puckering resistance or pop.  Flip with normal esophageal contractility.  Z-line irregular (-).  2 cm hiatal hernia.  Atrophic mucosa in the stomach.  Mapping performed (-).  Single gastric polyp in cardia (hp).  Normal duodenum.  Manometry 04/08/2022:  Poor copy.  Normal relaxation of LES with swallows with normal IRP.  IRP 11. DCI 07641. 100% hypercontractile.  0% premature.  0% pan esophageal.  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  Gastric emptying study 04/06/2022:  Delayed.  228  minutes retention of 32%.  CT abdomen 03/13/2022:  Hepatic steatosis.  Mild atelectasis scarring or pneumonia of the lung bases.  Correlate clinically.  EGD 6/2/2021 Gr A esophagitis.  Erythema in the stomach.  Biopsied.  Normal duodenum.  Pathology 06/03/2021:  Stomach random chemical reactive gastropathy, mild chronic gastritis and intestinal metaplasia.  EGD 05/17/2019:  Esophagus hernia with significant reflux esophagitis with edema erythema.  I am biopsies obtained.  Stomach antral portion of the stomach pre-pyloric edema with obvious slowly inflammation moderate severe gastritis histology as well as SANDRA test.  Duodenum mild edema and irritation duodenitis as well as dilation of the 2nd portion of duodenum biopsied visible portion taken above.  Gastric emptying study 10/09/2019:  Delayed.  9% at 4 H have time exceeds 125 minutes.  EGD 05/05/2020:  LA grade a esophagitis.  Erythema in the antrum biopsied.  Normal duodenum.  Pathology:  H pylori negative.  Mild chronic gastritis.  Colonoscopy 05/05/2020:  Hemorrhoids.  Abnormal mucosa in sigmoid colon (lymphoid aggregate).    Relevant Surgical History:    NA    ROS:  ROS     Complete ROS negative except as above    Medical History:   Past Medical History:   Diagnosis Date    Aortic regurgitation     Chronic fatigue     Chronic pain     Depression     Diabetes     Dysphagia     Gastritis     Gastroparesis     GERD (gastroesophageal reflux disease)     Hypertension     Intestinal metaplasia of gastric cardia     Irritable bowel syndrome     Sarcoidosis     Stroke         Surgical History:   Past Surgical History:   Procedure Laterality Date    APPENDECTOMY      CHOLECYSTECTOMY      COLONOSCOPY  05/17/2019    COLONOSCOPY N/A 5/5/2020    Procedure: COLONOSCOPY;  Surgeon: Garrick López MD;  Location: [facility];  Service: Endoscopy;  Laterality: N/A;  with bx and stool specimen    ELBOW SURGERY      ESOPHAGEAL MANOMETRY WITH MEASUREMENT OF  IMPEDANCE N/A 6/29/2022    Procedure: MANOMETRY-ESOPHAGEAL-WITH IMPEDANCE;  Surgeon: Concetta Odonnell MD;  Location: [facility] ENDO (4TH FLR);  Service: Endoscopy;  Laterality: N/A;  r/o rumination and supragastric belching  hold nortriptyline and norco 24 hours prior to procedure  fully vaccinated, instructions emailed to [email or web address]    ESOPHAGOGASTRODUODENOSCOPY N/A 5/5/2020    Procedure: EGD (ESOPHAGOGASTRODUODENOSCOPY);  Surgeon: Garrick López MD;  Location: [facility] ENDO;  Service: Endoscopy;  Laterality: N/A;  with biopsy    ESOPHAGOGASTRODUODENOSCOPY N/A 6/2/2021    Procedure: EGD (ESOPHAGOGASTRODUODENOSCOPY);  Surgeon: Garrick López MD;  Location: [facility] ENDO;  Service: Endoscopy;  Laterality: N/A;    ESOPHAGOGASTRODUODENOSCOPY N/A 6/28/2022    Procedure: ESOPHAGOGASTRODUODENOSCOPY (EGD);  Surgeon: Concetta Odonnell MD;  Location: [facility] ENDO (2ND FLR);  Service: Endoscopy;  Laterality: N/A;  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  full liquid diet x3 days, clear liquid diet x1 day prior to procedure  fully vaccinated, instructions emailed to [email or web address]    HAND SURGERY      HYSTERECTOMY      2005, age 39, KRISTAN fibroids    MEDIASTINOSCOPY      NECK SURGERY      UPPER GASTROINTESTINAL ENDOSCOPY  05/17/2019    WRIST SURGERY          Family History:   Family History   Problem Relation Age of Onset    Diabetes Mother     Colon cancer Mother 65    Bladder Cancer Mother     Heart disease Mother     Heart disease Father     Kidney failure Father     Diabetes Father     Stroke Father     Heart attack Father     Colon cancer Sister 65    Diabetes Sister     Hypertension Sister     Seizures Brother     Colon polyps Sister     Diabetes Sister     Hypertension Sister     Irritable bowel syndrome Sister     Diabetes Sister     Hypertension Sister     Diabetes Sister     Hypertension Sister     Diabetes Brother     Breast cancer Neg Hx     Esophageal cancer Neg Hx      Stomach cancer Neg Hx     Liver cancer Neg Hx     Liver disease Neg Hx     Crohn's disease Neg Hx     Celiac disease Neg Hx     Pancreatic cancer Neg Hx         Social History:   Social History     Socioeconomic History    Marital status:    Tobacco Use    Smoking status: Never Smoker    Smokeless tobacco: Never Used   Substance and Sexual Activity    Alcohol use: Not Currently     Comment: no drinking anymore    Drug use: Never    Sexual activity: Not Currently        Review of patient's allergies indicates:   Allergen Reactions    Darunavir Anaphylaxis    Sulfa (sulfonamide antibiotics) Anaphylaxis    Sulfamethoxazole-trimethoprim Anaphylaxis and Diarrhea     Other reaction(s): Anaphylaxis, Finding of vomiting, Diarrhea    Temazepam      Sleep walking   Other reaction(s): Sleep related hallucinations    Pantoprazole Nausea And Vomiting    Pregabalin Other (See Comments)    Verapamil      Other reaction(s): Constipation    Zonisamide      Other reaction(s): Anaphylaxis, Diarrhea, Finding of vomiting, Anaphylaxis, Diarrhea, Finding of vomiting, Anaphylaxis, Diarrhea, Finding of vomiting    Metoclopramide Rash and Anxiety    Sucralfate Other (See Comments) and Nausea Only       Current Outpatient Medications   Medication Sig Dispense Refill    acyclovir (ZOVIRAX) 400 MG tablet acyclovir 400 mg tablet (Patient taking differently: Take 400 mg by mouth once daily. acyclovir 400 mg tablet) 30 tablet 3    albuterol (PROVENTIL) 5 mg/mL nebulizer solution Take 2.5 mg by nebulization every 6 (six) hours as needed for Wheezing. Rescue      ALBUTEROL INHL Inhale into the lungs. Every 6 hrs prn      amLODIPine (NORVASC) 10 MG tablet Take 10 mg by mouth once daily.      aspirin (ECOTRIN) 81 MG EC tablet Take 81 mg by mouth once daily.      benzonatate (TESSALON) 100 MG capsule Take 100 mg by mouth 3 (three) times daily as needed.      biotin 10,000 mcg Cap Take by mouth.      candesartan  (ATACAND) 32 MG tablet Take 32 mg by mouth once daily.      carBAMazepine (TEGRETOL XR) 400 MG Tb12 Take 400 mg by mouth 2 (two) times daily.      cefUROXime (CEFTIN) 500 MG tablet       cephALEXin (KEFLEX) 500 MG capsule Take 500 mg by mouth 3 (three) times daily.      DEEP SEA NASAL 0.65 % nasal spray       diclofenac sodium (VOLTAREN) 1 % Gel Voltaren 1 % topical gel      diphenhydrAMINE-aluminum-magnesium hydroxide-simethicone-LIDOcaine HCl 2% Swish and swallow 5 mLs every 6 (six) hours as needed (throat irritation). 120 mL 2    donepeziL (ARICEPT) 10 MG tablet donepezil 10 mg oral tablet  Start Date: 5/13/21  Status: Ordered      EASY TOUCH ALCOHOL PREP PADS PadM Apply 1 each topically 3 (three) times daily.      ergocalciferol (ERGOCALCIFEROL) 50,000 unit Cap ergocalciferol (vitamin D2) 50,000 unit capsule      ergocalciferol, vitamin D2, (VITAMIN D ORAL) Take by mouth. Taking 2000 mg daily      esomeprazole (NEXIUM) 40 MG capsule Take 1 capsule (40 mg total) by mouth 2 (two) times daily. 180 capsule 3    fluticasone propionate (FLONASE) 50 mcg/actuation nasal spray       fluticasone-salmeterol 230-21 mcg/dose (ADVAIR HFA) 230-21 mcg/actuation HFAA inhaler Advair  mcg-21 mcg/actuation aerosol inhaler      FREESTYLE LANCETS 28 gauge lancets Apply topically 3 (three) times daily.      FREESTYLE LITE STRIPS Strp 1 strip 3 (three) times daily.      HYDROcodone-acetaminophen (NORCO) 5-325 mg per tablet Take 1-2 tablets by mouth every 4 (four) hours as needed.      ipratropium (ATROVENT) 42 mcg (0.06 %) nasal spray 2 sprays 2 (two) times daily.      isosorbide mononitrate (IMDUR) 30 MG 24 hr tablet 30 mg.      JARDIANCE 10 mg tablet Take 10 mg by mouth once daily.      L-METHYL-B6-B12 3-35-2 mg Tab       lidocaine (LIDODERM) 5 %       linaCLOtide (LINZESS) 72 mcg Cap capsule Take 1 capsule (72 mcg total) by mouth before breakfast. 30 capsule 11    mag hydrox/aluminum hyd/simeth (MAALOX  "ORAL)       melatonin 3 mg TbDL Take by mouth. prn      METANX, ALGAL OIL, 3 mg-35 mg-2 mg -90.314 mg Cap Take 1 capsule by mouth 2 (two) times daily.      metFORMIN (GLUCOPHAGE-XR) 500 MG XR 24hr tablet Take 500 mg by mouth 2 (two) times daily with meals.      methylphenidate HCl (RITALIN) 10 MG tablet Take 10 mg by mouth once daily.      metoprolol tartrate 75 mg Tab <span ID="[id number]" style="Bold">zzMetoprolol Tartrate 50 mg oral tablet</span><br/>Start Date: 3/26/21<br/>Status: Ordered      nortriptyline (PAMELOR) 75 MG Cap TAKE 1 CAPSULE BY MOUTH EVERY NIGHT 1 HOUR BEFORE BEDTIME      omeprazole (PRILOSEC) 40 MG capsule omeprazole 40 mg oral delayed release capsule  Start Date: 5/13/21  Status: Ordered      ondansetron (ZOFRAN-ODT) 8 MG TbDL Take 1 tablet (8 mg total) by mouth every 6 (six) hours as needed (nausea). 50 tablet 2    polyethylene glycol (GLYCOLAX) 17 gram/dose powder Take 17 g by mouth once daily. 1530 g 3    prazosin (MINIPRESS) 2 MG Cap Take 2 mg by mouth once daily.      predniSONE (DELTASONE) 20 MG tablet Take 40 mg by mouth once daily.      PROAIR HFA 90 mcg/actuation inhaler       rivastigmine (EXELON) 9.5 mg/24 hour PT24 Place onto the skin.      rosuvastatin (CRESTOR) 40 MG Tab Take 40 mg by mouth once daily.      sucralfate (CARAFATE) 1 gram tablet sucralfate 1 g oral tablet  Start Date: 6/24/21  Status: Ordered      temazepam (RESTORIL) 15 mg Cap temazepam 15 mg oral capsule  Start Date: 8/25/21  Status: Ordered      tiZANidine (ZANAFLEX) 4 MG tablet Take 4 mg by mouth every 8 (eight) hours.      traMADoL (ULTRAM) 50 mg tablet Take 1 tablet (50 mg total) by mouth every 6 (six) hours as needed for Pain. 20 tablet 0    triamterene-hydrochlorothiazide 75-50 mg (MAXZIDE) 75-50 mg per tablet   0.5 tab, Oral, Daily, # 45 tab, 3 Refill(s)      TYLENOL EXTRA STRENGTH 500 mg tablet Take 500 mg by mouth.      vitamin E 600 UNIT capsule Take 600 Units by mouth once " daily.      zolpidem (AMBIEN) 10 mg Tab Take 10 mg by mouth nightly as needed.       No current facility-administered medications for this visit.        Objective Findings:  Vital Signs:  There were no vitals taken for this visit.  There is no height or weight on file to calculate BMI.    Physical Exam:  Physical Exam:limited due to video visit  General appearance: alert, cooperative, no distress  HENT: Normocephalic, atraumatic, neck symmetrical, no nasal discharge  Eyes: conjunctivae/corneas clear,  EOM's intact  Extremities: visible extremities symmetric; no clubbing, cyanosis, or edema  Integument: visible Skin color, texture, turgor normal; no rashes; hair distrubution normal  Neurologic: Alert and oriented X 3,  Psychiatric: no pressured speech; normal affect; no evidence of impaired cognition      Labs:   Reviewed in Epic/record      Assessment and Plan:  Luz Arias is a 56 y.o. female with referred to Es[facility]ility Clinic for 2nd opinion regarding following problems:    GERD. 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  Early satiety.  Vomiting  Possibly cyclical episodes of n/v  DM gastroparesis   Unable to tolerate reglan   On zofran   On nortriptyline for pain w some improvement  -Def to ref GI     Dm   Jardiance, metformin    RUQ/epig pain/generalized  -Def to ref GI     Rectal pain   -Def to ref GI     Bloating   -Def to ref GI     Diarrhea and constipation   On  PEG  -Def to ref GI     GIM.  AM.  No fam of gastric cancer   Negative EGD w mapping 2022  -Repeat EGD in 6/2025  -Def to ref GI     Fatty liver   -Def to ref GI     FH CRC   Negative Colonoscopy 2020   Repeat 5/ 2025 w local GI     Chronic pain. Neuropathy   On zanaflex  On nortriptyline 75   On restoril     Follow up in about 4 months (around 12/22/2022).    1. Jackhammer esophagus    2. Gastroesophageal reflux disease, unspecified whether esophagitis present    3. Dysphagia, unspecified type    4. Globus sensation          Order summary:       Discussed with PT that I act as a consult service and do not accept patients to be their primary GI provider. Discussed that the goal of our visits is to address relevant motility problems while deferring other GI problems as well as screening and surveillance to his/her primary GI provider.   Discussed that he/she needs to continue to follow with his local primary GI provider.  Discussed that we will complete his/her workup, clarify diagnosis and attempt to optimize his/her symptoms with intention of him/her returning to referring GI provider for long term GI care.   Pt verbalized understanding.        Thank you so much for allowing me to participate in the care of Luz Odonnell MD      This note was created using voice recognition software, and may contain some unrecognized transcriptional errors.

## 2023-01-31 ENCOUNTER — OFFICE VISIT (OUTPATIENT)
Dept: OBSTETRICS AND GYNECOLOGY | Facility: CLINIC | Age: 58
End: 2023-01-31
Payer: MEDICARE

## 2023-01-31 VITALS
DIASTOLIC BLOOD PRESSURE: 68 MMHG | WEIGHT: 188 LBS | BODY MASS INDEX: 31.32 KG/M2 | HEIGHT: 65 IN | SYSTOLIC BLOOD PRESSURE: 116 MMHG

## 2023-01-31 DIAGNOSIS — Z01.419 ENCOUNTER FOR ANNUAL ROUTINE GYNECOLOGICAL EXAMINATION: Primary | ICD-10-CM

## 2023-01-31 DIAGNOSIS — A60.9 HSV (HERPES SIMPLEX VIRUS) ANOGENITAL INFECTION: ICD-10-CM

## 2023-01-31 DIAGNOSIS — R21 SKIN RASH IN PELVIC REGION: ICD-10-CM

## 2023-01-31 PROCEDURE — G0101 PR CA SCREEN;PELVIC/BREAST EXAM: ICD-10-PCS | Mod: S$GLB,,, | Performed by: OBSTETRICS & GYNECOLOGY

## 2023-01-31 PROCEDURE — G0101 CA SCREEN;PELVIC/BREAST EXAM: HCPCS | Mod: S$GLB,,, | Performed by: OBSTETRICS & GYNECOLOGY

## 2023-01-31 RX ORDER — INSULIN DETEMIR 100 [IU]/ML
10 INJECTION, SOLUTION SUBCUTANEOUS NIGHTLY
Status: ON HOLD | COMMUNITY
Start: 2023-01-16 | End: 2023-04-10

## 2023-01-31 RX ORDER — CLOTRIMAZOLE AND BETAMETHASONE DIPROPIONATE 10; .64 MG/G; MG/G
CREAM TOPICAL
Qty: 15 G | Refills: 1 | Status: SHIPPED | OUTPATIENT
Start: 2023-01-31 | End: 2024-01-31

## 2023-01-31 RX ORDER — VALACYCLOVIR HYDROCHLORIDE 500 MG/1
500 TABLET, FILM COATED ORAL DAILY
Qty: 90 TABLET | Refills: 3 | Status: SHIPPED | OUTPATIENT
Start: 2023-01-31 | End: 2023-04-06 | Stop reason: SDUPTHER

## 2023-01-31 RX ORDER — DAPAGLIFLOZIN 5 MG/1
5 TABLET, FILM COATED ORAL
COMMUNITY
Start: 2023-01-20 | End: 2023-12-04

## 2023-01-31 NOTE — PROGRESS NOTES
Medicare Breast [facility] Pelvic    HPI:  Luz Arias is a 57 y.o. female  presents for a well woman exam.  LMP: No LMP recorded. Patient has had a hysterectomy..    She complains vulvovaginal itching periodically, as well as a fungal rash under her pannus  She also takes daily medication for the suppression of HSV  Past Medical History:   Diagnosis Date    Aortic regurgitation     Chronic fatigue     Chronic pain     Depression     Diabetes     Dysphagia     Gastritis     Gastroparesis     GERD (gastroesophageal reflux disease)     Hypertension     Intestinal metaplasia of gastric cardia     Irritable bowel syndrome     Sarcoidosis     Stroke      Past Surgical History:   Procedure Laterality Date    APPENDECTOMY      CHOLECYSTECTOMY      COLONOSCOPY  2019    COLONOSCOPY N/A 2020    Procedure: COLONOSCOPY;  Surgeon: Garrick López MD;  Location: [facility];  Service: Endoscopy;  Laterality: N/A;  with bx and stool specimen    ELBOW SURGERY      ESOPHAGEAL MANOMETRY WITH MEASUREMENT OF IMPEDANCE N/A 2022    Procedure: MANOMETRY-ESOPHAGEAL-WITH IMPEDANCE;  Surgeon: Concetta Odonnell MD;  Location: [facility] (4TH FLR);  Service: Endoscopy;  Laterality: N/A;  r/o rumination and supragastric belching  hold nortriptyline and norco 24 hours prior to procedure  fully vaccinated, instructions emailed to [email or web address]    ESOPHAGOGASTRODUODENOSCOPY N/A 2020    Procedure: EGD (ESOPHAGOGASTRODUODENOSCOPY);  Surgeon: Garrick López MD;  Location: [facility];  Service: Endoscopy;  Laterality: N/A;  with biopsy    ESOPHAGOGASTRODUODENOSCOPY N/A 2021    Procedure: EGD (ESOPHAGOGASTRODUODENOSCOPY);  Surgeon: Garrick López MD;  Location: [facility];  Service: Endoscopy;  Laterality: N/A;    ESOPHAGOGASTRODUODENOSCOPY N/A 2022    Procedure: ESOPHAGOGASTRODUODENOSCOPY (EGD);  Surgeon: Concetta Odonnell MD;  Location: [facility] ENDO (2ND FLR);  Service: Endoscopy;  Laterality: N/A;  Endoflip  2nd  "floor-pumonary sarcoidosis  full liquid diet x3 days, clear liquid diet x1 day prior to procedure  fully vaccinated, instructions emailed to [email or web address]    HAND SURGERY      HYSTERECTOMY      2005, age 39, KRISTAN fibroids    MEDIASTINOSCOPY      NECK SURGERY      UPPER GASTROINTESTINAL ENDOSCOPY  2019    WRIST SURGERY       Social History     Socioeconomic History    Marital status:    Tobacco Use    Smoking status: Never    Smokeless tobacco: Never   Substance and Sexual Activity    Alcohol use: Not Currently     Comment: no drinking anymore    Drug use: Never    Sexual activity: Not Currently     Family History   Problem Relation Age of Onset    Diabetes Mother     Colon cancer Mother 65    Bladder Cancer Mother     Heart disease Mother     Heart disease Father     Kidney failure Father     Diabetes Father     Stroke Father     Heart attack Father     Colon cancer Sister 65    Diabetes Sister     Hypertension Sister     Seizures Brother     Colon polyps Sister     Diabetes Sister     Hypertension Sister     Irritable bowel syndrome Sister     Diabetes Sister     Hypertension Sister     Diabetes Sister     Hypertension Sister     Diabetes Brother     Breast cancer Neg Hx     Esophageal cancer Neg Hx     Stomach cancer Neg Hx     Liver cancer Neg Hx     Liver disease Neg Hx     Crohn's disease Neg Hx     Celiac disease Neg Hx     Pancreatic cancer Neg Hx      OB History          3    Para   1    Term           0    AB   2    Living   1         SAB        IAB   2    Ectopic        Multiple        Live Births   1                 /68 (BP Location: Right arm, Patient Position: Sitting)   Ht 5' 5" (1.651 m)   Wt 85.3 kg (188 lb)   BMI 31.28 kg/m²     ROS:  GENERAL: Denies weight gain or weight loss. Feeling well overall.   SKIN: Denies rash or lesions.   HEAD: Denies head injury or headache.   NODES: Denies enlarged lymph nodes.   CHEST: Denies chest pain or shortness of " breath.   CARDIOVASCULAR: Denies palpitations or left sided chest pain.   ABDOMEN: No abdominal pain, constipation, diarrhea, nausea, vomiting or rectal bleeding.   URINARY: No frequency, dysuria, hematuria, or burning on urination.  REPRODUCTIVE: See HPI.   BREASTS: The patient performs breast self-examination and denies pain, lumps, or nipple discharge.   HEMATOLOGIC: No easy bruisability or excessive bleeding.   MUSCULOSKELETAL: Denies joint pain or swelling.   NEUROLOGIC: Denies syncope or weakness.   PSYCHIATRIC: Denies depression, anxiety or mood swings.    PHYSICAL EXAM:    APPEARANCE: Well nourished, well developed, in no acute distress.  AFFECT: WNL, alert and oriented x 3  SKIN: No acne or hirsutism  NECK: Neck symmetric without masses or thyromegaly  NODES: No inguinal, cervical, axillary, or femoral lymph node enlargement  CHEST: Good respiratory effect  ABDOMEN: Soft.  No tenderness or masses.  No hepatosplenomegaly.  No hernias.  BREASTS: Symmetrical, no skin changes or visible lesions.  No palpable masses, nipple discharge bilaterally.  PELVIC: Normal external genitalia without lesions.  Normal hair distribution.  Adequate perineal body, normal urethral meatus.  Vagina moist and well rugated without lesions or discharge.  CERVIX: Absent, No significant cystocele or rectocele.    UTERUS: Absent,  Adnexa without masses or tenderness.    RECTAL: Rectovaginal exam confirms above with normal sphincter tone, no masses.  EXTREMITIES: No edema.      ICD-10-CM ICD-9-CM    1. Encounter for annual routine gynecological examination  Z01.419 V72.31       2. Skin rash in pelvic region  R21 782.1 clotrimazole-betamethasone 1-0.05% (LOTRISONE) cream      3. HSV (herpes simplex virus) anogenital infection  A60.9 054.9 valACYclovir (VALTREX) 500 MG tablet        Assessment and plan:  1. Colon cancer screening  She has an appointment with her GI physician, colonoscopy is due this year.  She gets a colonoscopy every 3  years due to a strong family history of colon cancer, as well as a history of polyps.  2. Mammogram up-to-date, she gets yearly  3. Refill Valtrex 500 mg once daily  4. Lotrisone p.r.n.  Return 1 year or as needed

## 2023-02-01 ENCOUNTER — PATIENT MESSAGE (OUTPATIENT)
Dept: OBSTETRICS AND GYNECOLOGY | Facility: CLINIC | Age: 58
End: 2023-02-01
Payer: MEDICARE

## 2023-02-01 ENCOUNTER — TELEPHONE (OUTPATIENT)
Dept: GASTROENTEROLOGY | Facility: CLINIC | Age: 58
End: 2023-02-01
Payer: MEDICARE

## 2023-02-02 NOTE — TELEPHONE ENCOUNTER
Spoke with the pt she stated she didn't need the Valtrex she was looking for the cream prescription. Advised the pt the cream was sent to [facility] pt said she would call pharmacy

## 2023-02-20 ENCOUNTER — PATIENT MESSAGE (OUTPATIENT)
Dept: GASTROENTEROLOGY | Facility: CLINIC | Age: 58
End: 2023-02-20
Payer: MEDICARE

## 2023-02-22 ENCOUNTER — PATIENT MESSAGE (OUTPATIENT)
Dept: GASTROENTEROLOGY | Facility: CLINIC | Age: 58
End: 2023-02-22
Payer: MEDICARE

## 2023-02-23 ENCOUNTER — PATIENT MESSAGE (OUTPATIENT)
Dept: GASTROENTEROLOGY | Facility: CLINIC | Age: 58
End: 2023-02-23
Payer: MEDICARE

## 2023-02-28 ENCOUNTER — TELEPHONE (OUTPATIENT)
Dept: GASTROENTEROLOGY | Facility: CLINIC | Age: 58
End: 2023-02-28
Payer: MEDICARE

## 2023-02-28 NOTE — TELEPHONE ENCOUNTER
----- Message from Juliann Snider MA sent at 2/27/2023  5:09 PM CST -----  Regarding: FW: Self/  [phone]    ----- Message -----  From: Suma Garza  Sent: 2/27/2023  12:17 PM CST  To: [facility] Staff  Subject: Self/  [phone]                              Type: Patient Call Back    Who called:  Patient    What is the request in detail:  Patient is wanting to reschedule her appt, but it won't let me reschedule, can someone from the staff please give patient a call back to reschedule her appt.  Thank you    Would the patient rather a call back or a response via [facility]?  Call back    Best call back number:  [phone]          Thank you

## 2023-03-08 ENCOUNTER — PATIENT MESSAGE (OUTPATIENT)
Dept: OBSTETRICS AND GYNECOLOGY | Facility: CLINIC | Age: 58
End: 2023-03-08
Payer: MEDICARE

## 2023-04-04 ENCOUNTER — TELEPHONE (OUTPATIENT)
Dept: OBSTETRICS AND GYNECOLOGY | Facility: CLINIC | Age: 58
End: 2023-04-04
Payer: MEDICARE

## 2023-04-04 ENCOUNTER — OFFICE VISIT (OUTPATIENT)
Dept: GASTROENTEROLOGY | Facility: CLINIC | Age: 58
End: 2023-04-04
Payer: MEDICARE

## 2023-04-04 ENCOUNTER — PATIENT MESSAGE (OUTPATIENT)
Dept: OBSTETRICS AND GYNECOLOGY | Facility: CLINIC | Age: 58
End: 2023-04-04
Payer: MEDICARE

## 2023-04-04 VITALS
BODY MASS INDEX: 30.64 KG/M2 | WEIGHT: 183.88 LBS | HEIGHT: 65 IN | HEART RATE: 84 BPM | SYSTOLIC BLOOD PRESSURE: 158 MMHG | DIASTOLIC BLOOD PRESSURE: 73 MMHG

## 2023-04-04 DIAGNOSIS — R13.10 DYSPHAGIA, UNSPECIFIED TYPE: ICD-10-CM

## 2023-04-04 DIAGNOSIS — R11.0 NAUSEA: ICD-10-CM

## 2023-04-04 DIAGNOSIS — K21.9 GASTROESOPHAGEAL REFLUX DISEASE, UNSPECIFIED WHETHER ESOPHAGITIS PRESENT: ICD-10-CM

## 2023-04-04 DIAGNOSIS — R10.13 EPIGASTRIC PAIN: ICD-10-CM

## 2023-04-04 DIAGNOSIS — R19.8 ALTERNATING CONSTIPATION AND DIARRHEA: ICD-10-CM

## 2023-04-04 DIAGNOSIS — D64.9 ANEMIA, UNSPECIFIED TYPE: ICD-10-CM

## 2023-04-04 DIAGNOSIS — K92.1 HEMATOCHEZIA: Primary | ICD-10-CM

## 2023-04-04 DIAGNOSIS — Z86.39 HISTORY OF DIABETIC GASTROPARESIS: ICD-10-CM

## 2023-04-04 DIAGNOSIS — R14.0 ABDOMINAL BLOATING: ICD-10-CM

## 2023-04-04 DIAGNOSIS — K62.89 RECTAL PAIN: ICD-10-CM

## 2023-04-04 DIAGNOSIS — Z87.19 HISTORY OF IBS: ICD-10-CM

## 2023-04-04 DIAGNOSIS — R53.83 FATIGUE, UNSPECIFIED TYPE: ICD-10-CM

## 2023-04-04 DIAGNOSIS — K22.4 JACKHAMMER ESOPHAGUS: ICD-10-CM

## 2023-04-04 DIAGNOSIS — Z87.19 HISTORY OF HEMORRHOIDS: ICD-10-CM

## 2023-04-04 DIAGNOSIS — Z86.010 HISTORY OF COLON POLYPS: ICD-10-CM

## 2023-04-04 PROCEDURE — 99999 PR PBB SHADOW E&M-EST. PATIENT-LVL V: ICD-10-PCS | Mod: PBBFAC,,,

## 2023-04-04 PROCEDURE — 99213 OFFICE O/P EST LOW 20 MIN: CPT | Mod: S$PBB,,,

## 2023-04-04 PROCEDURE — 99213 PR OFFICE/OUTPT VISIT, EST, LEVL III, 20-29 MIN: ICD-10-PCS | Mod: S$PBB,,,

## 2023-04-04 PROCEDURE — 99215 OFFICE O/P EST HI 40 MIN: CPT | Mod: PBBFAC,PN

## 2023-04-04 PROCEDURE — 99999 PR PBB SHADOW E&M-EST. PATIENT-LVL V: CPT | Mod: PBBFAC,,,

## 2023-04-04 NOTE — PROGRESS NOTES
Subjective:       Patient ID: Luz Arias is a 57 y.o. female Body mass index is 30.6 kg/m².    Chief Complaint: Rectal Bleeding, Rectal Pain, Constipation, and Diarrhea    This patient is new to me.  Established patient of Dr. López (retired) & Dr. Odonnell.     GI Problem  The primary symptoms include fatigue (Chronic), abdominal pain, nausea (Occurs 2 to 3 times a week;  takes Zofran p.r.n. with significant relief; history of gastroparesis), diarrhea (Intermittent episodes of diarrhea; typically occurs 3 times a week) and hematochezia. Primary symptoms do not include fever, weight loss, vomiting, melena, hematemesis, jaundice or dysuria.   The abdominal pain began more than 2 days ago (chronic issue). The abdominal pain has been gradually worsening since its onset. The abdominal pain is located in the epigastric region. The abdominal pain does not radiate. The severity of the abdominal pain is 3/10 (Described as a constant pressure that is sometimes sharp; occurs during BMs; past treatments: Mabex). Relieved by: improves after taking Gaviscon p.r.n.   The hematochezia began more than 1 week ago (Started after falling and landing on backside about 4 months ago). Frequency: Occurs 2 to 3 times a week; reports small amounts/specks of BRBPR on tissue paper with BMs; denies bleeding between BMs; reports constant rectal pain that worsens when washing, during, and after BM; currently using Anusol suppositories with no improvement.   The illness is also significant for [facility]ph[facility]ia (improved[facility] closely by Dr. Odonnell), bloating (improves after; wiping multiple times to feel clean this year) and constipation (Typically has 1-2 BMs daily, but sometimes does not have a BM for 3 days rated stool 1 and 5-6 on [place] scale; straining; takes MiraLax daily; past treatments: linzess (caused diarrhea/incontinence) & dulcolax). The illness does not include chills or odynophagia. Significant  associated medical issues include GERD (currently taking Nexium 40 mg b.i.d. & maalox PRN with improvement), irritable bowel syndrome and hemorrhoids. Associated medical issues do not include inflammatory bowel disease, gallstones, liver disease, alcohol abuse, PUD, gastric bypass, bowel resection or diverticulitis. Associated medical issues comments: S/p cholecystectomy.     Review of Systems   Constitutional:  Positive for activity change and fatigue (Chronic). Negative for appetite change, chills, diaphoresis, fever, unexpected weight change and weight loss.   HENT:  Positive for trouble swallowing. Negative for sore throat.    Respiratory:  Negative for cough, choking and shortness of breath.    Cardiovascular:  Negative for chest pain.   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 with significant relief; history of gastroparesis) and rectal pain (worsened; constant). Negative for abdominal distention, blood in stool, hematemesis, jaundice, melena and vomiting.   Genitourinary:  Negative for dysuria.       No LMP recorded. Patient has had a hysterectomy.  Past Medical History:   Diagnosis Date    Aortic regurgitation     Chronic fatigue     Chronic pain     Depression     Diabetes     Dysphagia     Gastritis     Gastroparesis     GERD (gastroesophageal reflux disease)     Hypertension     Intestinal metaplasia of gastric cardia     Irritable bowel syndrome      Sarcoidosis     Stroke      Past Surgical History:   Procedure Laterality Date    APPENDECTOMY      CHOLECYSTECTOMY      COLONOSCOPY  05/17/2019    COLONOSCOPY N/A 5/5/2020    Procedure: COLONOSCOPY;  Surgeon: Garrick López MD;  Location: [facility];  Service: Endoscopy;  Laterality: N/A;  with bx and stool specimen    ELBOW SURGERY      ESOPHAGEAL MANOMETRY WITH MEASUREMENT OF IMPEDANCE N/A 6/29/2022    Procedure: MANOMETRY-ESOPHAGEAL-WITH IMPEDANCE;  Surgeon: Concetta Odonnell MD;  Location: [facility] (4TH FLR);  Service: Endoscopy;  Laterality: N/A;  r/o rumination and supragastric belching  hold nortriptyline and norco 24 hours prior to procedure  fully vaccinated, instructions emailed to [email or web address]    ESOPHAGOGASTRODUODENOSCOPY N/A 5/5/2020    Procedure: EGD (ESOPHAGOGASTRODUODENOSCOPY);  Surgeon: Garrick López MD;  Location: [facility];  Service: Endoscopy;  Laterality: N/A;  with biopsy    ESOPHAGOGASTRODUODENOSCOPY N/A 6/2/2021    Procedure: EGD (ESOPHAGOGASTRODUODENOSCOPY);  Surgeon: Garrick López MD;  Location: [facility];  Service: Endoscopy;  Laterality: N/A;    ESOPHAGOGASTRODUODENOSCOPY N/A 6/28/2022    Procedure: ESOPHAGOGASTRODUODENOSCOPY (EGD);  Surgeon: Concetta Odonnell MD;  Location: 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  full liquid diet x3 days, clear liquid diet x1 day prior to procedure  fully vaccinated, instructions emailed to [email or web address]    HAND SURGERY      HYSTERECTOMY      2005, age 39, KRISTAN fibroids    MEDIASTINOSCOPY      NECK SURGERY      UPPER GASTROINTESTINAL ENDOSCOPY  05/17/2019    WRIST SURGERY       Family History   Problem Relation Age of Onset    Diabetes Mother     Colon cancer Mother 65    Bladder Cancer Mother     Heart disease Mother     Heart disease Father     Kidney failure Father     Diabetes Father     Stroke Father     Heart attack Father     Colon cancer Sister 65     Diabetes Sister     Hypertension Sister     Seizures Brother     Colon polyps Sister     Diabetes Sister     Hypertension Sister     Irritable bowel syndrome Sister     Diabetes Sister     Hypertension Sister     Diabetes Sister     Hypertension Sister     Diabetes Brother     Breast cancer Neg Hx     Esophageal cancer Neg Hx     Stomach cancer Neg Hx     Liver cancer Neg Hx     Liver disease Neg Hx     Crohn's disease Neg Hx     Celiac disease Neg Hx     Pancreatic cancer Neg Hx      Social History     Tobacco Use    Smoking status: Never    Smokeless tobacco: Never   Substance Use Topics    Alcohol use: Not Currently     Comment: no drinking anymore    Drug use: Never     Wt Readings from Last 10 Encounters:   04/04/23 83.4 kg (183 lb 13.8 oz)   01/31/23 85.3 kg (188 lb)   07/09/22 79.4 kg (175 lb)   06/29/22 79.4 kg (175 lb)   06/28/22 79.4 kg (175 lb)   06/23/22 79.4 kg (175 lb)   06/23/22 83.9 kg (184 lb 15.5 oz)   06/03/22 83.9 kg (185 lb)   05/06/22 82 kg (180 lb 11.2 oz)   03/09/22 83.5 kg (184 lb)     Lab Results   Component Value Date    WBC 8.97 06/23/2022    HGB 10.0 (L) 06/23/2022    HCT 31.2 (L) 06/23/2022    MCV 84 06/23/2022     06/23/2022     CMP  Sodium   Date Value Ref Range Status   06/23/2022 138 136 - 145 mmol/L Final     Potassium   Date Value Ref Range Status   06/23/2022 3.7 3.5 - 5.1 mmol/L Final     Chloride   Date Value Ref Range Status   06/23/2022 101 95 - 110 mmol/L Final     CO2   Date Value Ref Range Status   06/23/2022 25 23 - 29 mmol/L Final     Glucose   Date Value Ref Range Status   06/23/2022 187 (H) 70 - 110 mg/dL Final     BUN   Date Value Ref Range Status   06/23/2022 16 6 - 20 mg/dL Final     Creatinine   Date Value Ref Range Status   06/23/2022 0.8 0.5 - 1.4 mg/dL Final     Calcium   Date Value Ref Range Status   06/23/2022 9.7 8.7 - 10.5 mg/dL Final     Total Protein   Date Value Ref Range Status   06/23/2022 7.9 6.0 - 8.4 g/dL Final     Albumin   Date Value Ref  Range Status   06/23/2022 4.3 3.5 - 5.2 g/dL Final     Total Bilirubin   Date Value Ref Range Status   06/23/2022 0.3 0.1 - 1.0 mg/dL Final     Comment:     For infants and newborns, interpretation of results should be based  on gestational age, weight and in agreement with clinical  observations.    Premature Infant recommended reference ranges:  Up to 24 hours.............<8.0 mg/dL  Up to 48 hours............<12.0 mg/dL  3-5 days..................<15.0 mg/dL  6-29 days.................<15.0 mg/dL       Alkaline Phosphatase   Date Value Ref Range Status   06/23/2022 71 55 - 135 U/L Final     AST   Date Value Ref Range Status   06/23/2022 25 10 - 40 U/L Final     ALT   Date Value Ref Range Status   06/23/2022 28 10 - 44 U/L Final     Anion Gap   Date Value Ref Range Status   06/23/2022 12 8 - 16 mmol/L Final     eGFR if    Date Value Ref Range Status   06/23/2022 >60.0 >60 mL/min/1.73 m^2 Final     eGFR if non    Date Value Ref Range Status   06/23/2022 >60.0 >60 mL/min/1.73 m^2 Final     Comment:     Calculation used to obtain the estimated glomerular filtration  rate (eGFR) is the CKD-EPI equation.        Lab Results   Component Value Date    LIPASE 17 06/23/2022     Reviewed prior medical records including radiology report of CT abdomen and pelvis 06/23/2022, KUB 06/23/2022, esophagram 06/29/2022, gastric emptying study 04/06/2022, abdominal ultrasound 10/29/2021 & endoscopy history (see surgical history).    Objective:      Physical Exam  Vitals and nursing note reviewed.   Constitutional:       General: She is not in acute distress.     Appearance: Normal appearance. She is not ill-appearing.   HENT:      Mouth/Throat:      Lips: Pink. No lesions.   Cardiovascular:      Rate and Rhythm: Normal rate.      Pulses: Normal pulses.   Pulmonary:      Effort: Pulmonary effort is normal. No respiratory distress.   Abdominal:      General: Abdomen is protuberant. Bowel sounds are normal.  There is no distension or abdominal bruit. There are no signs of injury.      Palpations: Abdomen is soft. There is no shifting dullness, fluid wave, hepatomegaly, splenomegaly or mass.      Tenderness: There is abdominal tenderness in the epigastric area. There is no guarding or rebound. Negative signs include Emery's sign, Rovsing's sign and McBurney's sign.   Skin:     General: Skin is warm and dry.      Coloration: Skin is not jaundiced or pale.   Neurological:      Mental Status: She is alert and oriented to person, place, and time.   Psychiatric:         Attention and Perception: Attention normal.         Mood and Affect: Mood normal.         Speech: Speech normal.         Behavior: Behavior normal.       Assessment:       1. Hematochezia    2. Rectal pain    3. History of hemorrhoids    4. Abdominal bloating    5. Alternating constipation and diarrhea    6. History of IBS    7. History of colon polyps    8. Jackhammer esophagus    9. Dysphagia, unspecified type    10. Gastroesophageal reflux disease, unspecified whether esophagitis present    11. Epigastric pain    12. History of gastroparesis    13. Nausea    14. Fatigue, unspecified type    15. Anemia, unspecified type        Plan:       Hematochezia  - schedule Colonoscopy, discussed procedure with the patient, including risks and benefits, patient verbalized understanding  - discussed about different etiologies that can cause rectal bleeding, such as but not limited to diverticulosis, polyps, colon mass, colon inflammation or infection, anal fissure or hemorrhoids.   - You may resume normal activity as long as you feel well.  - Avoid/minimize NSAIDs such as ibuprofen (Advil, Motrin) and naproxen (Aleve and Naprosyn).  - Avoid alcohol.  -     CBC Without Differential; Future; Expected date: 04/04/2023  -     IRON AND TIBC; Future; Expected date: 04/04/2023  -     Ferritin; Future; Expected date: 04/04/2023  -     Case Request Endoscopy:  COLONOSCOPY    Rectal pain  - schedule Colonoscopy, discussed procedure with the patient, including risks and benefits, patient verbalized understanding  - avoid constipation and straining with bowel movements  - possible referral to colorectal surgery if symptoms persist  -     Case Request Endoscopy: COLONOSCOPY    History of hemorrhoids  - schedule Colonoscopy, discussed procedure with the patient, including risks and benefits, patient verbalized understanding  - avoid constipation and straining with bowel movements; try using an OTC stool softener as directed and increase fiber in diet (20-30 grams daily)/OTC fiber supplement such as metamucil (take as directed)  - recommend SITZ baths  - possible referral to colorectal surgery if symptoms persist    Abdominal bloating  - schedule Colonoscopy, discussed procedure with the patient, including risks and benefits, patient verbalized understanding  - recommend OTC simethicone as directed, such as Phazyme or Gas-x  - recommend low gas diet: Reduce or eliminate these foods from your diet: Broccoli, Cauliflower, Pease sprouts, Cabbage, Cooked dried beans, Carbonated beverages (sparkling water, soda, beer, champagne)  Other Causes Of Excess Gas Include:   1) EATING TOO FAST or TALKING WHILE YOU CHEW may cause you to swallow air. This increases the amount of gas in the stomach and may worsen your symptoms.  --> Chew each mouthful completely before swallowing. Take your time.  2) OVEREATING may increase the feeling of being bloated and cause more gas.  --> When you are full, stop eating.  3) CONSTIPATION can increase the amount of normal intestinal gas.  --> Avoid constipation by increasing the amount of fiber in your diet by including whole cereal grains, fresh vegetables (except those in the above list) and fresh fruits. High-fiber foods absorb water and carry it out of the body. When increasing the amount of fiber in your diet, you also need to increase the amount of  water that you drink. You should drink at least eight 8-ounce glasses of water (two quarts) per day.  -     X-Ray Abdomen AP 1 View; Future; Expected date: 04/04/2023  -     Case Request Endoscopy: COLONOSCOPY    Alternating constipation and diarrhea  - schedule Colonoscopy, discussed procedure with the patient, including risks and benefits, patient verbalized understanding  -Recommend daily exercise as tolerated, adequate water intake (six 8-oz glasses of water daily), and high fiber diet. OTC fiber supplements are recommended if diet does not reach daily fiber goal (20-30 grams daily), such as Metamucil, Citrucel, or FiberCon (take as directed, separate from other oral medications by >2 hours).  -Recommend trying OTC MiraLax once daily (17g PO) as directed  -If no improvement with above recommendations, try intermittently dosed Dulcolax OTC as directed (every 3-4 days) PRN to facilitate bowel movements  -If no relief with this, consider adding a emollient laxative (castor oil or mineral oil) +/- enema  -If still no improvement with these measures, call/follow-up    History of IBS  -recommended trial of Ibgard OTC as directed on packaging  - possible trial of OTC probiotic, such as Align or Culturelle, as directed  - avoid lactose  - drink adequate water intake  -smaller, more frequent meals  -avoid trigger foods  -consider dicyclomine     History of colon polyps  - schedule Colonoscopy, discussed procedure with the patient, including risks and benefits, patient verbalized understanding  -     Case Request Endoscopy: COLONOSCOPY    Jackhammer esophagus & Dysphagia, unspecified type  -continue to follow-up with Dr. Odonnell for continued evaluation and management    Gastroesophageal reflux disease, unspecified whether esophagitis present & Epigastric pain  -avoid large meals, avoid eating within 2-3 hours of bedtime (avoid late night eating & lying down soon after eating), elevate head of bed if nocturnal symptoms are  present, smoking cessation (if current smoker), & weight loss (if overweight).   -avoid known foods which trigger reflux symptoms & to minimize/avoid high-fat foods, chocolate, caffeine, citrus, alcohol, & tomato products.  -avoid/limit use of NSAID's, since they can cause GI upset, bleeding, and/or ulcers. If needed, take with food.   -     X-Ray Abdomen AP 1 View; Future; Expected date: 04/04/2023    History of gastroparesis & Nausea  -Recommend small frequent meals instead of 3 big meals a day, low fat meals & low residue diet.  Avoid: high fiber (insoluble fiber), red meats, dairy, and non-digestible solids (peels, fruit pulp, etc). Avoid NSAIDs and narcotics.  -follow-up with Dr. Odonnell for continued evaluation and management  -continue Zofran p.r.n. as prescribed    Fatigue, unspecified type & Anemia, unspecified type   -follow-up with PCP for continued evaluation and management  -     CBC Without Differential; Future; Expected date: 04/04/2023  -     IRON AND TIBC; Future; Expected date: 04/04/2023  -     Ferritin; Future; Expected date: 04/04/2023    Follow up in about 4 weeks (around 5/2/2023), or if symptoms worsen or fail to improve.      If no improvement in symptoms or symptoms worsen, call/follow-up at clinic or go to ER.        30 minutes of total time spent on the encounter, which includes face to face time and non-face to face time preparing to see the patient (eg, review of tests), Obtaining and/or reviewing separately obtained history, Documenting clinical information in the electronic or other health record, Independently interpreting results (not separately reported) and communicating results to the patient/family/caregiver, or Care coordination (not separately reported).     A ReferralMDation software program was used for this note. Please expect some simple typographical  errors in this note.

## 2023-04-04 NOTE — TELEPHONE ENCOUNTER
----- Message from Alicia Angeles sent at 4/4/2023  7:22 AM CDT -----  Regarding: FW: meds    ----- Message -----  From: Julianne Quigley  Sent: 4/3/2023  11:17 AM CDT  To: Nicci Fabian Staff  Subject: meds                                             Type: Needs Medical Advice  Who Called:  pt   Best Call Back Number: [phone]    Additional Information: pt would like a paper script of acyclovir and the cream  Pt has been in and out of hosp since last time she asked for it and has not been able to

## 2023-04-06 DIAGNOSIS — A60.9 HSV (HERPES SIMPLEX VIRUS) ANOGENITAL INFECTION: ICD-10-CM

## 2023-04-06 RX ORDER — VALACYCLOVIR HYDROCHLORIDE 500 MG/1
500 TABLET, FILM COATED ORAL DAILY
Qty: 90 TABLET | Refills: 3 | Status: SHIPPED | OUTPATIENT
Start: 2023-04-06 | End: 2024-01-25 | Stop reason: SDUPTHER

## 2023-04-10 ENCOUNTER — ANESTHESIA (OUTPATIENT)
Dept: ENDOSCOPY | Facility: HOSPITAL | Age: 58
End: 2023-04-10
Payer: MEDICARE

## 2023-04-10 ENCOUNTER — ANESTHESIA EVENT (OUTPATIENT)
Dept: ENDOSCOPY | Facility: HOSPITAL | Age: 58
End: 2023-04-10
Payer: MEDICARE

## 2023-04-10 ENCOUNTER — HOSPITAL ENCOUNTER (OUTPATIENT)
Facility: HOSPITAL | Age: 58
Discharge: HOME | End: 2023-04-10
Attending: INTERNAL MEDICINE | Admitting: INTERNAL MEDICINE
Payer: MEDICARE

## 2023-04-10 DIAGNOSIS — K92.1 HEMATOCHEZIA: Primary | ICD-10-CM

## 2023-04-10 PROCEDURE — 45378 DIAGNOSTIC COLONOSCOPY: CPT | Performed by: INTERNAL MEDICINE

## 2023-04-10 PROCEDURE — 63600175 PHARM REV CODE 636 W HCPCS: Performed by: NURSE ANESTHETIST, CERTIFIED REGISTERED

## 2023-04-10 PROCEDURE — D9220A PRA ANESTHESIA: Mod: ANES,,, | Performed by: ANESTHESIOLOGY

## 2023-04-10 PROCEDURE — D9220A PRA ANESTHESIA: ICD-10-PCS | Mod: CRNA,,, | Performed by: NURSE ANESTHETIST, CERTIFIED REGISTERED

## 2023-04-10 PROCEDURE — D9220A PRA ANESTHESIA: ICD-10-PCS | Mod: ANES,,, | Performed by: ANESTHESIOLOGY

## 2023-04-10 PROCEDURE — 37000008 HC ANESTHESIA 1ST 15 MINUTES: Performed by: INTERNAL MEDICINE

## 2023-04-10 PROCEDURE — 45378 DIAGNOSTIC COLONOSCOPY: CPT | Mod: ,,, | Performed by: INTERNAL MEDICINE

## 2023-04-10 PROCEDURE — 25000003 PHARM REV CODE 250: Performed by: INTERNAL MEDICINE

## 2023-04-10 PROCEDURE — 45378 PR COLONOSCOPY,DIAGNOSTIC: ICD-10-PCS | Mod: ,,, | Performed by: INTERNAL MEDICINE

## 2023-04-10 PROCEDURE — 37000009 HC ANESTHESIA EA ADD 15 MINS: Performed by: INTERNAL MEDICINE

## 2023-04-10 PROCEDURE — D9220A PRA ANESTHESIA: Mod: CRNA,,, | Performed by: NURSE ANESTHETIST, CERTIFIED REGISTERED

## 2023-04-10 PROCEDURE — 25000003 PHARM REV CODE 250: Performed by: NURSE ANESTHETIST, CERTIFIED REGISTERED

## 2023-04-10 RX ORDER — LIDOCAINE HYDROCHLORIDE 20 MG/ML
INJECTION INTRAVENOUS
Status: DISCONTINUED | OUTPATIENT
Start: 2023-04-10 | End: 2023-04-10

## 2023-04-10 RX ORDER — SODIUM CHLORIDE 9 MG/ML
INJECTION, SOLUTION INTRAVENOUS CONTINUOUS
Status: DISCONTINUED | OUTPATIENT
Start: 2023-04-10 | End: 2023-04-10 | Stop reason: HOSPADM

## 2023-04-10 RX ORDER — ONDANSETRON 2 MG/ML
INJECTION INTRAMUSCULAR; INTRAVENOUS
Status: DISCONTINUED | OUTPATIENT
Start: 2023-04-10 | End: 2023-04-10

## 2023-04-10 RX ORDER — PROPOFOL 10 MG/ML
INJECTION, EMULSION INTRAVENOUS
Status: DISCONTINUED | OUTPATIENT
Start: 2023-04-10 | End: 2023-04-10

## 2023-04-10 RX ADMIN — PROPOFOL 30 MG: 10 INJECTION, EMULSION INTRAVENOUS at 11:04

## 2023-04-10 RX ADMIN — ONDANSETRON 4 MG: 2 INJECTION INTRAMUSCULAR; INTRAVENOUS at 11:04

## 2023-04-10 RX ADMIN — PROPOFOL 100 MG: 10 INJECTION, EMULSION INTRAVENOUS at 11:04

## 2023-04-10 RX ADMIN — LIDOCAINE HYDROCHLORIDE 80 MG: 20 INJECTION, SOLUTION INTRAVENOUS at 11:04

## 2023-04-10 RX ADMIN — SODIUM CHLORIDE: 0.9 INJECTION, SOLUTION INTRAVENOUS at 10:04

## 2023-04-10 NOTE — TRANSFER OF CARE
"Anesthesia Transfer of Care Note    Patient: Luz Arias    Procedure(s) Performed: Procedure(s) (LRB):  COLONOSCOPY (N/A)    Patient location: GI    Anesthesia Type: general    Transport from OR: Transported from OR on room air with adequate spontaneous ventilation    Post pain: adequate analgesia    Post assessment: no apparent anesthetic complications and tolerated procedure well    Post vital signs: stable    Level of consciousness: sedated and responds to stimulation    Nausea/Vomiting: no nausea/vomiting    Complications: none    Transfer of care protocol was followed      Last vitals:   Visit Vitals  BP (!) 141/71 (BP Location: Left arm, Patient Position: Lying)   Pulse 83   Temp 36.6 °C (97.9 °F) (Skin)   Resp 15   Ht 5' 5" (1.651 m)   Wt 83 kg (183 lb)   SpO2 100%   BMI 30.45 kg/m²     "

## 2023-04-10 NOTE — ANESTHESIA PREPROCEDURE EVALUATION
04/10/2023  Luz Arias is a 57 y.o., female.      Pre-op Assessment    I have reviewed the Patient Summary Reports.     I have reviewed the Nursing Notes. I have reviewed the NPO Status.   I have reviewed the Medications.     Review of Systems  Anesthesia Hx:  No problems with previous Anesthesia    Social:  Non-Smoker    Cardiovascular:   Denies Hypertension.  Denies MI.  Denies CAD.    Denies CABG/stent.   Denies Angina.    Pulmonary:   Denies COPD.  Denies Asthma.  Denies Recent URI.    Renal/:   Denies Chronic Renal Disease.     Hepatic/GI:   Denies GERD. Denies Liver Disease.    Neurological:   Denies TIA. Denies CVA. Denies Seizures.    Endocrine:   Denies Diabetes. Denies Hypothyroidism.    Psych:   Denies Psychiatric History.          Physical Exam  General: Well nourished, Cooperative, Alert and Oriented    Airway:  Mallampati: II / II  Mouth Opening: Normal  TM Distance: 4 - 6 cm  Tongue: Normal    Dental:  Intact    Chest/Lungs:  Clear to auscultation, Normal Respiratory Rate    Heart:  Rate: Normal  Rhythm: Regular Rhythm  Sounds: Normal        Anesthesia Plan  Type of Anesthesia, risks & benefits discussed:    Anesthesia Type: Gen Natural Airway  Intra-op Monitoring Plan: Standard ASA Monitors  Induction:  IV  Informed Consent: Informed consent signed with the Patient and all parties understand the risks and agree with anesthesia plan.  All questions answered.   ASA Score: 3    Ready For Surgery From Anesthesia Perspective.     .

## 2023-04-10 NOTE — PLAN OF CARE
Vss, inder po fluids, denies pain, ambulates easily. IV removed, catheter intact. Discharge instructions provided and states understanding. States ready to go home.  Discharged from facility with family per wheelchair.

## 2023-04-10 NOTE — PROVATION PATIENT INSTRUCTIONS
Discharge Summary/Instructions after an Endoscopic Procedure  Patient Name: Luz Arias  Patient MRN: [id number]  Patient YOB: 1965  Monday, April 10, 2023  Forrest Gomes MD  Dear patient,  As a result of recent federal legislation (The Federal Cures Act), you may   receive lab or pathology results from your procedure in your [facility]   account before your physician is able to contact you. Your physician or   their representative will relay the results to you with their   recommendations at their soonest availability.  Thank you,  RESTRICTIONS:  During your procedure today, you received medications for sedation.  These   medications may affect your judgment, balance and coordination.  Therefore,   for 24 hours, you have the following restrictions:   - DO NOT drive a car, operate machinery, make legal/financial decisions,   sign important papers or drink alcohol.    ACTIVITY:  Today: no heavy lifting, straining or running due to procedural   sedation/anesthesia.  The following day: return to full activity including work.  DIET:  Eat and drink normally unless instructed otherwise.     TREATMENT FOR COMMON SIDE EFFECTS:  - Mild abdominal pain, nausea, belching, bloating or excessive gas:  rest,   eat lightly and use a heating pad.  - Sore Throat: treat with throat lozenges and/or gargle with warm salt   water.  - Because air was used during the procedure, expelling large amounts of air   from your rectum or belching is normal.  - If a bowel prep was taken, you may not have a bowel movement for 1-3 days.    This is normal.  SYMPTOMS TO WATCH FOR AND REPORT TO YOUR PHYSICIAN:  1. Abdominal pain or bloating, other than gas cramps.  2. Chest pain.  3. Back pain.  4. Signs of infection such as: chills or fever occurring within 24 hours   after the procedure.  5. Rectal bleeding, which would show as bright red, maroon, or black stools.   (A tablespoon of blood from the rectum is not serious, especially if    hemorrhoids are present.)  6. Vomiting.  7. Weakness or dizziness.  GO DIRECTLY TO THE NEAREST EMERGENCY ROOM IF YOU HAVE ANY OF THE FOLLOWING:      Difficulty breathing              Chills and/or fever over 101 F   Persistent vomiting and/or vomiting blood   Severe abdominal pain   Severe chest pain   Black, tarry stools   Bleeding- more than one tablespoon   Any other symptom or condition that you feel may need urgent attention  Your doctor recommends these additional instructions:  If any biopsies were taken, your doctors clinic will contact you in 1 to 2   weeks with any results.  - Repeat colonoscopy in 5 years for screening purposes due to fmaily history   of colon cancer in two first degree relatives (mother and sister).   - Discharge patient to home.   - Advance diet as tolerated.   - Continue present medications.   - Written discharge instructions were provided to the patient.  For questions, problems or results please call your physician - Forrest Gomes MD at Work:  [phone].  [facility], EMERGENCY ROOM PHONE NUMBER: [phone]  IF A COMPLICATION OR EMERGENCY SITUATION ARISES AND YOU ARE UNABLE TO REACH   YOUR PHYSICIAN - GO DIRECTLY TO THE EMERGENCY ROOM.  Forrest Gomes MD  4/10/2023 11:43:23 AM  This report has been verified and signed electronically.  Dear patient,  As a result of recent federal legislation (The Federal Cures Act), you may   receive lab or pathology results from your procedure in your [facility]   account before your physician is able to contact you. Your physician or   their representative will relay the results to you with their   recommendations at their soonest availability.  Thank you,  PROVATION

## 2023-04-10 NOTE — H&P
"History & Physical - Short Stay  Gastroenterology      SUBJECTIVE:     Procedure: Colonoscopy    Chief Complaint/Indication for Procedure: Hematochezia    PTA Medications   Medication Sig    ALBUTEROL INHL Inhale into the lungs. Every 6 hrs prn    amLODIPine (NORVASC) 10 MG tablet Take 10 mg by mouth once daily.    aspirin (ECOTRIN) 81 MG EC tablet Take 81 mg by mouth once daily.    benzonatate (TESSALON) 100 MG capsule Take 100 mg by mouth 3 (three) times daily as needed.    biotin 10,000 mcg Cap Take by mouth.    candesartan (ATACAND) 32 MG tablet Take 32 mg by mouth once daily.    carBAMazepine (TEGRETOL XR) 400 MG Tb12 Take 400 mg by mouth 2 (two) times daily.    diphenhydrAMINE-aluminum-magnesium hydroxide-simethicone-LIDOcaine HCl 2% Swish and swallow 5 mLs every 6 (six) hours as needed (throat irritation).    ergocalciferol (ERGOCALCIFEROL) 50,000 unit Cap ergocalciferol (vitamin D2) 50,000 unit capsule    esomeprazole (NEXIUM) 40 MG capsule Take 1 capsule (40 mg total) by mouth 2 (two) times daily.    FARXIGA 5 mg Tab tablet Take 5 mg by mouth.    fluticasone-salmeterol 230-21 mcg/dose (ADVAIR HFA) 230-21 mcg/actuation HFAA inhaler Advair  mcg-21 mcg/actuation aerosol inhaler    isosorbide mononitrate (IMDUR) 30 MG 24 hr tablet 30 mg.    mag hydrox/aluminum hyd/simeth (MAALOX ORAL)     melatonin 3 mg TbDL Take by mouth. prn    metFORMIN (GLUCOPHAGE-XR) 500 MG XR 24hr tablet Take 500 mg by mouth 2 (two) times daily with meals.    methylphenidate HCl (RITALIN) 10 MG tablet Take 10 mg by mouth once daily.    metoprolol tartrate 75 mg Tab <span ID="[id number]" style="Bold">zzMetoprolol Tartrate 50 mg oral tablet</span><br/>Start Date: 3/26/21<br/>Status: Ordered    nortriptyline (PAMELOR) 75 MG Cap TAKE 1 CAPSULE BY MOUTH EVERY NIGHT 1 HOUR BEFORE BEDTIME    ondansetron (ZOFRAN-ODT) 8 MG TbDL Take 1 tablet (8 mg total) by mouth every 6 (six) hours as needed (nausea).    prazosin (MINIPRESS) 2 MG " Cap Take 2 mg by mouth once daily.    rosuvastatin (CRESTOR) 40 MG Tab Take 40 mg by mouth once daily.    triamterene-hydrochlorothiazide 75-50 mg (MAXZIDE) 75-50 mg per tablet   0.5 tab, Oral, Daily, # 45 tab, 3 Refill(s)    TYLENOL EXTRA STRENGTH 500 mg tablet Take 500 mg by mouth.    valACYclovir (VALTREX) 500 MG tablet Take 1 tablet (500 mg total) by mouth once daily.    vitamin E 600 UNIT capsule Take 600 Units by mouth once daily.    clotrimazole-betamethasone 1-0.05% (LOTRISONE) cream Apply to affected area 2 times daily    DEEP SEA NASAL 0.65 % nasal spray     diclofenac sodium (VOLTAREN) 1 % Gel Voltaren 1 % topical gel    EASY TOUCH ALCOHOL PREP PADS PadM Apply 1 each topically 3 (three) times daily.    ergocalciferol, vitamin D2, (VITAMIN D ORAL) Take by mouth. Taking 2000 mg daily    fluticasone propionate (FLONASE) 50 mcg/actuation nasal spray     FREESTYLE LANCETS 28 gauge lancets Apply topically 3 (three) times daily.    FREESTYLE LITE STRIPS Strp 1 strip 3 (three) times daily.    ipratropium (ATROVENT) 42 mcg (0.06 %) nasal spray 2 sprays 2 (two) times daily.    lidocaine (LIDODERM) 5 %     rivastigmine (EXELON) 9.5 mg/24 hour PT24 Place onto the skin.    tiZANidine (ZANAFLEX) 4 MG tablet Take 4 mg by mouth every 8 (eight) hours.       Review of patient's allergies indicates:   Allergen Reactions    Darunavir Anaphylaxis    Sulfa (sulfonamide antibiotics) Anaphylaxis    Sulfamethoxazole-trimethoprim Anaphylaxis and Diarrhea     Other reaction(s): Anaphylaxis, Finding of vomiting, Diarrhea    Temazepam      Sleep walking   Other reaction(s): Sleep related hallucinations    Pantoprazole Nausea And Vomiting    Pregabalin Other (See Comments)    Verapamil      Other reaction(s): Constipation    Zonisamide      Other reaction(s): Anaphylaxis, Diarrhea, Finding of vomiting, Anaphylaxis, Diarrhea, Finding of vomiting, Anaphylaxis, Diarrhea, Finding of vomiting    Metoclopramide Rash and Anxiety     Sucralfate Other (See Comments) and Nausea Only        Past Medical History:   Diagnosis Date    Aortic regurgitation     Asthma     Chronic fatigue     Chronic pain     Depression     Diabetes     Dysphagia     Gastritis     Gastroparesis     GERD (gastroesophageal reflux disease)     Hypertension     Intestinal metaplasia of gastric cardia     Irritable bowel syndrome     Sarcoidosis     Sleep apnea     Stroke      Past Surgical History:   Procedure Laterality Date    APPENDECTOMY      CHOLECYSTECTOMY      COLONOSCOPY  05/17/2019    COLONOSCOPY N/A 5/5/2020    Procedure: COLONOSCOPY;  Surgeon: Garrick López MD;  Location: [facility];  Service: Endoscopy;  Laterality: N/A;  with bx and stool specimen    ELBOW SURGERY      ESOPHAGEAL MANOMETRY WITH MEASUREMENT OF IMPEDANCE N/A 6/29/2022    Procedure: MANOMETRY-ESOPHAGEAL-WITH IMPEDANCE;  Surgeon: Concetta Odonnell MD;  Location: [facility] (4TH FLR);  Service: Endoscopy;  Laterality: N/A;  r/o rumination and supragastric belching  hold nortriptyline and norco 24 hours prior to procedure  fully vaccinated, instructions emailed to [email or web address]    ESOPHAGOGASTRODUODENOSCOPY N/A 5/5/2020    Procedure: EGD (ESOPHAGOGASTRODUODENOSCOPY);  Surgeon: Garrick López MD;  Location: [facility];  Service: Endoscopy;  Laterality: N/A;  with biopsy    ESOPHAGOGASTRODUODENOSCOPY N/A 6/2/2021    Procedure: EGD (ESOPHAGOGASTRODUODENOSCOPY);  Surgeon: Garrick López MD;  Location: [facility];  Service: Endoscopy;  Laterality: N/A;    ESOPHAGOGASTRODUODENOSCOPY N/A 6/28/2022    Procedure: ESOPHAGOGASTRODUODENOSCOPY (EGD);  Surgeon: Concetta Odonnell MD;  Location: [facility] (2ND FLR);  Service: Endoscopy;  Laterality: N/A;  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  full liquid diet x3 days, clear liquid diet x1 day prior to procedure  fully vaccinated, instructions emailed to [email or web address]    HAND SURGERY      HYSTERECTOMY      2005, age 39, KRISTAN fibroids     MEDIASTINOSCOPY      NECK SURGERY      UPPER GASTROINTESTINAL ENDOSCOPY  05/17/2019    WRIST SURGERY       Family History   Problem Relation Age of Onset    Diabetes Mother     Colon cancer Mother 65    Bladder Cancer Mother     Heart disease Mother     Heart disease Father     Kidney failure Father     Diabetes Father     Stroke Father     Heart attack Father     Colon cancer Sister 65    Diabetes Sister     Hypertension Sister     Seizures Brother     Colon polyps Sister     Diabetes Sister     Hypertension Sister     Irritable bowel syndrome Sister     Diabetes Sister     Hypertension Sister     Diabetes Sister     Hypertension Sister     Diabetes Brother     Breast cancer Neg Hx     Esophageal cancer Neg Hx     Stomach cancer Neg Hx     Liver cancer Neg Hx     Liver disease Neg Hx     Crohn's disease Neg Hx     Celiac disease Neg Hx     Pancreatic cancer Neg Hx      Social History     Tobacco Use    Smoking status: Never    Smokeless tobacco: Never   Substance Use Topics    Alcohol use: Not Currently     Comment: no drinking anymore    Drug use: Never         OBJECTIVE:     Vital Signs (Most Recent)  Temp: 97.9 °F (36.6 °C) (04/10/23 1003)  Pulse: 83 (04/10/23 1003)  Resp: 15 (04/10/23 1003)  BP: (!) 141/71 (04/10/23 1003)  SpO2: 100 % (04/10/23 1003)    Physical Exam:                                                       GENERAL:  Comfortable, in no acute distress.                                 HEENT EXAM:  Nonicteric.  No adenopathy.  Oropharynx is clear.               NECK:  Supple.                                                               LUNGS:  Clear.                                                               CARDIAC:  Regular rate and rhythm.  S1, S2.  No murmur.                      ABDOMEN:  Soft, positive bowel sounds, nontender.  No hepatosplenomegaly or masses.  No rebound or guarding.                                             EXTREMITIES:  No edema.     MENTAL STATUS:  Normal, alert and  oriented.      ASSESSMENT/PLAN:     Assessment: Hematochezia    Plan: Colonoscopy

## 2023-04-11 VITALS
SYSTOLIC BLOOD PRESSURE: 138 MMHG | HEIGHT: 65 IN | WEIGHT: 183 LBS | BODY MASS INDEX: 30.49 KG/M2 | TEMPERATURE: 98 F | DIASTOLIC BLOOD PRESSURE: 58 MMHG | OXYGEN SATURATION: 99 % | HEART RATE: 73 BPM | RESPIRATION RATE: 16 BRPM

## 2023-04-11 NOTE — ANESTHESIA POSTPROCEDURE EVALUATION
Anesthesia Post Evaluation    Patient: Luz Arias    Procedure(s) Performed: Procedure(s) (LRB):  COLONOSCOPY (N/A)    Final Anesthesia Type: general      Patient location during evaluation: PACU  Patient participation: Yes- Able to Participate  Level of consciousness: awake and alert and oriented  Post-procedure vital signs: reviewed and stable  Pain management: adequate  Airway patency: patent    PONV status at discharge: No PONV  Anesthetic complications: no      Cardiovascular status: blood pressure returned to baseline  Respiratory status: unassisted, spontaneous ventilation and room air  Hydration status: euvolemic  Follow-up not needed.          Vitals Value Taken Time   /58 04/10/23 1205   Temp 36.6 °C (97.9 °F) 04/10/23 1205   Pulse 73 04/10/23 1205   Resp 16 04/10/23 1205   SpO2 99 % 04/10/23 1205         Event Time   Out of Recovery 12:28:38         Pain/Yamila Score: Yamila Score: 10 (4/10/2023 12:05 PM)

## 2023-04-17 ENCOUNTER — TELEPHONE (OUTPATIENT)
Dept: GASTROENTEROLOGY | Facility: CLINIC | Age: 58
End: 2023-04-17
Payer: MEDICARE

## 2023-04-17 NOTE — TELEPHONE ENCOUNTER
Dr. Odonnell    Pt states she having issues with nausea and vomiting , Pt states she was told to contact Dr. Odonnell office   Pt is requesting a sooner appointment  than 5/4/2023  Please advise

## 2023-04-17 NOTE — TELEPHONE ENCOUNTER
----- Message from Juliann Snider MA sent at 4/17/2023  3:30 PM CDT -----    ----- Message -----  From: Lynne Paez  Sent: 4/17/2023   1:31 PM CDT  To: [facility] Staff    Luz Arias calling regarding Appointment Access for wanting to be seen sooner than the 05/04/23, stating that she has been throwing up since last Thursday, said that the medication is not working and and it got worse yesterday, call back [phone]

## 2023-04-17 NOTE — TELEPHONE ENCOUNTER
Called by Karol RN, infant has had two elevated temps 99-99.9, with elevated  HR and is irritable.  Infant is now 37 hours old, born by elective c section at 38 6/7 weeks for uterine didephys and vaginal septum.  Mom was GBS positive however membranes were intact until c section.  On exam in has  rash on thorax and back, arms.  Lungs sounds clear equal bilaterally with good aeration. HRR regular 160.  Abdomen soft and flat, nondistended ,BS active, cord is dark, black and dry. CBC and blood culture sent. POCT was  7.40/28/81/17/-6 .  Na was 145 K 3.8, Ca 1.10 glucose 60.   Spoke with parents about the feedings and they are concerned of whether the baby is getting intake, difficult to assess. Infant has had voids and stools, 4 voids and 4 stools.  Skin is dry. Mucous membranes dry.  Parents agreed to try a bottle of formula infant vigorously took 30 mls and consoled after feeding. Burped well.  Encourage breastfeeding every 3 hours with supplement to follow until mom's milk supply established.  Follow CBC and blood culture.   Please remind patient that I am not helping address her nausea and vomiting.  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  She should follow-up with GI team to help address the nausea and vomiting

## 2023-04-25 ENCOUNTER — HOSPITAL ENCOUNTER (OUTPATIENT)
Dept: RADIOLOGY | Facility: HOSPITAL | Age: 58
Discharge: HOME | End: 2023-04-25
Payer: MEDICARE

## 2023-04-25 DIAGNOSIS — R14.0 ABDOMINAL BLOATING: ICD-10-CM

## 2023-04-25 DIAGNOSIS — R10.13 EPIGASTRIC PAIN: ICD-10-CM

## 2023-04-25 PROCEDURE — 74018 RADEX ABDOMEN 1 VIEW: CPT | Mod: TC

## 2023-04-25 PROCEDURE — 74018 XR ABDOMEN AP 1 VIEW: ICD-10-PCS | Mod: 26,,, | Performed by: RADIOLOGY

## 2023-04-25 PROCEDURE — 74018 RADEX ABDOMEN 1 VIEW: CPT | Mod: 26,,, | Performed by: RADIOLOGY

## 2023-05-01 ENCOUNTER — PATIENT MESSAGE (OUTPATIENT)
Dept: ENDOSCOPY | Facility: HOSPITAL | Age: 58
End: 2023-05-01
Payer: MEDICARE

## 2023-05-01 ENCOUNTER — TELEPHONE (OUTPATIENT)
Dept: GASTROENTEROLOGY | Facility: CLINIC | Age: 58
End: 2023-05-01
Payer: MEDICARE

## 2023-05-01 DIAGNOSIS — D64.9 ANEMIA, UNSPECIFIED TYPE: Primary | ICD-10-CM

## 2023-05-01 DIAGNOSIS — D50.9 IRON DEFICIENCY ANEMIA, UNSPECIFIED IRON DEFICIENCY ANEMIA TYPE: Primary | ICD-10-CM

## 2023-05-01 RX ORDER — FERROUS SULFATE 325(65) MG
325 TABLET ORAL
Qty: 30 TABLET | Refills: 0 | Status: SHIPPED | OUTPATIENT
Start: 2023-05-01 | End: 2023-05-31

## 2023-05-01 NOTE — TELEPHONE ENCOUNTER
Called patient to give test results patent verbally understood, previously seen Dr ORTIZ , scheduled EGD 7/11/ 2023 per Np for SHILPA, No further issues noted. Patent wants to  written RX for iron.

## 2023-05-04 ENCOUNTER — OFFICE VISIT (OUTPATIENT)
Dept: GASTROENTEROLOGY | Facility: CLINIC | Age: 58
End: 2023-05-04
Payer: MEDICARE

## 2023-05-04 DIAGNOSIS — R13.10 ODYNOPHAGIA: ICD-10-CM

## 2023-05-04 DIAGNOSIS — R11.0 NAUSEA: ICD-10-CM

## 2023-05-04 DIAGNOSIS — K21.9 GASTROESOPHAGEAL REFLUX DISEASE, UNSPECIFIED WHETHER ESOPHAGITIS PRESENT: ICD-10-CM

## 2023-05-04 DIAGNOSIS — R68.81 EARLY SATIETY: ICD-10-CM

## 2023-05-04 DIAGNOSIS — R13.10 DYSPHAGIA, UNSPECIFIED TYPE: Primary | ICD-10-CM

## 2023-05-04 DIAGNOSIS — I63.9 CEREBROVASCULAR ACCIDENT (CVA), UNSPECIFIED MECHANISM: ICD-10-CM

## 2023-05-04 PROCEDURE — 99213 OFFICE O/P EST LOW 20 MIN: CPT | Mod: 95,,, | Performed by: INTERNAL MEDICINE

## 2023-05-04 PROCEDURE — 99213 PR OFFICE/OUTPT VISIT, EST, LEVL III, 20-29 MIN: ICD-10-PCS | Mod: 95,,, | Performed by: INTERNAL MEDICINE

## 2023-05-04 NOTE — PROGRESS NOTES
The patient location is: home  The chief complaint leading to consultation is: gerd, dysphagia, chest spasms    Visit type: audiovisual    Face to Face time with patient: 18  22 minutes of total time spent on the encounter, which includes face to face time and non-face to face time preparing to see the patient (eg, review of tests), Obtaining and/or reviewing separately obtained history, Documenting clinical information in the electronic or other health record, Independently interpreting results (not separately reported) and communicating results to the patient/family/caregiver, or Care coordination (not separately reported).         Each patient to whom he or she provides medical services by telemedicine is:  (1) informed of the relationship between the physician and patient and the respective role of any other health care provider with respect to management of the patient; and (2) notified that he or she may decline to receive medical services by telemedicine and may withdraw from such care at any time.    Notes:        [facility] Consultation Note    Reason for Consult:    No chief complaint on file.        PCP:   Gab Levy       Referring MD:  Garrick López Md  [address] [place] [address]    Current GI: Armida Sullivan NP     HPI:  Luz Arias is a 57 y.o. female referred to motility clinic for second opinion regarding the following problems:    GERD  Retrosternal pyrosis: few per month   Regurgitation: few per week     MEDs:  nexium 40mg BID   Gaviscon helps    Dysphagia.  Worse   Problems with solids: w every meal   Problems with liquids: w every meal     Globus discomfort in throat    Chest pain and spasm.  Few per month     Eckardt Symptom Score  Dysphagia: 3  Regurgitation: 1  Retrosternal pain: 1  Weight Loss: 1  Total: 6          MEDS:   imdur 30 daily   Peppermint prn   nortriptyline 50mg   No longer on zanaflex     New chocking and coughing w  fluid   CVA 2/2023    Nausea.  Few per week   Early satiety.  Occasional   Vomiting: every few weeks  Cyclical episodes of n/v with symptom free intervals between episodes:    Dm gastroparesis     MEDS:   zofran   nortriptyline 50 for pain w some improvement    RUQ/epig pain/diffuse abd     Bloating     Diarrhea and constipation   PEG prn   Benefiber    Weight loss   174 from 183lb    Denies BRBPR, melena, weight loss.       Total visit time was 61   minutes, more than 50% of which was spent in face-to-face counseling with patient regarding symptoms, diagnostic results, prognosis, risks and benefits of treatment options, instructions for management, importance of compliance with chosen treatment options and coping strategies.      Previous Studies:   Colonoscopy 4/10/2023: nl.hemorrhoids.  Repeat 5 yrs 4/2028  CT abdomen 06/23/2022:  No acute abnormality.  Previous cholecystectomy and hysterectomy.  Fatty infiltration of the liver.  Prior granulomatous infection.  No constipation a bowel obstruction.  Timed barium swallow 06/29/2022: Normal. 13 mm tablet passed is easily into the stomach without obstruction.  Contrast emptied within 1 minutes.  Manometry 06/30/2022: High LES pressure with incomplete relaxation.  Hypercontractile esophagus.  50% complete bolus clearance.  Hiatal hernia.  Abnormal multiple rapid swallow test.  No significant difference with provocative maneuvers.  No evidence of residual liquid at the end of 200 cc bolus.  No evidence of rumination.  EGD 06/28/2022: Normal esophagus (-).  Normal GE junction.  No puckering resistance or pop.  Flip with normal esophageal contractility.  Z-line irregular (-).  2 cm hiatal hernia.  Atrophic mucosa in the stomach.  Mapping performed (-).  Single gastric polyp in cardia (hp).  Normal duodenum.  Manometry 04/08/2022:  Poor copy.  Normal relaxation of LES with swallows with normal IRP.  IRP 11. DCI 41817. 100% hypercontractile.  0% premature.  0% pan  esophageal.  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  Gastric emptying study 04/06/2022:  Delayed.  228 minutes retention of 32%.  CT abdomen 03/13/2022:  Hepatic steatosis.  Mild atelectasis scarring or pneumonia of the lung bases.  Correlate clinically.  EGD 6/2/2021 Gr A esophagitis.  Erythema in the stomach.  Biopsied.  Normal duodenum.  Pathology 06/03/2021:  Stomach random chemical reactive gastropathy, mild chronic gastritis and intestinal metaplasia.  EGD 05/17/2019:  Esophagus hernia with significant reflux esophagitis with edema erythema.  I am biopsies obtained.  Stomach antral portion of the stomach pre-pyloric edema with obvious slowly inflammation moderate severe gastritis histology as well as SANDRA test.  Duodenum mild edema and irritation duodenitis as well as dilation of the 2nd portion of duodenum biopsied visible portion taken above.  Gastric emptying study 10/09/2019:  Delayed.  9% at 4 H have time exceeds 125 minutes.  EGD 05/05/2020:  LA grade a esophagitis.  Erythema in the antrum biopsied.  Normal duodenum.  Pathology:  H pylori negative.  Mild chronic gastritis.  Colonoscopy 05/05/2020:  Hemorrhoids.  Abnormal mucosa in sigmoid colon (lymphoid aggregate).    Relevant Surgical History:    NA    ROS:  ROS     Complete ROS negative except as above    Medical History:   Past Medical History:   Diagnosis Date    Aortic regurgitation     Asthma     Chronic fatigue     Chronic pain     Depression     Diabetes     Dysphagia     Gastritis     Gastroparesis     GERD (gastroesophageal reflux disease)     Hypertension     Intestinal metaplasia of gastric cardia     Irritable bowel syndrome     Sarcoidosis     Sleep apnea     Stroke         Surgical History:   Past Surgical History:   Procedure Laterality Date    APPENDECTOMY      CHOLECYSTECTOMY      COLONOSCOPY  05/17/2019    COLONOSCOPY N/A 5/5/2020    Procedure: COLONOSCOPY;  Surgeon: Garrick López MD;  Location: [facility];  Service:  Endoscopy;  Laterality: N/A;  with bx and stool specimen    COLONOSCOPY N/A 4/10/2023    Procedure: COLONOSCOPY;  Surgeon: Forrest Gomes MD;  Location: [facility];  Service: Endoscopy;  Laterality: N/A;    ELBOW SURGERY      ESOPHAGEAL MANOMETRY WITH MEASUREMENT OF IMPEDANCE N/A 6/29/2022    Procedure: MANOMETRY-ESOPHAGEAL-WITH IMPEDANCE;  Surgeon: Concetta Odonnell MD;  Location: [facility] (4TH FLR);  Service: Endoscopy;  Laterality: N/A;  r/o rumination and supragastric belching  hold nortriptyline and norco 24 hours prior to procedure  fully vaccinated, instructions emailed to [email or web address]    ESOPHAGOGASTRODUODENOSCOPY N/A 5/5/2020    Procedure: EGD (ESOPHAGOGASTRODUODENOSCOPY);  Surgeon: Garrick López MD;  Location: [facility];  Service: Endoscopy;  Laterality: N/A;  with biopsy    ESOPHAGOGASTRODUODENOSCOPY N/A 6/2/2021    Procedure: EGD (ESOPHAGOGASTRODUODENOSCOPY);  Surgeon: Garrick López MD;  Location: [facility];  Service: Endoscopy;  Laterality: N/A;    ESOPHAGOGASTRODUODENOSCOPY N/A 6/28/2022    Procedure: ESOPHAGOGASTRODUODENOSCOPY (EGD);  Surgeon: Concetta Odonnell MD;  Location: [facility] (2ND FLR);  Service: Endoscopy;  Laterality: N/A;  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  full liquid diet x3 days, clear liquid diet x1 day prior to procedure  fully vaccinated, instructions emailed to [email or web address]    HAND SURGERY      HYSTERECTOMY      2005, age 39, KRISTAN fibroids    MEDIASTINOSCOPY      NECK SURGERY      UPPER GASTROINTESTINAL ENDOSCOPY  05/17/2019    WRIST SURGERY          Family History:   Family History   Problem Relation Age of Onset    Diabetes Mother     Colon cancer Mother 65    Bladder Cancer Mother     Heart disease Mother     Heart disease Father     Kidney failure Father     Diabetes Father     Stroke Father     Heart attack Father     Colon cancer Sister 65    Diabetes Sister     Hypertension Sister     Seizures Brother     Colon polyps Sister      Diabetes Sister     Hypertension Sister     Irritable bowel syndrome Sister     Diabetes Sister     Hypertension Sister     Diabetes Sister     Hypertension Sister     Diabetes Brother     Breast cancer Neg Hx     Esophageal cancer Neg Hx     Stomach cancer Neg Hx     Liver cancer Neg Hx     Liver disease Neg Hx     Crohn's disease Neg Hx     Celiac disease Neg Hx     Pancreatic cancer Neg Hx         Social History:   Social History     Socioeconomic History    Marital status:    Tobacco Use    Smoking status: Never    Smokeless tobacco: Never   Substance and Sexual Activity    Alcohol use: Not Currently     Comment: no drinking anymore    Drug use: Never    Sexual activity: Not Currently        Review of patient's allergies indicates:   Allergen Reactions    Darunavir Anaphylaxis    Sulfa (sulfonamide antibiotics) Anaphylaxis    Sulfamethoxazole-trimethoprim Anaphylaxis and Diarrhea     Other reaction(s): Anaphylaxis, Finding of vomiting, Diarrhea    Temazepam      Sleep walking   Other reaction(s): Sleep related hallucinations    Pantoprazole Nausea And Vomiting    Pregabalin Other (See Comments)    Verapamil      Other reaction(s): Constipation    Zonisamide      Other reaction(s): Anaphylaxis, Diarrhea, Finding of vomiting, Anaphylaxis, Diarrhea, Finding of vomiting, Anaphylaxis, Diarrhea, Finding of vomiting    Metoclopramide Rash and Anxiety    Sucralfate Other (See Comments) and Nausea Only       Current Outpatient Medications   Medication Sig Dispense Refill    ALBUTEROL INHL Inhale into the lungs. Every 6 hrs prn      amLODIPine (NORVASC) 10 MG tablet Take 10 mg by mouth once daily.      aspirin (ECOTRIN) 81 MG EC tablet Take 81 mg by mouth once daily.      benzonatate (TESSALON) 100 MG capsule Take 100 mg by mouth 3 (three) times daily as needed.      biotin 10,000 mcg Cap Take by mouth.      candesartan (ATACAND) 32 MG tablet Take 32 mg by mouth once daily.      carBAMazepine (TEGRETOL XR) 400 MG  "Tb12 Take 400 mg by mouth 2 (two) times daily.      clotrimazole-betamethasone 1-0.05% (LOTRISONE) cream Apply to affected area 2 times daily 15 g 1    DEEP SEA NASAL 0.65 % nasal spray       diclofenac sodium (VOLTAREN) 1 % Gel Voltaren 1 % topical gel      diphenhydrAMINE-aluminum-magnesium hydroxide-simethicone-LIDOcaine HCl 2% Swish and swallow 5 mLs every 6 (six) hours as needed (throat irritation). 120 mL 2    EASY TOUCH ALCOHOL PREP PADS PadM Apply 1 each topically 3 (three) times daily.      ergocalciferol (ERGOCALCIFEROL) 50,000 unit Cap ergocalciferol (vitamin D2) 50,000 unit capsule      ergocalciferol, vitamin D2, (VITAMIN D ORAL) Take by mouth. Taking 2000 mg daily      esomeprazole (NEXIUM) 40 MG capsule Take 1 capsule (40 mg total) by mouth 2 (two) times daily. 180 capsule 3    FARXIGA 5 mg Tab tablet Take 5 mg by mouth.      ferrous sulfate (FEOSOL) 325 mg (65 mg iron) Tab tablet Take 1 tablet (325 mg total) by mouth daily with breakfast. 30 tablet 0    fluticasone propionate (FLONASE) 50 mcg/actuation nasal spray       fluticasone-salmeterol 230-21 mcg/dose (ADVAIR HFA) 230-21 mcg/actuation HFAA inhaler Advair  mcg-21 mcg/actuation aerosol inhaler      FREESTYLE LANCETS 28 gauge lancets Apply topically 3 (three) times daily.      FREESTYLE LITE STRIPS Strp 1 strip 3 (three) times daily.      ipratropium (ATROVENT) 42 mcg (0.06 %) nasal spray 2 sprays 2 (two) times daily.      isosorbide mononitrate (IMDUR) 30 MG 24 hr tablet 30 mg.      lidocaine (LIDODERM) 5 %       mag hydrox/aluminum hyd/simeth (MAALOX ORAL)       melatonin 3 mg TbDL Take by mouth. prn      metFORMIN (GLUCOPHAGE-XR) 500 MG XR 24hr tablet Take 500 mg by mouth 2 (two) times daily with meals.      methylphenidate HCl (RITALIN) 10 MG tablet Take 10 mg by mouth once daily.      metoprolol tartrate 75 mg Tab <span ID="[id number]" style="Bold">zzMetoprolol Tartrate 50 mg oral tablet</span><br/>Start Date: " 3/26/21<br/>Status: Ordered      nortriptyline (PAMELOR) 75 MG Cap TAKE 1 CAPSULE BY MOUTH EVERY NIGHT 1 HOUR BEFORE BEDTIME      ondansetron (ZOFRAN-ODT) 8 MG TbDL Take 1 tablet (8 mg total) by mouth every 6 (six) hours as needed (nausea). 50 tablet 2    prazosin (MINIPRESS) 2 MG Cap Take 2 mg by mouth once daily.      rivastigmine (EXELON) 9.5 mg/24 hour PT24 Place onto the skin.      rosuvastatin (CRESTOR) 40 MG Tab Take 40 mg by mouth once daily.      tiZANidine (ZANAFLEX) 4 MG tablet Take 4 mg by mouth every 8 (eight) hours.      TYLENOL EXTRA STRENGTH 500 mg tablet Take 500 mg by mouth.      valACYclovir (VALTREX) 500 MG tablet Take 1 tablet (500 mg total) by mouth once daily. 90 tablet 3    vitamin E 600 UNIT capsule Take 600 Units by mouth once daily.       No current facility-administered medications for this visit.        Objective Findings:  Vital Signs:  There were no vitals taken for this visit.  There is no height or weight on file to calculate BMI.    Physical Exam:  Physical Exam:limited due to video visit  General appearance: alert, cooperative, no distress  HENT: Normocephalic, atraumatic, neck symmetrical, no nasal discharge  Eyes: conjunctivae/corneas clear,  EOM's intact  Extremities: visible extremities symmetric; no clubbing, cyanosis, or edema  Integument: visible Skin color, texture, turgor normal; no rashes; hair distrubution normal  Neurologic: Alert and oriented X 3,  Psychiatric: no pressured speech; normal affect; no evidence of impaired cognition      Labs:   Reviewed in Epic/record      Assessment and Plan:  Luz Arias is a 57 y.o. female with referred to [facility] for 2nd opinion regarding following problems:    GERD. 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  Early satiety.  Vomiting  Possibly cyclical episodes of n/v  DM gastroparesis   Unable to tolerate reglan   On zofran   On nortriptyline for pain w some improvement  -Def to ref GI     Dm   Jardiance, metformin    RUQ/epig pain/generalized  -Def to ref GI     Rectal pain   -Def to ref GI     Bloating   -Def to ref GI     Diarrhea and constipation   On PEG  -Def to ref GI     GIM.  AM.  No fam of gastric cancer   Negative EGD w mapping 2022  -Repeat EGD in 6/2025  -Def to ref GI     Fatty liver   -Def to ref GI     FH CRC   Negative Colonoscopy 2020   Repeat 4/2028 w local GI     SHILPA  Hematochezia resolved   Colon w hemorrhoids.   -Def to primary GI     Chronic pain. Neuropathy   On zanaflex  On nortriptyline 75   On restoril     Follow up in about 3 months (around 8/4/2023).    1. Dysphagia, unspecified type    2. Odynophagia    3. Cerebrovascular accident (CVA), unspecified mechanism    4. Gastroesophageal reflux disease, unspecified whether esophagitis present    5. Nausea    6. Early satiety            Order summary:  Orders Placed This Encounter    Fl Modified Barium Swallow Speech    SLP video swallow       Discussed with PT that I act as a consult service and do not accept patients to be their primary GI provider. Discussed that the goal of our visits is to address relevant motility problems while deferring other GI problems as well as screening and surveillance to his/her primary GI provider.   Discussed that he/she needs to continue to follow with his local primary GI provider.  Discussed that we will complete his/her workup, clarify diagnosis and attempt to optimize his/her symptoms with intention of him/her returning to  referring GI provider for long term GI care.   Pt verbalized understanding.        Thank you so much for allowing me to participate in the care of Luz Odonnell MD      This note was created using voice recognition software, and may contain some unrecognized transcriptional errors.

## 2023-05-04 NOTE — PATIENT INSTRUCTIONS
-Start peppermint three to four times per day (altoids, peppermint tea, peppermint oil (four drops in half glass of water)     -Increase nortriptyline 75 mg with psychiatry NP to help with Jackhammer esophagus     -Complete modified barium swallow    A barium swallow test is a special type of imaging test that uses barium and X-rays to create images of the back of your mouth and throat (pharynx) and your esophagus.  This allows the medical team to see how you swallow.  The speech pathologist will observe the coordination of anatomical structures in the mouth and throat, as they are actively functioning when chewing, drinking and swallowing. This will also identify whether food or liquid is entering your lungs while you swallow.     -Drink high protein shakes three times per day     -Continue to follow up with Dr. López for management of your chronic GI problems, including abdominal and rectal pain

## 2023-05-15 ENCOUNTER — TELEPHONE (OUTPATIENT)
Dept: SPEECH THERAPY | Facility: HOSPITAL | Age: 58
End: 2023-05-15
Payer: MEDICARE

## 2023-05-17 ENCOUNTER — PATIENT MESSAGE (OUTPATIENT)
Dept: GASTROENTEROLOGY | Facility: CLINIC | Age: 58
End: 2023-05-17
Payer: MEDICARE

## 2023-05-17 ENCOUNTER — TELEPHONE (OUTPATIENT)
Dept: GASTROENTEROLOGY | Facility: CLINIC | Age: 58
End: 2023-05-17
Payer: MEDICARE

## 2023-05-22 ENCOUNTER — PATIENT MESSAGE (OUTPATIENT)
Dept: GASTROENTEROLOGY | Facility: CLINIC | Age: 58
End: 2023-05-22
Payer: MEDICARE

## 2023-05-22 ENCOUNTER — PATIENT MESSAGE (OUTPATIENT)
Dept: ENDOSCOPY | Facility: HOSPITAL | Age: 58
End: 2023-05-22
Payer: MEDICARE

## 2023-05-30 ENCOUNTER — HOSPITAL ENCOUNTER (EMERGENCY)
Facility: HOSPITAL | Age: 58
Discharge: HOME | End: 2023-05-30
Attending: EMERGENCY MEDICINE
Payer: MEDICARE

## 2023-05-30 VITALS
RESPIRATION RATE: 20 BRPM | SYSTOLIC BLOOD PRESSURE: 126 MMHG | TEMPERATURE: 98 F | HEART RATE: 82 BPM | DIASTOLIC BLOOD PRESSURE: 60 MMHG | OXYGEN SATURATION: 96 %

## 2023-05-30 DIAGNOSIS — R10.9 ABDOMINAL PAIN, UNSPECIFIED ABDOMINAL LOCATION: Primary | ICD-10-CM

## 2023-05-30 LAB
ALBUMIN SERPL BCP-MCNC: 4.3 G/DL (ref 3.5–5.2)
ALP SERPL-CCNC: 62 U/L (ref 55–135)
ALT SERPL W/O P-5'-P-CCNC: 19 U/L (ref 10–44)
ANION GAP SERPL CALC-SCNC: 15 MMOL/L (ref 8–16)
AST SERPL-CCNC: 20 U/L (ref 10–40)
BASOPHILS # BLD AUTO: 0.05 K/UL (ref 0–0.2)
BASOPHILS NFR BLD: 0.5 % (ref 0–1.9)
BILIRUB SERPL-MCNC: 0.3 MG/DL (ref 0.1–1)
BUN SERPL-MCNC: 13 MG/DL (ref 6–20)
CALCIUM SERPL-MCNC: 10 MG/DL (ref 8.7–10.5)
CHLORIDE SERPL-SCNC: 97 MMOL/L (ref 95–110)
CO2 SERPL-SCNC: 26 MMOL/L (ref 23–29)
CREAT SERPL-MCNC: 0.9 MG/DL (ref 0.5–1.4)
DIFFERENTIAL METHOD: ABNORMAL
EOSINOPHIL # BLD AUTO: 0.1 K/UL (ref 0–0.5)
EOSINOPHIL NFR BLD: 0.6 % (ref 0–8)
ERYTHROCYTE [DISTWIDTH] IN BLOOD BY AUTOMATED COUNT: 16.3 % (ref 11.5–14.5)
EST. GFR  (NO RACE VARIABLE): >60 ML/MIN/1.73 M^2
GLUCOSE SERPL-MCNC: 93 MG/DL (ref 70–110)
HCT VFR BLD AUTO: 36.1 % (ref 37–48.5)
HGB BLD-MCNC: 11.3 G/DL (ref 12–16)
IMM GRANULOCYTES # BLD AUTO: 0.02 K/UL (ref 0–0.04)
IMM GRANULOCYTES NFR BLD AUTO: 0.2 % (ref 0–0.5)
LIPASE SERPL-CCNC: 14 U/L (ref 4–60)
LYMPHOCYTES # BLD AUTO: 3.7 K/UL (ref 1–4.8)
LYMPHOCYTES NFR BLD: 39.7 % (ref 18–48)
MCH RBC QN AUTO: 26.3 PG (ref 27–31)
MCHC RBC AUTO-ENTMCNC: 31.3 G/DL (ref 32–36)
MCV RBC AUTO: 84 FL (ref 82–98)
MONOCYTES # BLD AUTO: 0.7 K/UL (ref 0.3–1)
MONOCYTES NFR BLD: 7.7 % (ref 4–15)
NEUTROPHILS # BLD AUTO: 4.8 K/UL (ref 1.8–7.7)
NEUTROPHILS NFR BLD: 51.3 % (ref 38–73)
NRBC BLD-RTO: 0 /100 WBC
PLATELET # BLD AUTO: 392 K/UL (ref 150–450)
PMV BLD AUTO: 9.6 FL (ref 9.2–12.9)
POTASSIUM SERPL-SCNC: 3.6 MMOL/L (ref 3.5–5.1)
PROT SERPL-MCNC: 7.9 G/DL (ref 6–8.4)
RBC # BLD AUTO: 4.3 M/UL (ref 4–5.4)
SODIUM SERPL-SCNC: 138 MMOL/L (ref 136–145)
WBC # BLD AUTO: 9.4 K/UL (ref 3.9–12.7)

## 2023-05-30 PROCEDURE — 83690 ASSAY OF LIPASE: CPT | Performed by: EMERGENCY MEDICINE

## 2023-05-30 PROCEDURE — 99284 EMERGENCY DEPT VISIT MOD MDM: CPT | Mod: 25

## 2023-05-30 PROCEDURE — 99284 EMERGENCY DEPT VISIT MOD MDM: CPT | Mod: ,,, | Performed by: EMERGENCY MEDICINE

## 2023-05-30 PROCEDURE — 80053 COMPREHEN METABOLIC PANEL: CPT | Performed by: EMERGENCY MEDICINE

## 2023-05-30 PROCEDURE — 99284 PR EMERGENCY DEPT VISIT,LEVEL IV: ICD-10-PCS | Mod: ,,, | Performed by: EMERGENCY MEDICINE

## 2023-05-30 PROCEDURE — 63600175 PHARM REV CODE 636 W HCPCS: Performed by: EMERGENCY MEDICINE

## 2023-05-30 PROCEDURE — 96374 THER/PROPH/DIAG INJ IV PUSH: CPT

## 2023-05-30 PROCEDURE — 85025 COMPLETE CBC W/AUTO DIFF WBC: CPT | Performed by: EMERGENCY MEDICINE

## 2023-05-30 RX ORDER — DROPERIDOL 2.5 MG/ML
1.25 INJECTION, SOLUTION INTRAMUSCULAR; INTRAVENOUS
Status: COMPLETED | OUTPATIENT
Start: 2023-05-30 | End: 2023-05-30

## 2023-05-30 RX ADMIN — DROPERIDOL 1.25 MG: 2.5 INJECTION, SOLUTION INTRAMUSCULAR; INTRAVENOUS at 03:05

## 2023-05-30 NOTE — DISCHARGE INSTRUCTIONS
Continue home medications and follow-up with GI  Return to the emergency department if you are not tolerating anything by mouth

## 2023-05-30 NOTE — ED PROVIDER NOTES
Encounter Date: 5/30/2023       History     Chief Complaint   Patient presents with    Nausea     And vomiting for one week. Hx of IBS, stroke     57-year-old female with past medical history of chronic fatigue, hypertension, chronic abdominal pain presents for evaluation of abdominal pain. Reports onset of this flair yesterday. Localized to epigastric area, severe, non radiating. Associated with vomiting. Last episode of vomiting yesterday. No diarrhea. No fever. Reports compliance with medication but no relief of symptoms.     The history is provided by the patient.   Review of patient's allergies indicates:   Allergen Reactions    Darunavir Anaphylaxis    Sulfa (sulfonamide antibiotics) Anaphylaxis    Sulfamethoxazole-trimethoprim Anaphylaxis and Diarrhea     Other reaction(s): Anaphylaxis, Finding of vomiting, Diarrhea    Temazepam      Sleep walking   Other reaction(s): Sleep related hallucinations    Pantoprazole Nausea And Vomiting    Pregabalin Other (See Comments)    Verapamil      Other reaction(s): Constipation    Zonisamide      Other reaction(s): Anaphylaxis, Diarrhea, Finding of vomiting, Anaphylaxis, Diarrhea, Finding of vomiting, Anaphylaxis, Diarrhea, Finding of vomiting    Metoclopramide Rash and Anxiety    Sucralfate Other (See Comments) and Nausea Only     Past Medical History:   Diagnosis Date    Aortic regurgitation     Asthma     Chronic fatigue     Chronic pain     Depression     Diabetes     Dysphagia     Gastritis     Gastroparesis     GERD (gastroesophageal reflux disease)     Hypertension     Intestinal metaplasia of gastric cardia     Irritable bowel syndrome     Sarcoidosis     Sleep apnea     Stroke      Past Surgical History:   Procedure Laterality Date    APPENDECTOMY      CHOLECYSTECTOMY      COLONOSCOPY  05/17/2019    COLONOSCOPY N/A 5/5/2020    Procedure: COLONOSCOPY;  Surgeon: Garrick López MD;  Location: [facility];  Service: Endoscopy;  Laterality: N/A;  with bx and stool  specimen    COLONOSCOPY N/A 4/10/2023    Procedure: COLONOSCOPY;  Surgeon: Forrest Gomes MD;  Location: [facility] ENDO;  Service: Endoscopy;  Laterality: N/A;    ELBOW SURGERY      ESOPHAGEAL MANOMETRY WITH MEASUREMENT OF IMPEDANCE N/A 6/29/2022    Procedure: MANOMETRY-ESOPHAGEAL-WITH IMPEDANCE;  Surgeon: Concetta Odonnell MD;  Location: [facility] (4TH FLR);  Service: Endoscopy;  Laterality: N/A;  r/o rumination and supragastric belching  hold nortriptyline and norco 24 hours prior to procedure  fully vaccinated, instructions emailed to [email or web address]    ESOPHAGOGASTRODUODENOSCOPY N/A 5/5/2020    Procedure: EGD (ESOPHAGOGASTRODUODENOSCOPY);  Surgeon: Garrick López MD;  Location: [facility];  Service: Endoscopy;  Laterality: N/A;  with biopsy    ESOPHAGOGASTRODUODENOSCOPY N/A 6/2/2021    Procedure: EGD (ESOPHAGOGASTRODUODENOSCOPY);  Surgeon: Garrick López MD;  Location: [facility];  Service: Endoscopy;  Laterality: N/A;    ESOPHAGOGASTRODUODENOSCOPY N/A 6/28/2022    Procedure: ESOPHAGOGASTRODUODENOSCOPY (EGD);  Surgeon: Concetta Odonnell MD;  Location: [facility] (2ND FLR);  Service: Endoscopy;  Laterality: N/A;  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  full liquid diet x3 days, clear liquid diet x1 day prior to procedure  fully vaccinated, instructions emailed to [email or web address]    HAND SURGERY      HYSTERECTOMY      2005, age 39, KRISTAN fibroids    MEDIASTINOSCOPY      NECK SURGERY      UPPER GASTROINTESTINAL ENDOSCOPY  05/17/2019    WRIST SURGERY       Family History   Problem Relation Age of Onset    Diabetes Mother     Colon cancer Mother 65    Bladder Cancer Mother     Heart disease Mother     Heart disease Father     Kidney failure Father     Diabetes Father     Stroke Father     Heart attack Father     Colon cancer Sister 65    Diabetes Sister     Hypertension Sister     Seizures Brother     Colon polyps Sister     Diabetes Sister     Hypertension Sister     Irritable bowel syndrome Sister      Diabetes Sister     Hypertension Sister     Diabetes Sister     Hypertension Sister     Diabetes Brother     Breast cancer Neg Hx     Esophageal cancer Neg Hx     Stomach cancer Neg Hx     Liver cancer Neg Hx     Liver disease Neg Hx     Crohn's disease Neg Hx     Celiac disease Neg Hx     Pancreatic cancer Neg Hx      Social History     Tobacco Use    Smoking status: Never    Smokeless tobacco: Never   Substance Use Topics    Alcohol use: Not Currently     Comment: no drinking anymore    Drug use: Never     Review of Systems    Physical Exam     Initial Vitals [05/30/23 1325]   BP Pulse Resp Temp SpO2   (!) 98/53 85 20 97.6 °F (36.4 °C) 97 %      MAP       --         Physical Exam    Nursing note and vitals reviewed.  Constitutional: She appears well-developed and well-nourished. She appears distressed.   HENT:   Head: Normocephalic and atraumatic.   Eyes: Conjunctivae are normal.   Neck: Trachea normal.   Cardiovascular:  Normal rate, regular rhythm, normal heart sounds and normal pulses.           Pulmonary/Chest: Breath sounds normal. No tachypnea. No respiratory distress.   Abdominal: Abdomen is soft. There is abdominal tenderness (epigastric).     Neurological: She is alert and oriented to person, place, and time.   Skin: Skin is warm.       ED Course   Procedures  Labs Reviewed   CBC W/ AUTO DIFFERENTIAL - Abnormal; Notable for the following components:       Result Value    Hemoglobin 11.3 (*)     Hematocrit 36.1 (*)     MCH 26.3 (*)     MCHC 31.3 (*)     RDW 16.3 (*)     All other components within normal limits   COMPREHENSIVE METABOLIC PANEL   LIPASE          Imaging Results    None          Medications   droPERidol injection 1.25 mg (1.25 mg Intravenous Given 5/30/23 1543)     Medical Decision Making:   History:   Old Medical Records: I decided to obtain old medical records.  Old Records Summarized: records from clinic visits.       <> Summary of Records: April GI clinic- scheduled for  colonoscopy  Initial Assessment:   Emergent evaluation of abdominal pain  Differential Diagnosis:   Chronic abdominal pain, pancreatitis, esophagitis  Clinical Tests:   Lab Tests: Ordered and Reviewed  ED Management:  Reviewed the medical record and noted that patient has been evaluated multiple times by gastroenterology.  Has a diagnosis of jackhammer esophagus           ED Course as of 05/30/23 2008   Tue May 30, 2023   1752 Labs without significant abnormalities, no evidence of pancreatitis  [HS]   1758 Patient is feeling better.  Will discharge home to continue outpatient treatment plan with GI [HS]      ED Course User Index  [HS] Luke Ying MD                 Clinical Impression:   Final diagnoses:  [R10.9] Abdominal pain, unspecified abdominal location (Primary)        ED Disposition Condition    Discharge Stable          ED Prescriptions    None       Follow-up Information       Follow up With Specialties Details Why Contact Info    Gab Levy MD Internal Medicine  As needed [address] [facility] [address]  [phone]               Luke Ying MD  05/30/23 2008

## 2023-06-12 ENCOUNTER — PATIENT MESSAGE (OUTPATIENT)
Dept: ENDOSCOPY | Facility: HOSPITAL | Age: 58
End: 2023-06-12
Payer: MEDICARE

## 2023-06-12 ENCOUNTER — TELEPHONE (OUTPATIENT)
Dept: GASTROENTEROLOGY | Facility: CLINIC | Age: 58
End: 2023-06-12
Payer: MEDICARE

## 2023-06-12 NOTE — TELEPHONE ENCOUNTER
Ma spoke with pt regarding a follow/up visit she would like a virtual visit as I explained to her he would not be able to because she is [place] pt mentioned she would drive to [place] to have virtual appt with  I did advise pt to be sure she will be in the state of [place] to complete visit if not she will be held responsible for the the bill once her insurance process the claim and she may not be able to log in

## 2023-06-13 ENCOUNTER — ANESTHESIA (OUTPATIENT)
Dept: ENDOSCOPY | Facility: HOSPITAL | Age: 58
End: 2023-06-13
Payer: MEDICARE

## 2023-06-13 ENCOUNTER — ANESTHESIA EVENT (OUTPATIENT)
Dept: ENDOSCOPY | Facility: HOSPITAL | Age: 58
End: 2023-06-13
Payer: MEDICARE

## 2023-06-13 ENCOUNTER — HOSPITAL ENCOUNTER (OUTPATIENT)
Facility: HOSPITAL | Age: 58
Discharge: HOME | End: 2023-06-13
Attending: INTERNAL MEDICINE | Admitting: INTERNAL MEDICINE
Payer: MEDICARE

## 2023-06-13 DIAGNOSIS — D64.9 ANEMIA: ICD-10-CM

## 2023-06-13 PROCEDURE — D9220A PRA ANESTHESIA: Mod: CRNA,,, | Performed by: NURSE ANESTHETIST, CERTIFIED REGISTERED

## 2023-06-13 PROCEDURE — 88305 TISSUE EXAM BY PATHOLOGIST: CPT | Mod: 26,,, | Performed by: STUDENT IN AN ORGANIZED HEALTH CARE EDUCATION/TRAINING PROGRAM

## 2023-06-13 PROCEDURE — 27201012 HC FORCEPS, HOT/COLD, DISP: Performed by: INTERNAL MEDICINE

## 2023-06-13 PROCEDURE — 63600175 PHARM REV CODE 636 W HCPCS: Performed by: NURSE ANESTHETIST, CERTIFIED REGISTERED

## 2023-06-13 PROCEDURE — 88342 IMHCHEM/IMCYTCHM 1ST ANTB: CPT | Performed by: STUDENT IN AN ORGANIZED HEALTH CARE EDUCATION/TRAINING PROGRAM

## 2023-06-13 PROCEDURE — 88342 CHG IMMUNOCYTOCHEMISTRY: ICD-10-PCS | Mod: 26,,, | Performed by: STUDENT IN AN ORGANIZED HEALTH CARE EDUCATION/TRAINING PROGRAM

## 2023-06-13 PROCEDURE — 37000009 HC ANESTHESIA EA ADD 15 MINS: Performed by: INTERNAL MEDICINE

## 2023-06-13 PROCEDURE — D9220A PRA ANESTHESIA: ICD-10-PCS | Mod: ANES,,, | Performed by: ANESTHESIOLOGY

## 2023-06-13 PROCEDURE — 43239 EGD BIOPSY SINGLE/MULTIPLE: CPT | Performed by: INTERNAL MEDICINE

## 2023-06-13 PROCEDURE — 88305 TISSUE EXAM BY PATHOLOGIST: CPT | Performed by: STUDENT IN AN ORGANIZED HEALTH CARE EDUCATION/TRAINING PROGRAM

## 2023-06-13 PROCEDURE — 88305 TISSUE EXAM BY PATHOLOGIST: ICD-10-PCS | Mod: 26,,, | Performed by: STUDENT IN AN ORGANIZED HEALTH CARE EDUCATION/TRAINING PROGRAM

## 2023-06-13 PROCEDURE — 43239 PR EGD, FLEX, W/BIOPSY, SGL/MULTI: ICD-10-PCS | Mod: ,,, | Performed by: INTERNAL MEDICINE

## 2023-06-13 PROCEDURE — D9220A PRA ANESTHESIA: ICD-10-PCS | Mod: CRNA,,, | Performed by: NURSE ANESTHETIST, CERTIFIED REGISTERED

## 2023-06-13 PROCEDURE — 25000003 PHARM REV CODE 250: Performed by: NURSE ANESTHETIST, CERTIFIED REGISTERED

## 2023-06-13 PROCEDURE — 27200946 HC BRUSH, CYTOLOGY: Performed by: INTERNAL MEDICINE

## 2023-06-13 PROCEDURE — 87210 SMEAR WET MOUNT SALINE/INK: CPT | Performed by: INTERNAL MEDICINE

## 2023-06-13 PROCEDURE — 88342 IMHCHEM/IMCYTCHM 1ST ANTB: CPT | Mod: 26,,, | Performed by: STUDENT IN AN ORGANIZED HEALTH CARE EDUCATION/TRAINING PROGRAM

## 2023-06-13 PROCEDURE — D9220A PRA ANESTHESIA: Mod: ANES,,, | Performed by: ANESTHESIOLOGY

## 2023-06-13 PROCEDURE — 25000003 PHARM REV CODE 250: Performed by: INTERNAL MEDICINE

## 2023-06-13 PROCEDURE — 43239 EGD BIOPSY SINGLE/MULTIPLE: CPT | Mod: ,,, | Performed by: INTERNAL MEDICINE

## 2023-06-13 PROCEDURE — 37000008 HC ANESTHESIA 1ST 15 MINUTES: Performed by: INTERNAL MEDICINE

## 2023-06-13 RX ORDER — FLUCONAZOLE 200 MG/1
200 TABLET ORAL DAILY
Qty: 15 TABLET | Refills: 0 | Status: SHIPPED | OUTPATIENT
Start: 2023-06-13 | End: 2023-07-13

## 2023-06-13 RX ORDER — TIOTROPIUM BROMIDE 18 UG/1
18 CAPSULE ORAL; RESPIRATORY (INHALATION) 2 TIMES DAILY
COMMUNITY

## 2023-06-13 RX ORDER — SODIUM CHLORIDE 9 MG/ML
INJECTION, SOLUTION INTRAVENOUS CONTINUOUS
Status: DISCONTINUED | OUTPATIENT
Start: 2023-06-13 | End: 2023-06-13 | Stop reason: HOSPADM

## 2023-06-13 RX ORDER — LEVALBUTEROL INHALATION SOLUTION 0.63 MG/3ML
1 SOLUTION RESPIRATORY (INHALATION) EVERY 4 HOURS PRN
COMMUNITY

## 2023-06-13 RX ORDER — PROPOFOL 10 MG/ML
INJECTION, EMULSION INTRAVENOUS
Status: DISCONTINUED | OUTPATIENT
Start: 2023-06-13 | End: 2023-06-13

## 2023-06-13 RX ORDER — LIDOCAINE HYDROCHLORIDE 20 MG/ML
INJECTION INTRAVENOUS
Status: DISCONTINUED | OUTPATIENT
Start: 2023-06-13 | End: 2023-06-13

## 2023-06-13 RX ADMIN — PROPOFOL 50 MG: 10 INJECTION, EMULSION INTRAVENOUS at 01:06

## 2023-06-13 RX ADMIN — LIDOCAINE HYDROCHLORIDE 100 MG: 20 INJECTION, SOLUTION INTRAVENOUS at 01:06

## 2023-06-13 RX ADMIN — PROPOFOL 75 MG: 10 INJECTION, EMULSION INTRAVENOUS at 01:06

## 2023-06-13 RX ADMIN — PROPOFOL 25 MG: 10 INJECTION, EMULSION INTRAVENOUS at 02:06

## 2023-06-13 RX ADMIN — GLYCOPYRROLATE 0.2 MG: 0.2 INJECTION, SOLUTION INTRAMUSCULAR; INTRAVITREAL at 01:06

## 2023-06-13 RX ADMIN — PROPOFOL 25 MG: 10 INJECTION, EMULSION INTRAVENOUS at 01:06

## 2023-06-13 RX ADMIN — SODIUM CHLORIDE: 9 INJECTION, SOLUTION INTRAVENOUS at 01:06

## 2023-06-13 NOTE — PLAN OF CARE
Vss, inder po fluids, denies pain, ambulates easily. IV removed, catheter intact. Discharge instructions provided and states understanding. States ready to go home. Spoke with Dr Gmoes at bedside.  Discharged from facility with family per wheelchair.

## 2023-06-13 NOTE — ANESTHESIA PREPROCEDURE EVALUATION
06/13/2023  Luz Arias is a 57 y.o., female.      Pre-op Assessment    I have reviewed the Patient Summary Reports.     I have reviewed the Nursing Notes. I have reviewed the NPO Status.   I have reviewed the Medications.     Review of Systems  Anesthesia Hx:  No problems with previous Anesthesia    Social:  Non-Smoker    Cardiovascular:   Hypertension    Pulmonary:   Asthma    Hepatic/GI:   GERD    Neurological:   CVA    Endocrine:   Diabetes    Psych:   Psychiatric History depression          Physical Exam  General: Well nourished, Cooperative, Alert and Oriented    Airway:  Mallampati: II   Mouth Opening: Normal  TM Distance: Normal  Tongue: Normal  Neck ROM: Normal ROM    Dental:  Intact        Anesthesia Plan  Type of Anesthesia, risks & benefits discussed:    Anesthesia Type: Gen Natural Airway  Intra-op Monitoring Plan: Standard ASA Monitors  Induction:  IV  Informed Consent: Informed consent signed with the Patient and all parties understand the risks and agree with anesthesia plan.  All questions answered.   ASA Score: 3    Ready For Surgery From Anesthesia Perspective.     .

## 2023-06-13 NOTE — TRANSFER OF CARE
Anesthesia Transfer of Care Note    Patient: Luz Arias    Procedure(s) Performed: Procedure(s) (LRB):  EGD (ESOPHAGOGASTRODUODENOSCOPY) (N/A)    Patient location: GI    Anesthesia Type: general    Post pain: adequate analgesia    Post assessment: no apparent anesthetic complications and tolerated procedure well    Post vital signs: stable    Level of consciousness: awake and responds to stimulation    Nausea/Vomiting: no nausea/vomiting    Complications: none    Transfer of care protocol was followed      Last vitals:   Visit Vitals  BP (!) 158/67 (BP Location: Left arm, Patient Position: Lying)   Pulse 72   Temp 36.3 °C (97.4 °F) (Skin)   Resp 16   SpO2 99%   Breastfeeding No

## 2023-06-13 NOTE — PROVATION PATIENT INSTRUCTIONS
Discharge Summary/Instructions after an Endoscopic Procedure  Patient Name: Luz Arias  Patient MRN: [id number]  Patient YOB: 1965 Tuesday, June 13, 2023  Forrest Gomes MD  Dear patient,  As a result of recent federal legislation (The Federal Cures Act), you may   receive lab or pathology results from your procedure in your [facility]   account before your physician is able to contact you. Your physician or   their representative will relay the results to you with their   recommendations at their soonest availability.  Thank you,  RESTRICTIONS:  During your procedure today, you received medications for sedation.  These   medications may affect your judgment, balance and coordination.  Therefore,   for 24 hours, you have the following restrictions:   - DO NOT drive a car, operate machinery, make legal/financial decisions,   sign important papers or drink alcohol.    ACTIVITY:  Today: no heavy lifting, straining or running due to procedural   sedation/anesthesia.  The following day: return to full activity including work.  DIET:  Eat and drink normally unless instructed otherwise.     TREATMENT FOR COMMON SIDE EFFECTS:  - Mild abdominal pain, nausea, belching, bloating or excessive gas:  rest,   eat lightly and use a heating pad.  - Sore Throat: treat with throat lozenges and/or gargle with warm salt   water.  - Because air was used during the procedure, expelling large amounts of air   from your rectum or belching is normal.  - If a bowel prep was taken, you may not have a bowel movement for 1-3 days.    This is normal.  SYMPTOMS TO WATCH FOR AND REPORT TO YOUR PHYSICIAN:  1. Abdominal pain or bloating, other than gas cramps.  2. Chest pain.  3. Back pain.  4. Signs of infection such as: chills or fever occurring within 24 hours   after the procedure.  5. Rectal bleeding, which would show as bright red, maroon, or black stools.   (A tablespoon of blood from the rectum is not serious, especially if    hemorrhoids are present.)  6. Vomiting.  7. Weakness or dizziness.  GO DIRECTLY TO THE NEAREST EMERGENCY ROOM IF YOU HAVE ANY OF THE FOLLOWING:      Difficulty breathing              Chills and/or fever over 101 F   Persistent vomiting and/or vomiting blood   Severe abdominal pain   Severe chest pain   Black, tarry stools   Bleeding- more than one tablespoon   Any other symptom or condition that you feel may need urgent attention  Your doctor recommends these additional instructions:  If any biopsies were taken, your doctors clinic will contact you in 1 to 2   weeks with any results.  - Discharge patient to home.   - Advance diet as tolerated.   - Continue present medications.   - Await pathology results.   - Diflucan (fluconazole) 400 mg PO x 1 then 200 mg PO daily for total of 14   days  For questions, problems or results please call your physician - Forrest Gomes MD at Work:  [phone].  [facility], EMERGENCY ROOM PHONE NUMBER: [phone]  IF A COMPLICATION OR EMERGENCY SITUATION ARISES AND YOU ARE UNABLE TO REACH   YOUR PHYSICIAN - GO DIRECTLY TO THE EMERGENCY ROOM.  Forrest Gomes MD  6/13/2023 2:35:03 PM  This report has been verified and signed electronically.  Dear patient,  As a result of recent federal legislation (The Federal Cures Act), you may   receive lab or pathology results from your procedure in your [facility]   account before your physician is able to contact you. Your physician or   their representative will relay the results to you with their   recommendations at their soonest availability.  Thank you,  PROVATION

## 2023-06-14 VITALS
RESPIRATION RATE: 16 BRPM | DIASTOLIC BLOOD PRESSURE: 64 MMHG | SYSTOLIC BLOOD PRESSURE: 143 MMHG | TEMPERATURE: 97 F | OXYGEN SATURATION: 98 % | HEART RATE: 76 BPM

## 2023-06-14 LAB — KOH PREP SPEC: NORMAL

## 2023-06-14 NOTE — ANESTHESIA POSTPROCEDURE EVALUATION
Anesthesia Post Evaluation    Patient: Luz Arias    Procedure(s) Performed: Procedure(s) (LRB):  EGD (ESOPHAGOGASTRODUODENOSCOPY) (N/A)    Final Anesthesia Type: general      Patient location during evaluation: PACU  Patient participation: Yes- Able to Participate  Level of consciousness: awake and alert  Post-procedure vital signs: reviewed and stable  Pain management: adequate  Airway patency: patent    PONV status at discharge: No PONV  Anesthetic complications: no      Cardiovascular status: hemodynamically stable  Respiratory status: unassisted and room air  Hydration status: euvolemic  Follow-up not needed.          Vitals Value Taken Time   /64 06/13/23 1430     06/13/23 2135   Pulse 76 06/13/23 1430   Resp 16 06/13/23 1430   SpO2 98 % 06/13/23 1430         Event Time   Out of Recovery 15:02:32         Pain/Yamila Score: Yamila Score: 10 (6/13/2023  2:30 PM)

## 2023-06-15 NOTE — H&P
History & Physical - Short Stay  Gastroenterology      SUBJECTIVE:     Procedure: EGD    Chief Complaint/Indication for Procedure: Dysphagia    No medications prior to admission.       Review of patient's allergies indicates:   Allergen Reactions    Darunavir Anaphylaxis    Sulfa (sulfonamide antibiotics) Anaphylaxis    Sulfamethoxazole-trimethoprim Anaphylaxis and Diarrhea     Other reaction(s): Anaphylaxis, Finding of vomiting, Diarrhea    Temazepam      Sleep walking   Other reaction(s): Sleep related hallucinations    Pantoprazole Nausea And Vomiting    Pregabalin Other (See Comments)    Verapamil      Other reaction(s): Constipation    Zonisamide      Other reaction(s): Anaphylaxis, Diarrhea, Finding of vomiting, Anaphylaxis, Diarrhea, Finding of vomiting, Anaphylaxis, Diarrhea, Finding of vomiting    Metoclopramide Rash and Anxiety    Sucralfate Other (See Comments) and Nausea Only        Past Medical History:   Diagnosis Date    Aortic regurgitation     Asthma     Chronic fatigue     Chronic pain     Depression     Diabetes     Dysphagia     Gastritis     Gastroparesis     GERD (gastroesophageal reflux disease)     Hypertension     Intestinal metaplasia of gastric cardia     Irritable bowel syndrome     Sarcoidosis     Sleep apnea     Stroke     12/2015, mini stroke 2/2023     Past Surgical History:   Procedure Laterality Date    APPENDECTOMY      CHOLECYSTECTOMY      COLONOSCOPY  05/17/2019    COLONOSCOPY N/A 5/5/2020    Procedure: COLONOSCOPY;  Surgeon: Garrick López MD;  Location: [facility];  Service: Endoscopy;  Laterality: N/A;  with bx and stool specimen    COLONOSCOPY N/A 4/10/2023    Procedure: COLONOSCOPY;  Surgeon: Forrest Gomes MD;  Location: [facility];  Service: Endoscopy;  Laterality: N/A;    ELBOW SURGERY      ESOPHAGEAL MANOMETRY WITH MEASUREMENT OF IMPEDANCE N/A 6/29/2022    Procedure: MANOMETRY-ESOPHAGEAL-WITH IMPEDANCE;  Surgeon: Concetta Odonnell MD;  Location: [facility] ([address] [place] [address]);   Service: Endoscopy;  Laterality: N/A;  r/o rumination and supragastric belching  hold nortriptyline and norco 24 hours prior to procedure  fully vaccinated, instructions emailed to [email or web address]    ESOPHAGOGASTRODUODENOSCOPY N/A 5/5/2020    Procedure: EGD (ESOPHAGOGASTRODUODENOSCOPY);  Surgeon: Garrick López MD;  Location: [facility] ENDO;  Service: Endoscopy;  Laterality: N/A;  with biopsy    ESOPHAGOGASTRODUODENOSCOPY N/A 6/2/2021    Procedure: EGD (ESOPHAGOGASTRODUODENOSCOPY);  Surgeon: Garrick López MD;  Location: [facility] ENDO;  Service: Endoscopy;  Laterality: N/A;    ESOPHAGOGASTRODUODENOSCOPY N/A 6/28/2022    Procedure: ESOPHAGOGASTRODUODENOSCOPY (EGD);  Surgeon: Concetta Odonnell MD;  [facility]  Service: Endoscopy;  Laterality: N/A;  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  full liquid diet x3 days, clear liquid diet x1 day prior to procedure  fully vaccinated, instructions emailed to [email or web address]    ESOPHAGOGASTRODUODENOSCOPY N/A 6/13/2023    Procedure: EGD (ESOPHAGOGASTRODUODENOSCOPY);  Surgeon: Forrest Gomes MD;  Location: [facility];  Service: Endoscopy;  Laterality: N/A;    HAND SURGERY      HYSTERECTOMY      2005, age 39, KRISTAN fibroids    MEDIASTINOSCOPY      NECK SURGERY      UPPER GASTROINTESTINAL ENDOSCOPY  05/17/2019    WRIST SURGERY       Family History   Problem Relation Age of Onset    Diabetes Mother     Colon cancer Mother 65    Bladder Cancer Mother     Heart disease Mother     Heart disease Father     Kidney failure Father     Diabetes Father     Stroke Father     Heart attack Father     Colon cancer Sister 65    Diabetes Sister     Hypertension Sister     Seizures Brother     Colon polyps Sister     Diabetes Sister     Hypertension Sister     Irritable bowel syndrome Sister     Diabetes Sister     Hypertension Sister     Diabetes Sister     Hypertension Sister     Diabetes Brother     Breast cancer Neg Hx     Esophageal cancer Neg Hx     Stomach  cancer Neg Hx     Liver cancer Neg Hx     Liver disease Neg Hx     Crohn's disease Neg Hx     Celiac disease Neg Hx     Pancreatic cancer Neg Hx      Social History     Tobacco Use    Smoking status: Never    Smokeless tobacco: Never   Substance Use Topics    Alcohol use: Not Currently     Comment: no drinking anymore    Drug use: Never         OBJECTIVE:     Vital Signs (Most Recent)  Temp: 97.4 °F (36.3 °C) (06/13/23 1309)  Pulse: 76 (06/13/23 1430)  Resp: 16 (06/13/23 1430)  BP: (!) 143/64 (06/13/23 1430)  SpO2: 98 % (06/13/23 1430)    Physical Exam:                                                       GENERAL:  Comfortable, in no acute distress.                                 HEENT EXAM:  Nonicteric.  No adenopathy.  Oropharynx is clear.               NECK:  Supple.                                                               LUNGS:  Clear.                                                               CARDIAC:  Regular rate and rhythm.  S1, S2.  No murmur.                      ABDOMEN:  Soft, positive bowel sounds, nontender.  No hepatosplenomegaly or masses.  No rebound or guarding.                                             EXTREMITIES:  No edema.     MENTAL STATUS:  Normal, alert and oriented.      ASSESSMENT/PLAN:     Assessment: Dysphagia    Plan: EGD

## 2023-06-16 LAB
FINAL PATHOLOGIC DIAGNOSIS: NORMAL
GROSS: NORMAL
Lab: NORMAL
MICROSCOPIC EXAM: NORMAL

## 2023-07-13 ENCOUNTER — PATIENT MESSAGE (OUTPATIENT)
Dept: GASTROENTEROLOGY | Facility: CLINIC | Age: 58
End: 2023-07-13
Payer: MEDICARE

## 2023-07-25 ENCOUNTER — OFFICE VISIT (OUTPATIENT)
Dept: GASTROENTEROLOGY | Facility: CLINIC | Age: 58
End: 2023-07-25
Payer: MEDICARE

## 2023-07-25 DIAGNOSIS — R13.10 DYSPHAGIA, UNSPECIFIED TYPE: ICD-10-CM

## 2023-07-25 DIAGNOSIS — R07.89 NON-CARDIAC CHEST PAIN: ICD-10-CM

## 2023-07-25 DIAGNOSIS — K21.9 GASTROESOPHAGEAL REFLUX DISEASE, UNSPECIFIED WHETHER ESOPHAGITIS PRESENT: ICD-10-CM

## 2023-07-25 DIAGNOSIS — K22.4 JACKHAMMER ESOPHAGUS: Primary | ICD-10-CM

## 2023-07-25 PROCEDURE — 99214 PR OFFICE/OUTPT VISIT, EST, LEVL IV, 30-39 MIN: ICD-10-PCS | Mod: 95,,, | Performed by: INTERNAL MEDICINE

## 2023-07-25 PROCEDURE — 99214 OFFICE O/P EST MOD 30 MIN: CPT | Mod: 95,,, | Performed by: INTERNAL MEDICINE

## 2023-07-25 RX ORDER — ESOMEPRAZOLE MAGNESIUM 40 MG/1
40 CAPSULE, DELAYED RELEASE ORAL DAILY
Qty: 30 CAPSULE | Refills: 6 | Status: SHIPPED | OUTPATIENT
Start: 2023-07-25 | End: 2024-02-20

## 2023-07-25 NOTE — PROGRESS NOTES
The patient location is: home  The chief complaint leading to consultation is: gerd, dysphagia, chest spasms    Visit type: audiovisual    Face to Face time with patient: 25  30 minutes of total time spent on the encounter, which includes face to face time and non-face to face time preparing to see the patient (eg, review of tests), Obtaining and/or reviewing separately obtained history, Documenting clinical information in the electronic or other health record, Independently interpreting results (not separately reported) and communicating results to the patient/family/caregiver, or Care coordination (not separately reported).         Each patient to whom he or she provides medical services by telemedicine is:  (1) informed of the relationship between the physician and patient and the respective role of any other health care provider with respect to management of the patient; and (2) notified that he or she may decline to receive medical services by telemedicine and may withdraw from such care at any time.    Notes:        [facility] Consultation Note    Reason for Consult:    No chief complaint on file.        PCP:   Gab Levy       Referring MD:  Garrick López Md  [address] [place] [address]    Current GI: Armida Sullivan, NP/    HPI:  Luz Kamryn Arias is a 57 y.o. female referred to motility clinic for second opinion regarding the following problems:    GERD  Retrosternal pyrosis: rare   Regurgitation:few per week     MEDs:  nexium 40mg BID   Gaviscon prn     Dysphagia.     Problems with solids: few per week   Problems with liquids: occasional      Globus discomfort in throat. Resolved     Chest pain and spasm. Resolved         Eckardt Symptom Score  Dysphagia: 1  Regurgitation: 1  Retrosternal pain: 0  Weight Loss: 0  Total: 2      MEDS:   imdur 30 daily   Peppermint prn   nortriptyline 75mg   No longer on zanaflex     New chocking and coughing w fluid   CVA  2/2023    Candida esophagitis.  Treated w diflucan    Nausea.  Improved   Early satiety.  Occasional   Vomiting: resolved     Cyclical episodes of n/v with symptom free intervals between episodes:    DM gastroparesis     MEDS:   zofran   nortriptyline 75 for pain w some improvement    RUQ/epig pain/diffuse abd.  Improved      Bloating     Diarrhea and constipation   Miralax     Weight loss . Denies   171lb     Denies BRBPR, melena, weight loss.       Total visit time was 61   minutes, more than 50% of which was spent in face-to-face counseling with patient regarding symptoms, diagnostic results, prognosis, risks and benefits of treatment options, instructions for management, importance of compliance with chosen treatment options and coping strategies.      Previous Studies:   EGD 6/13/2023:  - Esophageal plaques were found, suspicious for  candidiasis. Brushings performed (-). Erythematous mucosa in the antrum (IM, focal).  ().  Normal duodenun.   Colonoscopy 4/10/2023: nl.hemorrhoids.  Repeat 5 yrs 4/2028  CT abdomen 06/23/2022:  No acute abnormality.  Previous cholecystectomy and hysterectomy.  Fatty infiltration of the liver.  Prior granulomatous infection.  No constipation a bowel obstruction.  Timed barium swallow 06/29/2022: Normal. 13 mm tablet passed is easily into the stomach without obstruction.  Contrast emptied within 1 minutes.  Manometry 06/30/2022: High LES pressure with incomplete relaxation.  Hypercontractile esophagus.  50% complete bolus clearance.  Hiatal hernia.  Abnormal multiple rapid swallow test.  No significant difference with provocative maneuvers.  No evidence of residual liquid at the end of 200 cc bolus.  No evidence of rumination.  EGD 06/28/2022: Normal esophagus (-).  Normal GE junction.  No puckering resistance or pop.  Flip with normal esophageal contractility.  Z-line irregular (-).  2 cm hiatal hernia.  Atrophic mucosa in the stomach.  Mapping performed (-).  Single gastric polyp  in cardia (hp).  Normal duodenum.  Manometry 04/08/2022:  Poor copy.  Normal relaxation of LES with swallows with normal IRP.  IRP 11. DCI 43647. 100% hypercontractile.  0% premature.  0% pan esophageal.  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  Gastric emptying study 04/06/2022:  Delayed.  228 minutes retention of 32%.  CT abdomen 03/13/2022:  Hepatic steatosis.  Mild atelectasis scarring or pneumonia of the lung bases.  Correlate clinically.  EGD 6/2/2021 Gr A esophagitis.  Erythema in the stomach.  Biopsied.  Normal duodenum.  Pathology 06/03/2021:  Stomach random chemical reactive gastropathy, mild chronic gastritis and intestinal metaplasia.  EGD 05/17/2019:  Esophagus hernia with significant reflux esophagitis with edema erythema.  I am biopsies obtained.  Stomach antral portion of the stomach pre-pyloric edema with obvious slowly inflammation moderate severe gastritis histology as well as SANDRA test.  Duodenum mild edema and irritation duodenitis as well as dilation of the 2nd portion of duodenum biopsied visible portion taken above.  Gastric emptying study 10/09/2019:  Delayed.  9% at 4 H have time exceeds 125 minutes.  EGD 05/05/2020:  LA grade a esophagitis.  Erythema in the antrum biopsied.  Normal duodenum.  Pathology:  H pylori negative.  Mild chronic gastritis.  Colonoscopy 05/05/2020:  Hemorrhoids.  Abnormal mucosa in sigmoid colon (lymphoid aggregate).    Relevant Surgical History:    NA    ROS:  ROS     Complete ROS negative except as above    Medical History:   Past Medical History:   Diagnosis Date    Aortic regurgitation     Asthma     Chronic fatigue     Chronic pain     Depression     Diabetes     Dysphagia     Gastritis     Gastroparesis     GERD (gastroesophageal reflux disease)     Hypertension     Intestinal metaplasia of gastric cardia     Irritable bowel syndrome     Sarcoidosis     Sleep apnea     Stroke     12/2015, mini stroke 2/2023        Surgical History:   Past Surgical  History:   Procedure Laterality Date    APPENDECTOMY      CHOLECYSTECTOMY      COLONOSCOPY  05/17/2019    COLONOSCOPY N/A 5/5/2020    Procedure: COLONOSCOPY;  Surgeon: Garrick López MD;  Location: [facility];  Service: Endoscopy;  Laterality: N/A;  with bx and stool specimen    COLONOSCOPY N/A 4/10/2023    Procedure: COLONOSCOPY;  Surgeon: Forrest Gomes MD;  Location: [facility];  Service: Endoscopy;  Laterality: N/A;    ELBOW SURGERY      ESOPHAGEAL MANOMETRY WITH MEASUREMENT OF IMPEDANCE N/A 6/29/2022    Procedure: MANOMETRY-ESOPHAGEAL-WITH IMPEDANCE;  Surgeon: Concetta Odonnell MD;  Location: [facility] (4TH FLR);  Service: Endoscopy;  Laterality: N/A;  r/o rumination and supragastric belching  hold nortriptyline and norco 24 hours prior to procedure  fully vaccinated, instructions emailed to [email or web address]    ESOPHAGOGASTRODUODENOSCOPY N/A 5/5/2020    Procedure: EGD (ESOPHAGOGASTRODUODENOSCOPY);  Surgeon: Garrick López MD;  Location: [facility];  Service: Endoscopy;  Laterality: N/A;  with biopsy    ESOPHAGOGASTRODUODENOSCOPY N/A 6/2/2021    Procedure: EGD (ESOPHAGOGASTRODUODENOSCOPY);  Surgeon: Garrick López MD;  Location: [facility];  Service: Endoscopy;  Laterality: N/A;    ESOPHAGOGASTRODUODENOSCOPY N/A 6/28/2022    Procedure: ESOPHAGOGASTRODUODENOSCOPY (EGD);  Surgeon: Concetta Odonnell MD;  Location: [facility] (2ND FLR);  Service: Endoscopy;  Laterality: N/A;  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  full liquid diet x3 days, clear liquid diet x1 day prior to procedure  fully vaccinated, instructions emailed to [email or web address]    ESOPHAGOGASTRODUODENOSCOPY N/A 6/13/2023    Procedure: EGD (ESOPHAGOGASTRODUODENOSCOPY);  Surgeon: Forrest Gomes MD;  Location: [facility];  Service: Endoscopy;  Laterality: N/A;    HAND SURGERY      HYSTERECTOMY      2005, age 39, KRISTAN fibroids    MEDIASTINOSCOPY      NECK SURGERY      UPPER GASTROINTESTINAL ENDOSCOPY  05/17/2019    WRIST SURGERY           Family History:   Family History   Problem Relation Age of Onset    Diabetes Mother     Colon cancer Mother 65    Bladder Cancer Mother     Heart disease Mother     Heart disease Father     Kidney failure Father     Diabetes Father     Stroke Father     Heart attack Father     Colon cancer Sister 65    Diabetes Sister     Hypertension Sister     Seizures Brother     Colon polyps Sister     Diabetes Sister     Hypertension Sister     Irritable bowel syndrome Sister     Diabetes Sister     Hypertension Sister     Diabetes Sister     Hypertension Sister     Diabetes Brother     Breast cancer Neg Hx     Esophageal cancer Neg Hx     Stomach cancer Neg Hx     Liver cancer Neg Hx     Liver disease Neg Hx     Crohn's disease Neg Hx     Celiac disease Neg Hx     Pancreatic cancer Neg Hx         Social History:   Social History     Socioeconomic History    Marital status:    Tobacco Use    Smoking status: Never    Smokeless tobacco: Never   Substance and Sexual Activity    Alcohol use: Not Currently     Comment: no drinking anymore    Drug use: Never    Sexual activity: Not Currently        Review of patient's allergies indicates:   Allergen Reactions    Darunavir Anaphylaxis    Sulfa (sulfonamide antibiotics) Anaphylaxis    Sulfamethoxazole-trimethoprim Anaphylaxis and Diarrhea     Other reaction(s): Anaphylaxis, Finding of vomiting, Diarrhea    Temazepam      Sleep walking   Other reaction(s): Sleep related hallucinations    Pantoprazole Nausea And Vomiting    Pregabalin Other (See Comments)    Verapamil      Other reaction(s): Constipation    Zonisamide      Other reaction(s): Anaphylaxis, Diarrhea, Finding of vomiting, Anaphylaxis, Diarrhea, Finding of vomiting, Anaphylaxis, Diarrhea, Finding of vomiting    Metoclopramide Rash and Anxiety    Sucralfate Other (See Comments) and Nausea Only       Current Outpatient Medications   Medication Sig Dispense Refill    ALBUTEROL INHL Inhale into the lungs. Every 6  hrs prn      amLODIPine (NORVASC) 10 MG tablet Take 10 mg by mouth once daily.      aspirin (ECOTRIN) 81 MG EC tablet Take 81 mg by mouth once daily.      benzonatate (TESSALON) 100 MG capsule Take 100 mg by mouth 3 (three) times daily as needed.      biotin 10,000 mcg Cap Take by mouth.      candesartan (ATACAND) 32 MG tablet Take 32 mg by mouth once daily.      carBAMazepine (TEGRETOL XR) 400 MG Tb12 Take 400 mg by mouth 2 (two) times daily.      clotrimazole-betamethasone 1-0.05% (LOTRISONE) cream Apply to affected area 2 times daily 15 g 1    DEEP SEA NASAL 0.65 % nasal spray       diclofenac sodium (VOLTAREN) 1 % Gel Voltaren 1 % topical gel      diphenhydrAMINE-aluminum-magnesium hydroxide-simethicone-LIDOcaine HCl 2% Swish and swallow 5 mLs every 6 (six) hours as needed (throat irritation). 120 mL 2    EASY TOUCH ALCOHOL PREP PADS PadM Apply 1 each topically 3 (three) times daily.      ergocalciferol (ERGOCALCIFEROL) 50,000 unit Cap ergocalciferol (vitamin D2) 50,000 unit capsule      ergocalciferol, vitamin D2, (VITAMIN D ORAL) Take by mouth. Taking 2000 mg daily      esomeprazole (NEXIUM) 40 MG capsule Take 1 capsule (40 mg total) by mouth once daily. 30 capsule 6    FARXIGA 5 mg Tab tablet Take 5 mg by mouth.      fluticasone propionate (FLONASE) 50 mcg/actuation nasal spray       fluticasone-salmeterol 230-21 mcg/dose (ADVAIR HFA) 230-21 mcg/actuation HFAA inhaler Advair  mcg-21 mcg/actuation aerosol inhaler      FREESTYLE LANCETS 28 gauge lancets Apply topically 3 (three) times daily.      FREESTYLE LITE STRIPS Strp 1 strip 3 (three) times daily.      ipratropium (ATROVENT) 42 mcg (0.06 %) nasal spray 2 sprays 2 (two) times daily.      isosorbide mononitrate (IMDUR) 30 MG 24 hr tablet 30 mg.      levalbuterol (XOPENEX) 0.63 mg/3 mL nebulizer solution Take 1 ampule by nebulization every 4 (four) hours as needed for Wheezing. Rescue      lidocaine (LIDODERM) 5 %       mag hydrox/aluminum hyd/simeth  "(MAALOX ORAL)       melatonin 3 mg TbDL Take by mouth. prn      metFORMIN (GLUCOPHAGE-XR) 500 MG XR 24hr tablet Take 500 mg by mouth 2 (two) times daily with meals.      methylphenidate HCl (RITALIN) 10 MG tablet Take 10 mg by mouth once daily.      metoprolol tartrate 75 mg Tab <span ID="[id number]" style="Bold">zzMetoprolol Tartrate 50 mg oral tablet</span><br/>Start Date: 3/26/21<br/>Status: Ordered      nortriptyline (PAMELOR) 75 MG Cap TAKE 1 CAPSULE BY MOUTH EVERY NIGHT 1 HOUR BEFORE BEDTIME      ondansetron (ZOFRAN-ODT) 8 MG TbDL Take 1 tablet (8 mg total) by mouth every 6 (six) hours as needed (nausea). 50 tablet 2    prazosin (MINIPRESS) 2 MG Cap Take 2 mg by mouth once daily.      rivastigmine (EXELON) 9.5 mg/24 hour PT24 Place onto the skin.      rosuvastatin (CRESTOR) 40 MG Tab Take 40 mg by mouth once daily.      tiotropium (SPIRIVA) 18 mcg inhalation capsule Inhale 18 mcg into the lungs 2 (two) times a day. Controller      tiZANidine (ZANAFLEX) 4 MG tablet Take 4 mg by mouth every 8 (eight) hours.      TYLENOL EXTRA STRENGTH 500 mg tablet Take 500 mg by mouth.      valACYclovir (VALTREX) 500 MG tablet Take 1 tablet (500 mg total) by mouth once daily. 90 tablet 3    vitamin E 600 UNIT capsule Take 600 Units by mouth once daily.       No current facility-administered medications for this visit.        Objective Findings:  Vital Signs:  There were no vitals taken for this visit.  There is no height or weight on file to calculate BMI.    Physical Exam:  Physical Exam:limited due to video visit  General appearance: alert, cooperative, no distress  HENT: Normocephalic, atraumatic, neck symmetrical, no nasal discharge  Eyes: conjunctivae/corneas clear,  EOM's intact  Extremities: visible extremities symmetric; no clubbing, cyanosis, or edema  Integument: visible Skin color, texture, turgor normal; no rashes; hair distrubution normal  Neurologic: Alert and oriented X 3,  Psychiatric: no pressured " speech; normal affect; no evidence of impaired cognition      Labs:   Reviewed in Epic/record      Assessment and Plan:  Luz Arias is a 57 y.o. female with referred to [facility] for 2nd opinion regarding following problems:    GERD. 2cm HH.  Improved     Gr A esophagitis   -nexium 40mg daily    -Gaviscon prn    Dysphagia to solids  Regurgitation   Chest pain/spasms   Eckardt 2/12 from 6/12 w nortriptyline   No longer on zaflex, restoril  Manometry w GEJOO and hypercontractile esophagus   EGD negative   Flip negative   TBS negative  -PPI daily   -On imdur 30 daily for HTN/cardiac   -Nortriptyline 75 w psychiatry   -peppermint prn    Chocking and coughing w fluids.  Resolved   CVA 2/2023 per pt    -did not complete MBS.  Reschedule if symptoms reoccur     Globus.    Resolved w nortriptyline     Candida esophagitis   S/p diflucan w resolution of symptoms    Nausea.  Early satiety.  Vomiting  Possibly cyclical episodes of n/v  DM gastroparesis   Unable to tolerate reglan   On zofran   Improved w nortriptyline 75  -Def to ref GI     Dm   Jardiance, metformin    RUQ/epig pain/generalized  Improved w nortriptyline 75  -Def to ref GI     Rectal pain   -Def to ref GI     Bloating   -Def to ref GI     Diarrhea and constipation   On PEG  -Def to ref GI     GIM.  AM.  No fam of gastric cancer   Negative EGD w mapping 2022  -Repeat EGD in 6/2025  -Def to ref GI     Fatty liver   -Def to ref GI     FH CRC   Negative Colonoscopy 2020   Repeat 4/2028 w local GI     SHILPA  Hematochezia resolved   Colon w hemorrhoids.   -Def to primary GI     Chronic pain. Neuropathy   On zanaflex  On nortriptyline 75   On restoril     Discussed with PT that I am leaving [facility]. Pt will return to referring GI provider for long term GI care.   Pt verbalized understanding.      No follow-ups on file.    1. Jackhammer esophagus    2. Dysphagia, unspecified type    3. Non-cardiac chest pain    4. Gastroesophageal reflux disease,  unspecified whether esophagitis present              Order summary:  Orders Placed This Encounter    esomeprazole (NEXIUM) 40 MG capsule           Thank you so much for allowing me to participate in the care of Luz Odonnell MD      This note was created using voice recognition software, and may contain some unrecognized transcriptional errors.

## 2023-09-21 ENCOUNTER — PATIENT MESSAGE (OUTPATIENT)
Dept: GASTROENTEROLOGY | Facility: CLINIC | Age: 58
End: 2023-09-21
Payer: MEDICARE

## 2023-11-13 ENCOUNTER — PATIENT MESSAGE (OUTPATIENT)
Dept: SURGERY | Facility: CLINIC | Age: 58
End: 2023-11-13
Payer: MEDICARE

## 2023-12-04 ENCOUNTER — OFFICE VISIT (OUTPATIENT)
Dept: SURGERY | Facility: CLINIC | Age: 58
End: 2023-12-04
Payer: MEDICARE

## 2023-12-04 VITALS — HEART RATE: 80 BPM | SYSTOLIC BLOOD PRESSURE: 128 MMHG | TEMPERATURE: 98 F | DIASTOLIC BLOOD PRESSURE: 70 MMHG

## 2023-12-04 DIAGNOSIS — R15.0 INCOMPLETE DEFECATION: Primary | ICD-10-CM

## 2023-12-04 PROCEDURE — 99203 OFFICE O/P NEW LOW 30 MIN: CPT | Mod: S$GLB,,, | Performed by: STUDENT IN AN ORGANIZED HEALTH CARE EDUCATION/TRAINING PROGRAM

## 2023-12-04 PROCEDURE — 99203 PR OFFICE/OUTPT VISIT, NEW, LEVL III, 30-44 MIN: ICD-10-PCS | Mod: S$GLB,,, | Performed by: STUDENT IN AN ORGANIZED HEALTH CARE EDUCATION/TRAINING PROGRAM

## 2023-12-04 RX ORDER — DAPAGLIFLOZIN 10 MG/1
TABLET, FILM COATED ORAL
COMMUNITY
Start: 2023-02-13 | End: 2024-02-09

## 2023-12-04 NOTE — PROGRESS NOTES
History & Physical    SUBJECTIVE:     History of Present Illness:  Patient is a 58 y.o. female presents with intermittent anal pain with defecation, some bleeding.  She is more concerned about what sounds like fecal incontinence and leakage of stool on occasion.  Patient has been taking Anusol suppositories for the same.  She is currently not on a fiber supplement.  Patient has a history of esophageal dysmotility.    Chief Complaint   Patient presents with    Consult       Review of patient's allergies indicates:   Allergen Reactions    Sulfa (sulfonamide antibiotics) Anaphylaxis    Sulfamethoxazole-trimethoprim Anaphylaxis and Diarrhea     Other reaction(s): Anaphylaxis, Finding of vomiting, Diarrhea    Temazepam      Sleep walking   Other reaction(s): Sleep related hallucinations    Pantoprazole Nausea And Vomiting    Pregabalin Other (See Comments)    Verapamil      Other reaction(s): Constipation    Zonisamide      Other reaction(s): Anaphylaxis, Diarrhea, Finding of vomiting, Anaphylaxis, Diarrhea, Finding of vomiting, Anaphylaxis, Diarrhea, Finding of vomiting    Metoclopramide Rash and Anxiety    Sucralfate Other (See Comments) and Nausea Only       Current Outpatient Medications   Medication Sig Dispense Refill    ALBUTEROL INHL Inhale into the lungs. Every 6 hrs prn      amLODIPine (NORVASC) 10 MG tablet Take 10 mg by mouth once daily.      aspirin (ECOTRIN) 81 MG EC tablet Take 81 mg by mouth once daily.      benzonatate (TESSALON) 100 MG capsule Take 100 mg by mouth 3 (three) times daily as needed.      biotin 10,000 mcg Cap Take by mouth.      candesartan (ATACAND) 32 MG tablet Take 32 mg by mouth once daily.      carBAMazepine (TEGRETOL XR) 400 MG Tb12 Take 400 mg by mouth 2 (two) times daily.      clotrimazole-betamethasone 1-0.05% (LOTRISONE) cream Apply to affected area 2 times daily 15 g 1    diclofenac sodium (VOLTAREN) 1 % Gel Voltaren 1 % topical gel      diphenhydrAMINE-aluminum-magnesium  hydroxide-simethicone-LIDOcaine HCl 2% Swish and swallow 5 mLs every 6 (six) hours as needed (throat irritation). 120 mL 2    EASY TOUCH ALCOHOL PREP PADS PadM Apply 1 each topically 3 (three) times daily.      ergocalciferol (ERGOCALCIFEROL) 50,000 unit Cap ergocalciferol (vitamin D2) 50,000 unit capsule      ergocalciferol, vitamin D2, (VITAMIN D ORAL) Take by mouth. Taking 2000 mg daily      esomeprazole (NEXIUM) 40 MG capsule Take 1 capsule (40 mg total) by mouth once daily. 30 capsule 6    FARXIGA 10 mg tablet Take with plenty of water.Obtain advice for OTCs.Check with your doctor before becoming pregnant.      fluticasone propionate (FLONASE) 50 mcg/actuation nasal spray       fluticasone-salmeterol 230-21 mcg/dose (ADVAIR HFA) 230-21 mcg/actuation HFAA inhaler Advair  mcg-21 mcg/actuation aerosol inhaler      FREESTYLE LANCETS 28 gauge lancets Apply topically 3 (three) times daily.      FREESTYLE LITE STRIPS Strp 1 strip 3 (three) times daily.      ipratropium (ATROVENT) 42 mcg (0.06 %) nasal spray 2 sprays 2 (two) times daily.      isosorbide mononitrate (IMDUR) 30 MG 24 hr tablet 30 mg.      levalbuterol (XOPENEX) 0.63 mg/3 mL nebulizer solution Take 1 ampule by nebulization every 4 (four) hours as needed for Wheezing. Rescue      lidocaine (LIDODERM) 5 %       mag hydrox/aluminum hyd/simeth (MAALOX ORAL)       metFORMIN (GLUCOPHAGE-XR) 500 MG XR 24hr tablet Take 500 mg by mouth 2 (two) times daily with meals.      methylphenidate HCl (RITALIN) 10 MG tablet Take 10 mg by mouth once daily.      nortriptyline (PAMELOR) 75 MG Cap TAKE 1 CAPSULE BY MOUTH EVERY NIGHT 1 HOUR BEFORE BEDTIME      ondansetron (ZOFRAN-ODT) 8 MG TbDL Take 1 tablet (8 mg total) by mouth every 6 (six) hours as needed (nausea). 50 tablet 2    prazosin (MINIPRESS) 2 MG Cap Take 2 mg by mouth once daily.      rosuvastatin (CRESTOR) 40 MG Tab Take 40 mg by mouth once daily.      tiotropium (SPIRIVA) 18 mcg inhalation capsule Inhale  18 mcg into the lungs 2 (two) times a day. Controller      TYLENOL EXTRA STRENGTH 500 mg tablet Take 500 mg by mouth.      valACYclovir (VALTREX) 500 MG tablet Take 1 tablet (500 mg total) by mouth once daily. 90 tablet 3    vitamin E 600 UNIT capsule Take 600 Units by mouth once daily.      rivastigmine (EXELON) 9.5 mg/24 hour PT24 Place onto the skin.       No current facility-administered medications for this visit.       Past Medical History:   Diagnosis Date    Aortic regurgitation     Asthma     Chronic fatigue     Chronic pain     Depression     Diabetes     Dysphagia     Gastritis     Gastroparesis     GERD (gastroesophageal reflux disease)     Hypertension     Intestinal metaplasia of gastric cardia     Irritable bowel syndrome     Sarcoidosis     Sleep apnea     Stroke     12/2015, mini stroke 2/2023     Past Surgical History:   Procedure Laterality Date    APPENDECTOMY      CHOLECYSTECTOMY      COLONOSCOPY  05/17/2019    COLONOSCOPY N/A 5/5/2020    Procedure: COLONOSCOPY;  Surgeon: Garrick López MD;  Location: [facility];  Service: Endoscopy;  Laterality: N/A;  with bx and stool specimen    COLONOSCOPY N/A 4/10/2023    Procedure: COLONOSCOPY;  Surgeon: Forrest Gomes MD;  Location: [facility];  Service: Endoscopy;  Laterality: N/A;    ELBOW SURGERY      ESOPHAGEAL MANOMETRY WITH MEASUREMENT OF IMPEDANCE N/A 6/29/2022    Procedure: MANOMETRY-ESOPHAGEAL-WITH IMPEDANCE;  Surgeon: Concetta Odonnell MD;  Location: [facility] ([address] [place] [address]);  Service: Endoscopy;  Laterality: N/A;  r/o rumination and supragastric belching  hold nortriptyline and norco 24 hours prior to procedure  fully vaccinated, instructions emailed to [email or web address]    ESOPHAGOGASTRODUODENOSCOPY N/A 5/5/2020    Procedure: EGD (ESOPHAGOGASTRODUODENOSCOPY);  Surgeon: Garrick López MD;  Location: [facility];  Service: Endoscopy;  Laterality: N/A;  with biopsy    ESOPHAGOGASTRODUODENOSCOPY N/A 6/2/2021    Procedure: EGD  (ESOPHAGOGASTRODUODENOSCOPY);  Surgeon: Garrick López MD;  Location: [facility] ENDO;  Service: Endoscopy;  Laterality: N/A;    ESOPHAGOGASTRODUODENOSCOPY N/A 6/28/2022    Procedure: ESOPHAGOGASTRODUODENOSCOPY (EGD);  Surgeon: Concetta Odonnell MD;  Location: [facility] ENDO (2ND FLR);  Service: Endoscopy;  Laterality: N/A;  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  full liquid diet x3 days, clear liquid diet x1 day prior to procedure  fully vaccinated, instructions emailed to [email or web address]    ESOPHAGOGASTRODUODENOSCOPY N/A 6/13/2023    Procedure: EGD (ESOPHAGOGASTRODUODENOSCOPY);  Surgeon: Forrest Gomes MD;  Location: [facility] ENDO;  Service: Endoscopy;  Laterality: N/A;    HAND SURGERY      HYSTERECTOMY      2005, age 39, KRISTAN fibroids    MEDIASTINOSCOPY      NECK SURGERY      UPPER GASTROINTESTINAL ENDOSCOPY  05/17/2019    WRIST SURGERY       Family History   Problem Relation Age of Onset    Diabetes Mother     Colon cancer Mother 65    Bladder Cancer Mother     Heart disease Mother     Heart disease Father     Kidney failure Father     Diabetes Father     Stroke Father     Heart attack Father     Colon cancer Sister 65    Diabetes Sister     Hypertension Sister     Seizures Brother     Colon polyps Sister     Diabetes Sister     Hypertension Sister     Irritable bowel syndrome Sister     Diabetes Sister     Hypertension Sister     Diabetes Sister     Hypertension Sister     Diabetes Brother     Breast cancer Neg Hx     Esophageal cancer Neg Hx     Stomach cancer Neg Hx     Liver cancer Neg Hx     Liver disease Neg Hx     Crohn's disease Neg Hx     Celiac disease Neg Hx     Pancreatic cancer Neg Hx      Social History     Tobacco Use    Smoking status: Never    Smokeless tobacco: Never   Substance Use Topics    Alcohol use: Not Currently     Comment: no drinking anymore    Drug use: Never        Review of Systems:  Review of Systems   Constitutional:  Negative for fever.   HENT: Negative.     Eyes: Negative.     Respiratory: Negative.     Cardiovascular: Negative.    Gastrointestinal:  Positive for anal bleeding and rectal pain.   Endocrine: Negative.    Genitourinary: Negative.    Musculoskeletal: Negative.    Skin: Negative.    Allergic/Immunologic: Negative.    Neurological: Negative.    Hematological: Negative.    Psychiatric/Behavioral: Negative.         OBJECTIVE:     Vital Signs (Most Recent)  Temp: 97.5 °F (36.4 °C) (12/04/23 1336)  Pulse: 80 (12/04/23 1336)  BP: 128/70 (12/04/23 1336)           Physical Exam:  Physical Exam  Constitutional:       General: She is not in acute distress.     Appearance: Normal appearance. She is not ill-appearing, toxic-appearing or diaphoretic.   HENT:      Head: Normocephalic.      Nose: Nose normal.   Eyes:      Conjunctiva/sclera: Conjunctivae normal.   Cardiovascular:      Rate and Rhythm: Normal rate and regular rhythm.   Pulmonary:      Effort: Pulmonary effort is normal.   Abdominal:      Palpations: Abdomen is soft.   Genitourinary:     Comments: External anal exam is normal  Musculoskeletal:         General: Normal range of motion.      Cervical back: Normal range of motion.   Skin:     General: Skin is warm.   Neurological:      General: No focal deficit present.      Mental Status: She is alert.   Psychiatric:         Mood and Affect: Mood normal.           Diagnostic Results:  Recent colonoscopy was reviewed.  There is internal hemorrhoids.    ASSESSMENT/PLAN:     58-year-old female with fecal incontinence on occasion, anal pain, anal bleeding.  She is been taking any cell suppositories with the same.  She is not on a fiber supplement.  Denies prolapsing mass.  Recent colonoscopy was unremarkable.    PLAN:  Recommend that she start a fiber supplement over a stool softener which she is currently taking.  Discuss that if fiber supplementation does not alleviate symptoms, she may ultimately need to follow-up with a: Motility specialist to discuss defecography  [facility].

## 2024-01-20 ENCOUNTER — HOSPITAL ENCOUNTER (EMERGENCY)
Facility: HOSPITAL | Age: 59
Discharge: HOME | End: 2024-01-20
Attending: EMERGENCY MEDICINE
Payer: MEDICARE

## 2024-01-20 VITALS
TEMPERATURE: 98 F | HEART RATE: 74 BPM | OXYGEN SATURATION: 97 % | SYSTOLIC BLOOD PRESSURE: 120 MMHG | HEIGHT: 65 IN | RESPIRATION RATE: 20 BRPM | BODY MASS INDEX: 29.99 KG/M2 | WEIGHT: 180 LBS | DIASTOLIC BLOOD PRESSURE: 60 MMHG
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
## 2024-01-20 DIAGNOSIS — S09.90XA HEAD TRAUMA, INITIAL ENCOUNTER: ICD-10-CM

## 2024-01-20 DIAGNOSIS — S16.1XXA CERVICAL STRAIN, ACUTE, INITIAL ENCOUNTER: ICD-10-CM

## 2024-01-20 PROCEDURE — 86803 HEPATITIS C AB TEST: CPT | Performed by: EMERGENCY MEDICINE

## 2024-01-20 PROCEDURE — 72125 CT NECK SPINE W/O DYE: CPT | Mod: TC

## 2024-01-20 PROCEDURE — 72125 CT NECK SPINE W/O DYE: CPT | Mod: 26,,, | Performed by: RADIOLOGY

## 2024-01-20 PROCEDURE — 87389 HIV-1 AG W/HIV-1&-2 AB AG IA: CPT | Performed by: EMERGENCY MEDICINE

## 2024-01-20 PROCEDURE — 99284 EMERGENCY DEPT VISIT MOD MDM: CPT | Mod: 25

## 2024-01-20 PROCEDURE — 70450 CT HEAD/BRAIN W/O DYE: CPT | Mod: 26,,, | Performed by: RADIOLOGY

## 2024-01-20 PROCEDURE — 70450 CT HEAD/BRAIN W/O DYE: CPT | Mod: TC

## 2024-01-20 PROCEDURE — 25000003 PHARM REV CODE 250: Performed by: EMERGENCY MEDICINE

## 2024-01-20 RX ORDER — ACYCLOVIR 400 MG/1
TABLET ORAL
COMMUNITY
Start: 2023-01-24 | End: 2024-01-23

## 2024-01-20 RX ORDER — HYDROCODONE BITARTRATE AND HOMATROPINE METHYLBROMIDE ORAL SOLUTION 5; 1.5 MG/5ML; MG/5ML
5 LIQUID ORAL EVERY 4 HOURS PRN
COMMUNITY
Start: 2023-01-06

## 2024-01-20 RX ORDER — DULAGLUTIDE 1.5 MG/.5ML
INJECTION, SOLUTION SUBCUTANEOUS
COMMUNITY
Start: 2023-07-14 | End: 2024-08-15

## 2024-01-20 RX ORDER — ALBUTEROL SULFATE 90 UG/1
AEROSOL, METERED RESPIRATORY (INHALATION)
COMMUNITY
Start: 2023-11-22 | End: 2024-11-21

## 2024-01-20 RX ORDER — CLONIDINE HYDROCHLORIDE 0.1 MG/1
TABLET ORAL
COMMUNITY
Start: 2023-01-06 | End: 2024-06-05

## 2024-01-20 RX ORDER — GUAIFENESIN 600 MG/1
600 TABLET, EXTENDED RELEASE ORAL
COMMUNITY
Start: 2023-01-06

## 2024-01-20 RX ORDER — PROMETHAZINE HYDROCHLORIDE AND DEXTROMETHORPHAN HYDROBROMIDE 6.25; 15 MG/5ML; MG/5ML
5 SYRUP ORAL DAILY PRN
COMMUNITY
Start: 2022-12-27

## 2024-01-20 RX ORDER — TRIAMTERENE AND HYDROCHLOROTHIAZIDE 75; 50 MG/1; MG/1
TABLET ORAL
COMMUNITY
Start: 2022-09-29 | End: 2024-02-01

## 2024-01-20 RX ORDER — MEMANTINE HYDROCHLORIDE 10 MG/1
TABLET ORAL
COMMUNITY
Start: 2022-07-12 | End: 2024-12-10

## 2024-01-20 RX ORDER — BLOOD-GLUCOSE METER
KIT MISCELLANEOUS
COMMUNITY
Start: 2023-11-21

## 2024-01-20 RX ORDER — INSULIN GLARGINE 100 [IU]/ML
24 INJECTION, SOLUTION SUBCUTANEOUS NIGHTLY
COMMUNITY
Start: 2023-02-13 | End: 2024-07-12

## 2024-01-20 RX ORDER — FLUTICASONE FUROATE AND VILANTEROL TRIFENATATE 100; 25 UG/1; UG/1
1 POWDER RESPIRATORY (INHALATION)
COMMUNITY
Start: 2023-10-13

## 2024-01-20 RX ORDER — ARMODAFINIL 250 MG/1
250 TABLET ORAL
COMMUNITY
Start: 2024-01-09

## 2024-01-20 RX ORDER — FLUCONAZOLE 150 MG/1
TABLET ORAL
COMMUNITY
Start: 2023-03-01 | End: 2024-02-21

## 2024-01-20 RX ORDER — ACETAMINOPHEN 325 MG/1
650 TABLET ORAL
Status: COMPLETED | OUTPATIENT
Start: 2024-01-20 | End: 2024-01-20

## 2024-01-20 RX ORDER — ACYCLOVIR 50 MG/G
CREAM TOPICAL
COMMUNITY
Start: 2023-04-04 | End: 2024-03-08

## 2024-01-20 RX ORDER — DULAGLUTIDE 0.75 MG/.5ML
INJECTION, SOLUTION SUBCUTANEOUS
COMMUNITY
Start: 2023-02-13 | End: 2024-02-09

## 2024-01-20 RX ORDER — MAG CARB/ALUMINUM HYDROX/ALGIN 237.5-254
10 SUSPENSION, ORAL (FINAL DOSE FORM) ORAL
COMMUNITY
Start: 2023-01-03

## 2024-01-20 RX ADMIN — ACETAMINOPHEN 650 MG: 325 TABLET ORAL at 05:01

## 2024-01-20 NOTE — ED PROVIDER NOTES
Encounter Date: 1/20/2024       History     Chief Complaint   Patient presents with    Loss of Consciousness     Possible syncopal episode coming out of movie theater today PTA. C/o head and back pain.      Patient is a 58-year-old female, history of right-sided weakness and gait disturbance secondary to a stroke, who comes complaining of a fall while leaving a movie theater this afternoon.  She states she was trying to carry a bag of popcorn with the 1 hand, and her cane in the other, when she tripped on something and fell.  She states she struck the back of her head on something and although she has not sure, she thinks she may have lost consciousness momentarily.  She denies any new neurologic symptoms, but complains of generalized headache and generalized neck pain.  She has a history of cervical spine surgery in the past.  She has not tried any medications or treatments prior to coming here.  She states that she has some mild lower back soreness, but states that she does not think she injured her back.  Denies any other complaints.  Patient is wearing some sort of cardiac recording device on the anterior chest wall.  I asked her if she thought that she may have fainted, which caused her fall, and she stated that she was sure that she tripped on something.  Denies any palpitations or chest pain.  Patient takes daily aspirin secondary to her previous stroke.      Review of patient's allergies indicates:   Allergen Reactions    Sulfa (sulfonamide antibiotics) Anaphylaxis    Sulfamethoxazole-trimethoprim Anaphylaxis and Diarrhea     Other reaction(s): Anaphylaxis, Finding of vomiting, Diarrhea    Temazepam      Sleep walking   Other reaction(s): Sleep related hallucinations    Pantoprazole Nausea And Vomiting    Pregabalin Other (See Comments)    Verapamil      Other reaction(s): Constipation    Zonisamide      Other reaction(s): Anaphylaxis, Diarrhea, Finding of vomiting, Anaphylaxis, Diarrhea, Finding of  vomiting, Anaphylaxis, Diarrhea, Finding of vomiting    Metoclopramide Rash and Anxiety    Sucralfate Other (See Comments) and Nausea Only     Past Medical History:   Diagnosis Date    Aortic regurgitation     Asthma     Chronic fatigue     Chronic pain     Depression     Diabetes     Dysphagia     Gastritis     Gastroparesis     GERD (gastroesophageal reflux disease)     Hypertension     Intestinal metaplasia of gastric cardia     Irritable bowel syndrome     Sarcoidosis     Sleep apnea     Stroke     12/2015, mini stroke 2/2023     Past Surgical History:   Procedure Laterality Date    APPENDECTOMY      CHOLECYSTECTOMY      COLONOSCOPY  05/17/2019    COLONOSCOPY N/A 5/5/2020    Procedure: COLONOSCOPY;  Surgeon: Garrick López MD;  Location: [facility];  Service: Endoscopy;  Laterality: N/A;  with bx and stool specimen    COLONOSCOPY N/A 4/10/2023    Procedure: COLONOSCOPY;  Surgeon: Forrest Gomes MD;  Location: [facility];  Service: Endoscopy;  Laterality: N/A;    ELBOW SURGERY      ESOPHAGEAL MANOMETRY WITH MEASUREMENT OF IMPEDANCE N/A 6/29/2022    Procedure: MANOMETRY-ESOPHAGEAL-WITH IMPEDANCE;  Surgeon: Concetta Odonnell MD;  Location: [facility] (4TH FLR);  Service: Endoscopy;  Laterality: N/A;  r/o rumination and supragastric belching  hold nortriptyline and norco 24 hours prior to procedure  fully vaccinated, instructions emailed to [email or web address]    ESOPHAGOGASTRODUODENOSCOPY N/A 5/5/2020    Procedure: EGD (ESOPHAGOGASTRODUODENOSCOPY);  Surgeon: Garrick López MD;  Location: [facility];  Service: Endoscopy;  Laterality: N/A;  with biopsy    ESOPHAGOGASTRODUODENOSCOPY N/A 6/2/2021    Procedure: EGD (ESOPHAGOGASTRODUODENOSCOPY);  Surgeon: Garrick López MD;  Location: [facility];  Service: Endoscopy;  Laterality: N/A;    ESOPHAGOGASTRODUODENOSCOPY N/A 6/28/2022    Procedure: ESOPHAGOGASTRODUODENOSCOPY (EGD);  Surgeon: Concetta Odonnell MD;  Location: [facility] (2ND FLR);  Service: Endoscopy;   Laterality: N/A;  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  full liquid diet x3 days, clear liquid diet x1 day prior to procedure  fully vaccinated, instructions emailed to [email or web address]    ESOPHAGOGASTRODUODENOSCOPY N/A 6/13/2023    Procedure: EGD (ESOPHAGOGASTRODUODENOSCOPY);  Surgeon: Forrest Gomes MD;  Location: [facility];  Service: Endoscopy;  Laterality: N/A;    HAND SURGERY      HYSTERECTOMY      2005, age 39, KRISTAN fibroids    MEDIASTINOSCOPY      NECK SURGERY      UPPER GASTROINTESTINAL ENDOSCOPY  05/17/2019    WRIST SURGERY       Family History   Problem Relation Age of Onset    Diabetes Mother     Colon cancer Mother 65    Bladder Cancer Mother     Heart disease Mother     Heart disease Father     Kidney failure Father     Diabetes Father     Stroke Father     Heart attack Father     Colon cancer Sister 65    Diabetes Sister     Hypertension Sister     Seizures Brother     Colon polyps Sister     Diabetes Sister     Hypertension Sister     Irritable bowel syndrome Sister     Diabetes Sister     Hypertension Sister     Diabetes Sister     Hypertension Sister     Diabetes Brother     Breast cancer Neg Hx     Esophageal cancer Neg Hx     Stomach cancer Neg Hx     Liver cancer Neg Hx     Liver disease Neg Hx     Crohn's disease Neg Hx     Celiac disease Neg Hx     Pancreatic cancer Neg Hx      Social History     Tobacco Use    Smoking status: Never    Smokeless tobacco: Never   Substance Use Topics    Alcohol use: Not Currently     Comment: no drinking anymore    Drug use: Never     Review of Systems   Constitutional: Negative.    HENT: Negative.     Eyes: Negative.    Respiratory: Negative.     Cardiovascular: Negative.    Gastrointestinal: Negative.    Endocrine: Negative.    Genitourinary: Negative.    Musculoskeletal:  Positive for neck pain.   Skin: Negative.    Allergic/Immunologic: Negative.    Neurological:  Positive for headaches.   Hematological: Negative.    Psychiatric/Behavioral:  Negative.         Physical Exam     Initial Vitals [01/20/24 1709]   BP Pulse Resp Temp SpO2   135/67 75 20 97.7 °F (36.5 °C) 100 %      MAP       --         Physical Exam    Nursing note and vitals reviewed.  Constitutional: She appears well-developed and well-nourished. She is not diaphoretic. No distress.   HENT:   Head: Normocephalic and atraumatic.   Nose: Nose normal.   Mouth/Throat: Oropharynx is clear and moist. No oropharyngeal exudate.   Patient indicates that she has some tenderness to palpation of the scalp in the occipital parietal region.  There are no lacerations, abrasions, or other obvious injuries there.  No step-off or depression, no hematoma.   Eyes: Conjunctivae and EOM are normal. Pupils are equal, round, and reactive to light. No scleral icterus.   Neck: Neck supple. No JVD present.   Palpation of the patient's cervical spine reveals no tenderness, no obvious deformity, no ecchymosis, no erythema.    Normal range of motion.  Cardiovascular:  Normal rate, regular rhythm, normal heart sounds and intact distal pulses.           No murmur heard.  Pulmonary/Chest: Breath sounds normal. No stridor. No respiratory distress. She has no wheezes. She has no rhonchi. She has no rales.   Abdominal: Abdomen is soft. Bowel sounds are normal. She exhibits no distension. There is no abdominal tenderness.   Musculoskeletal:         General: No tenderness or edema. Normal range of motion.      Cervical back: Normal range of motion and neck supple.      Comments: Palpation of the patient's lumbar spine and thoracic spine reveals no abnormal curvature, no step-off or depression, no ecchymosis, erythema, or edema.  No tenderness to palpation over the spine.  Patient can bend forward from a seated position and straightened back up without any difficulty or pain.     Neurological: She is alert and oriented to person, place, and time. She has normal strength. No cranial nerve deficit or sensory deficit. GCS score is  15. GCS eye subscore is 4. GCS verbal subscore is 5. GCS motor subscore is 6.   Will sensory function both lower extremities, normal sensory function both upper extremities.  Motor strength slightly decreased in the right upper extremity and right lower extremity secondary to her stroke.  She denies any new onset weakness.   Skin: Skin is warm and dry. Capillary refill takes less than 2 seconds. No rash noted. No erythema.   Psychiatric: She has a normal mood and affect. Her behavior is normal.         ED Course   Procedures  Labs Reviewed   HIV 1 / 2 ANTIBODY   HEPATITIS C ANTIBODY          Imaging Results              CT Head Without Contrast (In process)                      CT Cervical Spine Without Contrast (In process)                   X-Rays:   Independently Interpreted Readings:   Other Readings:  CT brain, personally reviewed by me, shows no evidence of intracranial hemorrhage, no mass, mass effect, no skull fracture    CT cervical spine, personally reviewed by me, shows no evidence of fracture or subluxation.  Previous surgical interventions appear to be in place and intact.    Medications   acetaminophen tablet 650 mg (650 mg Oral Given 1/20/24 1735)     Medical Decision Making  Differential includes concussion, intracranial hemorrhage, cervical spine fractures or subluxation, cervical spine hardware disruption, cervical strain, other injuries, etc.    Patient was given Tylenol 650 mg for headache and neck pain, and CTs of the head and neck were performed.  These were personally reviewed by me.  No evidence of fracture, no intracranial hemorrhage, no subluxation of the cervical spine.  Patient will be discharged home.  She will take over-the-counter medications and previously prescribed medications for symptoms, follow-up with her PCP, return here as needed or if worse in any way.    Amount and/or Complexity of Data Reviewed  Radiology: ordered.    Risk  OTC drugs.                                       Clinical Impression:  Final diagnoses:  [W19.XXXA] Fall, initial encounter (Primary)  [S09.90XA] Head trauma, initial encounter  [S16.1XXA] Cervical strain, acute, initial encounter          ED Disposition Condition    Discharge Stable          ED Prescriptions    None       Follow-up Information    None          Juan José Camejo MD  01/21/24 0626       Juan José Camejo MD  01/21/24 0627

## 2024-01-21 LAB
HCV AB SERPL QL IA: NORMAL
HIV 1+2 AB+HIV1 P24 AG SERPL QL IA: NORMAL

## 2024-01-21 NOTE — DISCHARGE INSTRUCTIONS
Take your previously prescribed medications and treatments, follow-up with your primary care provider, return here as needed or if worse in any way.

## 2024-01-25 ENCOUNTER — PROCEDURE VISIT (OUTPATIENT)
Dept: OBSTETRICS AND GYNECOLOGY | Facility: CLINIC | Age: 59
End: 2024-01-25
Payer: MEDICARE

## 2024-01-25 ENCOUNTER — OFFICE VISIT (OUTPATIENT)
Dept: OBSTETRICS AND GYNECOLOGY | Facility: CLINIC | Age: 59
End: 2024-01-25
Payer: MEDICARE

## 2024-01-25 VITALS
BODY MASS INDEX: 28.65 KG/M2 | SYSTOLIC BLOOD PRESSURE: 128 MMHG | WEIGHT: 171.94 LBS | HEIGHT: 65 IN | DIASTOLIC BLOOD PRESSURE: 84 MMHG

## 2024-01-25 DIAGNOSIS — R19.00 PELVIC MASS: Primary | ICD-10-CM

## 2024-01-25 DIAGNOSIS — A60.9 HSV (HERPES SIMPLEX VIRUS) ANOGENITAL INFECTION: ICD-10-CM

## 2024-01-25 DIAGNOSIS — R19.00 PELVIC MASS: ICD-10-CM

## 2024-01-25 PROCEDURE — G0101 CA SCREEN;PELVIC/BREAST EXAM: HCPCS | Mod: S$GLB,,, | Performed by: OBSTETRICS & GYNECOLOGY

## 2024-01-25 PROCEDURE — 76856 US EXAM PELVIC COMPLETE: CPT | Mod: S$GLB,,, | Performed by: OBSTETRICS & GYNECOLOGY

## 2024-01-25 RX ORDER — VALACYCLOVIR HYDROCHLORIDE 500 MG/1
500 TABLET, FILM COATED ORAL DAILY
Qty: 90 TABLET | Refills: 3 | Status: SHIPPED | OUTPATIENT
Start: 2024-01-25 | End: 2025-01-24

## 2024-01-25 NOTE — PROGRESS NOTES
Medicare Breast and Pelvic    HPI:  Luz Arias is a 58 y.o. female  presents for a well woman exam.  LMP: No LMP recorded. Patient has had a hysterectomy..  No issues, problems, or complaints.    Past Medical History:   Diagnosis Date    Aortic regurgitation     Asthma     Chronic fatigue     Chronic pain     Depression     Diabetes     Dysphagia     Gastritis     Gastroparesis     GERD (gastroesophageal reflux disease)     Hypertension     Intestinal metaplasia of gastric cardia     Irritable bowel syndrome     Sarcoidosis     Sleep apnea     Stroke     2015, mini stroke 2023     Past Surgical History:   Procedure Laterality Date    APPENDECTOMY      CHOLECYSTECTOMY      COLONOSCOPY  2019    COLONOSCOPY N/A 2020    Procedure: COLONOSCOPY;  Surgeon: Garrick López MD;  Location: [facility];  Service: Endoscopy;  Laterality: N/A;  with bx and stool specimen    COLONOSCOPY N/A 4/10/2023    Procedure: COLONOSCOPY;  Surgeon: Forrest Gomes MD;  Location: [facility];  Service: Endoscopy;  Laterality: N/A;    ELBOW SURGERY      ESOPHAGEAL MANOMETRY WITH MEASUREMENT OF IMPEDANCE N/A 2022    Procedure: MANOMETRY-ESOPHAGEAL-WITH IMPEDANCE;  Surgeon: Concetta Odonnell MD;  Location: [facility] ([address] [place] [address]);  Service: Endoscopy;  Laterality: N/A;  r/o rumination and supragastric belching  hold nortriptyline and norco 24 hours prior to procedure  fully vaccinated, instructions emailed to [email or web address]    ESOPHAGOGASTRODUODENOSCOPY N/A 2020    Procedure: EGD (ESOPHAGOGASTRODUODENOSCOPY);  Surgeon: Garrick López MD;  Location: [facility];  Service: Endoscopy;  Laterality: N/A;  with biopsy    ESOPHAGOGASTRODUODENOSCOPY N/A 2021    Procedure: EGD (ESOPHAGOGASTRODUODENOSCOPY);  Surgeon: Garrick López MD;  Location: [facility] ENDO;  Service: Endoscopy;  Laterality: N/A;    ESOPHAGOGASTRODUODENOSCOPY N/A 2022    Procedure: ESOPHAGOGASTRODUODENOSCOPY (EGD);  Surgeon: Concetta Odonnell,  "MD;  Location: [facility] ENDO (2ND FLR);  Service: Endoscopy;  Laterality: N/A;  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  full liquid diet x3 days, clear liquid diet x1 day prior to procedure  fully vaccinated, instructions emailed to [email or web address]    ESOPHAGOGASTRODUODENOSCOPY N/A 2023    Procedure: EGD (ESOPHAGOGASTRODUODENOSCOPY);  Surgeon: Forrest Gomes MD;  Location: [facility] ENDO;  Service: Endoscopy;  Laterality: N/A;    HAND SURGERY      HYSTERECTOMY      , age 39, KRISTAN fibroids    MEDIASTINOSCOPY      NECK SURGERY      UPPER GASTROINTESTINAL ENDOSCOPY  2019    WRIST SURGERY       Social History     Socioeconomic History    Marital status:    Tobacco Use    Smoking status: Never    Smokeless tobacco: Never   Substance and Sexual Activity    Alcohol use: Not Currently     Comment: no drinking anymore    Drug use: Never    Sexual activity: Not Currently     Family History   Problem Relation Age of Onset    Diabetes Mother     Colon cancer Mother 65    Bladder Cancer Mother     Heart disease Mother     Heart disease Father     Kidney failure Father     Diabetes Father     Stroke Father     Heart attack Father     Colon cancer Sister 65    Diabetes Sister     Hypertension Sister     Seizures Brother     Colon polyps Sister     Diabetes Sister     Hypertension Sister     Irritable bowel syndrome Sister     Diabetes Sister     Hypertension Sister     Diabetes Sister     Hypertension Sister     Diabetes Brother     Breast cancer Neg Hx     Esophageal cancer Neg Hx     Stomach cancer Neg Hx     Liver cancer Neg Hx     Liver disease Neg Hx     Crohn's disease Neg Hx     Celiac disease Neg Hx     Pancreatic cancer Neg Hx      OB History          3    Para   1    Term           0    AB   2    Living   1         SAB        IAB   2    Ectopic        Multiple        Live Births   1                 /84 (BP Location: Right arm, Patient Position: Sitting)   Ht 5' 5" (1.651 " m)   Wt 78 kg (171 lb 15.3 oz)   BMI 28.62 kg/m²     ROS:  GENERAL: Denies weight gain or weight loss. Feeling well overall.   SKIN: Denies rash or lesions.   HEAD: Denies head injury or headache.   NODES: Denies enlarged lymph nodes.   CHEST: Denies chest pain or shortness of breath.   CARDIOVASCULAR: Denies palpitations or left sided chest pain.   ABDOMEN: No abdominal pain, constipation, diarrhea, nausea, vomiting or rectal bleeding.   URINARY: No frequency, dysuria, hematuria, or burning on urination.  REPRODUCTIVE: See HPI.   BREASTS: The patient performs breast self-examination and denies pain, lumps, or nipple discharge.   HEMATOLOGIC: No easy bruisability or excessive bleeding.   MUSCULOSKELETAL: Denies joint pain or swelling.   NEUROLOGIC: Denies syncope or weakness.   PSYCHIATRIC: Denies depression, anxiety or mood swings.    PHYSICAL EXAM:    APPEARANCE: Well nourished, well developed, in no acute distress.  AFFECT: WNL, alert and oriented x 3  SKIN: No acne or hirsutism  NECK: Neck symmetric without masses or thyromegaly  NODES: No inguinal, cervical, axillary, or femoral lymph node enlargement  CHEST: Good respiratory effect  ABDOMEN: Soft.  No tenderness or masses.  No hepatosplenomegaly.  No hernias.  BREASTS: Symmetrical, no skin changes or visible lesions.  No palpable masses, nipple discharge bilaterally.  PELVIC:    V/v atrophic, without discharge or lesions  Difficult exam but possible pelvic mass/ovarian mass noted      ICD-10-CM ICD-9-CM    1. Pelvic mass  R19.00 789.30 US OB/GYN Procedure ([facility])          Ultrasound to rule out ovarian mass    Mammogram up-to-date  Colonoscopy up-to-date

## 2024-05-20 ENCOUNTER — OFFICE VISIT (OUTPATIENT)
Dept: GASTROENTEROLOGY | Facility: CLINIC | Age: 59
End: 2024-05-20
Payer: MEDICARE

## 2024-05-20 VITALS
HEIGHT: 65 IN | BODY MASS INDEX: 30.12 KG/M2 | SYSTOLIC BLOOD PRESSURE: 128 MMHG | WEIGHT: 180.75 LBS | HEART RATE: 83 BPM | DIASTOLIC BLOOD PRESSURE: 62 MMHG

## 2024-05-20 DIAGNOSIS — D50.9 IRON DEFICIENCY ANEMIA, UNSPECIFIED IRON DEFICIENCY ANEMIA TYPE: ICD-10-CM

## 2024-05-20 DIAGNOSIS — R11.0 CHRONIC NAUSEA: ICD-10-CM

## 2024-05-20 DIAGNOSIS — K21.9 GASTROESOPHAGEAL REFLUX DISEASE, UNSPECIFIED WHETHER ESOPHAGITIS PRESENT: ICD-10-CM

## 2024-05-20 DIAGNOSIS — R10.84 GENERALIZED ABDOMINAL PAIN: ICD-10-CM

## 2024-05-20 DIAGNOSIS — K59.09 CHRONIC CONSTIPATION: Primary | ICD-10-CM

## 2024-05-20 DIAGNOSIS — R12 HEARTBURN: ICD-10-CM

## 2024-05-20 DIAGNOSIS — Z86.39 HISTORY OF DIABETIC GASTROPARESIS: ICD-10-CM

## 2024-05-20 DIAGNOSIS — K22.4 JACKHAMMER ESOPHAGUS: ICD-10-CM

## 2024-05-20 PROCEDURE — 99999 PR PBB SHADOW E&M-EST. PATIENT-LVL V: CPT | Mod: PBBFAC,,,

## 2024-05-20 PROCEDURE — 99213 OFFICE O/P EST LOW 20 MIN: CPT | Mod: S$PBB,,,

## 2024-05-20 PROCEDURE — 99215 OFFICE O/P EST HI 40 MIN: CPT | Mod: PBBFAC,PN

## 2024-05-20 NOTE — PROGRESS NOTES
Subjective:       Patient ID: Luz Arias is a 58 y.o. female Body mass index is 30.08 kg/m².    Chief Complaint: Abdominal Pain    This patient is new to me.  Referring Provider: Toñito Self for constipation.  Established patient of Dr. Landrum.     GI Problem  The primary symptoms include abdominal pain, nausea (chronic nausea hx of gastroparesis zofran helps with nausea) and vomiting (pt had abdominal pain and N/V had 5-6 episodes of vomiting up food no blood or coffee grounds seen, no vomiting now resolved). Primary symptoms do not include fever, weight loss, fatigue, diarrhea, melena, hematemesis, jaundice, hematochezia, dysuria, myalgias or arthralgias.   Onset: started chronically, generalized abdominal pain occurs intermittently last episode was saturday. The abdominal pain is generalized (denies rectal pain). The severity of the abdominal pain is 0/10 (not in  current pain, pain resolved). Relieved by: worsened by BMs, after vomiting and having a good BM.   The illness is also significant for dysphagia (hx of jackhammer esopahgus - followed closely by Dr. Odonnell reports DR. Odonnell is no longer at [facility]) and constipation (hx of chronic constipation, has a bm daily or will skip days rates stool type 1-5 on bristols tool scale, strains does not feel empty has tried miralax,linzess unaware of dose milk of magnesia, oral fiber with minor relieved, reports linzess was too strong). The illness does not include bloating. Significant associated medical issues include GERD. Associated medical issues do not include inflammatory bowel disease, gallstones, liver disease, alcohol abuse, PUD, gastric bypass, bowel resection, irritable bowel syndrome, hemorrhoids or diverticulitis.   Gastroesophageal Reflux  She complains of abdominal pain, dysphagia (hx of jackhammer esopahgus - followed closely by Dr. Odonnell reports DR. Odonnell is no longer at [facility]), heartburn and nausea (chronic nausea hx of gastroparesis zofran  helps with nausea). She reports no belching, no chest pain, no choking, no coughing, no early satiety, no globus sensation, no hoarse voice or no water brash. This is a chronic problem. The current episode started more than 1 year ago. The problem has been waxing and waning. The heartburn is located in the substernum. The heartburn does not wake her from sleep. The heartburn does not limit her activity. The heartburn doesn't change with position. Associated symptoms include anemia (hx of SHILPA had EGD and colonosocopy work up for further investigation, no source was found, not on current iron replacement). Pertinent negatives include no fatigue, melena, muscle weakness, orthopnea or weight loss. Attending MD: Forrest Gomes  Procedure: Upper GI endoscopy  Impression:            - Esophageal plaques were found, suspicious for                         candidiasis. Brushings performed.                         - Erythematous mucosa in the antrum. Biopsied.                         - Normal duodenal bulb, first portion of the                         duodenum and second portion of the duodenum.  -Biopsies from EGD are negative for abnormality.    Procedure Date: 4/10/2023  Attending MD: Forrest Gomes MD, [id number]  Procedure:             Colonoscopy  Impression:            - The entire examined colon is normal.                         - Tortuous colon.                         - Non-bleeding internal hemorrhoids.                         - No specimens collected.    -patient declines history of colon polyps, has family history of colon cancer mother and sister diagnosed in their 60s  . There are no known risk factors. She has tried a PPI (Patient on Nexium 40 mg orally daily for GERD) for the symptoms. The treatment provided moderate relief. Past procedures include an EGD. Past procedures do not include an abdominal ultrasound, esophageal manometry, esophageal pH monitoring, H. pylori antibody titer or a UGI. Past  invasive treatments do not include gastroplasty, gastroplication or reflux surgery.       Review of Systems   Constitutional:  Negative for fatigue, fever and weight loss.   HENT:  Negative for hoarse voice.    Respiratory:  Negative for cough and choking.    Cardiovascular:  Negative for chest pain.   Gastrointestinal:  Positive for abdominal pain, constipation (hx of chronic constipation, has a bm daily or will skip days rates stool type 1-5 on bristols tool scale, strains does not feel empty has tried miralax,linzess unaware of dose milk of magnesia, oral fiber with minor relieved, reports linzess was too strong), dysphagia (hx of jackhammer esopahgus - followed closely by Dr. Odonnell reports DR. Odonnell is no longer at [facility]), heartburn, nausea (chronic nausea hx of gastroparesis zofran helps with nausea) and vomiting (pt had abdominal pain and N/V had 5-6 episodes of vomiting up food no blood or coffee grounds seen, no vomiting now resolved). Negative for abdominal distention, anal bleeding, bloating, blood in stool, diarrhea, hematemesis, hematochezia, jaundice, melena and rectal pain.   Genitourinary:  Negative for dysuria.   Musculoskeletal:  Negative for arthralgias, myalgias and muscle weakness.         No LMP recorded. Patient has had a hysterectomy.  Past Medical History:   Diagnosis Date    Aortic regurgitation     Asthma     Chronic fatigue     Chronic pain     Depression     Diabetes     Dysphagia     Gastritis     Gastroparesis     GERD (gastroesophageal reflux disease)     Hypertension     Intestinal metaplasia of gastric cardia     Irritable bowel syndrome     Sarcoidosis     Sleep apnea     Stroke     12/2015, mini stroke 2/2023     Past Surgical History:   Procedure Laterality Date    APPENDECTOMY      CHOLECYSTECTOMY      COLONOSCOPY  05/17/2019    COLONOSCOPY N/A 5/5/2020    Procedure: COLONOSCOPY;  Surgeon: Garrick López MD;  Location: [facility];  Service: Endoscopy;  Laterality: N/A;  with bx  and stool specimen    COLONOSCOPY N/A 4/10/2023    Procedure: COLONOSCOPY;  Surgeon: Forrest Gomes MD;  Location: [facility];  Service: Endoscopy;  Laterality: N/A;    ELBOW SURGERY      ESOPHAGEAL MANOMETRY WITH MEASUREMENT OF IMPEDANCE N/A 6/29/2022    Procedure: MANOMETRY-ESOPHAGEAL-WITH IMPEDANCE;  Surgeon: Concetta Odonnell MD;  Location: [facility] (4TH FLR);  Service: Endoscopy;  Laterality: N/A;  r/o rumination and supragastric belching  hold nortriptyline and norco 24 hours prior to procedure  fully vaccinated, instructions emailed to [email or web address]    ESOPHAGOGASTRODUODENOSCOPY N/A 5/5/2020    Procedure: EGD (ESOPHAGOGASTRODUODENOSCOPY);  Surgeon: Garrick López MD;  Location: [facility];  Service: Endoscopy;  Laterality: N/A;  with biopsy    ESOPHAGOGASTRODUODENOSCOPY N/A 6/2/2021    Procedure: EGD (ESOPHAGOGASTRODUODENOSCOPY);  Surgeon: Garrick López MD;  Location: [facility];  Service: Endoscopy;  Laterality: N/A;    ESOPHAGOGASTRODUODENOSCOPY N/A 6/28/2022    Procedure: ESOPHAGOGASTRODUODENOSCOPY (EGD);  Surgeon: Concetta Odonnell MD;  Location: [facility] (2ND FLR);  Service: Endoscopy;  Laterality: N/A;  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  full liquid diet x3 days, clear liquid diet x1 day prior to procedure  fully vaccinated, instructions emailed to [email or web address]    ESOPHAGOGASTRODUODENOSCOPY N/A 6/13/2023    Procedure: EGD (ESOPHAGOGASTRODUODENOSCOPY);  Surgeon: Forrest Gomes MD;  Location: [facility];  Service: Endoscopy;  Laterality: N/A;    HAND SURGERY      HYSTERECTOMY      2005, age 39, KRISTAN fibroids    MEDIASTINOSCOPY      NECK SURGERY      UPPER GASTROINTESTINAL ENDOSCOPY  05/17/2019    WRIST SURGERY       Family History   Problem Relation Name Age of Onset    Diabetes Mother      Colon cancer Mother  65    Bladder Cancer Mother      Heart disease Mother      Heart disease Father      Kidney failure Father      Diabetes Father      Stroke Father      Heart  attack Father      Colon cancer Sister fabrice 65    Diabetes Sister fabrice     Hypertension Sister fabrice     Seizures Brother dinh     Colon polyps Sister jonathan     Diabetes Sister jonathan     Hypertension Sister jonathan     Irritable bowel syndrome Sister yury     Diabetes Sister yury     Hypertension Sister yury     Diabetes Sister prerna     Hypertension Sister prerna     Diabetes Brother clau     Breast cancer Neg Hx      Esophageal cancer Neg Hx      Stomach cancer Neg Hx      Liver cancer Neg Hx      Liver disease Neg Hx      Crohn's disease Neg Hx      Celiac disease Neg Hx      Pancreatic cancer Neg Hx       Social History     Tobacco Use    Smoking status: Never    Smokeless tobacco: Never   Substance Use Topics    Alcohol use: Not Currently     Comment: no drinking anymore    Drug use: Never     Wt Readings from Last 10 Encounters:   05/20/24 82 kg (180 lb 12.4 oz)   01/25/24 78 kg (171 lb 15.3 oz)   01/20/24 81.6 kg (180 lb)   04/10/23 83 kg (183 lb)   04/04/23 83.4 kg (183 lb 13.8 oz)   01/31/23 85.3 kg (188 lb)   07/09/22 79.4 kg (175 lb)   06/29/22 79.4 kg (175 lb)   06/28/22 79.4 kg (175 lb)   06/23/22 79.4 kg (175 lb)     Lab Results   Component Value Date    WBC 9.40 05/30/2023    HGB 11.3 (L) 05/30/2023    HCT 36.1 (L) 05/30/2023    MCV 84 05/30/2023     05/30/2023     CMP  Sodium   Date Value Ref Range Status   05/30/2023 138 136 - 145 mmol/L Final     Potassium   Date Value Ref Range Status   05/30/2023 3.6 3.5 - 5.1 mmol/L Final     Chloride   Date Value Ref Range Status   05/30/2023 97 95 - 110 mmol/L Final     CO2   Date Value Ref Range Status   05/30/2023 26 23 - 29 mmol/L Final     Glucose   Date Value Ref Range Status   05/30/2023 93 70 - 110 mg/dL Final     BUN   Date Value Ref Range Status   05/30/2023 13 6 - 20 mg/dL Final     Creatinine   Date Value Ref Range Status   05/30/2023 0.9 0.5 - 1.4 mg/dL Final     Calcium   Date Value Ref Range Status   05/30/2023 10.0 8.7 -  "10.5 mg/dL Final     Total Protein   Date Value Ref Range Status   05/30/2023 7.9 6.0 - 8.4 g/dL Final     Albumin   Date Value Ref Range Status   05/30/2023 4.3 3.5 - 5.2 g/dL Final     Total Bilirubin   Date Value Ref Range Status   05/30/2023 0.3 0.1 - 1.0 mg/dL Final     Comment:     For infants and newborns, interpretation of results should be based  on gestational age, weight and in agreement with clinical  observations.    Premature Infant recommended reference ranges:  Up to 24 hours.............<8.0 mg/dL  Up to 48 hours............<12.0 mg/dL  3-5 days..................<15.0 mg/dL  6-29 days.................<15.0 mg/dL       Alkaline Phosphatase   Date Value Ref Range Status   05/30/2023 62 55 - 135 U/L Final     AST   Date Value Ref Range Status   05/30/2023 20 10 - 40 U/L Final     ALT   Date Value Ref Range Status   05/30/2023 19 10 - 44 U/L Final     Anion Gap   Date Value Ref Range Status   05/30/2023 15 8 - 16 mmol/L Final     eGFR if    Date Value Ref Range Status   06/23/2022 >60.0 >60 mL/min/1.73 m^2 Final     eGFR if non    Date Value Ref Range Status   06/23/2022 >60.0 >60 mL/min/1.73 m^2 Final     Comment:     Calculation used to obtain the estimated glomerular filtration  rate (eGFR) is the CKD-EPI equation.        Lab Results   Component Value Date    LIPASE 14 05/30/2023     No results found for: "LIPASERES"  No results found for: "TSH"    Reviewed prior medical records including office visit 04/04/2023 ANTHONY Sullivan , office visit 07/25/2023 dr. Odonnell radiology report of x-ray abdomen 04/04/2023, CT abdomen 06/23/2022 & endoscopy history (see surgical history/procedures).    Objective:      Physical Exam  Vitals and nursing note reviewed.   Constitutional:       Appearance: Normal appearance. She is normal weight.   Cardiovascular:      Rate and Rhythm: Normal rate and regular rhythm.      Heart sounds: Normal heart sounds.   Pulmonary:      Breath sounds: " Normal breath sounds.   Abdominal:      General: Bowel sounds are normal.      Palpations: Abdomen is soft.      Tenderness: There is no abdominal tenderness.   Skin:     General: Skin is warm and dry.      Coloration: Skin is not jaundiced.   Neurological:      Mental Status: She is alert and oriented to person, place, and time.   Psychiatric:         Mood and Affect: Mood normal.         Behavior: Behavior normal.         Assessment:       1. Chronic constipation    2. Generalized abdominal pain    3. Chronic nausea    4. History of gastroparesis    5. Gastroesophageal reflux disease, unspecified whether esophagitis present    6. Heartburn    7. Iron deficiency anemia, unspecified iron deficiency anemia type    8. Jackhammer esophagus        Plan:       Chronic constipation  -  START   linaCLOtide (LINZESS) 72 mcg Cap capsule; Take 1 capsule (72 mcg total) by mouth before breakfast.  Dispense: 30 capsule; Refill: 2  -Recommend daily exercise as tolerated, adequate water intake (six 8-oz glasses of water daily), and high fiber diet. OTC fiber supplements are recommended if diet does not reach daily fiber goal (20-30 grams daily), such as Metamucil, Citrucel, or FiberCon (take as directed, separate from other oral medications by >2 hours).  -Recommend trying OTC MiraLax once daily (17g PO) as directed  -If no improvement with above recommendations, try intermittently dosed Dulcolax -If still no improvement with these measures, call/follow-up    Generalized abdominal pain  -     CT Abdomen Pelvis With IV Contrast Routine Oral Contrast; Future; Expected date: 05/20/2024  -start on constipation regimen    Chronic nausea, History of gastroparesis   Discussed diagnosis and possibly etiologies and that it can be idiopathetic, may also improve over time or can be lifelong, reviewed AVS educational material on diagnosis and given to patient, patient verbalized understanding  Recommend small frequent meals instead of 3 big  meals a day, low fat meals & low residue diet.  Avoid: high fiber (insoluble fiber), red meats, dairy, and non-digestible solids (peels, fruit pulp, etc). Avoid NSAIDs and narcotics.   -continue Zofran as needed for nausea    Gastroesophageal reflux disease, unspecified whether esophagitis present, Heartburn   -Continue Nexium 40 mg orally daily   -Take PPI 30min-1hr before eating breakfast  -Educated patient on lifestyle modifications to help control/reduce reflux/abdominal pain including: avoid large meals, avoid eating within 2-3 hours of bedtime (avoid late night eating & lying down soon after eating), elevate head of bed if nocturnal symptoms are present, smoking cessation (if current smoker), & weight loss (if overweight).   -Educated to avoid known foods which trigger reflux symptoms & to minimize/avoid high-fat foods, chocolate, caffeine, citrus, alcohol, & tomato products.  -Advised to avoid/limit use of NSAID's, since they can cause GI upset, bleeding, and/or ulcers. If needed, take with food.     Iron deficiency anemia, unspecified iron deficiency anemia type  -     Ambulatory referral/consult to Hematology / Oncology; Future; Expected date: 05/27/2024  - discussed with patient the different ways that anemia occurs: blood loss (such as from the gi tract), the body is not making enough, or the body is breaking down the rbcs too quickly; recommend EGD and colonoscopy to further evaluate gi tract for possible blood loss and pending results of endoscopies, possible UGI with Small Bowel Follow Through/video capsule study  -follow-up with PCP and/or hematology for continued evaluation and management    Jackhammer esophagus   -     Ambulatory referral/consult to Gastroenterology; Future; Expected date: 05/27/2024   -Referred to Dr. Julian for the motility clinic    Follow up if symptoms worsen or fail to improve.      If no improvement in symptoms or symptoms worsen, call/follow-up at clinic or go to ER.        [facility] - GASTROENTEROLOGY  [facility], [place]     Dictation software program was used for this note. Please expect some simple typographical  errors in this note.    Encounter includes face to face time and non-face to face time preparing to see the patient (eg, review of tests), obtaining and/or reviewing separately obtained history, documenting clinical information in the electronic or other health record, independently interpreting results (not separately reported) and communicating results to the patient/family/caregiver, or care coordination (not separately reported).

## 2024-05-22 ENCOUNTER — TELEPHONE (OUTPATIENT)
Dept: GASTROENTEROLOGY | Facility: CLINIC | Age: 59
End: 2024-05-22
Payer: OTHER GOVERNMENT

## 2024-05-22 NOTE — TELEPHONE ENCOUNTER
Received denial letter via [employer] for Linzess Capsule. Forwarding denial letter on to prescriber ANTHONY Song for review.

## 2024-05-22 NOTE — TELEPHONE ENCOUNTER
----- Message from Allyssa Song NP sent at 5/22/2024 12:23 PM CDT -----  Please let the patient know the Linzess was not approved for coverage by her insurance, in clinic we discussed she wanted to try over-the-counter options prior to starting the Linzess again.  Please let the patient know if over-the-counter constipation medications are not helping still to follow up and we can further discuss Linzess or any other constipation medications.      Thank you,  MRSA, FNP

## 2024-06-05 ENCOUNTER — OFFICE VISIT (OUTPATIENT)
Dept: HEMATOLOGY/ONCOLOGY | Facility: CLINIC | Age: 59
End: 2024-06-05
Payer: MEDICARE

## 2024-06-05 VITALS
HEART RATE: 79 BPM | SYSTOLIC BLOOD PRESSURE: 138 MMHG | WEIGHT: 183.63 LBS | DIASTOLIC BLOOD PRESSURE: 71 MMHG | RESPIRATION RATE: 12 BRPM | TEMPERATURE: 97 F | OXYGEN SATURATION: 98 % | BODY MASS INDEX: 30.56 KG/M2

## 2024-06-05 DIAGNOSIS — D50.9 IRON DEFICIENCY ANEMIA, UNSPECIFIED IRON DEFICIENCY ANEMIA TYPE: ICD-10-CM

## 2024-06-05 PROCEDURE — 99999 PR PBB SHADOW E&M-EST. PATIENT-LVL V: CPT | Mod: PBBFAC,,, | Performed by: INTERNAL MEDICINE

## 2024-06-05 PROCEDURE — 99215 OFFICE O/P EST HI 40 MIN: CPT | Mod: PBBFAC,PN | Performed by: INTERNAL MEDICINE

## 2024-06-05 PROCEDURE — 99204 OFFICE O/P NEW MOD 45 MIN: CPT | Mod: S$PBB,,, | Performed by: INTERNAL MEDICINE

## 2024-06-05 NOTE — PROGRESS NOTES
HPI    58 years old female referred to [facility] for evaluation of iron deficiency anemia.      Past Medical History:   Diagnosis Date    Aortic regurgitation     Asthma     Chronic fatigue     Chronic pain     Depression     Diabetes     Dysphagia     Gastritis     Gastroparesis     GERD (gastroesophageal reflux disease)     Hypertension     Intestinal metaplasia of gastric cardia     Irritable bowel syndrome     Sarcoidosis     Sleep apnea     Stroke     12/2015, mini stroke 2/2023     Social History     Socioeconomic History    Marital status:    Tobacco Use    Smoking status: Never    Smokeless tobacco: Never   Substance and Sexual Activity    Alcohol use: Not Currently     Comment: no drinking anymore    Drug use: Never    Sexual activity: Not Currently     Social Determinants of Health     Financial Resource Strain: Low Risk  (6/5/2024)    Overall Financial Resource Strain (CARDIA)     Difficulty of Paying Living Expenses: Not hard at all   Food Insecurity: No Food Insecurity (6/5/2024)    Hunger Vital Sign     Worried About Running Out of Food in the Last Year: Never true     Ran Out of Food in the Last Year: Never true   Physical Activity: Insufficiently Active (6/5/2024)    Exercise Vital Sign     Days of Exercise per Week: 2 days     Minutes of Exercise per Session: 40 min   Stress: Stress Concern Present (6/5/2024)    [employer] - Occupational Stress Questionnaire     Feeling of Stress : To some extent   Housing Stability: Unknown (6/5/2024)    Housing Stability Vital Sign     Unable to Pay for Housing in the Last Year: No         Subjective      Review of Systems   Constitutional: Negative for appetite change, fatigue and unexpected weight change.   HENT: Negative for mouth sores.   Eyes: Negative for visual disturbance.   Respiratory: Negative for cough and shortness of breath.   Cardiovascular: Negative for chest pain.   Gastrointestinal: Negative for  diarrhea.   Genitourinary: Negative for frequency.   Musculoskeletal: Negative for back pain.   Skin: Negative for rash.   Neurological: Negative for headaches.   Hematological: Negative for adenopathy.   Psychiatric/Behavioral: The patient is not nervous/anxious.   All other systems reviewed and are negative.     Objective    Physical Exam   There were no vitals filed for this visit.      Constitutional: patient is oriented to person, place, and time. patient appears well-developed and well-nourished. No distress.   HENT:   Right Ear: External ear normal.   Left Ear: External ear normal.   Nose: Nose normal.   Mouth/Throat: Oropharynx is clear and moist. No oropharyngeal exudate.   Teeth, gums and lips are normal   No sinus tenderness   Palate, tongue, posterior pharynx are normal   Eyes: Conjunctivae and lids are normal.   Neck: Trachea normal and normal range of motion. No thyromegaly   Cardiovascular: Normal rate, regular rhythm, normal heart sounds, intact distal pulses and normal pulses.   No murmur heard.   No edema, no tenderness in the extremities.   Pulmonary/Chest: Effort normal and breath sounds normal. No accessory muscle usage. patient has no wheezes..   Abdominal: Soft. Normal appearance and bowel sounds are normal. patient exhibits no distension and no mass. There is no hepatosplenomegaly. There is no tenderness.   Musculoskeletal: Normal range of motion.   Gait is normal   No clubbing, cyanosis     Lymphadenopathy:   Head (right side): No submental and no submandibular adenopathy present.   Head (left side): No submental and no submandibular adenopathy present.   patient has no cervical adenopathy.   Right: No supraclavicular adenopathy present.   Left: No supraclavicular adenopathy present.   Neurological: patient is alert and oriented to person, place, and time. patient has normal strength and normal reflexes. No sensory deficit. Gait normal.   Skin: Skin is warm, dry and intact. No bruising, no  lesion and no rash noted. No cyanosis. Nails show no clubbing.   No lesions   Psychiatric: patient has a normal mood and affect. patient speech is normal and behavior is normal. Judgment normal. Cognition and memory are normal.   Vitals reviewed.     Lab Results   Component Value Date    WBC 9.40 05/30/2023    HGB 11.3 (L) 05/30/2023    HCT 36.1 (L) 05/30/2023    MCV 84 05/30/2023     05/30/2023       CMP  Sodium   Date Value Ref Range Status   05/30/2023 138 136 - 145 mmol/L Final     Potassium   Date Value Ref Range Status   05/30/2023 3.6 3.5 - 5.1 mmol/L Final     Chloride   Date Value Ref Range Status   05/30/2023 97 95 - 110 mmol/L Final     CO2   Date Value Ref Range Status   05/30/2023 26 23 - 29 mmol/L Final     Glucose   Date Value Ref Range Status   05/30/2023 93 70 - 110 mg/dL Final     BUN   Date Value Ref Range Status   05/30/2023 13 6 - 20 mg/dL Final     Creatinine   Date Value Ref Range Status   05/30/2023 0.9 0.5 - 1.4 mg/dL Final     Calcium   Date Value Ref Range Status   05/30/2023 10.0 8.7 - 10.5 mg/dL Final     Total Protein   Date Value Ref Range Status   05/30/2023 7.9 6.0 - 8.4 g/dL Final     Albumin   Date Value Ref Range Status   05/30/2023 4.3 3.5 - 5.2 g/dL Final     Total Bilirubin   Date Value Ref Range Status   05/30/2023 0.3 0.1 - 1.0 mg/dL Final     Comment:     For infants and newborns, interpretation of results should be based  on gestational age, weight and in agreement with clinical  observations.    Premature Infant recommended reference ranges:  Up to 24 hours.............<8.0 mg/dL  Up to 48 hours............<12.0 mg/dL  3-5 days..................<15.0 mg/dL  6-29 days.................<15.0 mg/dL       Alkaline Phosphatase   Date Value Ref Range Status   05/30/2023 62 55 - 135 U/L Final     AST   Date Value Ref Range Status   05/30/2023 20 10 - 40 U/L Final     ALT   Date Value Ref Range Status   05/30/2023 19 10 - 44 U/L Final     Anion Gap   Date Value Ref Range  Status   05/30/2023 15 8 - 16 mmol/L Final     eGFR   Date Value Ref Range Status   05/30/2023 >60.0 >60 mL/min/1.73 m^2 Final         Assessment    Iron deficiency anemia    Upper EGD 06/13/2023:  Esophageal plaque found.  Suspicious for candidiasis.  Erythematous mucosa in the atrium.    Colonoscopy.  04/10/2023 Entire colonoscopy examination is normal recommendation RTC 5 years for screening.    CMP shows no evidence of renal dysfunction and normal calcium.    > recheck iron and CBC.  May continue taking oral iron supplements (patient declined due to indigestion and adverse side effect_)    Plan    Iron deficiency anemia, unspecified iron deficiency anemia type  -     Ambulatory referral/consult to Hematology / Oncology

## 2024-07-01 ENCOUNTER — HOSPITAL ENCOUNTER (OUTPATIENT)
Dept: RADIOLOGY | Facility: HOSPITAL | Age: 59
Discharge: HOME | End: 2024-07-01
Payer: MEDICARE

## 2024-07-01 DIAGNOSIS — R10.84 GENERALIZED ABDOMINAL PAIN: ICD-10-CM

## 2024-07-01 PROCEDURE — 74177 CT ABD & PELVIS W/CONTRAST: CPT | Mod: TC

## 2024-07-01 PROCEDURE — A9698 NON-RAD CONTRAST MATERIALNOC: HCPCS

## 2024-07-01 PROCEDURE — 74177 CT ABD & PELVIS W/CONTRAST: CPT | Mod: 26,,, | Performed by: RADIOLOGY

## 2024-07-01 PROCEDURE — 25500020 PHARM REV CODE 255

## 2024-07-01 RX ADMIN — IOHEXOL 75 ML: 350 INJECTION, SOLUTION INTRAVENOUS at 11:07

## 2024-07-01 RX ADMIN — IOHEXOL 1000 ML: 9 SOLUTION ORAL at 11:07

## 2024-07-02 ENCOUNTER — PATIENT MESSAGE (OUTPATIENT)
Dept: GASTROENTEROLOGY | Facility: CLINIC | Age: 59
End: 2024-07-02
Payer: OTHER GOVERNMENT

## 2024-07-22 ENCOUNTER — PATIENT MESSAGE (OUTPATIENT)
Dept: GASTROENTEROLOGY | Facility: CLINIC | Age: 59
End: 2024-07-22
Payer: OTHER GOVERNMENT

## 2024-07-23 DIAGNOSIS — R11.0 NAUSEA: ICD-10-CM

## 2024-07-23 RX ORDER — ONDANSETRON 8 MG/1
8 TABLET, ORALLY DISINTEGRATING ORAL EVERY 6 HOURS PRN
Qty: 50 TABLET | Refills: 2 | Status: SHIPPED | OUTPATIENT
Start: 2024-07-23

## 2024-08-06 ENCOUNTER — HOSPITAL ENCOUNTER (INPATIENT)
Facility: HOSPITAL | Age: 59
LOS: 1 days | Discharge: HOME | DRG: 178 | End: 2024-08-08
Attending: EMERGENCY MEDICINE | Admitting: STUDENT IN AN ORGANIZED HEALTH CARE EDUCATION/TRAINING PROGRAM
Payer: MEDICARE

## 2024-08-06 DIAGNOSIS — D86.0 PULMONARY SARCOIDOSIS: ICD-10-CM

## 2024-08-06 DIAGNOSIS — R05.9 COUGH: ICD-10-CM

## 2024-08-06 DIAGNOSIS — R55 SYNCOPE: Primary | ICD-10-CM

## 2024-08-06 DIAGNOSIS — R07.9 CHEST PAIN: ICD-10-CM

## 2024-08-06 DIAGNOSIS — R91.8 BILATERAL PULMONARY INFILTRATES ON CHEST X-RAY: ICD-10-CM

## 2024-08-06 LAB
ALBUMIN SERPL BCP-MCNC: 4 G/DL (ref 3.5–5.2)
ALP SERPL-CCNC: 78 U/L (ref 55–135)
ALT SERPL W/O P-5'-P-CCNC: 58 U/L (ref 10–44)
AMPHET+METHAMPHET UR QL: NEGATIVE
ANION GAP SERPL CALC-SCNC: 11 MMOL/L (ref 8–16)
AST SERPL-CCNC: 31 U/L (ref 10–40)
BACTERIA #/AREA URNS HPF: ABNORMAL /HPF
BARBITURATES UR QL SCN>200 NG/ML: NEGATIVE
BASOPHILS # BLD AUTO: 0.02 K/UL (ref 0–0.2)
BASOPHILS NFR BLD: 0.3 % (ref 0–1.9)
BENZODIAZ UR QL SCN>200 NG/ML: NEGATIVE
BILIRUB SERPL-MCNC: 0.2 MG/DL (ref 0.1–1)
BILIRUB UR QL STRIP: NEGATIVE
BNP SERPL-MCNC: <10 PG/ML (ref 0–99)
BUN SERPL-MCNC: 24 MG/DL (ref 6–20)
BZE UR QL SCN: NEGATIVE
CALCIUM SERPL-MCNC: 9.1 MG/DL (ref 8.7–10.5)
CANNABINOIDS UR QL SCN: NEGATIVE
CHLORIDE SERPL-SCNC: 104 MMOL/L (ref 95–110)
CLARITY UR: ABNORMAL
CO2 SERPL-SCNC: 24 MMOL/L (ref 23–29)
COLOR UR: YELLOW
CREAT SERPL-MCNC: 0.9 MG/DL (ref 0.5–1.4)
CREAT UR-MCNC: 66.1 MG/DL (ref 15–325)
DIFFERENTIAL METHOD BLD: ABNORMAL
EOSINOPHIL # BLD AUTO: 0.1 K/UL (ref 0–0.5)
EOSINOPHIL NFR BLD: 0.9 % (ref 0–8)
ERYTHROCYTE [DISTWIDTH] IN BLOOD BY AUTOMATED COUNT: 13.5 % (ref 11.5–14.5)
EST. GFR  (NO RACE VARIABLE): >60 ML/MIN/1.73 M^2
ETHANOL SERPL-MCNC: <10 MG/DL (ref 0–10)
GLUCOSE SERPL-MCNC: 154 MG/DL (ref 70–110)
GLUCOSE UR QL STRIP: NEGATIVE
HCT VFR BLD AUTO: 27.9 % (ref 37–48.5)
HGB BLD-MCNC: 8.9 G/DL (ref 12–16)
HGB UR QL STRIP: NEGATIVE
IMM GRANULOCYTES # BLD AUTO: 0.02 K/UL (ref 0–0.04)
IMM GRANULOCYTES NFR BLD AUTO: 0.3 % (ref 0–0.5)
KETONES UR QL STRIP: NEGATIVE
LEUKOCYTE ESTERASE UR QL STRIP: ABNORMAL
LYMPHOCYTES # BLD AUTO: 2.6 K/UL (ref 1–4.8)
LYMPHOCYTES NFR BLD: 32.5 % (ref 18–48)
MAGNESIUM SERPL-MCNC: 1.9 MG/DL (ref 1.6–2.6)
MCH RBC QN AUTO: 27.8 PG (ref 27–31)
MCHC RBC AUTO-ENTMCNC: 31.9 G/DL (ref 32–36)
MCV RBC AUTO: 87 FL (ref 82–98)
METHADONE UR QL SCN>300 NG/ML: NEGATIVE
MICROSCOPIC COMMENT: ABNORMAL
MONOCYTES # BLD AUTO: 0.6 K/UL (ref 0.3–1)
MONOCYTES NFR BLD: 7.8 % (ref 4–15)
NEUTROPHILS # BLD AUTO: 4.6 K/UL (ref 1.8–7.7)
NEUTROPHILS NFR BLD: 58.2 % (ref 38–73)
NITRITE UR QL STRIP: NEGATIVE
NRBC BLD-RTO: 0 /100 WBC
OPIATES UR QL SCN: NEGATIVE
PCP UR QL SCN>25 NG/ML: NEGATIVE
PH UR STRIP: 7 [PH] (ref 5–8)
PHOSPHATE SERPL-MCNC: 3.3 MG/DL (ref 2.7–4.5)
PLATELET # BLD AUTO: 222 K/UL (ref 150–450)
PMV BLD AUTO: 9.4 FL (ref 9.2–12.9)
POCT GLUCOSE: 133 MG/DL (ref 70–110)
POCT GLUCOSE: 155 MG/DL (ref 70–110)
POTASSIUM SERPL-SCNC: 4 MMOL/L (ref 3.5–5.1)
PROT SERPL-MCNC: 7 G/DL (ref 6–8.4)
PROT UR QL STRIP: NEGATIVE
RBC # BLD AUTO: 3.2 M/UL (ref 4–5.4)
SODIUM SERPL-SCNC: 139 MMOL/L (ref 136–145)
SP GR UR STRIP: 1.01 (ref 1–1.03)
SQUAMOUS #/AREA URNS HPF: 6 /HPF
TOXICOLOGY INFORMATION: NORMAL
TROPONIN I SERPL DL<=0.01 NG/ML-MCNC: <0.006 NG/ML (ref 0–0.03)
URN SPEC COLLECT METH UR: ABNORMAL
UROBILINOGEN UR STRIP-ACNC: NEGATIVE EU/DL
WBC # BLD AUTO: 7.91 K/UL (ref 3.9–12.7)
WBC #/AREA URNS HPF: 4 /HPF (ref 0–5)
YEAST URNS QL MICRO: ABNORMAL

## 2024-08-06 PROCEDURE — 71250 CT THORAX DX C-: CPT | Mod: TC

## 2024-08-06 PROCEDURE — 70450 CT HEAD/BRAIN W/O DYE: CPT | Mod: 26,,, | Performed by: RADIOLOGY

## 2024-08-06 PROCEDURE — 71045 X-RAY EXAM CHEST 1 VIEW: CPT | Mod: 26,,, | Performed by: RADIOLOGY

## 2024-08-06 PROCEDURE — 70450 CT HEAD/BRAIN W/O DYE: CPT | Mod: TC

## 2024-08-06 PROCEDURE — 96367 TX/PROPH/DG ADDL SEQ IV INF: CPT

## 2024-08-06 PROCEDURE — 25000003 PHARM REV CODE 250: Performed by: EMERGENCY MEDICINE

## 2024-08-06 PROCEDURE — 84100 ASSAY OF PHOSPHORUS: CPT | Performed by: EMERGENCY MEDICINE

## 2024-08-06 PROCEDURE — 83880 ASSAY OF NATRIURETIC PEPTIDE: CPT | Performed by: EMERGENCY MEDICINE

## 2024-08-06 PROCEDURE — 85025 COMPLETE CBC W/AUTO DIFF WBC: CPT | Performed by: EMERGENCY MEDICINE

## 2024-08-06 PROCEDURE — 63600175 PHARM REV CODE 636 W HCPCS: Performed by: EMERGENCY MEDICINE

## 2024-08-06 PROCEDURE — 71250 CT THORAX DX C-: CPT | Mod: 26,,, | Performed by: RADIOLOGY

## 2024-08-06 PROCEDURE — 71045 X-RAY EXAM CHEST 1 VIEW: CPT | Mod: TC

## 2024-08-06 PROCEDURE — 82962 GLUCOSE BLOOD TEST: CPT

## 2024-08-06 PROCEDURE — 82077 ASSAY SPEC XCP UR&BREATH IA: CPT | Performed by: EMERGENCY MEDICINE

## 2024-08-06 PROCEDURE — 80307 DRUG TEST PRSMV CHEM ANLYZR: CPT | Performed by: EMERGENCY MEDICINE

## 2024-08-06 PROCEDURE — 99285 EMERGENCY DEPT VISIT HI MDM: CPT | Mod: 25

## 2024-08-06 PROCEDURE — 96366 THER/PROPH/DIAG IV INF ADDON: CPT

## 2024-08-06 PROCEDURE — 83735 ASSAY OF MAGNESIUM: CPT | Performed by: EMERGENCY MEDICINE

## 2024-08-06 PROCEDURE — 81000 URINALYSIS NONAUTO W/SCOPE: CPT | Mod: 59 | Performed by: EMERGENCY MEDICINE

## 2024-08-06 PROCEDURE — 96375 TX/PRO/DX INJ NEW DRUG ADDON: CPT

## 2024-08-06 PROCEDURE — 80053 COMPREHEN METABOLIC PANEL: CPT | Performed by: EMERGENCY MEDICINE

## 2024-08-06 PROCEDURE — 84484 ASSAY OF TROPONIN QUANT: CPT | Performed by: EMERGENCY MEDICINE

## 2024-08-06 PROCEDURE — 96365 THER/PROPH/DIAG IV INF INIT: CPT

## 2024-08-06 RX ORDER — NALOXONE HCL 0.4 MG/ML
1 VIAL (ML) INJECTION
Status: COMPLETED | OUTPATIENT
Start: 2024-08-06 | End: 2024-08-06

## 2024-08-06 RX ORDER — INSULIN GLARGINE 100 [IU]/ML
24 INJECTION, SOLUTION SUBCUTANEOUS NIGHTLY
COMMUNITY

## 2024-08-06 RX ORDER — METHYLPREDNISOLONE SOD SUCC 125 MG
125 VIAL (EA) INJECTION
Status: COMPLETED | OUTPATIENT
Start: 2024-08-06 | End: 2024-08-06

## 2024-08-06 RX ORDER — MORPHINE SULFATE 2 MG/ML
2 INJECTION, SOLUTION INTRAMUSCULAR; INTRAVENOUS
Status: COMPLETED | OUTPATIENT
Start: 2024-08-06 | End: 2024-08-06

## 2024-08-06 RX ORDER — PROCHLORPERAZINE EDISYLATE 5 MG/ML
10 INJECTION INTRAMUSCULAR; INTRAVENOUS ONCE
Status: COMPLETED | OUTPATIENT
Start: 2024-08-06 | End: 2024-08-06

## 2024-08-06 RX ADMIN — AZITHROMYCIN MONOHYDRATE 500 MG: 500 INJECTION, POWDER, LYOPHILIZED, FOR SOLUTION INTRAVENOUS at 04:08

## 2024-08-06 RX ADMIN — SODIUM CHLORIDE, POTASSIUM CHLORIDE, SODIUM LACTATE AND CALCIUM CHLORIDE 1000 ML: 600; 310; 30; 20 INJECTION, SOLUTION INTRAVENOUS at 04:08

## 2024-08-06 RX ADMIN — METHYLPREDNISOLONE SODIUM SUCCINATE 125 MG: 125 INJECTION, POWDER, FOR SOLUTION INTRAMUSCULAR; INTRAVENOUS at 05:08

## 2024-08-06 RX ADMIN — CEFTRIAXONE SODIUM 1 G: 1 INJECTION, POWDER, FOR SOLUTION INTRAMUSCULAR; INTRAVENOUS at 04:08

## 2024-08-06 RX ADMIN — NALOXONE HYDROCHLORIDE 1 MG: 0.4 INJECTION, SOLUTION INTRAMUSCULAR; INTRAVENOUS; SUBCUTANEOUS at 02:08

## 2024-08-06 RX ADMIN — MORPHINE SULFATE 2 MG: 2 INJECTION, SOLUTION INTRAMUSCULAR; INTRAVENOUS at 07:08

## 2024-08-06 RX ADMIN — PROCHLORPERAZINE EDISYLATE 10 MG: 5 INJECTION INTRAMUSCULAR; INTRAVENOUS at 07:08

## 2024-08-06 NOTE — ED PROVIDER NOTES
History     Chief Complaint   Patient presents with    Altered Mental Status     Patient is somnolent. Spouse reported that she was eating breakfast when she slumped over. Prev stroke retrotonsillar abscess sided deficits. Patient was hypotensive upon arrival of EMS.         HPI:  Luz Arias is a 58 y.o. female with PMH as below who presents to the [facility] emergency department for evaluation of 6 weeks of cough, worsening, not alleviated by a course of Augmentin that she completed two days ago; now with severe fatigue. This proceeded to the point of syncope at home. She was hypotensive on EMS arrival which improved with fluids.     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; but now too fatigued to use it        PCP: Gab Levy MD    Review of patient's allergies indicates:   Allergen Reactions    Sulfa (sulfonamide antibiotics) Anaphylaxis    Sulfamethoxazole-trimethoprim Anaphylaxis and Diarrhea     Other reaction(s): Anaphylaxis, Finding of vomiting, Diarrhea    Temazepam      Sleep walking   Other reaction(s): Sleep related hallucinations    Pantoprazole Nausea And Vomiting    Pregabalin Other (See Comments)    Verapamil      Other reaction(s): Constipation    Zonisamide      Other reaction(s): Anaphylaxis, Diarrhea, Finding of vomiting, Anaphylaxis, Diarrhea, Finding of vomiting, Anaphylaxis, Diarrhea, Finding of vomiting    Metoclopramide Rash and Anxiety    Sucralfate Other (See Comments) and Nausea Only      Past Medical History:   Diagnosis Date    Aortic regurgitation     Asthma     Chronic fatigue     Chronic pain     Depression     Diabetes     Dysphagia     Gastritis     Gastroparesis     GERD (gastroesophageal reflux disease)     Hypertension     Intestinal metaplasia of gastric cardia     Irritable bowel syndrome      Sarcoidosis     Sleep apnea     Stroke     12/2015, mini stroke 2/2023     Past Surgical History:   Procedure Laterality Date    APPENDECTOMY      CHOLECYSTECTOMY      COLONOSCOPY  05/17/2019    COLONOSCOPY N/A 5/5/2020    Procedure: COLONOSCOPY;  Surgeon: Garrick López MD;  Location: [facility];  Service: Endoscopy;  Laterality: N/A;  with bx and stool specimen    COLONOSCOPY N/A 4/10/2023    Procedure: COLONOSCOPY;  Surgeon: Forrest Gomes MD;  Location: [facility];  Service: Endoscopy;  Laterality: N/A;    ELBOW SURGERY      ESOPHAGEAL MANOMETRY WITH MEASUREMENT OF IMPEDANCE N/A 6/29/2022    Procedure: MANOMETRY-ESOPHAGEAL-WITH IMPEDANCE;  Surgeon: Concetta Odonnell MD;  Location: [facility] (4TH FLR);  Service: Endoscopy;  Laterality: N/A;  r/o rumination and supragastric belching  hold nortriptyline and norco 24 hours prior to procedure  fully vaccinated, instructions emailed to [email or web address]    ESOPHAGOGASTRODUODENOSCOPY N/A 5/5/2020    Procedure: EGD (ESOPHAGOGASTRODUODENOSCOPY);  Surgeon: Garrick López MD;  Location: [facility];  Service: Endoscopy;  Laterality: N/A;  with biopsy    ESOPHAGOGASTRODUODENOSCOPY N/A 6/2/2021    Procedure: EGD (ESOPHAGOGASTRODUODENOSCOPY);  Surgeon: Garrick López MD;  Location: [facility];  Service: Endoscopy;  Laterality: N/A;    ESOPHAGOGASTRODUODENOSCOPY N/A 6/28/2022    Procedure: ESOPHAGOGASTRODUODENOSCOPY (EGD);  Surgeon: Concetta Odonnell MD;  Location: [facility] (2ND FLR);  Service: Endoscopy;  Laterality: N/A;  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  full liquid diet x3 days, clear liquid diet x1 day prior to procedure  fully vaccinated, instructions emailed to [email or web address]    ESOPHAGOGASTRODUODENOSCOPY N/A 6/13/2023    Procedure: EGD (ESOPHAGOGASTRODUODENOSCOPY);  Surgeon: Forrest Gomes MD;  Location: [facility];  Service: Endoscopy;  Laterality: N/A;    HAND SURGERY      HYSTERECTOMY      2005, age 39, KRISTAN fibroids    MEDIASTINOSCOPY       NECK SURGERY      UPPER GASTROINTESTINAL ENDOSCOPY  05/17/2019    WRIST SURGERY         Family History   Problem Relation Name Age of Onset    Diabetes Mother      Colon cancer Mother  65    Bladder Cancer Mother      Heart disease Mother      Heart disease Father      Kidney failure Father      Diabetes Father      Stroke Father      Heart attack Father      Colon cancer Sister fabrice 65    Diabetes Sister fabrice     Hypertension Sister fabrice     Seizures Brother dinh     Colon polyps Sister jonathan     Diabetes Sister jonathan     Hypertension Sister jonathan     Irritable bowel syndrome Sister yury     Diabetes Sister yury     Hypertension Sister yury     Diabetes Sister prerna     Hypertension Sister prerna     Diabetes Brother clau     Breast cancer Neg Hx      Esophageal cancer Neg Hx      Stomach cancer Neg Hx      Liver cancer Neg Hx      Liver disease Neg Hx      Crohn's disease Neg Hx      Celiac disease Neg Hx      Pancreatic cancer Neg Hx       Social History     Tobacco Use    Smoking status: Never    Smokeless tobacco: Never   Substance and Sexual Activity    Alcohol use: Not Currently     Comment: no drinking anymore    Drug use: Never    Sexual activity: Not Currently      Review of Systems     Review of Systems   Constitutional: Negative.  Negative for fever.   HENT: Negative.     Eyes: Negative.    Respiratory: Negative.     Cardiovascular: Negative.    Gastrointestinal: Negative.    Endocrine: Negative.    Genitourinary: Negative.    Musculoskeletal: Negative.    Skin: Negative.    Allergic/Immunologic: Negative.    Neurological: Negative.    Hematological: Negative.    Psychiatric/Behavioral: Negative.     All other systems reviewed and are negative.       Physical Exam     Initial Vitals [08/06/24 1253]   BP Pulse Resp Temp SpO2   109/62 70 18 98.2 °F (36.8 °C) 100 %      MAP       --          Nursing notes and vital signs reviewed.  Constitutional: Patient is in mild distress. Appears  "severely fatigued.   Head: Normocephalic. Atraumatic.   Eyes:  Conjunctivae are not pale. No scleral icterus.   ENT: Mucous membranes dry.   Neck: Supple.   Cardiovascular: Regular rate. Regular rhythm. No murmurs, rubs, or gallops   Pulmonary: tachypnea. Faint RLL crackles.   Abdominal: Non-distended.   Musculoskeletal: Moves all extremities. No obvious deformities.   Skin: Warm and dry.   Neurological:  Somnolent; long latency of speech & reaction. No acute lateralizing neurologic deficits appreciated.   Psychiatric: Normal affect.       ED Course   Procedures  Vitals:    08/06/24 2132 08/06/24 2203 08/06/24 2233 08/06/24 2303   BP: (!) 120/59 (!) 141/65 132/61 124/60   Pulse: 79 79 80 80   Resp: 15 14 14 14   Temp:       TempSrc:       SpO2: 95% 96% 97% 95%   Weight:       Height:        08/06/24 2330 08/06/24 2333 08/06/24 2348 08/07/24 0034   BP:  124/60  (!) 114/56   Pulse: 80 81 79 84   Resp: 16 (!) 21 18 15   Temp:   98.6 °F (37 °C)    TempSrc:   Oral    SpO2: 98% 98% 97% 95%   Weight:       Height:        08/07/24 0202 08/07/24 0234 08/07/24 0304 08/07/24 0400   BP: (!) 104/55 (!) 100/52 (!) 111/55 (!) 114/59   Pulse: 80 78 81 80   Resp: (!) 26 15 14 16   Temp:       TempSrc:       SpO2: 95% 95% 95% 97%   Weight:       Height:        08/07/24 0512 08/07/24 0542 08/07/24 0719   BP:  (!) 147/63    Pulse:  80 79   Resp: 20 20 18   Temp:  97.8 °F (36.6 °C)    TempSrc:  Temporal    SpO2:  100% 100%   Weight:  83 kg (182 lb 15.7 oz)    Height:  5' 5" (1.651 m)      Lab Results Interpreted as Abnormal:  Labs Reviewed   CBC W/ AUTO DIFFERENTIAL - Abnormal       Result Value    WBC 7.91      RBC 3.20 (*)     Hemoglobin 8.9 (*)     Hematocrit 27.9 (*)     MCV 87      MCH 27.8      MCHC 31.9 (*)     RDW 13.5      Platelets 222      MPV 9.4      Immature Granulocytes 0.3      Gran # (ANC) 4.6      Immature Grans (Abs) 0.02      Lymph # 2.6      Mono # 0.6      Eos # 0.1      Baso # 0.02      nRBC 0      Gran % 58.2   "    Lymph % 32.5      Mono % 7.8      Eosinophil % 0.9      Basophil % 0.3      Differential Method Automated     COMPREHENSIVE METABOLIC PANEL - Abnormal    Sodium 139      Potassium 4.0      Chloride 104      CO2 24      Glucose 154 (*)     BUN 24 (*)     Creatinine 0.9      Calcium 9.1      Total Protein 7.0      Albumin 4.0      Total Bilirubin 0.2      Alkaline Phosphatase 78      AST 31      ALT 58 (*)     eGFR >60.0      Anion Gap 11     URINALYSIS, REFLEX TO URINE CULTURE - Abnormal    Specimen UA Urine, Unspecified      Color, UA Yellow      Appearance, UA Hazy (*)     pH, UA 7.0      Specific Gravity, UA 1.015      Protein, UA Negative      Glucose, UA Negative      Ketones, UA Negative      Bilirubin (UA) Negative      Occult Blood UA Negative      Nitrite, UA Negative      Urobilinogen, UA Negative      Leukocytes, UA Trace (*)     Narrative:     Preferred Collection Type->Urine, Clean Catch  Specimen Source->Urine   URINALYSIS MICROSCOPIC - Abnormal    WBC, UA 4      Bacteria Many (*)     Yeast, UA Occasional (*)     Squam Epithel, UA 6      Microscopic Comment SEE COMMENT      Narrative:     Preferred Collection Type->Urine, Clean Catch  Specimen Source->Urine   POCT GLUCOSE - Abnormal    POCT Glucose 155 (*)    POCT GLUCOSE - Abnormal    POCT Glucose 133 (*)    POCT GLUCOSE - Abnormal    POCT Glucose 209 (*)    TROPONIN I    Troponin I <0.006     B-TYPE NATRIURETIC PEPTIDE    BNP <10     MAGNESIUM    Magnesium 1.9     PHOSPHORUS    Phosphorus 3.3     DRUG SCREEN PANEL, URINE EMERGENCY    Benzodiazepines Negative      Methadone metabolites Negative      Cocaine (Metab.) Negative      Opiate Scrn, Ur Negative      Barbiturate Screen, Ur Negative      Amphetamine Screen, Ur Negative      THC Negative      Phencyclidine Negative      Creatinine, Urine 66.1      Toxicology Information SEE COMMENT      Narrative:     Preferred Collection Type->Urine, Clean Catch  Specimen Source->Urine   ALCOHOL,MEDICAL  (ETHANOL)   ALCOHOL,MEDICAL (ETHANOL)    Alcohol, Serum <10     ANGIOTENSIN CONVERTING ENZYME   ISTAT PROCEDURE    POC PH 7.372      POC PCO2 43.7      POC PO2 41      POC HCO3 25.4      POC BE 0      POC SATURATED O2 75      POC TCO2 27      Sample VENOUS      Site Other      Allens Test N/A      DelSys Room Air      Mode SPONT      Sp02 100     POCT GLUCOSE MONITORING CONTINUOUS      All Lab Results:  Results for orders placed or performed during the hospital encounter of 08/06/24   CBC auto differential   Result Value Ref Range    WBC 7.91 3.90 - 12.70 K/uL    RBC 3.20 (L) 4.00 - 5.40 M/uL    Hemoglobin 8.9 (L) 12.0 - 16.0 g/dL    Hematocrit 27.9 (L) 37.0 - 48.5 %    MCV 87 82 - 98 fL    MCH 27.8 27.0 - 31.0 pg    MCHC 31.9 (L) 32.0 - 36.0 g/dL    RDW 13.5 11.5 - 14.5 %    Platelets 222 150 - 450 K/uL    MPV 9.4 9.2 - 12.9 fL    Immature Granulocytes 0.3 0.0 - 0.5 %    Gran # (ANC) 4.6 1.8 - 7.7 K/uL    Immature Grans (Abs) 0.02 0.00 - 0.04 K/uL    Lymph # 2.6 1.0 - 4.8 K/uL    Mono # 0.6 0.3 - 1.0 K/uL    Eos # 0.1 0.0 - 0.5 K/uL    Baso # 0.02 0.00 - 0.20 K/uL    nRBC 0 0 /100 WBC    Gran % 58.2 38.0 - 73.0 %    Lymph % 32.5 18.0 - 48.0 %    Mono % 7.8 4.0 - 15.0 %    Eosinophil % 0.9 0.0 - 8.0 %    Basophil % 0.3 0.0 - 1.9 %    Differential Method Automated    Comprehensive metabolic panel   Result Value Ref Range    Sodium 139 136 - 145 mmol/L    Potassium 4.0 3.5 - 5.1 mmol/L    Chloride 104 95 - 110 mmol/L    CO2 24 23 - 29 mmol/L    Glucose 154 (H) 70 - 110 mg/dL    BUN 24 (H) 6 - 20 mg/dL    Creatinine 0.9 0.5 - 1.4 mg/dL    Calcium 9.1 8.7 - 10.5 mg/dL    Total Protein 7.0 6.0 - 8.4 g/dL    Albumin 4.0 3.5 - 5.2 g/dL    Total Bilirubin 0.2 0.1 - 1.0 mg/dL    Alkaline Phosphatase 78 55 - 135 U/L    AST 31 10 - 40 U/L    ALT 58 (H) 10 - 44 U/L    eGFR >60.0 >60 mL/min/1.73 m^2    Anion Gap 11 8 - 16 mmol/L   Troponin I   Result Value Ref Range    Troponin I <0.006 0.000 - 0.026 ng/mL   Brain natriuretic  peptide   Result Value Ref Range    BNP <10 0 - 99 pg/mL   Magnesium   Result Value Ref Range    Magnesium 1.9 1.6 - 2.6 mg/dL   Phosphorus   Result Value Ref Range    Phosphorus 3.3 2.7 - 4.5 mg/dL   Urinalysis, Reflex to Urine Culture Urine, Clean Catch    Specimen: Urine   Result Value Ref Range    Specimen UA Urine, Unspecified     Color, UA Yellow Yellow, Straw, Lenore    Appearance, UA Hazy (A) Clear    pH, UA 7.0 5.0 - 8.0    Specific Gravity, UA 1.015 1.005 - 1.030    Protein, UA Negative Negative    Glucose, UA Negative Negative    Ketones, UA Negative Negative    Bilirubin (UA) Negative Negative    Occult Blood UA Negative Negative    Nitrite, UA Negative Negative    Urobilinogen, UA Negative Negative EU/dL    Leukocytes, UA Trace (A) Negative   Drug screen panel, emergency   Result Value Ref Range    Benzodiazepines Negative Negative    Methadone metabolites Negative Negative    Cocaine (Metab.) Negative Negative    Opiate Scrn, Ur Negative Negative    Barbiturate Screen, Ur Negative Negative    Amphetamine Screen, Ur Negative Negative    THC Negative Negative    Phencyclidine Negative Negative    Creatinine, Urine 66.1 15.0 - 325.0 mg/dL    Toxicology Information SEE COMMENT    Urinalysis Microscopic   Result Value Ref Range    WBC, UA 4 0 - 5 /hpf    Bacteria Many (A) None-Occ /hpf    Yeast, UA Occasional (A) None    Squam Epithel, UA 6 /hpf    Microscopic Comment SEE COMMENT    Ethanol   Result Value Ref Range    Alcohol, Serum <10 0 - 10 mg/dL   Hemoglobin A1c if not done in past 3 months   Result Value Ref Range    Hemoglobin A1C 6.9 (H) 4.0 - 5.6 %    Estimated Avg Glucose 151 (H) 68 - 131 mg/dL   CBC auto differential   Result Value Ref Range    WBC 11.70 3.90 - 12.70 K/uL    RBC 3.43 (L) 4.00 - 5.40 M/uL    Hemoglobin 9.7 (L) 12.0 - 16.0 g/dL    Hematocrit 29.8 (L) 37.0 - 48.5 %    MCV 87 82 - 98 fL    MCH 28.3 27.0 - 31.0 pg    MCHC 32.6 32.0 - 36.0 g/dL    RDW 13.5 11.5 - 14.5 %    Platelets 294  150 - 450 K/uL    MPV 9.1 (L) 9.2 - 12.9 fL    Immature Granulocytes 0.4 0.0 - 0.5 %    Gran # (ANC) 7.9 (H) 1.8 - 7.7 K/uL    Immature Grans (Abs) 0.05 (H) 0.00 - 0.04 K/uL    Lymph # 3.0 1.0 - 4.8 K/uL    Mono # 0.7 0.3 - 1.0 K/uL    Eos # 0.0 0.0 - 0.5 K/uL    Baso # 0.01 0.00 - 0.20 K/uL    nRBC 0 0 /100 WBC    Gran % 67.5 38.0 - 73.0 %    Lymph % 25.6 18.0 - 48.0 %    Mono % 6.3 4.0 - 15.0 %    Eosinophil % 0.1 0.0 - 8.0 %    Basophil % 0.1 0.0 - 1.9 %    Differential Method Automated    Comprehensive metabolic panel   Result Value Ref Range    Sodium 138 136 - 145 mmol/L    Potassium 4.2 3.5 - 5.1 mmol/L    Chloride 105 95 - 110 mmol/L    CO2 20 (L) 23 - 29 mmol/L    Glucose 146 (H) 70 - 110 mg/dL    BUN 18 6 - 20 mg/dL    Creatinine 0.8 0.5 - 1.4 mg/dL    Calcium 9.3 8.7 - 10.5 mg/dL    Total Protein 7.2 6.0 - 8.4 g/dL    Albumin 4.0 3.5 - 5.2 g/dL    Alkaline Phosphatase 78 55 - 135 U/L    AST 28 10 - 40 U/L    ALT 55 (H) 10 - 44 U/L    eGFR >60.0 >60 mL/min/1.73 m^2    Anion Gap 13 8 - 16 mmol/L   Magnesium   Result Value Ref Range    Magnesium 1.8 1.6 - 2.6 mg/dL   Phosphorus   Result Value Ref Range    Phosphorus 2.9 2.7 - 4.5 mg/dL   POCT glucose   Result Value Ref Range    POCT Glucose 155 (H) 70 - 110 mg/dL   POCT glucose   Result Value Ref Range    POCT Glucose 133 (H) 70 - 110 mg/dL   POCT glucose   Result Value Ref Range    POCT Glucose 209 (H) 70 - 110 mg/dL   ISTAT PROCEDURE   Result Value Ref Range    POC PH 7.372 7.35 - 7.45    POC PCO2 43.7 35 - 45 mmHg    POC PO2 41 40 - 60 mmHg    POC HCO3 25.4 24 - 28 mmol/L    POC BE 0 -2 to 2 mmol/L    POC SATURATED O2 75 95 - 100 %    POC TCO2 27 24 - 29 mmol/L    Sample VENOUS     Site Other     Allens Test N/A     DelSys Room Air     Mode SPONT     Sp02 100    POCT glucose   Result Value Ref Range    POCT Glucose 179 (H) 70 - 110 mg/dL   POCT glucose   Result Value Ref Range    POCT Glucose 130 (H) 70 - 110 mg/dL     Imaging Results              CT  Chest Without Contrast (Final result)  Result time 08/06/24 18:04:49      Final result by Gurpreet Oneill MD (08/06/24 18:04:49)                   Impression:      Bibasilar subsegmental bandlike opacities.  These are favored to reflect atelectasis, although early infectious or inflammatory process would be difficult to entirely exclude.  Clinical correlation is advised.    Scattered coronary artery calcification.      Electronically signed by: Gurpreet Oneill MD  Date:    08/06/2024  Time:    18:04               Narrative:    EXAMINATION:  CT CHEST WITHOUT CONTRAST    CLINICAL HISTORY:  Pneumonia, unresolved;sarcoidosis;    TECHNIQUE:  Low dose axial images, sagittal and coronal reformations were obtained from the thoracic inlet to the lung bases. Contrast was not administered.    COMPARISON:  Chest radiograph 08/06/2024    FINDINGS:  Examination of the vascular and soft tissue structures at the base of the neck is unremarkable.    The thoracic aorta maintains normal caliber, contour, and course with mild atherosclerotic calcification within its course.  The heart is not enlarged and there is no evidence of pericardial effusion.  There are scattered coronary artery calcifications present.The esophagus maintains a normal course and caliber. No bulky axillary lymph node enlargement.  No significant mediastinal or hilar lymph node enlargement appreciated allowing for lack of IV contrast.    The trachea is midline and the proximal airways are patent.  Detailed evaluation of the lung parenchyma is limited by respiratory motion.  There is no pneumothorax.  There are scattered subsegmental bandlike opacities at the lung bases.  There is no large confluent lobar consolidation.  There is a calcified right lower lobe granuloma.  There is no honeycombing.  There is no significant bronchiectasis.    Limited views of the upper abdomen demonstrate no acute abnormalities.  The gallbladder is surgically absent with stable mild  chronic prominence of the extrahepatic common bile duct.    Osseous structures demonstrate mild degenerative changes of the spine.                                       CT Head Without Contrast (Final result)  Result time 08/06/24 14:33:40      Final result by Santosh Tracey MD (08/06/24 14:33:40)                   Impression:      Mild cortical atrophy without acute intracranial abnormality.      Electronically signed by: Santosh Tracey  Date:    08/06/2024  Time:    14:33               Narrative:    EXAMINATION:  CT HEAD WITHOUT CONTRAST    CLINICAL HISTORY:  Mental status change, unknown cause;    TECHNIQUE:  Low dose axial images were obtained through the head.  Coronal and sagittal reformations were also performed. Contrast was not administered.    COMPARISON:  CT 01/20/2024.    FINDINGS:  There is no acute hemorrhage or infarction.  There is mild cortical atrophy.  There is a normal pattern of gray-white matter differentiation.    No extra-axial fluid collections.  Ventricles are normal in size, shape and configuration.  The basal cisterns are patent.    The imaged paranasal sinuses and ethmoid air cells are well aerated.    The mastoid air cells and middle ears are normally pneumatized.                                       X-Ray Chest AP Portable (Final result)  Result time 08/06/24 13:42:20      Final result by Santosh Tracey MD (08/06/24 13:42:20)                   Impression:      1. Hypoaeration slightly limits evaluation.  2. Subtle increased markings within the bilateral lower lobes may represent developing pulmonary infiltrates.  Correlate clinically with possible fever and/or elevated white count.  As deemed clinically necessary, further evaluation may be obtained with dedicated PA and lateral views of the chest.      Electronically signed by: Santosh Tracey  Date:    08/06/2024  Time:    13:42               Narrative:    EXAMINATION:  XR CHEST AP PORTABLE    CLINICAL HISTORY:  Cough,  unspecified    TECHNIQUE:  Portable view of the chest was performed.    COMPARISON:  None.    FINDINGS:  Pulmonary hypoinflation crowding of the bronchopulmonary vessels.  There are subtle increased markings within the bilateral lower lobes.  No significant pleural effusion.  Heart size is mildly enlarged.  Trachea midline.  Bony thorax intact.                                     ED Physician's independent review of the above imaging: agree with radiologist.    The EKG was ordered, reviewed, and independently interpreted by the ED Physician:  Rhythm: normal sinus  Rate: 71 bpm  No ST-T changes concerning for acute ischemia  Normal axis     The emergency physician reviewed the vital signs and test results, which are outlined above.     ED Discussion      Attempted transfer for pulm; transfer not accepted and pulm recommended admission here.       Dr. Eligio Guzman, [facility] medicine, accepts the patient for admission.     Admitting physician: Dr. Guzman  Admitting service:  Hospital Medicine   Admission type: Observation     ED Medication(s) Administered:  Medications   cefTRIAXone (Rocephin) 1 g in D5W 100 mL IVPB (MB+) (has no administration in time range)   azithromycin tablet 500 mg (has no administration in time range)   benzonatate capsule 100 mg (has no administration in time range)   dextromethorphan-guaiFENesin  mg/5 ml liquid 10 mL (10 mLs Oral Given 8/7/24 0510)   predniSONE tablet 40 mg (has no administration in time range)   albuterol-ipratropium 2.5 mg-0.5 mg/3 mL nebulizer solution 3 mL (has no administration in time range)   albuterol-ipratropium 2.5 mg-0.5 mg/3 mL nebulizer solution 3 mL (3 mLs Nebulization Given 8/7/24 7405)   0.9%  NaCl infusion ( Intravenous New Bag 8/7/24 6779)   aspirin EC tablet 81 mg (has no administration in time range)   fluticasone furoate-vilanteroL 100-25 mcg/dose diskus inhaler 1 puff (has no administration in time range)   carBAMazepine chewable tablet 400 mg (has  no administration in time range)   cholecalciferol (vitamin D3) capsule/tablet 1,200 Units (has no administration in time range)   insulin glargine U-100 (Lantus) pen 15 Units (15 Units Subcutaneous Given 8/7/24 0553)   atorvastatin tablet 40 mg (has no administration in time range)   sodium chloride 0.9% flush 10 mL (has no administration in time range)   naloxone 0.4 mg/mL injection 0.02 mg (has no administration in time range)   acetaminophen tablet 650 mg (has no administration in time range)   HYDROcodone-acetaminophen 5-325 mg per tablet 1 tablet (1 tablet Oral Given 8/7/24 0512)   ondansetron injection 4 mg (has no administration in time range)   prochlorperazine injection Soln 5 mg (has no administration in time range)   melatonin tablet 6 mg (has no administration in time range)   aluminum-magnesium hydroxide-simethicone 200-200-20 mg/5 mL suspension 30 mL (has no administration in time range)   simethicone chewable tablet 80 mg (has no administration in time range)   glucose chewable tablet 16 g (has no administration in time range)   glucose chewable tablet 24 g (has no administration in time range)   glucagon (human recombinant) injection 1 mg (has no administration in time range)   insulin aspart U-100 pen 0-5 Units (has no administration in time range)   dextrose 10% bolus 125 mL 125 mL (has no administration in time range)   dextrose 10% bolus 250 mL 250 mL (has no administration in time range)   naloxone 0.4 mg/mL injection 1 mg (1 mg Intravenous Given 8/6/24 1404)   lactated ringers bolus 1,000 mL (0 mLs Intravenous Stopped 8/6/24 1715)   cefTRIAXone (Rocephin) 1 g in D5W 100 mL IVPB (MB+) (0 g Intravenous Stopped 8/6/24 1645)   azithromycin (ZITHROMAX) 500 mg in D5W 250 mL IVPB (Vial-Mate) (0 mg Intravenous Stopped 8/6/24 1852)   methylPREDNISolone sodium succinate injection 125 mg (125 mg Intravenous Given 8/6/24 1723)   prochlorperazine injection Soln 10 mg (10 mg Intravenous Given 8/6/24 1957)    morphine injection 2 mg (2 mg Intravenous Given 8/6/24 1956)       Prescription Management: I performed a review of the patient's current Rx medication list as input by nursing staff.    Current Discharge Medication List        CONTINUE these medications which have NOT CHANGED    Details   albuterol (PROVENTIL/VENTOLIN HFA) 90 mcg/actuation inhaler Take or use exactly as directed.Obtain advice for OTCs.Shake well.For inhalation.      aluminum hydrox-magnesium carb (GAVISCON EXTRA STRENGTH) 254-237.5 mg/5 mL Susp 10 mLs.      amLODIPine (NORVASC) 10 MG tablet Take 10 mg by mouth once daily.      aspirin (ECOTRIN) 81 MG EC tablet Take 81 mg by mouth once daily.      benzonatate (TESSALON) 100 MG capsule Take 100 mg by mouth 3 (three) times daily as needed.      BREO ELLIPTA 100-25 mcg/dose diskus inhaler Inhale 1 puff into the lungs.      candesartan (ATACAND) 32 MG tablet Take 32 mg by mouth once daily.      carBAMazepine (TEGRETOL XR) 400 MG Tb12 Take 400 mg by mouth 2 (two) times daily.      diphenhydrAMINE-aluminum-magnesium hydroxide-simethicone-LIDOcaine HCl 2% Swish and swallow 5 mLs every 6 (six) hours as needed (throat irritation).  Qty: 120 mL, Refills: 2    Associated Diagnoses: Throat irritation      !! ergocalciferol (ERGOCALCIFEROL) 50,000 unit Cap       esomeprazole (NEXIUM) 40 MG capsule Take 1 capsule (40 mg total) by mouth once daily.  Qty: 30 capsule, Refills: 6      insulin glargine U-100, Lantus, (LANTUS SOLOSTAR U-100 INSULIN) 100 unit/mL (3 mL) InPn pen Inject 24 Units into the skin every evening.      ipratropium (ATROVENT) 42 mcg (0.06 %) nasal spray 2 sprays 2 (two) times daily.      isosorbide mononitrate (IMDUR) 30 MG 24 hr tablet 30 mg once daily.      LANTUS SOLOSTAR U-100 INSULIN glargine 100 units/mL SubQ pen 24 Units every evening.      metFORMIN (GLUCOPHAGE-XR) 500 MG XR 24hr tablet Take 500 mg by mouth 2 (two) times daily with meals.      nortriptyline (PAMELOR) 75 MG Cap TAKE 1  CAPSULE BY MOUTH EVERY NIGHT 1 HOUR BEFORE BEDTIME      ondansetron (ZOFRAN-ODT) 8 MG TbDL Take 1 tablet (8 mg total) by mouth every 6 (six) hours as needed (nausea).  Qty: 50 tablet, Refills: 2    Associated Diagnoses: Nausea      promethazine-dextromethorphan (PROMETHAZINE-DM) 6.25-15 mg/5 mL Syrp 5 mLs daily as needed.      rivastigmine (EXELON) 9.5 mg/24 hour PT24 Place onto the skin once daily.      rosuvastatin (CRESTOR) 40 MG Tab Take 40 mg by mouth once daily.      tiotropium (SPIRIVA) 18 mcg inhalation capsule Inhale 36 mcg into the lungs once daily. Controller      TYLENOL EXTRA STRENGTH 500 mg tablet Take 500 mg by mouth daily as needed.      valACYclovir (VALTREX) 500 MG tablet Take 1 tablet (500 mg total) by mouth once daily.  Qty: 90 tablet, Refills: 3    Associated Diagnoses: HSV (herpes simplex virus) anogenital infection      ALBUTEROL INHL Inhale into the lungs. Every 6 hrs prn      armodafiniL (NUVIGIL) 250 mg tablet 250 mg.      biotin 10,000 mcg Cap Take by mouth once daily.      cloNIDine (CATAPRES) 0.1 MG tablet PRN, PT DOES NOT HAVE BOTTLE WITH HER IN THE ER AND DOES NOT RECALL HOW OFTEN PRN      diclofenac sodium (VOLTAREN) 1 % Gel daily as needed.      EASY TOUCH ALCOHOL PREP PADS PadM Apply 1 each topically 3 (three) times daily.      !! ergocalciferol, vitamin D2, (VITAMIN D ORAL) Take by mouth once daily.      fluticasone propionate (FLONASE) 50 mcg/actuation nasal spray       fluticasone-salmeterol 230-21 mcg/dose (ADVAIR HFA) 230-21 mcg/actuation HFAA inhaler Advair  mcg-21 mcg/actuation aerosol inhaler      FREESTYLE FREEDOM LITE kit       FREESTYLE LANCETS 28 gauge lancets Apply topically 3 (three) times daily.      FREESTYLE LITE STRIPS Strp 1 strip 3 (three) times daily.      guaiFENesin (MUCINEX) 600 mg 12 hr tablet 600 mg.      hydrocodone-homatropine 5-1.5 mg/5 ml (HYDROMET) 5-1.5 mg/5 mL Syrp 5 mLs every 4 (four) hours as needed.      INSULIN LISPRO SUBQ MODERATE DOSE  SLIDING SCALE, SUBQ, ACHS, PRN other (see comment), 0 Refill(s), Maintenance      levalbuterol (XOPENEX) 0.63 mg/3 mL nebulizer solution Take 1 ampule by nebulization every 4 (four) hours as needed for Wheezing. Rescue      lidocaine (LIDODERM) 5 % daily as needed.      linaCLOtide (LINZESS) 72 mcg Cap capsule Take 1 capsule (72 mcg total) by mouth before breakfast.  Qty: 30 capsule, Refills: 2    Associated Diagnoses: Chronic constipation      mag hydrox/aluminum hyd/simeth (MAALOX ORAL)       memantine (NAMENDA) 10 MG Tab       methylphenidate HCl (RITALIN) 10 MG tablet Take 10 mg by mouth once daily.      prazosin (MINIPRESS) 2 MG Cap Take 2 mg by mouth once daily.      TRULICITY 1.5 mg/0.5 mL pen injector Refrigerate.Check with your doctor before becoming pregnant.Store in original package.      vitamin E 600 UNIT capsule Take 600 Units by mouth once daily.       !! - Potential duplicate medications found. Please discuss with provider.              Clinical Impression       ICD-10-CM ICD-9-CM   1. Syncope  R55 780.2   2. Cough  R05.9 786.2   3. Pulmonary sarcoidosis  D86.0 135     517.8   4. Bilateral pulmonary infiltrates on chest x-ray  R91.8 793.19   5. Chest pain  R07.9 786.50      ED Disposition Condition    Observation              Delbert Bonilla MD  08/07/24 0748

## 2024-08-07 PROBLEM — E11.9 TYPE 2 DIABETES MELLITUS, WITH LONG-TERM CURRENT USE OF INSULIN: Status: ACTIVE | Noted: 2024-08-07

## 2024-08-07 PROBLEM — Z79.4 TYPE 2 DIABETES MELLITUS, WITH LONG-TERM CURRENT USE OF INSULIN: Status: ACTIVE | Noted: 2024-08-07

## 2024-08-07 PROBLEM — J20.9 ACUTE BRONCHITIS: Status: ACTIVE | Noted: 2024-08-07

## 2024-08-07 PROBLEM — R55 SYNCOPE: Status: ACTIVE | Noted: 2024-08-07

## 2024-08-07 PROBLEM — D63.8 ANEMIA OF CHRONIC DISEASE: Status: ACTIVE | Noted: 2020-04-29

## 2024-08-07 PROBLEM — D86.0 PULMONARY SARCOIDOSIS: Status: ACTIVE | Noted: 2024-08-07

## 2024-08-07 PROBLEM — G47.33 OBSTRUCTIVE SLEEP APNEA: Status: ACTIVE | Noted: 2024-08-07

## 2024-08-07 PROBLEM — I10 ESSENTIAL HYPERTENSION: Status: ACTIVE | Noted: 2024-08-07

## 2024-08-07 PROBLEM — I69.351 HEMIPARESIS AFFECTING RIGHT SIDE AS LATE EFFECT OF CEREBROVASCULAR ACCIDENT: Status: ACTIVE | Noted: 2024-08-07

## 2024-08-07 LAB
ALBUMIN SERPL BCP-MCNC: 4 G/DL (ref 3.5–5.2)
ALLENS TEST: NORMAL
ALP SERPL-CCNC: 78 U/L (ref 55–135)
ALT SERPL W/O P-5'-P-CCNC: 55 U/L (ref 10–44)
ANION GAP SERPL CALC-SCNC: 13 MMOL/L (ref 8–16)
AST SERPL-CCNC: 28 U/L (ref 10–40)
BASOPHILS # BLD AUTO: 0.01 K/UL (ref 0–0.2)
BASOPHILS NFR BLD: 0.1 % (ref 0–1.9)
BILIRUB SERPL-MCNC: 0.3 MG/DL (ref 0.1–1)
BUN SERPL-MCNC: 18 MG/DL (ref 6–20)
CALCIUM SERPL-MCNC: 9.3 MG/DL (ref 8.7–10.5)
CHLORIDE SERPL-SCNC: 105 MMOL/L (ref 95–110)
CO2 SERPL-SCNC: 20 MMOL/L (ref 23–29)
CREAT SERPL-MCNC: 0.8 MG/DL (ref 0.5–1.4)
DELSYS: NORMAL
DIFFERENTIAL METHOD BLD: ABNORMAL
EOSINOPHIL # BLD AUTO: 0 K/UL (ref 0–0.5)
EOSINOPHIL NFR BLD: 0.1 % (ref 0–8)
ERYTHROCYTE [DISTWIDTH] IN BLOOD BY AUTOMATED COUNT: 13.5 % (ref 11.5–14.5)
EST. GFR  (NO RACE VARIABLE): >60 ML/MIN/1.73 M^2
ESTIMATED AVG GLUCOSE: 151 MG/DL (ref 68–131)
GLUCOSE SERPL-MCNC: 146 MG/DL (ref 70–110)
HBA1C MFR BLD: 6.9 % (ref 4–5.6)
HCO3 UR-SCNC: 25.4 MMOL/L (ref 24–28)
HCT VFR BLD AUTO: 29.8 % (ref 37–48.5)
HGB BLD-MCNC: 9.7 G/DL (ref 12–16)
IMM GRANULOCYTES # BLD AUTO: 0.05 K/UL (ref 0–0.04)
IMM GRANULOCYTES NFR BLD AUTO: 0.4 % (ref 0–0.5)
LYMPHOCYTES # BLD AUTO: 3 K/UL (ref 1–4.8)
LYMPHOCYTES NFR BLD: 25.6 % (ref 18–48)
MAGNESIUM SERPL-MCNC: 1.8 MG/DL (ref 1.6–2.6)
MCH RBC QN AUTO: 28.3 PG (ref 27–31)
MCHC RBC AUTO-ENTMCNC: 32.6 G/DL (ref 32–36)
MCV RBC AUTO: 87 FL (ref 82–98)
MODE: NORMAL
MONOCYTES # BLD AUTO: 0.7 K/UL (ref 0.3–1)
MONOCYTES NFR BLD: 6.3 % (ref 4–15)
NEUTROPHILS # BLD AUTO: 7.9 K/UL (ref 1.8–7.7)
NEUTROPHILS NFR BLD: 67.5 % (ref 38–73)
NRBC BLD-RTO: 0 /100 WBC
PCO2 BLDA: 43.7 MMHG (ref 35–45)
PH SMN: 7.37 [PH] (ref 7.35–7.45)
PHOSPHATE SERPL-MCNC: 2.9 MG/DL (ref 2.7–4.5)
PLATELET # BLD AUTO: 294 K/UL (ref 150–450)
PMV BLD AUTO: 9.1 FL (ref 9.2–12.9)
PO2 BLDA: 41 MMHG (ref 40–60)
POC BE: 0 MMOL/L
POC SATURATED O2: 75 % (ref 95–100)
POC TCO2: 27 MMOL/L (ref 24–29)
POCT GLUCOSE: 130 MG/DL (ref 70–110)
POCT GLUCOSE: 179 MG/DL (ref 70–110)
POCT GLUCOSE: 186 MG/DL (ref 70–110)
POCT GLUCOSE: 209 MG/DL (ref 70–110)
POCT GLUCOSE: 233 MG/DL (ref 70–110)
POCT GLUCOSE: 241 MG/DL (ref 70–110)
POTASSIUM SERPL-SCNC: 4.2 MMOL/L (ref 3.5–5.1)
PROT SERPL-MCNC: 7.2 G/DL (ref 6–8.4)
RBC # BLD AUTO: 3.43 M/UL (ref 4–5.4)
SAMPLE: NORMAL
SITE: NORMAL
SODIUM SERPL-SCNC: 138 MMOL/L (ref 136–145)
SP02: 100
TROPONIN I SERPL DL<=0.01 NG/ML-MCNC: <0.006 NG/ML (ref 0–0.03)
TROPONIN I SERPL DL<=0.01 NG/ML-MCNC: <0.006 NG/ML (ref 0–0.03)
WBC # BLD AUTO: 11.7 K/UL (ref 3.9–12.7)

## 2024-08-07 PROCEDURE — 25500020 PHARM REV CODE 255: Performed by: STUDENT IN AN ORGANIZED HEALTH CARE EDUCATION/TRAINING PROGRAM

## 2024-08-07 PROCEDURE — 85025 COMPLETE CBC W/AUTO DIFF WBC: CPT | Performed by: STUDENT IN AN ORGANIZED HEALTH CARE EDUCATION/TRAINING PROGRAM

## 2024-08-07 PROCEDURE — 83735 ASSAY OF MAGNESIUM: CPT | Performed by: STUDENT IN AN ORGANIZED HEALTH CARE EDUCATION/TRAINING PROGRAM

## 2024-08-07 PROCEDURE — 25000242 PHARM REV CODE 250 ALT 637 W/ HCPCS: Performed by: INTERNAL MEDICINE

## 2024-08-07 PROCEDURE — 94640 AIRWAY INHALATION TREATMENT: CPT | Mod: XB

## 2024-08-07 PROCEDURE — 97162 PT EVAL MOD COMPLEX 30 MIN: CPT

## 2024-08-07 PROCEDURE — 36415 COLL VENOUS BLD VENIPUNCTURE: CPT | Performed by: INTERNAL MEDICINE

## 2024-08-07 PROCEDURE — 36415 COLL VENOUS BLD VENIPUNCTURE: CPT | Mod: XB | Performed by: STUDENT IN AN ORGANIZED HEALTH CARE EDUCATION/TRAINING PROGRAM

## 2024-08-07 PROCEDURE — 25000003 PHARM REV CODE 250: Performed by: INTERNAL MEDICINE

## 2024-08-07 PROCEDURE — 84484 ASSAY OF TROPONIN QUANT: CPT | Performed by: STUDENT IN AN ORGANIZED HEALTH CARE EDUCATION/TRAINING PROGRAM

## 2024-08-07 PROCEDURE — 99223 1ST HOSP IP/OBS HIGH 75: CPT | Mod: ,,, | Performed by: INTERNAL MEDICINE

## 2024-08-07 PROCEDURE — 99900031 HC PATIENT EDUCATION (STAT)

## 2024-08-07 PROCEDURE — 83036 HEMOGLOBIN GLYCOSYLATED A1C: CPT | Performed by: INTERNAL MEDICINE

## 2024-08-07 PROCEDURE — 63600175 PHARM REV CODE 636 W HCPCS: Performed by: INTERNAL MEDICINE

## 2024-08-07 PROCEDURE — 94761 N-INVAS EAR/PLS OXIMETRY MLT: CPT

## 2024-08-07 PROCEDURE — 99900035 HC TECH TIME PER 15 MIN (STAT)

## 2024-08-07 PROCEDURE — 80053 COMPREHEN METABOLIC PANEL: CPT | Performed by: STUDENT IN AN ORGANIZED HEALTH CARE EDUCATION/TRAINING PROGRAM

## 2024-08-07 PROCEDURE — 20000000 HC ICU ROOM

## 2024-08-07 PROCEDURE — 63700000 PHARM REV CODE 250 ALT 637 W/O HCPCS: Performed by: INTERNAL MEDICINE

## 2024-08-07 PROCEDURE — 84100 ASSAY OF PHOSPHORUS: CPT | Performed by: STUDENT IN AN ORGANIZED HEALTH CARE EDUCATION/TRAINING PROGRAM

## 2024-08-07 PROCEDURE — 96372 THER/PROPH/DIAG INJ SC/IM: CPT | Performed by: INTERNAL MEDICINE

## 2024-08-07 PROCEDURE — 82164 ANGIOTENSIN I ENZYME TEST: CPT | Performed by: INTERNAL MEDICINE

## 2024-08-07 RX ORDER — INSULIN GLARGINE 100 [IU]/ML
15 INJECTION, SOLUTION SUBCUTANEOUS NIGHTLY
Status: DISCONTINUED | OUTPATIENT
Start: 2024-08-07 | End: 2024-08-08 | Stop reason: HOSPADM

## 2024-08-07 RX ORDER — ATORVASTATIN CALCIUM 40 MG/1
40 TABLET, FILM COATED ORAL NIGHTLY
Status: DISCONTINUED | OUTPATIENT
Start: 2024-08-07 | End: 2024-08-08 | Stop reason: HOSPADM

## 2024-08-07 RX ORDER — IPRATROPIUM BROMIDE AND ALBUTEROL SULFATE 2.5; .5 MG/3ML; MG/3ML
3 SOLUTION RESPIRATORY (INHALATION) EVERY 6 HOURS
Status: DISCONTINUED | OUTPATIENT
Start: 2024-08-07 | End: 2024-08-08 | Stop reason: HOSPADM

## 2024-08-07 RX ORDER — ACETAMINOPHEN 325 MG/1
650 TABLET ORAL EVERY 4 HOURS PRN
Status: DISCONTINUED | OUTPATIENT
Start: 2024-08-07 | End: 2024-08-08 | Stop reason: HOSPADM

## 2024-08-07 RX ORDER — TALC
6 POWDER (GRAM) TOPICAL NIGHTLY PRN
Status: DISCONTINUED | OUTPATIENT
Start: 2024-08-07 | End: 2024-08-08 | Stop reason: HOSPADM

## 2024-08-07 RX ORDER — SODIUM CHLORIDE 0.9 % (FLUSH) 0.9 %
10 SYRINGE (ML) INJECTION EVERY 12 HOURS PRN
Status: DISCONTINUED | OUTPATIENT
Start: 2024-08-07 | End: 2024-08-08 | Stop reason: HOSPADM

## 2024-08-07 RX ORDER — HYDROCODONE BITARTRATE AND ACETAMINOPHEN 5; 325 MG/1; MG/1
1 TABLET ORAL EVERY 6 HOURS PRN
Status: DISCONTINUED | OUTPATIENT
Start: 2024-08-07 | End: 2024-08-08 | Stop reason: HOSPADM

## 2024-08-07 RX ORDER — CARBAMAZEPINE 100 MG/1
400 TABLET, CHEWABLE ORAL 2 TIMES DAILY
Status: DISCONTINUED | OUTPATIENT
Start: 2024-08-07 | End: 2024-08-08 | Stop reason: HOSPADM

## 2024-08-07 RX ORDER — PROCHLORPERAZINE EDISYLATE 5 MG/ML
5 INJECTION INTRAMUSCULAR; INTRAVENOUS EVERY 6 HOURS PRN
Status: DISCONTINUED | OUTPATIENT
Start: 2024-08-07 | End: 2024-08-08 | Stop reason: HOSPADM

## 2024-08-07 RX ORDER — SIMETHICONE 80 MG
1 TABLET,CHEWABLE ORAL 4 TIMES DAILY PRN
Status: DISCONTINUED | OUTPATIENT
Start: 2024-08-07 | End: 2024-08-08 | Stop reason: HOSPADM

## 2024-08-07 RX ORDER — GLUCAGON 1 MG
1 KIT INJECTION
Status: DISCONTINUED | OUTPATIENT
Start: 2024-08-07 | End: 2024-08-08 | Stop reason: HOSPADM

## 2024-08-07 RX ORDER — BENZONATATE 100 MG/1
100 CAPSULE ORAL 3 TIMES DAILY PRN
Status: DISCONTINUED | OUTPATIENT
Start: 2024-08-07 | End: 2024-08-08 | Stop reason: HOSPADM

## 2024-08-07 RX ORDER — PREDNISONE 20 MG/1
40 TABLET ORAL DAILY
Status: DISCONTINUED | OUTPATIENT
Start: 2024-08-07 | End: 2024-08-08 | Stop reason: HOSPADM

## 2024-08-07 RX ORDER — FLUTICASONE FUROATE AND VILANTEROL 100; 25 UG/1; UG/1
1 POWDER RESPIRATORY (INHALATION) DAILY
Status: DISCONTINUED | OUTPATIENT
Start: 2024-08-07 | End: 2024-08-08 | Stop reason: HOSPADM

## 2024-08-07 RX ORDER — NALOXONE HCL 0.4 MG/ML
0.02 VIAL (ML) INJECTION
Status: DISCONTINUED | OUTPATIENT
Start: 2024-08-07 | End: 2024-08-08 | Stop reason: HOSPADM

## 2024-08-07 RX ORDER — SODIUM CHLORIDE 9 MG/ML
INJECTION, SOLUTION INTRAVENOUS CONTINUOUS
Status: ACTIVE | OUTPATIENT
Start: 2024-08-07 | End: 2024-08-07

## 2024-08-07 RX ORDER — INSULIN ASPART 100 [IU]/ML
0-5 INJECTION, SOLUTION INTRAVENOUS; SUBCUTANEOUS
Status: DISCONTINUED | OUTPATIENT
Start: 2024-08-07 | End: 2024-08-08 | Stop reason: HOSPADM

## 2024-08-07 RX ORDER — ONDANSETRON HYDROCHLORIDE 2 MG/ML
4 INJECTION, SOLUTION INTRAVENOUS EVERY 8 HOURS PRN
Status: DISCONTINUED | OUTPATIENT
Start: 2024-08-07 | End: 2024-08-08 | Stop reason: HOSPADM

## 2024-08-07 RX ORDER — AZITHROMYCIN 250 MG/1
500 TABLET, FILM COATED ORAL DAILY
Status: DISCONTINUED | OUTPATIENT
Start: 2024-08-07 | End: 2024-08-08 | Stop reason: HOSPADM

## 2024-08-07 RX ORDER — IBUPROFEN 200 MG
24 TABLET ORAL
Status: DISCONTINUED | OUTPATIENT
Start: 2024-08-07 | End: 2024-08-08 | Stop reason: HOSPADM

## 2024-08-07 RX ORDER — IBUPROFEN 200 MG
16 TABLET ORAL
Status: DISCONTINUED | OUTPATIENT
Start: 2024-08-07 | End: 2024-08-08 | Stop reason: HOSPADM

## 2024-08-07 RX ORDER — GUAIFENESIN AND DEXTROMETHORPHAN HYDROBROMIDE 10; 100 MG/5ML; MG/5ML
10 SYRUP ORAL EVERY 6 HOURS
Status: DISCONTINUED | OUTPATIENT
Start: 2024-08-07 | End: 2024-08-08 | Stop reason: HOSPADM

## 2024-08-07 RX ORDER — CHOLECALCIFEROL (VITAMIN D3) 10 MCG
1200 TABLET ORAL DAILY
Status: DISCONTINUED | OUTPATIENT
Start: 2024-08-07 | End: 2024-08-08 | Stop reason: HOSPADM

## 2024-08-07 RX ORDER — IPRATROPIUM BROMIDE AND ALBUTEROL SULFATE 2.5; .5 MG/3ML; MG/3ML
3 SOLUTION RESPIRATORY (INHALATION) EVERY 4 HOURS PRN
Status: DISCONTINUED | OUTPATIENT
Start: 2024-08-07 | End: 2024-08-08 | Stop reason: HOSPADM

## 2024-08-07 RX ORDER — ASPIRIN 81 MG/1
81 TABLET ORAL DAILY
Status: DISCONTINUED | OUTPATIENT
Start: 2024-08-07 | End: 2024-08-08 | Stop reason: HOSPADM

## 2024-08-07 RX ORDER — ALUMINUM HYDROXIDE, MAGNESIUM HYDROXIDE, AND SIMETHICONE 1200; 120; 1200 MG/30ML; MG/30ML; MG/30ML
30 SUSPENSION ORAL 4 TIMES DAILY PRN
Status: DISCONTINUED | OUTPATIENT
Start: 2024-08-07 | End: 2024-08-08 | Stop reason: HOSPADM

## 2024-08-07 RX ADMIN — GUAIFENESIN AND DEXTROMETHORPHAN 10 ML: 100; 10 SYRUP ORAL at 05:08

## 2024-08-07 RX ADMIN — ACETAMINOPHEN 650 MG: 325 TABLET ORAL at 01:08

## 2024-08-07 RX ADMIN — IPRATROPIUM BROMIDE AND ALBUTEROL SULFATE 3 ML: .5; 2.5 SOLUTION RESPIRATORY (INHALATION) at 12:08

## 2024-08-07 RX ADMIN — SULFUR HEXAFLUORIDE 2.4 ML: KIT at 04:08

## 2024-08-07 RX ADMIN — INSULIN GLARGINE 15 UNITS: 100 INJECTION, SOLUTION SUBCUTANEOUS at 05:08

## 2024-08-07 RX ADMIN — FLUTICASONE FUROATE AND VILANTEROL TRIFENATATE 1 PUFF: 100; 25 POWDER RESPIRATORY (INHALATION) at 12:08

## 2024-08-07 RX ADMIN — HYDROCODONE BITARTRATE AND ACETAMINOPHEN 1 TABLET: 5; 325 TABLET ORAL at 11:08

## 2024-08-07 RX ADMIN — GUAIFENESIN AND DEXTROMETHORPHAN 10 ML: 100; 10 SYRUP ORAL at 12:08

## 2024-08-07 RX ADMIN — CHOLECALCIFEROL TAB 10 MCG (400 UNIT) 1200 UNITS: 10 TAB at 10:08

## 2024-08-07 RX ADMIN — ATORVASTATIN CALCIUM 40 MG: 40 TABLET, FILM COATED ORAL at 08:08

## 2024-08-07 RX ADMIN — AZITHROMYCIN DIHYDRATE 500 MG: 250 TABLET ORAL at 08:08

## 2024-08-07 RX ADMIN — HYDROCODONE BITARTRATE AND ACETAMINOPHEN 1 TABLET: 5; 325 TABLET ORAL at 05:08

## 2024-08-07 RX ADMIN — SODIUM CHLORIDE: 9 INJECTION, SOLUTION INTRAVENOUS at 05:08

## 2024-08-07 RX ADMIN — CARBAMAZEPINE 400 MG: 100 TABLET, CHEWABLE ORAL at 10:08

## 2024-08-07 RX ADMIN — IPRATROPIUM BROMIDE AND ALBUTEROL SULFATE 3 ML: .5; 2.5 SOLUTION RESPIRATORY (INHALATION) at 07:08

## 2024-08-07 RX ADMIN — INSULIN ASPART 2 UNITS: 100 INJECTION, SOLUTION INTRAVENOUS; SUBCUTANEOUS at 06:08

## 2024-08-07 RX ADMIN — CARBAMAZEPINE 400 MG: 100 TABLET, CHEWABLE ORAL at 08:08

## 2024-08-07 RX ADMIN — INSULIN ASPART 1 UNITS: 100 INJECTION, SOLUTION INTRAVENOUS; SUBCUTANEOUS at 09:08

## 2024-08-07 RX ADMIN — IPRATROPIUM BROMIDE AND ALBUTEROL SULFATE 3 ML: .5; 2.5 SOLUTION RESPIRATORY (INHALATION) at 06:08

## 2024-08-07 RX ADMIN — INSULIN GLARGINE 15 UNITS: 100 INJECTION, SOLUTION SUBCUTANEOUS at 08:08

## 2024-08-07 RX ADMIN — ASPIRIN 81 MG: 81 TABLET, COATED ORAL at 10:08

## 2024-08-07 RX ADMIN — PREDNISONE 40 MG: 20 TABLET ORAL at 10:08

## 2024-08-07 RX ADMIN — CEFTRIAXONE SODIUM 1 G: 1 INJECTION, POWDER, FOR SOLUTION INTRAMUSCULAR; INTRAVENOUS at 05:08

## 2024-08-07 NOTE — PT/OT/SLP EVAL
Physical Therapy Evaluation    Patient Name:  Luz Arias   MRN:  [id number]    Recommendations:     Discharge Recommendations: Moderate Intensity Therapy (Outpatient Physical and Occupational Therapy)   Discharge Equipment Recommendations: none   Barriers to discharge: None    Assessment:     Luz Arias is a 58 y.o. female admitted with a medical diagnosis of Syncope.  She presents with the following impairments/functional limitations: weakness, impaired functional mobility, gait instability.    Rehab Prognosis: Good; patient would benefit from acute skilled PT services to address these deficits and reach maximum level of function.    Recent Surgery: * No surgery found *      Plan:     During this hospitalization, patient to be seen 5 x/week to address the identified rehab impairments via gait training, therapeutic activities, therapeutic exercises in order to progress towards goals.    Plan of Care Expires:   (Upon discharge from the hospital.)    Subjective     Chief Complaint: The patient reports her  found her slumped over in a chair and he had her brought to the emergency room.  She has no memory of the events.  She complains of feeling weak but much better than yesterday.  She has a fracture on the right 5th toe for which she has been walking with a cast shoe on that foot.  She reports right sided weakness due to an old CVA in 2015.  Patient/Family Comments/goals: Return home with her  independent with mobility.  Pain/Comfort:  Pain Rating 1: 0/10    Patients cultural, spiritual, Orthodoxy conflicts given the current situation: no    Living Environment:  The patient lives with her  in a single story residence with no steps to enter the home.  Prior to admission, patients level of function was independent with ambulation using a cane. Her  did help her with bathing. Equipment used at home: cane, straight, rollator.  DME owned (not currently used): none.  Upon discharge,  patient will have assistance from her .    Objective:     Communicated with nurse prior to session.  Patient found supine with peripheral IV, telemetry  upon PT entry to room.    General Precautions: Standard, fall  Orthopedic Precautions:N/A   Braces: N/A  Respiratory Status: Room air    Exams:  Cognitive Exam:  Patient is oriented to Person, Place, Time, and Situation  RLE ROM: WNL  RLE Strength: Deficits: 3/5  LLE ROM: WNL  LLE Strength: WFL    Functional Mobility:  Bed Mobility:     Supine to Sit: minimum assistance  Sit to Supine: minimum assistance  Transfers:     Sit to Stand:  minimum assistance with rolling walker  Gait: Ambulates with a rolling walker and min assist approximately 30 feet.  She demonstrates a wide based stance, short step length, and slow luciana.      AM-PAC 6 CLICK MOBILITY  Total Score:15       Treatment & Education:  The patient was seen for an initial evaluation.  She was instructed in the purpose and goals of physical therapy.    Patient left supine with all lines intact, call button in reach, and nurse notified.    GOALS:   Multidisciplinary Problems       Physical Therapy Goals          Problem: Physical Therapy    Goal Priority Disciplines Outcome Goal Variances Interventions   Physical Therapy Goal     PT, PT/OT      Description: Goals    1.  Patient to be independent with bed <> chair transfers.  2.  Patient to be independent with ambulation using a rolling walker > 50 feet.                       History:     Past Medical History:   Diagnosis Date    Aortic regurgitation     Asthma     Chronic fatigue     Chronic pain     Depression     Diabetes     Dysphagia     Gastritis     Gastroparesis     GERD (gastroesophageal reflux disease)     Hypertension     Intestinal metaplasia of gastric cardia     Irritable bowel syndrome     Sarcoidosis     Sleep apnea     Stroke     12/2015, mini stroke 2/2023       Past Surgical History:   Procedure Laterality Date    APPENDECTOMY       CHOLECYSTECTOMY      COLONOSCOPY  05/17/2019    COLONOSCOPY N/A 5/5/2020    Procedure: COLONOSCOPY;  Surgeon: Garrick López MD;  Location: [facility];  Service: Endoscopy;  Laterality: N/A;  with bx and stool specimen    COLONOSCOPY N/A 4/10/2023    Procedure: COLONOSCOPY;  Surgeon: Forrest Gomes MD;  Location: [facility];  Service: Endoscopy;  Laterality: N/A;    ELBOW SURGERY      ESOPHAGEAL MANOMETRY WITH MEASUREMENT OF IMPEDANCE N/A 6/29/2022    Procedure: MANOMETRY-ESOPHAGEAL-WITH IMPEDANCE;  Surgeon: Concetta Odonnell MD;  Location: [facility] (4TH FLR);  Service: Endoscopy;  Laterality: N/A;  r/o rumination and supragastric belching  hold nortriptyline and norco 24 hours prior to procedure  fully vaccinated, instructions emailed to [email or web address]    ESOPHAGOGASTRODUODENOSCOPY N/A 5/5/2020    Procedure: EGD (ESOPHAGOGASTRODUODENOSCOPY);  Surgeon: Garrick López MD;  Location: [facility];  Service: Endoscopy;  Laterality: N/A;  with biopsy    ESOPHAGOGASTRODUODENOSCOPY N/A 6/2/2021    Procedure: EGD (ESOPHAGOGASTRODUODENOSCOPY);  Surgeon: Garrick López MD;  Location: [facility];  Service: Endoscopy;  Laterality: N/A;    ESOPHAGOGASTRODUODENOSCOPY N/A 6/28/2022    Procedure: ESOPHAGOGASTRODUODENOSCOPY (EGD);  Surgeon: Concetta Odonnell MD;  Location: [facility] (2ND FLR);  Service: Endoscopy;  Laterality: N/A;  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  full liquid diet x3 days, clear liquid diet x1 day prior to procedure  fully vaccinated, instructions emailed to [email or web address]    ESOPHAGOGASTRODUODENOSCOPY N/A 6/13/2023    Procedure: EGD (ESOPHAGOGASTRODUODENOSCOPY);  Surgeon: Forrest Gomes MD;  Location: [facility];  Service: Endoscopy;  Laterality: N/A;    HAND SURGERY      HYSTERECTOMY      2005, age 39, KRISTAN fibroids    MEDIASTINOSCOPY      NECK SURGERY      UPPER GASTROINTESTINAL ENDOSCOPY  05/17/2019    WRIST SURGERY         Time Tracking:     PT Received On: 08/07/24  PT Start  Time: 0815     PT Stop Time: 0845  PT Total Time (min): 30 min     Billable Minutes: Evaluation 30      08/07/2024

## 2024-08-07 NOTE — ASSESSMENT & PLAN NOTE
Stable to follow up with PCP for this     Latest Reference Range & Units 07/01/24 09:31   Iron 30 - 160 ug/dL 97   TIBC 250 - 450 ug/dL 451 (H)   Saturated Iron 20 - 50 % 22   Transferrin 200 - 375 mg/dL 305   Ferritin 20.0 - 300.0 ng/mL 57   (H): Data is abnormally high

## 2024-08-07 NOTE — SUBJECTIVE & OBJECTIVE
Past Medical History:   Diagnosis Date    Aortic regurgitation     Asthma     Chronic fatigue     Chronic pain     Depression     Diabetes     Dysphagia     Gastritis     Gastroparesis     GERD (gastroesophageal reflux disease)     Hypertension     Intestinal metaplasia of gastric cardia     Irritable bowel syndrome     Sarcoidosis     Sleep apnea     Stroke     12/2015, mini stroke 2/2023       Past Surgical History:   Procedure Laterality Date    APPENDECTOMY      CHOLECYSTECTOMY      COLONOSCOPY  05/17/2019    COLONOSCOPY N/A 5/5/2020    Procedure: COLONOSCOPY;  Surgeon: Garrick López MD;  Location: [facility];  Service: Endoscopy;  Laterality: N/A;  with bx and stool specimen    COLONOSCOPY N/A 4/10/2023    Procedure: COLONOSCOPY;  Surgeon: Forrest Gomes MD;  Location: [facility] ENDO;  Service: Endoscopy;  Laterality: N/A;    ELBOW SURGERY      ESOPHAGEAL MANOMETRY WITH MEASUREMENT OF IMPEDANCE N/A 6/29/2022    Procedure: MANOMETRY-ESOPHAGEAL-WITH IMPEDANCE;  Surgeon: Concetta Odonnell MD;  Location: [facility] (4TH FLR);  Service: Endoscopy;  Laterality: N/A;  r/o rumination and supragastric belching  hold nortriptyline and norco 24 hours prior to procedure  fully vaccinated, instructions emailed to [email or web address]    ESOPHAGOGASTRODUODENOSCOPY N/A 5/5/2020    Procedure: EGD (ESOPHAGOGASTRODUODENOSCOPY);  Surgeon: Garrick López MD;  Location: [facility];  Service: Endoscopy;  Laterality: N/A;  with biopsy    ESOPHAGOGASTRODUODENOSCOPY N/A 6/2/2021    Procedure: EGD (ESOPHAGOGASTRODUODENOSCOPY);  Surgeon: Garrick López MD;  Location: [facility];  Service: Endoscopy;  Laterality: N/A;    ESOPHAGOGASTRODUODENOSCOPY N/A 6/28/2022    Procedure: ESOPHAGOGASTRODUODENOSCOPY (EGD);  Surgeon: Concetta Odonnell MD;  Location: [facility] ENDO (2ND FLR);  Service: Endoscopy;  Laterality: N/A;  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  full liquid diet x3 days, clear liquid diet x1 day prior to procedure  fully  vaccinated, instructions emailed to [email or web address]    ESOPHAGOGASTRODUODENOSCOPY N/A 6/13/2023    Procedure: EGD (ESOPHAGOGASTRODUODENOSCOPY);  Surgeon: Forrest Gomes MD;  Location: [facility];  Service: Endoscopy;  Laterality: N/A;    HAND SURGERY      HYSTERECTOMY      2005, age 39, KRISTAN fibroids    MEDIASTINOSCOPY      NECK SURGERY      UPPER GASTROINTESTINAL ENDOSCOPY  05/17/2019    WRIST SURGERY         Review of patient's allergies indicates:   Allergen Reactions    Sulfa (sulfonamide antibiotics) Anaphylaxis    Sulfamethoxazole-trimethoprim Anaphylaxis and Diarrhea     Other reaction(s): Anaphylaxis, Finding of vomiting, Diarrhea    Temazepam      Sleep walking   Other reaction(s): Sleep related hallucinations    Pantoprazole Nausea And Vomiting    Pregabalin Other (See Comments)    Verapamil      Other reaction(s): Constipation    Zonisamide      Other reaction(s): Anaphylaxis, Diarrhea, Finding of vomiting, Anaphylaxis, Diarrhea, Finding of vomiting, Anaphylaxis, Diarrhea, Finding of vomiting    Metoclopramide Rash and Anxiety    Sucralfate Other (See Comments) and Nausea Only       No current facility-administered medications on file prior to encounter.     Current Outpatient Medications on File Prior to Encounter   Medication Sig    albuterol (PROVENTIL/VENTOLIN HFA) 90 mcg/actuation inhaler Take or use exactly as directed.Obtain advice for OTCs.Shake well.For inhalation.    aluminum hydrox-magnesium carb (GAVISCON EXTRA STRENGTH) 254-237.5 mg/5 mL Susp 10 mLs.    amLODIPine (NORVASC) 10 MG tablet Take 10 mg by mouth once daily.    aspirin (ECOTRIN) 81 MG EC tablet Take 81 mg by mouth once daily.    benzonatate (TESSALON) 100 MG capsule Take 100 mg by mouth 3 (three) times daily as needed.    BREO ELLIPTA 100-25 mcg/dose diskus inhaler Inhale 1 puff into the lungs.    candesartan (ATACAND) 32 MG tablet Take 32 mg by mouth once daily.    carBAMazepine (TEGRETOL XR) 400 MG Tb12 Take 400 mg  by mouth 2 (two) times daily.    diphenhydrAMINE-aluminum-magnesium hydroxide-simethicone-LIDOcaine HCl 2% Swish and swallow 5 mLs every 6 (six) hours as needed (throat irritation).    ergocalciferol (ERGOCALCIFEROL) 50,000 unit Cap     esomeprazole (NEXIUM) 40 MG capsule Take 1 capsule (40 mg total) by mouth once daily.    insulin glargine U-100, Lantus, (LANTUS SOLOSTAR U-100 INSULIN) 100 unit/mL (3 mL) InPn pen Inject 24 Units into the skin every evening.    ipratropium (ATROVENT) 42 mcg (0.06 %) nasal spray 2 sprays 2 (two) times daily.    isosorbide mononitrate (IMDUR) 30 MG 24 hr tablet 30 mg once daily.    LANTUS SOLOSTAR U-100 INSULIN glargine 100 units/mL SubQ pen 24 Units every evening.    metFORMIN (GLUCOPHAGE-XR) 500 MG XR 24hr tablet Take 500 mg by mouth 2 (two) times daily with meals.    nortriptyline (PAMELOR) 75 MG Cap TAKE 1 CAPSULE BY MOUTH EVERY NIGHT 1 HOUR BEFORE BEDTIME    ondansetron (ZOFRAN-ODT) 8 MG TbDL Take 1 tablet (8 mg total) by mouth every 6 (six) hours as needed (nausea).    promethazine-dextromethorphan (PROMETHAZINE-DM) 6.25-15 mg/5 mL Syrp 5 mLs daily as needed.    rivastigmine (EXELON) 9.5 mg/24 hour PT24 Place onto the skin once daily.    rosuvastatin (CRESTOR) 40 MG Tab Take 40 mg by mouth once daily.    tiotropium (SPIRIVA) 18 mcg inhalation capsule Inhale 36 mcg into the lungs once daily. Controller    TYLENOL EXTRA STRENGTH 500 mg tablet Take 500 mg by mouth daily as needed.    valACYclovir (VALTREX) 500 MG tablet Take 1 tablet (500 mg total) by mouth once daily.    ALBUTEROL INHL Inhale into the lungs. Every 6 hrs prn    armodafiniL (NUVIGIL) 250 mg tablet 250 mg.    biotin 10,000 mcg Cap Take by mouth once daily.    cloNIDine (CATAPRES) 0.1 MG tablet PRN, PT DOES NOT HAVE BOTTLE WITH HER IN THE ER AND DOES NOT RECALL HOW OFTEN PRN    diclofenac sodium (VOLTAREN) 1 % Gel daily as needed.    EASY TOUCH ALCOHOL PREP PADS PadM Apply 1 each topically 3 (three) times daily.     ergocalciferol, vitamin D2, (VITAMIN D ORAL) Take by mouth once daily.    fluticasone propionate (FLONASE) 50 mcg/actuation nasal spray  (Patient not taking: Reported on 6/5/2024)    fluticasone-salmeterol 230-21 mcg/dose (ADVAIR HFA) 230-21 mcg/actuation HFAA inhaler Advair  mcg-21 mcg/actuation aerosol inhaler    FREESTYLE FREEDOM LITE kit     FREESTYLE LANCETS 28 gauge lancets Apply topically 3 (three) times daily.    FREESTYLE LITE STRIPS Strp 1 strip 3 (three) times daily.    guaiFENesin (MUCINEX) 600 mg 12 hr tablet 600 mg.    hydrocodone-homatropine 5-1.5 mg/5 ml (HYDROMET) 5-1.5 mg/5 mL Syrp 5 mLs every 4 (four) hours as needed.    INSULIN LISPRO SUBQ MODERATE DOSE SLIDING SCALE, SUBQ, ACHS, PRN other (see comment), 0 Refill(s), Maintenance (Patient not taking: Reported on 5/20/2024)    levalbuterol (XOPENEX) 0.63 mg/3 mL nebulizer solution Take 1 ampule by nebulization every 4 (four) hours as needed for Wheezing. Rescue    lidocaine (LIDODERM) 5 % daily as needed.    linaCLOtide (LINZESS) 72 mcg Cap capsule Take 1 capsule (72 mcg total) by mouth before breakfast.    mag hydrox/aluminum hyd/simeth (MAALOX ORAL)  (Patient not taking: Reported on 6/5/2024)    memantine (NAMENDA) 10 MG Tab     methylphenidate HCl (RITALIN) 10 MG tablet Take 10 mg by mouth once daily.    prazosin (MINIPRESS) 2 MG Cap Take 2 mg by mouth once daily.    TRULICITY 1.5 mg/0.5 mL pen injector Refrigerate.Check with your doctor before becoming pregnant.Store in original package.    vitamin E 600 UNIT capsule Take 600 Units by mouth once daily.     Family History       Problem Relation (Age of Onset)    Bladder Cancer Mother    Colon cancer Mother (65), Sister (65)    Colon polyps Sister    Diabetes Mother, Father, Sister, Sister, Sister, Sister, Brother    Heart attack Father    Heart disease Mother, Father    Hypertension Sister, Sister, Sister, Sister    Irritable bowel syndrome Sister    Kidney failure Father    Seizures  Brother    Stroke Father          Tobacco Use    Smoking status: Never    Smokeless tobacco: Never   Substance and Sexual Activity    Alcohol use: Not Currently     Comment: no drinking anymore    Drug use: Never    Sexual activity: Not Currently     Review of Systems   Constitutional:  Positive for activity change, appetite change and fatigue. Negative for chills and fever.   HENT:  Positive for congestion.    Eyes:  Negative for visual disturbance.   Respiratory:  Positive for cough and shortness of breath. Negative for chest tightness.    Cardiovascular:  Negative for chest pain.   Gastrointestinal:  Positive for nausea and vomiting. Negative for diarrhea.   Endocrine: Negative for cold intolerance and heat intolerance.   Genitourinary:  Negative for dysuria.   Musculoskeletal:  Positive for neck pain and neck stiffness. Negative for myalgias.   Skin:  Negative for rash.   Allergic/Immunologic: Negative for immunocompromised state.   Neurological:  Positive for dizziness, syncope and light-headedness. Negative for headaches.   Hematological:  Does not bruise/bleed easily.   Psychiatric/Behavioral:  Negative for confusion and decreased concentration.      Objective:     Vital Signs (Most Recent):  Temp: 98.6 °F (37 °C) (08/06/24 2348)  Pulse: 81 (08/07/24 0304)  Resp: 14 (08/07/24 0304)  BP: (!) 111/55 (08/07/24 0304)  SpO2: 95 % (08/07/24 0304) Vital Signs (24h Range):  Temp:  [98.2 °F (36.8 °C)-98.6 °F (37 °C)] 98.6 °F (37 °C)  Pulse:  [70-84] 81  Resp:  [14-26] 14  SpO2:  [95 %-100 %] 95 %  BP: (100-152)/(52-70) 111/55     Weight: 77.1 kg (170 lb)  Body mass index is 28.29 kg/m².     Physical Exam  Constitutional:       General: She is not in acute distress.     Appearance: She is overweight. She is not ill-appearing, toxic-appearing or diaphoretic.   HENT:      Head: Normocephalic and atraumatic.   Pulmonary:      Effort: No tachypnea, bradypnea, accessory muscle usage or retractions.   Abdominal:       General: Abdomen is protuberant.   Genitourinary:     Comments: No delarosa in place  Skin:     Findings: No rash.   Neurological:      Mental Status: She is alert and oriented to person, place, and time.      GCS: GCS eye subscore is 4. GCS verbal subscore is 5. GCS motor subscore is 6.   Psychiatric:         Attention and Perception: Attention and perception normal.         Cognition and Memory: Cognition and memory normal.                Significant Labs: All pertinent labs within the past 24 hours have been reviewed.  Recent Results (from the past 24 hour(s))   POCT glucose    Collection Time: 08/06/24  1:06 PM   Result Value Ref Range    POCT Glucose 155 (H) 70 - 110 mg/dL   CBC auto differential    Collection Time: 08/06/24  1:54 PM   Result Value Ref Range    WBC 7.91 3.90 - 12.70 K/uL    RBC 3.20 (L) 4.00 - 5.40 M/uL    Hemoglobin 8.9 (L) 12.0 - 16.0 g/dL    Hematocrit 27.9 (L) 37.0 - 48.5 %    MCV 87 82 - 98 fL    MCH 27.8 27.0 - 31.0 pg    MCHC 31.9 (L) 32.0 - 36.0 g/dL    RDW 13.5 11.5 - 14.5 %    Platelets 222 150 - 450 K/uL    MPV 9.4 9.2 - 12.9 fL    Immature Granulocytes 0.3 0.0 - 0.5 %    Gran # (ANC) 4.6 1.8 - 7.7 K/uL    Immature Grans (Abs) 0.02 0.00 - 0.04 K/uL    Lymph # 2.6 1.0 - 4.8 K/uL    Mono # 0.6 0.3 - 1.0 K/uL    Eos # 0.1 0.0 - 0.5 K/uL    Baso # 0.02 0.00 - 0.20 K/uL    nRBC 0 0 /100 WBC    Gran % 58.2 38.0 - 73.0 %    Lymph % 32.5 18.0 - 48.0 %    Mono % 7.8 4.0 - 15.0 %    Eosinophil % 0.9 0.0 - 8.0 %    Basophil % 0.3 0.0 - 1.9 %    Differential Method Automated    Comprehensive metabolic panel    Collection Time: 08/06/24  1:54 PM   Result Value Ref Range    Sodium 139 136 - 145 mmol/L    Potassium 4.0 3.5 - 5.1 mmol/L    Chloride 104 95 - 110 mmol/L    CO2 24 23 - 29 mmol/L    Glucose 154 (H) 70 - 110 mg/dL    BUN 24 (H) 6 - 20 mg/dL    Creatinine 0.9 0.5 - 1.4 mg/dL    Calcium 9.1 8.7 - 10.5 mg/dL    Total Protein 7.0 6.0 - 8.4 g/dL    Albumin 4.0 3.5 - 5.2 g/dL    Total Bilirubin  0.2 0.1 - 1.0 mg/dL    Alkaline Phosphatase 78 55 - 135 U/L    AST 31 10 - 40 U/L    ALT 58 (H) 10 - 44 U/L    eGFR >60.0 >60 mL/min/1.73 m^2    Anion Gap 11 8 - 16 mmol/L   Troponin I    Collection Time: 08/06/24  1:54 PM   Result Value Ref Range    Troponin I <0.006 0.000 - 0.026 ng/mL   Brain natriuretic peptide    Collection Time: 08/06/24  1:54 PM   Result Value Ref Range    BNP <10 0 - 99 pg/mL   Magnesium    Collection Time: 08/06/24  1:54 PM   Result Value Ref Range    Magnesium 1.9 1.6 - 2.6 mg/dL   Phosphorus    Collection Time: 08/06/24  1:54 PM   Result Value Ref Range    Phosphorus 3.3 2.7 - 4.5 mg/dL   Ethanol    Collection Time: 08/06/24  1:54 PM   Result Value Ref Range    Alcohol, Serum <10 0 - 10 mg/dL   Urinalysis, Reflex to Urine Culture Urine, Clean Catch    Collection Time: 08/06/24  2:01 PM    Specimen: Urine   Result Value Ref Range    Specimen UA Urine, Unspecified     Color, UA Yellow Yellow, Straw, Lenore    Appearance, UA Hazy (A) Clear    pH, UA 7.0 5.0 - 8.0    Specific Gravity, UA 1.015 1.005 - 1.030    Protein, UA Negative Negative    Glucose, UA Negative Negative    Ketones, UA Negative Negative    Bilirubin (UA) Negative Negative    Occult Blood UA Negative Negative    Nitrite, UA Negative Negative    Urobilinogen, UA Negative Negative EU/dL    Leukocytes, UA Trace (A) Negative   Drug screen panel, emergency    Collection Time: 08/06/24  2:01 PM   Result Value Ref Range    Benzodiazepines Negative Negative    Methadone metabolites Negative Negative    Cocaine (Metab.) Negative Negative    Opiate Scrn, Ur Negative Negative    Barbiturate Screen, Ur Negative Negative    Amphetamine Screen, Ur Negative Negative    THC Negative Negative    Phencyclidine Negative Negative    Creatinine, Urine 66.1 15.0 - 325.0 mg/dL    Toxicology Information SEE COMMENT    Urinalysis Microscopic    Collection Time: 08/06/24  2:01 PM   Result Value Ref Range    WBC, UA 4 0 - 5 /hpf    Bacteria Many (A)  None-Occ /hpf    Yeast, UA Occasional (A) None    Squam Epithel, UA 6 /hpf    Microscopic Comment SEE COMMENT    POCT glucose    Collection Time: 08/06/24  7:43 PM   Result Value Ref Range    POCT Glucose 133 (H) 70 - 110 mg/dL   POCT glucose    Collection Time: 08/07/24  3:35 AM   Result Value Ref Range    POCT Glucose 209 (H) 70 - 110 mg/dL   ISTAT PROCEDURE    Collection Time: 08/07/24  3:43 AM   Result Value Ref Range    POC PH 7.372 7.35 - 7.45    POC PCO2 43.7 35 - 45 mmHg    POC PO2 41 40 - 60 mmHg    POC HCO3 25.4 24 - 28 mmol/L    POC BE 0 -2 to 2 mmol/L    POC SATURATED O2 75 95 - 100 %    POC TCO2 27 24 - 29 mmol/L    Sample VENOUS     Site Other     Allens Test N/A     DelSys Room Air     Mode SPONT     Sp02 100          Significant Imaging: I have reviewed all pertinent imaging results/findings within the past 24 hours.

## 2024-08-07 NOTE — ED NOTES
"1st contact with pt, sitting semi can in stretcher, awake, alert, calm, A&Ox3, pt reports reason for ER visit, she reports she is aware she is having " pulmonary" issues pt states " my  said I passed out, that's what he said, I'm very weak, just not myself physically" pt reports weakness, back pain, neck pain, pt has a history of chronic back and neck pain, usually takes tylenol po for pain and lidocaine patch, pt is requesting some of her home meds she usually takes at 5 pm: Norvasc, candesartan, tegretol metformin    Pt rates lower back pain 7/10, rates neck pain 6/10,   Pt is requesting to know status of transfer, made aware, [facility] did not accept her transfer due to capacity issues, pt is requesting to go to [facility] as her second choice then [facility] as third choice    Pt is made aware, I will notify ER doctor of her request, I complete pt's MAR and will notiry charge nurse of pt's choices of hospital to go to, pt and pt's daughter Mrs. Wheeler verbalized understanding    Noted SR on cardiac monitor, bp cuff, pulse ox, SR up x2, call light with reach, stretcher to it's lowest position   "

## 2024-08-07 NOTE — HPI
"Ms. Arias is a 57yo lady with a past medical history of aortic regurge, depression, chronic fatigue, DM2, dysphagia, gastritis, gastroparesis, HTN, IBS, SILVESTRE, CVA and sarcoidosis.  She also has known cervical disc disease with chronic pain.  She has no old TTE on record.      She is followed at [facility] with NP Niesha Oliver, who last saw her in clinic on 7/24/24 for reactive airway disease, pulmonary sarcoidosis, and SILVESTRE on CPAP.  She was complaining of some cough with hemoptysis at that time.  He ordered ACE level, BNP and CT chest without contrast.  He started Augmentin 875mg PO BID(finished 2 days ago), Tessalon Pearls, and told her to continue Breo, duonebs and spiriva.    She was admitted to the hospital up in [place] a week and a half ago with chest pain.  She tells me she had a normal TTE at that time, but a high Ca score on CT.  She was discharged and flew back here where her primary Cardiologist, Dr. Holt, performed a LHC last week (she was told it was also completely normal)    She now presents to [place] with a syncopal episode.  Apparently the patient's  found her slumped over, so he called 911.  EMS supposedly found her hypotensive at the time, at 80/30 per her 's report to her.  She has also had 6 weeks of worsening cough and severe fatigue.  Her  notes that she had an old stroke and has chronic right sided weakness, but can usually walk with a walker, now to fatigued to do this.  She tells me she no longer takes opioids for her neck pain.  She is a little more SOB than usual, but not much.  She has had some recent N/V, but no diarrhea.  She denies fever or chills.     In the ED her VS were BP (!) 104/55   Pulse 80   Temp max 98.6 °F (37 °C) (Oral)   Resp (!) 26   Ht 5' 5" (1.651 m)   Wt 77.1 kg (170 lb)   SpO2 95% RA  Breastfeeding No   BMI 28.29 kg/m².      Labs showed Hg 8.9, WBC 7.9, Cr 0.9, HCO3 24, Gluc 154, ALT 58, BNP < 10, Trop < 0.006, " UA: LE trace, Nitrite neg, WBC 4, many bacteria. VBG: pH 7.37, PCO2 43.7.    CXR showed 1. Hypoaeration slightly limits evaluation. 2. Subtle increased markings within the bilateral lower lobes may represent developing pulmonary infiltrates.  Correlate clinically with possible fever and/or elevated white count.  As deemed clinically necessary, further evaluation may be obtained with dedicated PA and lateral views of the chest.    CT chest without contrast showed Bibasilar subsegmental bandlike opacities.  These are favored to reflect atelectasis, although early infectious or inflammatory process would be difficult to entirely exclude.  Clinical correlation is advised.  Scattered coronary artery calcification.    In the ED she was treated with:  Medications   naloxone 0.4 mg/mL injection 1 mg (1 mg Intravenous Given 8/6/24 1404)   lactated ringers bolus 1,000 mL (0 mLs Intravenous Stopped 8/6/24 1715)   cefTRIAXone (Rocephin) 1 g in D5W 100 mL IVPB (MB+) (0 g Intravenous Stopped 8/6/24 1645)   azithromycin (ZITHROMAX) 500 mg in D5W 250 mL IVPB (Vial-Mate) (0 mg Intravenous Stopped 8/6/24 1852)   methylPREDNISolone sodium succinate injection 125 mg (125 mg Intravenous Given 8/6/24 1723)   prochlorperazine injection Soln 10 mg (10 mg Intravenous Given 8/6/24 1957)   morphine injection 2 mg (2 mg Intravenous Given 8/6/24 1956)

## 2024-08-07 NOTE — PLAN OF CARE
08/07/24 1642   Medicare Message   Important Message from Medicare regarding Discharge Appeal Rights Given to patient/caregiver;Explained to patient/caregiver;Signed/date by patient/caregiver   Date IMM was signed 08/07/24   Time IMM was signed 1642   SEO Message   Medicare Outpatient and Observation Notification regarding financial responsibility Given to patient/caregiver;Explained to patient/caregiver;Signed/date by patient/caregiver   Date SEO was signed 08/07/24   Time SEO was signed 1642

## 2024-08-07 NOTE — ED NOTES
Food provided with po fluid, sandwich, diet sprite, diet coke, water, sugar free pudding given, pt's  at bedside, assisting pt

## 2024-08-07 NOTE — PLAN OF CARE
Problem: Adult Inpatient Plan of Care  Goal: Plan of Care Review  Outcome: Progressing  Goal: Patient-Specific Goal (Individualized)  Outcome: Progressing  Goal: Absence of Hospital-Acquired Illness or Injury  Outcome: Progressing  Goal: Optimal Comfort and Wellbeing  Outcome: Progressing  Goal: Readiness for Transition of Care  Outcome: Progressing     Problem: Infection  Goal: Absence of Infection Signs and Symptoms  Outcome: Progressing     Problem: Diabetes Comorbidity  Goal: Blood Glucose Level Within Targeted Range  Outcome: Progressing     No acute events overnight. Pt VSS. Hourly rounding complete. All questions answered. Call light within reach. Bed in locked and low position.

## 2024-08-07 NOTE — ASSESSMENT & PLAN NOTE
She does report SOB, wheezing and cough  She has no fever or leukocytosis  Recent completion of Augmentin  On Rocephin + Zithro for now   -Likely can d/c home today with just Zithro  Prednisone 40mg daily   Tessalon pearls + Robitussin DM

## 2024-08-07 NOTE — H&P
[facility] Emergency Dept  [facility] Medicine  History & Physical    Patient Name: Luz Arias  MRN: [id number]  Admission Date: 8/6/2024  Attending Physician: Ward Lee MD   Primary Care Provider: Gab Levy MD         Patient information was obtained from patient, past medical records, and ER records.       Subjective:     Principal Problem:Syncope    Chief Complaint:   Chief Complaint   Patient presents with    Altered Mental Status     Patient is somnolent. Spouse reported that she was eating breakfast when she slumped over. Prev stroke retrotonsillar abscess sided deficits. Patient was hypotensive upon arrival of EMS.            HPI: Ms. Arias is a 59yo lady with a past medical history of aortic regurge, depression, chronic fatigue, DM2, dysphagia, gastritis, gastroparesis, HTN, IBS, SILVESTRE, CVA and sarcoidosis.  She also has known cervical disc disease with chronic pain.  She has no old TTE on record.      She is followed at [facility] with NP Niesha Oliver, who last saw her in clinic on 7/24/24 for reactive airway disease, pulmonary sarcoidosis, and SILVESTRE on CPAP.  She was complaining of some cough with hemoptysis at that time.  He ordered ACE level, BNP and CT chest without contrast.  He started Augmentin 875mg PO BID(finished 2 days ago), Tessalon Pearls, and told her to continue Breo, duonebs and spiriva.    She was admitted to the hospital up in [place] a week and a half ago with chest pain.  She tells me she had a normal TTE at that time, but a high Ca score on CT.  She was discharged and flew back here where her primary Cardiologist, Dr. Holt, performed a LHC last week (she was told it was also completely normal)    She now presents to [place] with a syncopal episode.  Apparently the patient's  found her slumped over, so he called 911.  EMS supposedly found her hypotensive at the time, at 80/30 per her 's report to her.  She has also had 6 weeks of  "worsening cough and severe fatigue.  Her  notes that she had an old stroke and has chronic right sided weakness, but can usually walk with a walker, now to fatigued to do this.  She tells me she no longer takes opioids for her neck pain.  She is a little more SOB than usual, but not much.  She has had some recent N/V, but no diarrhea.  She denies fever or chills.     In the ED her VS were BP (!) 104/55   Pulse 80   Temp max 98.6 °F (37 °C) (Oral)   Resp (!) 26   Ht 5' 5" (1.651 m)   Wt 77.1 kg (170 lb)   SpO2 95% RA  Breastfeeding No   BMI 28.29 kg/m².      Labs showed Hg 8.9, WBC 7.9, Cr 0.9, HCO3 24, Gluc 154, ALT 58, BNP < 10, Trop < 0.006, UA: LE trace, Nitrite neg, WBC 4, many bacteria. VBG: pH 7.37, PCO2 43.7.    CXR showed 1. Hypoaeration slightly limits evaluation. 2. Subtle increased markings within the bilateral lower lobes may represent developing pulmonary infiltrates.  Correlate clinically with possible fever and/or elevated white count.  As deemed clinically necessary, further evaluation may be obtained with dedicated PA and lateral views of the chest.    CT chest without contrast showed Bibasilar subsegmental bandlike opacities.  These are favored to reflect atelectasis, although early infectious or inflammatory process would be difficult to entirely exclude.  Clinical correlation is advised.  Scattered coronary artery calcification.    In the ED she was treated with:  Medications   naloxone 0.4 mg/mL injection 1 mg (1 mg Intravenous Given 8/6/24 1404)   lactated ringers bolus 1,000 mL (0 mLs Intravenous Stopped 8/6/24 1715)   cefTRIAXone (Rocephin) 1 g in D5W 100 mL IVPB (MB+) (0 g Intravenous Stopped 8/6/24 1645)   azithromycin (ZITHROMAX) 500 mg in D5W 250 mL IVPB (Vial-Mate) (0 mg Intravenous Stopped 8/6/24 1852)   methylPREDNISolone sodium succinate injection 125 mg (125 mg Intravenous Given 8/6/24 1723)   prochlorperazine injection Soln 10 mg (10 mg Intravenous Given 8/6/24 1957) "   morphine injection 2 mg (2 mg Intravenous Given 8/6/24 1956)       Past Medical History:   Diagnosis Date    Aortic regurgitation     Asthma     Chronic fatigue     Chronic pain     Depression     Diabetes     Dysphagia     Gastritis     Gastroparesis     GERD (gastroesophageal reflux disease)     Hypertension     Intestinal metaplasia of gastric cardia     Irritable bowel syndrome     Sarcoidosis     Sleep apnea     Stroke     12/2015, mini stroke 2/2023       Past Surgical History:   Procedure Laterality Date    APPENDECTOMY      CHOLECYSTECTOMY      COLONOSCOPY  05/17/2019    COLONOSCOPY N/A 5/5/2020    Procedure: COLONOSCOPY;  Surgeon: Garrick López MD;  Location: [facility];  Service: Endoscopy;  Laterality: N/A;  with bx and stool specimen    COLONOSCOPY N/A 4/10/2023    Procedure: COLONOSCOPY;  Surgeon: Forrest Gomes MD;  Location: [facility] ENDO;  Service: Endoscopy;  Laterality: N/A;    ELBOW SURGERY      ESOPHAGEAL MANOMETRY WITH MEASUREMENT OF IMPEDANCE N/A 6/29/2022    Procedure: MANOMETRY-ESOPHAGEAL-WITH IMPEDANCE;  Surgeon: Concetta Odonnell MD;  Location: [facility] (4TH FLR);  Service: Endoscopy;  Laterality: N/A;  r/o rumination and supragastric belching  hold nortriptyline and norco 24 hours prior to procedure  fully vaccinated, instructions emailed to [email or web address]    ESOPHAGOGASTRODUODENOSCOPY N/A 5/5/2020    Procedure: EGD (ESOPHAGOGASTRODUODENOSCOPY);  Surgeon: Garrick López MD;  Location: [facility];  Service: Endoscopy;  Laterality: N/A;  with biopsy    ESOPHAGOGASTRODUODENOSCOPY N/A 6/2/2021    Procedure: EGD (ESOPHAGOGASTRODUODENOSCOPY);  Surgeon: Garrick López MD;  Location: [facility];  Service: Endoscopy;  Laterality: N/A;    ESOPHAGOGASTRODUODENOSCOPY N/A 6/28/2022    Procedure: ESOPHAGOGASTRODUODENOSCOPY (EGD);  Surgeon: Concetta Odonnell MD;  Location: [facility] ENDO (2ND FLR);  Service: Endoscopy;  Laterality: N/A;  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  full liquid diet  x3 days, clear liquid diet x1 day prior to procedure  fully vaccinated, instructions emailed to [email or web address]    ESOPHAGOGASTRODUODENOSCOPY N/A 6/13/2023    Procedure: EGD (ESOPHAGOGASTRODUODENOSCOPY);  Surgeon: Forrest Gomes MD;  Location: [facility];  Service: Endoscopy;  Laterality: N/A;    HAND SURGERY      HYSTERECTOMY      2005, age 39, KRISTAN fibroids    MEDIASTINOSCOPY      NECK SURGERY      UPPER GASTROINTESTINAL ENDOSCOPY  05/17/2019    WRIST SURGERY         Review of patient's allergies indicates:   Allergen Reactions    Sulfa (sulfonamide antibiotics) Anaphylaxis    Sulfamethoxazole-trimethoprim Anaphylaxis and Diarrhea     Other reaction(s): Anaphylaxis, Finding of vomiting, Diarrhea    Temazepam      Sleep walking   Other reaction(s): Sleep related hallucinations    Pantoprazole Nausea And Vomiting    Pregabalin Other (See Comments)    Verapamil      Other reaction(s): Constipation    Zonisamide      Other reaction(s): Anaphylaxis, Diarrhea, Finding of vomiting, Anaphylaxis, Diarrhea, Finding of vomiting, Anaphylaxis, Diarrhea, Finding of vomiting    Metoclopramide Rash and Anxiety    Sucralfate Other (See Comments) and Nausea Only       No current facility-administered medications on file prior to encounter.     Current Outpatient Medications on File Prior to Encounter   Medication Sig    albuterol (PROVENTIL/VENTOLIN HFA) 90 mcg/actuation inhaler Take or use exactly as directed.Obtain advice for OTCs.Shake well.For inhalation.    aluminum hydrox-magnesium carb (GAVISCON EXTRA STRENGTH) 254-237.5 mg/5 mL Susp 10 mLs.    amLODIPine (NORVASC) 10 MG tablet Take 10 mg by mouth once daily.    aspirin (ECOTRIN) 81 MG EC tablet Take 81 mg by mouth once daily.    benzonatate (TESSALON) 100 MG capsule Take 100 mg by mouth 3 (three) times daily as needed.    BREO ELLIPTA 100-25 mcg/dose diskus inhaler Inhale 1 puff into the lungs.    candesartan (ATACAND) 32 MG tablet Take 32 mg by mouth once  daily.    carBAMazepine (TEGRETOL XR) 400 MG Tb12 Take 400 mg by mouth 2 (two) times daily.    diphenhydrAMINE-aluminum-magnesium hydroxide-simethicone-LIDOcaine HCl 2% Swish and swallow 5 mLs every 6 (six) hours as needed (throat irritation).    ergocalciferol (ERGOCALCIFEROL) 50,000 unit Cap     esomeprazole (NEXIUM) 40 MG capsule Take 1 capsule (40 mg total) by mouth once daily.    insulin glargine U-100, Lantus, (LANTUS SOLOSTAR U-100 INSULIN) 100 unit/mL (3 mL) InPn pen Inject 24 Units into the skin every evening.    ipratropium (ATROVENT) 42 mcg (0.06 %) nasal spray 2 sprays 2 (two) times daily.    isosorbide mononitrate (IMDUR) 30 MG 24 hr tablet 30 mg once daily.    LANTUS SOLOSTAR U-100 INSULIN glargine 100 units/mL SubQ pen 24 Units every evening.    metFORMIN (GLUCOPHAGE-XR) 500 MG XR 24hr tablet Take 500 mg by mouth 2 (two) times daily with meals.    nortriptyline (PAMELOR) 75 MG Cap TAKE 1 CAPSULE BY MOUTH EVERY NIGHT 1 HOUR BEFORE BEDTIME    ondansetron (ZOFRAN-ODT) 8 MG TbDL Take 1 tablet (8 mg total) by mouth every 6 (six) hours as needed (nausea).    promethazine-dextromethorphan (PROMETHAZINE-DM) 6.25-15 mg/5 mL Syrp 5 mLs daily as needed.    rivastigmine (EXELON) 9.5 mg/24 hour PT24 Place onto the skin once daily.    rosuvastatin (CRESTOR) 40 MG Tab Take 40 mg by mouth once daily.    tiotropium (SPIRIVA) 18 mcg inhalation capsule Inhale 36 mcg into the lungs once daily. Controller    TYLENOL EXTRA STRENGTH 500 mg tablet Take 500 mg by mouth daily as needed.    valACYclovir (VALTREX) 500 MG tablet Take 1 tablet (500 mg total) by mouth once daily.    ALBUTEROL INHL Inhale into the lungs. Every 6 hrs prn    armodafiniL (NUVIGIL) 250 mg tablet 250 mg.    biotin 10,000 mcg Cap Take by mouth once daily.    cloNIDine (CATAPRES) 0.1 MG tablet PRN, PT DOES NOT HAVE BOTTLE WITH HER IN THE ER AND DOES NOT RECALL HOW OFTEN PRN    diclofenac sodium (VOLTAREN) 1 % Gel daily as needed.    EASY TOUCH ALCOHOL PREP  PADS PadM Apply 1 each topically 3 (three) times daily.    ergocalciferol, vitamin D2, (VITAMIN D ORAL) Take by mouth once daily.    fluticasone propionate (FLONASE) 50 mcg/actuation nasal spray  (Patient not taking: Reported on 6/5/2024)    fluticasone-salmeterol 230-21 mcg/dose (ADVAIR HFA) 230-21 mcg/actuation HFAA inhaler Advair  mcg-21 mcg/actuation aerosol inhaler    FREESTYLE FREEDOM LITE kit     FREESTYLE LANCETS 28 gauge lancets Apply topically 3 (three) times daily.    FREESTYLE LITE STRIPS Strp 1 strip 3 (three) times daily.    guaiFENesin (MUCINEX) 600 mg 12 hr tablet 600 mg.    hydrocodone-homatropine 5-1.5 mg/5 ml (HYDROMET) 5-1.5 mg/5 mL Syrp 5 mLs every 4 (four) hours as needed.    INSULIN LISPRO SUBQ MODERATE DOSE SLIDING SCALE, SUBQ, ACHS, PRN other (see comment), 0 Refill(s), Maintenance (Patient not taking: Reported on 5/20/2024)    levalbuterol (XOPENEX) 0.63 mg/3 mL nebulizer solution Take 1 ampule by nebulization every 4 (four) hours as needed for Wheezing. Rescue    lidocaine (LIDODERM) 5 % daily as needed.    linaCLOtide (LINZESS) 72 mcg Cap capsule Take 1 capsule (72 mcg total) by mouth before breakfast.    mag hydrox/aluminum hyd/simeth (MAALOX ORAL)  (Patient not taking: Reported on 6/5/2024)    memantine (NAMENDA) 10 MG Tab     methylphenidate HCl (RITALIN) 10 MG tablet Take 10 mg by mouth once daily.    prazosin (MINIPRESS) 2 MG Cap Take 2 mg by mouth once daily.    TRULICITY 1.5 mg/0.5 mL pen injector Refrigerate.Check with your doctor before becoming pregnant.Store in original package.    vitamin E 600 UNIT capsule Take 600 Units by mouth once daily.     Family History       Problem Relation (Age of Onset)    Bladder Cancer Mother    Colon cancer Mother (65), Sister (65)    Colon polyps Sister    Diabetes Mother, Father, Sister, Sister, Sister, Sister, Brother    Heart attack Father    Heart disease Mother, Father    Hypertension Sister, Sister, Sister, Sister    Irritable  bowel syndrome Sister    Kidney failure Father    Seizures Brother    Stroke Father          Tobacco Use    Smoking status: Never    Smokeless tobacco: Never   Substance and Sexual Activity    Alcohol use: Not Currently     Comment: no drinking anymore    Drug use: Never    Sexual activity: Not Currently     Review of Systems   Constitutional:  Positive for activity change, appetite change and fatigue. Negative for chills and fever.   HENT:  Positive for congestion.    Eyes:  Negative for visual disturbance.   Respiratory:  Positive for cough and shortness of breath. Negative for chest tightness.    Cardiovascular:  Negative for chest pain.   Gastrointestinal:  Positive for nausea and vomiting. Negative for diarrhea.   Endocrine: Negative for cold intolerance and heat intolerance.   Genitourinary:  Negative for dysuria.   Musculoskeletal:  Positive for neck pain and neck stiffness. Negative for myalgias.   Skin:  Negative for rash.   Allergic/Immunologic: Negative for immunocompromised state.   Neurological:  Positive for dizziness, syncope and light-headedness. Negative for headaches.   Hematological:  Does not bruise/bleed easily.   Psychiatric/Behavioral:  Negative for confusion and decreased concentration.      Objective:     Vital Signs (Most Recent):  Temp: 98.6 °F (37 °C) (08/06/24 2348)  Pulse: 81 (08/07/24 0304)  Resp: 14 (08/07/24 0304)  BP: (!) 111/55 (08/07/24 0304)  SpO2: 95 % (08/07/24 0304) Vital Signs (24h Range):  Temp:  [98.2 °F (36.8 °C)-98.6 °F (37 °C)] 98.6 °F (37 °C)  Pulse:  [70-84] 81  Resp:  [14-26] 14  SpO2:  [95 %-100 %] 95 %  BP: (100-152)/(52-70) 111/55     Weight: 77.1 kg (170 lb)  Body mass index is 28.29 kg/m².     Physical Exam  Constitutional:       General: She is not in acute distress.     Appearance: She is overweight. She is not ill-appearing, toxic-appearing or diaphoretic.   HENT:      Head: Normocephalic and atraumatic.   Pulmonary:      Effort: No tachypnea, bradypnea,  accessory muscle usage or retractions.   Abdominal:      General: Abdomen is protuberant.   Genitourinary:     Comments: No delarosa in place  Skin:     Findings: No rash.   Neurological:      Mental Status: She is alert and oriented to person, place, and time.      GCS: GCS eye subscore is 4. GCS verbal subscore is 5. GCS motor subscore is 6.   Psychiatric:         Attention and Perception: Attention and perception normal.         Cognition and Memory: Cognition and memory normal.                Significant Labs: All pertinent labs within the past 24 hours have been reviewed.  Recent Results (from the past 24 hour(s))   POCT glucose    Collection Time: 08/06/24  1:06 PM   Result Value Ref Range    POCT Glucose 155 (H) 70 - 110 mg/dL   CBC auto differential    Collection Time: 08/06/24  1:54 PM   Result Value Ref Range    WBC 7.91 3.90 - 12.70 K/uL    RBC 3.20 (L) 4.00 - 5.40 M/uL    Hemoglobin 8.9 (L) 12.0 - 16.0 g/dL    Hematocrit 27.9 (L) 37.0 - 48.5 %    MCV 87 82 - 98 fL    MCH 27.8 27.0 - 31.0 pg    MCHC 31.9 (L) 32.0 - 36.0 g/dL    RDW 13.5 11.5 - 14.5 %    Platelets 222 150 - 450 K/uL    MPV 9.4 9.2 - 12.9 fL    Immature Granulocytes 0.3 0.0 - 0.5 %    Gran # (ANC) 4.6 1.8 - 7.7 K/uL    Immature Grans (Abs) 0.02 0.00 - 0.04 K/uL    Lymph # 2.6 1.0 - 4.8 K/uL    Mono # 0.6 0.3 - 1.0 K/uL    Eos # 0.1 0.0 - 0.5 K/uL    Baso # 0.02 0.00 - 0.20 K/uL    nRBC 0 0 /100 WBC    Gran % 58.2 38.0 - 73.0 %    Lymph % 32.5 18.0 - 48.0 %    Mono % 7.8 4.0 - 15.0 %    Eosinophil % 0.9 0.0 - 8.0 %    Basophil % 0.3 0.0 - 1.9 %    Differential Method Automated    Comprehensive metabolic panel    Collection Time: 08/06/24  1:54 PM   Result Value Ref Range    Sodium 139 136 - 145 mmol/L    Potassium 4.0 3.5 - 5.1 mmol/L    Chloride 104 95 - 110 mmol/L    CO2 24 23 - 29 mmol/L    Glucose 154 (H) 70 - 110 mg/dL    BUN 24 (H) 6 - 20 mg/dL    Creatinine 0.9 0.5 - 1.4 mg/dL    Calcium 9.1 8.7 - 10.5 mg/dL    Total Protein 7.0 6.0 -  8.4 g/dL    Albumin 4.0 3.5 - 5.2 g/dL    Total Bilirubin 0.2 0.1 - 1.0 mg/dL    Alkaline Phosphatase 78 55 - 135 U/L    AST 31 10 - 40 U/L    ALT 58 (H) 10 - 44 U/L    eGFR >60.0 >60 mL/min/1.73 m^2    Anion Gap 11 8 - 16 mmol/L   Troponin I    Collection Time: 08/06/24  1:54 PM   Result Value Ref Range    Troponin I <0.006 0.000 - 0.026 ng/mL   Brain natriuretic peptide    Collection Time: 08/06/24  1:54 PM   Result Value Ref Range    BNP <10 0 - 99 pg/mL   Magnesium    Collection Time: 08/06/24  1:54 PM   Result Value Ref Range    Magnesium 1.9 1.6 - 2.6 mg/dL   Phosphorus    Collection Time: 08/06/24  1:54 PM   Result Value Ref Range    Phosphorus 3.3 2.7 - 4.5 mg/dL   Ethanol    Collection Time: 08/06/24  1:54 PM   Result Value Ref Range    Alcohol, Serum <10 0 - 10 mg/dL   Urinalysis, Reflex to Urine Culture Urine, Clean Catch    Collection Time: 08/06/24  2:01 PM    Specimen: Urine   Result Value Ref Range    Specimen UA Urine, Unspecified     Color, UA Yellow Yellow, Straw, Lenore    Appearance, UA Hazy (A) Clear    pH, UA 7.0 5.0 - 8.0    Specific Gravity, UA 1.015 1.005 - 1.030    Protein, UA Negative Negative    Glucose, UA Negative Negative    Ketones, UA Negative Negative    Bilirubin (UA) Negative Negative    Occult Blood UA Negative Negative    Nitrite, UA Negative Negative    Urobilinogen, UA Negative Negative EU/dL    Leukocytes, UA Trace (A) Negative   Drug screen panel, emergency    Collection Time: 08/06/24  2:01 PM   Result Value Ref Range    Benzodiazepines Negative Negative    Methadone metabolites Negative Negative    Cocaine (Metab.) Negative Negative    Opiate Scrn, Ur Negative Negative    Barbiturate Screen, Ur Negative Negative    Amphetamine Screen, Ur Negative Negative    THC Negative Negative    Phencyclidine Negative Negative    Creatinine, Urine 66.1 15.0 - 325.0 mg/dL    Toxicology Information SEE COMMENT    Urinalysis Microscopic    Collection Time: 08/06/24  2:01 PM   Result Value  Ref Range    WBC, UA 4 0 - 5 /hpf    Bacteria Many (A) None-Occ /hpf    Yeast, UA Occasional (A) None    Squam Epithel, UA 6 /hpf    Microscopic Comment SEE COMMENT    POCT glucose    Collection Time: 08/06/24  7:43 PM   Result Value Ref Range    POCT Glucose 133 (H) 70 - 110 mg/dL   POCT glucose    Collection Time: 08/07/24  3:35 AM   Result Value Ref Range    POCT Glucose 209 (H) 70 - 110 mg/dL   ISTAT PROCEDURE    Collection Time: 08/07/24  3:43 AM   Result Value Ref Range    POC PH 7.372 7.35 - 7.45    POC PCO2 43.7 35 - 45 mmHg    POC PO2 41 40 - 60 mmHg    POC HCO3 25.4 24 - 28 mmol/L    POC BE 0 -2 to 2 mmol/L    POC SATURATED O2 75 95 - 100 %    POC TCO2 27 24 - 29 mmol/L    Sample VENOUS     Site Other     Allens Test N/A     DelSys Room Air     Mode SPONT     Sp02 100          Significant Imaging: I have reviewed all pertinent imaging results/findings within the past 24 hours.  Assessment/Plan:     * Syncope  She has had N/V with poor po intake  She is also on multiple BP meds  She was hypotensive on EMS arrival to 80/30  Hold all home BP meds for now  IV  cc/hr  Re-introduce BP meds one at a time today as tolerates      Acute bronchitis  She does report SOB, wheezing and cough  She has no fever or leukocytosis  Recent completion of Augmentin  On Rocephin + Zithro for now   -Likely can d/c home today with just Zithro  Prednisone 40mg daily   Tessalon pearls + Robitussin DM      Pulmonary sarcoidosis  This is not noted on CT chest today  Follow up with primary Pulmonologist as needed      Essential hypertension  She is running on the low side now, so holding all home BP meds      Cervical spine disease  Vicodin 5mg prn  Chronic issue      Type 2 diabetes mellitus, with long-term current use of insulin  Lantus 15U HS, now  Low dose SSI protocol        Obstructive sleep apnea  Unsure if she uses CPAP        Hemiparesis affecting right side as late effect of cerebrovascular accident  Continue ASA 81mg +  Statin      Anemia of chronic disease  Stable to follow up with PCP for this     Latest Reference Range & Units 07/01/24 09:31   Iron 30 - 160 ug/dL 97   TIBC 250 - 450 ug/dL 451 (H)   Saturated Iron 20 - 50 % 22   Transferrin 200 - 375 mg/dL 305   Ferritin 20.0 - 300.0 ng/mL 57   (H): Data is abnormally high        VTE Risk Mitigation (From admission, onward)           Ordered     Place VERÓNICA hose  Until discontinued         08/07/24 0453     IP VTE LOW RISK PATIENT  Once         08/07/24 0453     Place sequential compression device  Until discontinued         08/07/24 0453                         The attending portion of this evaluation, treatment, and documentation was performed per TUAN Guzman MD via Telemedicine AudioVisual using the secure [employer] software platform with 2 way audio/video. The provider was located off-site and the patient is located in the hospital. The aforementioned video software was utilized to document the relevant history and physical exam    On 08/07/2024, patient should be placed in hospital observation services under my care.        TUAN Guzman MD  Department of Hospital Medicine   [facility]

## 2024-08-07 NOTE — PLAN OF CARE
[facility] Unit  Initial Discharge Assessment       Primary Care Provider: Gab Levy MD    Admission Diagnosis: Cough [R05.9]  Pulmonary sarcoidosis [D86.0]  Syncope [R55]  Bilateral pulmonary infiltrates on chest x-ray [R91.8]  Chest pain [R07.9]    Admission Date: 8/6/2024  Expected Discharge Date: 8/8/2024    Transition of Care Barriers: None    Patient alert & oriented lives at home with her spouse. She uses a quad cane at home. She is open to going to outpatient PT/OT her at [facility]. She uses Dr Levy in [place] for PCP. She denies any discharge needs at this time. Her spouse will bring her home at time of discharge. Will continue to follow.     Payor:  / Plan: [place] / Product Type: Government /     Extended Emergency Contact Information  Primary Emergency Contact: olga murillo  Address: [address]  Mobile Phone: [phone]  Relation: Spouse  Preferred language: English   needed? No    Discharge Plan A: Home with family  Discharge Plan B: Home with family      [facility] - [place], [place] - [address]  [address]  [place] [address]  Phone: [phone] Fax: [phone]    [id number] [place], [place] - [address]  [address]  [place] [address]  Phone: [phone] Fax: [phone]    [facility] - [place], [place] - [address]  [address]  [address]  [place] [address]  Phone: [phone] Fax: [phone]      Initial Assessment (most recent)       Adult Discharge Assessment - 08/07/24 1642          Discharge Assessment    Assessment Type Discharge Planning Assessment     Confirmed/corrected address, phone number and insurance Yes     Confirmed Demographics Correct on Facesheet     Source of Information patient     When was your last doctors appointment? --   about a month ago    Communicated EDDY with  patient/caregiver Yes     Reason For Admission low blood pressure     People in Home spouse     Do you expect to return to your current living situation? Yes     Do you have help at home or someone to help you manage your care at home? Yes     Who are your caregiver(s) and their phone number(s)? German Arias spouse [phone]     Prior to hospitilization cognitive status: Alert/Oriented     Current cognitive status: Alert/Oriented     Walking or Climbing Stairs Difficulty yes     Walking or Climbing Stairs ambulation difficulty, requires equipment     Mobility Management uses a quad cane     Dressing/Bathing Difficulty yes     Dressing/Bathing bathing difficulty, requires equipment     Dressing/Bathing Management uses shower chair     Do you have any problems with: Errands/Grocery     Home Layout Able to live on 1st floor     Equipment Currently Used at Home cane, quad;shower chair;other (see comments)   grabber    Readmission within 30 days? Yes     Patient currently being followed by outpatient case management? No     Do you currently have service(s) that help you manage your care at home? No     Do you take prescription medications? Yes     Do you have prescription coverage? Yes     Coverage      Do you have any problems affording any of your prescribed medications? No     Is the patient taking medications as prescribed? yes     Who is going to help you get home at discharge? German Arias spouse [phone]     How do you get to doctors appointments? family or friend will provide     Are you on dialysis? No     Do you take coumadin? No     Discharge Plan A Home with family     Discharge Plan B Home with family     DME Needed Upon Discharge  none     Discharge Plan discussed with: Patient     Transition of Care Barriers None        Physical Activity    On average, how many days per week do you engage in moderate to strenuous exercise (like a brisk walk)? 3 days     On average, how many minutes do you  engage in exercise at this level? 20 min        Financial Resource Strain    How hard is it for you to pay for the very basics like food, housing, medical care, and heating? Not hard at all        Housing Stability    In the last 12 months, was there a time when you were not able to pay the mortgage or rent on time? No     At any time in the past 12 months, were you homeless or living in a shelter (including now)? No        Transportation Needs    Has the lack of transportation kept you from medical appointments, meetings, work or from getting things needed for daily living? No        Food Insecurity    Within the past 12 months, you worried that your food would run out before you got the money to buy more. Never true     Within the past 12 months, the food you bought just didn't last and you didn't have money to get more. Never true        Stress    Do you feel stress - tense, restless, nervous, or anxious, or unable to sleep at night because your mind is troubled all the time - these days? To some extent        Social Isolation    How often do you feel lonely or isolated from those around you?  Sometimes        Alcohol Use    Q1: How often do you have a drink containing alcohol? Never     Q2: How many drinks containing alcohol do you have on a typical day when you are drinking? Patient does not drink     Q3: How often do you have six or more drinks on one occasion? Never        Utilities    In the past 12 months has the electric, gas, oil, or water company threatened to shut off services in your home? No        Health Literacy    How often do you need to have someone help you when you read instructions, pamphlets, or other written material from your doctor or pharmacy? Never        OTHER    Name(s) of People in Home German Arias spouse [phone]

## 2024-08-07 NOTE — ED NOTES
Pt made aware, ER doctor Nancy has given her permission to take her home meds, pt and pt's daughter verbalized understanding

## 2024-08-07 NOTE — ED NOTES
Charge nurse Ranjith made aware, pt is requesting to go to [facility], then [facility], after being informed her first choice [facility] is full and decline the transfer, charge nurse verbalized understanding and reports he will contact transfer center

## 2024-08-07 NOTE — ED NOTES
Pt updated on plan of care at this time.  Pt and family agreeable to plan.  Pt denies any new concerns at this time.  Pt requesting food.  Primary RN notified.  NAD noted.

## 2024-08-07 NOTE — CONSULTS
[facility]  Adult Nutrition  Consult Note    SUMMARY     Recommendations    Recommendation/Intervention: 1. Boost Glucose Control qd to aid pt in intake goals. 2. Dietary to honor meal preferences. 3. Encourage CHO controlled diet with weigth management.  Goals: 1. PO intake of meals and supplements meet at least 75% of EEN. 2. Diet and lifestyle change with weight loss.  Nutrition Goal Status: new    Assessment and Plan    Nutrition Problem  Inadequate oral intake    Related to (etiology):   Poor appetite    Signs and Symptoms (as evidenced by):   <25% intake x2 days.    Interventions/Recommendations (treatment strategy):  Boost Glucose Control qd with review of intake goals. Protein portion encouraged.     Nutrition Diagnosis Status:   New    Nutrition Problem  Food and nutrition knowledge deficit    Related to (etiology):   DM and CVD    Signs and Symptoms (as evidenced by):   Pt report of no prior nutrition education and dietary recall (see notes).     Interventions/Recommendations (treatment strategy):  Heart healthy, CHO control diet education    Nutrition Diagnosis Status:   New    Pt reports poor appetite and intake. RD discussed nutrition needs with pt and intake goals. Protein portion of meals encouraged. Additionally, nutrition supplement discussed with pt. Pt was agreeable to consuming. Per dietary recall, pt consumes 2 meals per day. Dietary recall: toast, coffee, fruit, pasta, dumplings, etc. RD reviewed the Plate Method for CHO control. Whole grains and soluble fibers discussed and encouraged. Lean proteins sources and cooking methods reviewed. Non starchy vegetables encouraged. Na and saturated fat intake reviewed. Salt substitute and the interpretation of the nutrition label were addressed. Weight management goals (initial loss of 9-18 lbs) and needs encouraged. Pt and spouse receptive to information and good compliance expected. Will provide education materials at  "discharge.       Malnutrition Assessment  Malnutrition Level: other (see comments) (No malnutrition per ASPEN guidelines indicated at this time.)                                    Reason for Assessment    Reason For Assessment: consult (Decreased PO intake)  Diagnosis: other (see comments) (Syncope)  Relevant Medical History: T2DM, dysphagia, gastritis, gastroparesis, HTN, IBS, SILVESTRE, CVA  Interdisciplinary Rounds: did not attend  General Information Comments: RN documented pt requesting meals. Provided pt with po fluid, sandwich, diet sprite, diet coke, water, sugar free pudding.  Nutrition Discharge Planning: CHO Controlled diet    Nutrition Risk Screen    Nutrition Risk Screen: no indicators present    Nutrition/Diet History    Spiritual, Cultural Beliefs, Yarsanism Practices, Values that Affect Care: no  Food Allergies: NKFA  Factors Affecting Nutritional Intake: decreased appetite  Nutrition Related SDOH: None identified     Anthropometrics    Temp: 97.8 °F (36.6 °C)  Height Method: Stated  Height: 5' 5" (165.1 cm)  Height (inches): 65 in  Weight Method: Bed Scale  Weight: 83 kg (182 lb 15.7 oz)  Weight (lb): 182.98 lb  Ideal Body Weight (IBW), Female: 125 lb  % Ideal Body Weight, Female (lb): 146.38 %  BMI (Calculated): 30.4  BMI Grade: 30 - 34.9- obesity - grade I     Wt Readings from Last 30 Encounters:   08/07/24 83 kg (182 lb 15.7 oz)   06/05/24 83.3 kg (183 lb 10.3 oz)   05/20/24 82 kg (180 lb 12.4 oz)   01/25/24 78 kg (171 lb 15.3 oz)   01/20/24 81.6 kg (180 lb)   04/10/23 83 kg (183 lb)   04/04/23 83.4 kg (183 lb 13.8 oz)   01/31/23 85.3 kg (188 lb)   07/09/22 79.4 kg (175 lb)   06/29/22 79.4 kg (175 lb)   06/28/22 79.4 kg (175 lb)   06/23/22 79.4 kg (175 lb)   06/23/22 83.9 kg (184 lb 15.5 oz)   06/03/22 83.9 kg (185 lb)   05/06/22 82 kg (180 lb 11.2 oz)   03/09/22 83.5 kg (184 lb)   09/22/21 79.4 kg (175 lb)   08/11/21 79.5 kg (175 lb 3.2 oz)   06/22/21 83.9 kg (185 lb)   06/02/21 83.9 kg (185 lb) "   05/18/21 78.7 kg (173 lb 9.6 oz)   02/09/21 83.9 kg (185 lb)   01/07/21 83.9 kg (185 lb)   11/17/20 83.6 kg (184 lb 3.2 oz)   06/30/20 82.1 kg (181 lb)   05/28/20 77.1 kg (170 lb)   05/05/20 78.9 kg (174 lb)   04/29/20 81.2 kg (179 lb)   03/04/20 80.7 kg (178 lb)   ]    Lab/Procedures/Meds    Pertinent Labs Reviewed: reviewed  Pertinent Labs Comments: ALT 55, Glu 146, A1C 6.9  Pertinent Medications Reviewed: reviewed     albuterol-ipratropium  3 mL Nebulization Q6H    aspirin  81 mg Oral Daily    atorvastatin  40 mg Oral QHS    azithromycin  500 mg Oral Daily    carBAMazepine  400 mg Oral BID    cefTRIAXone (Rocephin) IV (PEDS and ADULTS)  1 g Intravenous Q24H    cholecalciferol (vitamin D3)  1,200 Units Oral Daily    dextromethorphan-guaiFENesin  mg/5 ml  10 mL Oral Q6H    fluticasone furoate-vilanteroL  1 puff Inhalation Daily    insulin glargine U-100 (Lantus)  15 Units Subcutaneous QHS    predniSONE  40 mg Oral Daily         Estimated/Assessed Needs    Weight Used For Calorie Calculations: 83 kg (182 lb 15.7 oz)  Energy Calorie Requirements (kcal): 1764 kcal/day  Energy Need Method: Greene-St Jeor (MSJ xAF 1.25)  Protein Requirements: 66-88 gm/day  Weight Used For Protein Calculations: 83 kg (182 lb 15.7 oz) (15-20% EEN)  Fluid Requirements (mL): 1764 mL/day  Estimated Fluid Requirement Method: RDA Method  RDA Method (mL): 1764  CHO Requirement: 198 gm/day      Nutrition Prescription Ordered    Current Diet Order: 2000 kcal CHO controlled (IDDSI Level 7)    Evaluation of Received Nutrient/Fluid Intake    Tolerance: tolerating (Per RN note)  % Intake of Estimated Energy Needs: 0 - 25 %  % Meal Intake: 0 - 25 %    Nutrition Risk    Level of Risk/Frequency of Follow-up: low - moderate       Monitor and Evaluation    Food and Nutrient Intake: food and beverage intake  Food and Nutrient Adminstration: diet order  Knowledge/Beliefs/Attitudes: food and nutrition knowledge/skill, beliefs and  attitudes  Anthropometric Measurements: weight change, weight  Biochemical Data, Medical Tests and Procedures: glucose/endocrine profile       Nutrition Follow-Up    Per Consult     Discharge Plan    CHO controlled diet with weight management recommendations

## 2024-08-07 NOTE — ASSESSMENT & PLAN NOTE
She has had N/V with poor po intake  She is also on multiple BP meds  She was hypotensive on EMS arrival to 80/30  Hold all home BP meds for now  IV  cc/hr  Re-introduce BP meds one at a time today as tolerates

## 2024-08-08 VITALS
DIASTOLIC BLOOD PRESSURE: 77 MMHG | RESPIRATION RATE: 20 BRPM | BODY MASS INDEX: 31.22 KG/M2 | OXYGEN SATURATION: 97 % | HEART RATE: 107 BPM | SYSTOLIC BLOOD PRESSURE: 170 MMHG | HEIGHT: 65 IN | TEMPERATURE: 98 F | WEIGHT: 187.38 LBS

## 2024-08-08 LAB
ACE SERPL-CCNC: 24 U/L (ref 16–85)
ANION GAP SERPL CALC-SCNC: 10 MMOL/L (ref 8–16)
AORTIC ROOT ANNULUS: 2.63 CM
AORTIC VALVE CUSP SEPERATION: 2.11 CM
ASCENDING AORTA: 2.17 CM
AV INDEX (PROSTH): 0.92
AV MEAN GRADIENT: 4 MMHG
AV PEAK GRADIENT: 6 MMHG
AV VALVE AREA BY VELOCITY RATIO: 2.43 CM²
AV VALVE AREA: 2.59 CM²
AV VELOCITY RATIO: 0.87
BASOPHILS # BLD AUTO: 0.02 K/UL (ref 0–0.2)
BASOPHILS NFR BLD: 0.2 % (ref 0–1.9)
BSA FOR ECHO PROCEDURE: 1.95 M2
BUN SERPL-MCNC: 10 MG/DL (ref 6–20)
CALCIUM SERPL-MCNC: 9.1 MG/DL (ref 8.7–10.5)
CHLORIDE SERPL-SCNC: 106 MMOL/L (ref 95–110)
CO2 SERPL-SCNC: 25 MMOL/L (ref 23–29)
CREAT SERPL-MCNC: 0.7 MG/DL (ref 0.5–1.4)
CV ECHO LV RWT: 0.65 CM
DIFFERENTIAL METHOD BLD: ABNORMAL
DOP CALC AO PEAK VEL: 1.26 M/S
DOP CALC AO VTI: 28.6 CM
DOP CALC LVOT AREA: 2.8 CM2
DOP CALC LVOT DIAMETER: 1.89 CM
DOP CALC LVOT PEAK VEL: 1.09 M/S
DOP CALC LVOT STROKE VOLUME: 74.03 CM3
DOP CALC MV VTI: 39.9 CM
DOP CALCLVOT PEAK VEL VTI: 26.4 CM
E WAVE DECELERATION TIME: 189.91 MSEC
E/A RATIO: 1
E/E' RATIO: 14.35 M/S
ECHO LV POSTERIOR WALL: 1.15 CM (ref 0.6–1.1)
EJECTION FRACTION: 65 %
EOSINOPHIL # BLD AUTO: 0 K/UL (ref 0–0.5)
EOSINOPHIL NFR BLD: 0.5 % (ref 0–8)
ERYTHROCYTE [DISTWIDTH] IN BLOOD BY AUTOMATED COUNT: 13.7 % (ref 11.5–14.5)
EST. GFR  (NO RACE VARIABLE): >60 ML/MIN/1.73 M^2
FRACTIONAL SHORTENING: 35 % (ref 28–44)
GLUCOSE SERPL-MCNC: 91 MG/DL (ref 70–110)
HCT VFR BLD AUTO: 27.3 % (ref 37–48.5)
HGB BLD-MCNC: 8.9 G/DL (ref 12–16)
IMM GRANULOCYTES # BLD AUTO: 0.03 K/UL (ref 0–0.04)
IMM GRANULOCYTES NFR BLD AUTO: 0.3 % (ref 0–0.5)
INTERVENTRICULAR SEPTUM: 1.41 CM (ref 0.6–1.1)
IVC DIAMETER: 1.04 CM
LA MAJOR: 3.29 CM
LA MINOR: 2.47 CM
LEFT ATRIUM AREA SYSTOLIC (APICAL 2 CHAMBER): 6.32 CM2
LEFT ATRIUM AREA SYSTOLIC (APICAL 4 CHAMBER): 10.83 CM2
LEFT ATRIUM SIZE: 3.44 CM
LEFT ATRIUM VOLUME INDEX MOD: 8.8 ML/M2
LEFT ATRIUM VOLUME MOD: 16.65 CM3
LEFT INTERNAL DIMENSION IN SYSTOLE: 2.31 CM (ref 2.1–4)
LEFT VENTRICLE DIASTOLIC VOLUME INDEX: 27.61 ML/M2
LEFT VENTRICLE DIASTOLIC VOLUME: 52.46 ML
LEFT VENTRICLE END SYSTOLIC VOLUME APICAL 2 CHAMBER: 9.6 ML
LEFT VENTRICLE END SYSTOLIC VOLUME APICAL 4 CHAMBER: 22.82 ML
LEFT VENTRICLE MASS INDEX: 81 G/M2
LEFT VENTRICLE SYSTOLIC VOLUME INDEX: 9.7 ML/M2
LEFT VENTRICLE SYSTOLIC VOLUME: 18.35 ML
LEFT VENTRICULAR INTERNAL DIMENSION IN DIASTOLE: 3.55 CM (ref 3.5–6)
LEFT VENTRICULAR MASS: 153.16 G
LV LATERAL E/E' RATIO: 15.25 M/S
LV SEPTAL E/E' RATIO: 13.56 M/S
LVED V (TEICH): 52.46 ML
LVES V (TEICH): 18.35 ML
LVOT MG: 2.99 MMHG
LVOT MV: 0.81 CM/S
LYMPHOCYTES # BLD AUTO: 3.9 K/UL (ref 1–4.8)
LYMPHOCYTES NFR BLD: 44.6 % (ref 18–48)
MAGNESIUM SERPL-MCNC: 1.6 MG/DL (ref 1.6–2.6)
MCH RBC QN AUTO: 28.1 PG (ref 27–31)
MCHC RBC AUTO-ENTMCNC: 32.6 G/DL (ref 32–36)
MCV RBC AUTO: 86 FL (ref 82–98)
MONOCYTES # BLD AUTO: 0.7 K/UL (ref 0.3–1)
MONOCYTES NFR BLD: 7.5 % (ref 4–15)
MV MEAN GRADIENT: 5 MMHG
MV PEAK A VEL: 1.22 M/S
MV PEAK E VEL: 1.22 M/S
MV PEAK GRADIENT: 9 MMHG
MV STENOSIS PRESSURE HALF TIME: 55.08 MS
MV VALVE AREA BY CONTINUITY EQUATION: 1.86 CM2
MV VALVE AREA P 1/2 METHOD: 3.99 CM2
NEUTROPHILS # BLD AUTO: 4.1 K/UL (ref 1.8–7.7)
NEUTROPHILS NFR BLD: 46.9 % (ref 38–73)
NRBC BLD-RTO: 0 /100 WBC
OHS CV RV/LV RATIO: 0.65 CM
OHS QRS DURATION: 82 MS
OHS QRS DURATION: 84 MS
OHS QTC CALCULATION: 437 MS
OHS QTC CALCULATION: 445 MS
PHOSPHATE SERPL-MCNC: 3.3 MG/DL (ref 2.7–4.5)
PISA MRMAX VEL: 3.6 M/S
PISA TR MAX VEL: 2.88 M/S
PLATELET # BLD AUTO: 250 K/UL (ref 150–450)
PMV BLD AUTO: 9.1 FL (ref 9.2–12.9)
POCT GLUCOSE: 121 MG/DL (ref 70–110)
POCT GLUCOSE: 239 MG/DL (ref 70–110)
POTASSIUM SERPL-SCNC: 3.6 MMOL/L (ref 3.5–5.1)
PV MV: 0.88 M/S
PV PEAK GRADIENT: 5 MMHG
PV PEAK VELOCITY: 1.15 M/S
RA MAJOR: 3.34 CM
RA PRESSURE ESTIMATED: 3 MMHG
RA WIDTH: 2.17 CM
RBC # BLD AUTO: 3.17 M/UL (ref 4–5.4)
RIGHT VENTRICLE DIASTOLIC BASEL DIMENSION: 3 CM
RIGHT VENTRICLE DIASTOLIC LENGTH: 5.3 CM
RIGHT VENTRICLE DIASTOLIC MID DIMENSION: 2.2 CM
RIGHT VENTRICULAR END-DIASTOLIC DIMENSION: 2.29 CM
RIGHT VENTRICULAR LENGTH IN DIASTOLE (APICAL 4-CHAMBER VIEW): 5.34 CM
RV MID DIAMA: 2.21 CM
RV TB RVSP: 6 MMHG
SINUS: 2.33 CM
SODIUM SERPL-SCNC: 141 MMOL/L (ref 136–145)
STJ: 2.3 CM
TDI LATERAL: 0.08 M/S
TDI SEPTAL: 0.09 M/S
TDI: 0.09 M/S
TR MAX PG: 33 MMHG
TRICUSPID ANNULAR PLANE SYSTOLIC EXCURSION: 2.1 CM
TRICUSPID VALVE PEAK A WAVE VELOCITY: 0.7 M/S
TV PEAK E VEL: 0.8 M/S
TV REST PULMONARY ARTERY PRESSURE: 36 MMHG
WBC # BLD AUTO: 8.66 K/UL (ref 3.9–12.7)
Z-SCORE OF LEFT VENTRICULAR DIMENSION IN END DIASTOLE: -4.07
Z-SCORE OF LEFT VENTRICULAR DIMENSION IN END SYSTOLE: -2.78

## 2024-08-08 PROCEDURE — 80048 BASIC METABOLIC PNL TOTAL CA: CPT | Performed by: INTERNAL MEDICINE

## 2024-08-08 PROCEDURE — 87205 SMEAR GRAM STAIN: CPT | Performed by: STUDENT IN AN ORGANIZED HEALTH CARE EDUCATION/TRAINING PROGRAM

## 2024-08-08 PROCEDURE — 99900035 HC TECH TIME PER 15 MIN (STAT)

## 2024-08-08 PROCEDURE — 94640 AIRWAY INHALATION TREATMENT: CPT

## 2024-08-08 PROCEDURE — 25000003 PHARM REV CODE 250: Performed by: INTERNAL MEDICINE

## 2024-08-08 PROCEDURE — 25000003 PHARM REV CODE 250: Performed by: STUDENT IN AN ORGANIZED HEALTH CARE EDUCATION/TRAINING PROGRAM

## 2024-08-08 PROCEDURE — 83735 ASSAY OF MAGNESIUM: CPT | Performed by: INTERNAL MEDICINE

## 2024-08-08 PROCEDURE — 63700000 PHARM REV CODE 250 ALT 637 W/O HCPCS: Performed by: INTERNAL MEDICINE

## 2024-08-08 PROCEDURE — 87186 SC STD MICRODIL/AGAR DIL: CPT | Performed by: STUDENT IN AN ORGANIZED HEALTH CARE EDUCATION/TRAINING PROGRAM

## 2024-08-08 PROCEDURE — 63600175 PHARM REV CODE 636 W HCPCS: Performed by: INTERNAL MEDICINE

## 2024-08-08 PROCEDURE — 25000242 PHARM REV CODE 250 ALT 637 W/ HCPCS: Performed by: INTERNAL MEDICINE

## 2024-08-08 PROCEDURE — 94761 N-INVAS EAR/PLS OXIMETRY MLT: CPT

## 2024-08-08 PROCEDURE — 97116 GAIT TRAINING THERAPY: CPT

## 2024-08-08 PROCEDURE — 84100 ASSAY OF PHOSPHORUS: CPT | Performed by: INTERNAL MEDICINE

## 2024-08-08 PROCEDURE — 85025 COMPLETE CBC W/AUTO DIFF WBC: CPT | Performed by: INTERNAL MEDICINE

## 2024-08-08 PROCEDURE — 97165 OT EVAL LOW COMPLEX 30 MIN: CPT

## 2024-08-08 PROCEDURE — 99238 HOSP IP/OBS DSCHRG MGMT 30/<: CPT | Mod: ,,, | Performed by: STUDENT IN AN ORGANIZED HEALTH CARE EDUCATION/TRAINING PROGRAM

## 2024-08-08 PROCEDURE — 36415 COLL VENOUS BLD VENIPUNCTURE: CPT | Performed by: INTERNAL MEDICINE

## 2024-08-08 PROCEDURE — 87070 CULTURE OTHR SPECIMN AEROBIC: CPT | Performed by: STUDENT IN AN ORGANIZED HEALTH CARE EDUCATION/TRAINING PROGRAM

## 2024-08-08 RX ORDER — LEVOFLOXACIN 750 MG/1
750 TABLET ORAL DAILY
Qty: 7 TABLET | Refills: 0 | Status: SHIPPED | OUTPATIENT
Start: 2024-08-08 | End: 2024-08-08

## 2024-08-08 RX ORDER — POLYETHYLENE GLYCOL 3350 17 G/17G
17 POWDER, FOR SOLUTION ORAL DAILY
Status: DISCONTINUED | OUTPATIENT
Start: 2024-08-08 | End: 2024-08-08 | Stop reason: HOSPADM

## 2024-08-08 RX ORDER — PREDNISONE 20 MG/1
40 TABLET ORAL DAILY
Qty: 10 TABLET | Refills: 0 | Status: SHIPPED | OUTPATIENT
Start: 2024-08-08 | End: 2024-08-13

## 2024-08-08 RX ORDER — LEVOFLOXACIN 750 MG/1
750 TABLET ORAL DAILY
Qty: 7 TABLET | Refills: 0 | Status: SHIPPED | OUTPATIENT
Start: 2024-08-08 | End: 2024-08-15

## 2024-08-08 RX ORDER — PREDNISONE 20 MG/1
40 TABLET ORAL DAILY
Qty: 10 TABLET | Refills: 0 | Status: SHIPPED | OUTPATIENT
Start: 2024-08-08 | End: 2024-08-08

## 2024-08-08 RX ADMIN — ASPIRIN 81 MG: 81 TABLET, COATED ORAL at 10:08

## 2024-08-08 RX ADMIN — GUAIFENESIN AND DEXTROMETHORPHAN 10 ML: 100; 10 SYRUP ORAL at 12:08

## 2024-08-08 RX ADMIN — INSULIN ASPART 2 UNITS: 100 INJECTION, SOLUTION INTRAVENOUS; SUBCUTANEOUS at 12:08

## 2024-08-08 RX ADMIN — POLYETHYLENE GLYCOL (3350) 17 G: 17 POWDER, FOR SOLUTION ORAL at 12:08

## 2024-08-08 RX ADMIN — AZITHROMYCIN DIHYDRATE 500 MG: 250 TABLET ORAL at 10:08

## 2024-08-08 RX ADMIN — CHOLECALCIFEROL TAB 10 MCG (400 UNIT) 1200 UNITS: 10 TAB at 10:08

## 2024-08-08 RX ADMIN — CARBAMAZEPINE 400 MG: 100 TABLET, CHEWABLE ORAL at 10:08

## 2024-08-08 RX ADMIN — IPRATROPIUM BROMIDE AND ALBUTEROL SULFATE 3 ML: .5; 2.5 SOLUTION RESPIRATORY (INHALATION) at 12:08

## 2024-08-08 RX ADMIN — FLUTICASONE FUROATE AND VILANTEROL TRIFENATATE 1 PUFF: 100; 25 POWDER RESPIRATORY (INHALATION) at 07:08

## 2024-08-08 RX ADMIN — HYDROCODONE BITARTRATE AND ACETAMINOPHEN 1 TABLET: 5; 325 TABLET ORAL at 11:08

## 2024-08-08 RX ADMIN — PREDNISONE 40 MG: 20 TABLET ORAL at 10:08

## 2024-08-08 RX ADMIN — IPRATROPIUM BROMIDE AND ALBUTEROL SULFATE 3 ML: .5; 2.5 SOLUTION RESPIRATORY (INHALATION) at 07:08

## 2024-08-08 NOTE — PT/OT/SLP PROGRESS
Physical Therapy Treatment    Patient Name:  Luz Arias   MRN:  [id number]    Recommendations:     Discharge Recommendations: Moderate Intensity Therapy (Outpatient Physical and Occupational Therapy)  Discharge Equipment Recommendations: none  Barriers to discharge: None    Assessment:     Luz Arias is a 58 y.o. female admitted with a medical diagnosis of Syncope.  She presents with the following impairments/functional limitations: weakness, impaired functional mobility, gait instability.    The patient progressed from min assist to supervision with supine <> sit <> transfers.  She progressed from min assist to SBA with ambulation using the rolling walker.  She increased gait tolerance from 30 feet to 120 feet.    Rehab Prognosis: Good; patient would benefit from acute skilled PT services to address these deficits and reach maximum level of function.    Recent Surgery: * No surgery found *      Plan:     During this hospitalization, patient to be seen 5 x/week to address the identified rehab impairments via gait training, therapeutic activities, therapeutic exercises in order to progress towards goals.    Plan of Care Expires:   (Upon discharge from the hospital.)    Subjective     Chief Complaint: The patient states she is feeling much better today.  She may be going home this afternoon.  She would like to go to outpatient rehab.  Patient/Family Comments/goals: Return home with her  today.  Pain/Comfort:  Pain Rating 1: 0/10      Objective:     Communicated with nurse prior to session.  Patient found supine with telemetry, peripheral IV upon PT entry to room.     General Precautions: Standard, fall  Orthopedic Precautions: N/A  Braces: N/A  Respiratory Status: Room air     Functional Mobility:  Bed Mobility:     Supine to Sit: supervision  Sit to Supine: supervision  Transfers:     Sit to Stand:  supervision with rolling walker  Gait: Ambulates with a rolling walker and SBA       Treatment &  Education:  The patient transferred supine <> sit <> stand with supervision.  She received gait training with the rolling walker and SBA approximately 120 feet.      Patient left supine with all lines intact, call button in reach, nurse notified, and  present..    GOALS:   Multidisciplinary Problems       Physical Therapy Goals          Problem: Physical Therapy    Goal Priority Disciplines Outcome Goal Variances Interventions   Physical Therapy Goal     PT, PT/OT      Description: Goals    1.  Patient to be independent with bed <> chair transfers.  2.  Patient to be independent with ambulation using a rolling walker > 50 feet.                       Time Tracking:     PT Received On: 08/08/24  PT Start Time: 0945     PT Stop Time: 1000  PT Total Time (min): 15 min     Billable Minutes: Gait Training 15    Treatment Type: Treatment  PT/PTA: PT     Number of PTA visits since last PT visit: 0     08/08/2024

## 2024-08-08 NOTE — PLAN OF CARE
Problem: Adult Inpatient Plan of Care  Goal: Plan of Care Review  Outcome: Met  Goal: Patient-Specific Goal (Individualized)  Outcome: Met  Goal: Absence of Hospital-Acquired Illness or Injury  Outcome: Met  Goal: Optimal Comfort and Wellbeing  Outcome: Met  Goal: Readiness for Transition of Care  Outcome: Met     Problem: Infection  Goal: Absence of Infection Signs and Symptoms  Outcome: Met     Problem: Diabetes Comorbidity  Goal: Blood Glucose Level Within Targeted Range  Outcome: Met     Problem: Gas Exchange Impaired  Goal: Optimal Gas Exchange  Outcome: Met

## 2024-08-08 NOTE — PT/OT/SLP EVAL
Occupational Therapy Evaluation     Name: Luz Arias  MRN: [id number]  Admitting Diagnosis: Syncope  Recent Surgery: * No surgery found *      Recommendations:     Discharge Recommendations: Moderate Intensity Therapy  Level of Assistance Recommended: 24 hours supervision  Discharge Equipment Recommendations: cane, quad  Barriers to discharge: None    Assessment:     Luz Arias is a 58 y.o. female with a medical diagnosis of Syncope. She presents with performance deficits affecting function including weakness, impaired endurance, decreased upper extremity function.     Rehab Prognosis: Good; patient would benefit from acute OT services to address these deficits and reach maximum level of function.    Plan:     Patient to be seen 3 x/week to address the above listed problems via self-care/home management, therapeutic activities, therapeutic exercises, neuromuscular re-education  Plan of Care Expires: 09/05/24  Plan of Care Reviewed with: patient, spouse, daughter    Subjective     Chief Complaint: Patient has no complaints.   Patient Comments/Goals: Patient states she is ready to go home.  Pain/Comfort:  Pain Rating 1: 0/10    Patients cultural, spiritual, Scientologist conflicts given the current situation: yes    Social History:  Living Environment: Patient lives with their spouse in a single story home with ramped  Prior Level of Function: Prior to admission, patient  required supervision assist with bathing.   Roles and Routines: Patient was driving and not working prior to admission.  Equipment Used at Home: cane, quad  DME owned (not currently used): none  Assistance Upon Discharge: significant other    Objective:     Communicated with patient, patient spouse prior to session. Patient found HOB elevated with peripheral IV, pulse ox (continuous), telemetry upon OT entry to room.    General Precautions: Standard,     Orthopedic Precautions:     Braces:      Respiratory Status: Room air    Occupational  Performance    Gait belt applied - No    Bed Mobility:   Supine to sit from left side of bed with supervision  Sit to Supine with supervision on L side of bed    Activities of Daily Living:  Lower Body Dressing: independence    Cognitive/Visual Perceptual:  Cognitive/Psychosocial Skills:    -     Oriented to: Person, Place, Time, Situation    Physical Exam:  Balance:    -     Sitting: supervision  Upper Extremity Range of Motion:     -       Right Upper Extremity: WFL  -       Left Upper Extremity: WFL  Upper Extremity Strength:    -       Right Upper Extremity: Deficits: 3/5  -       Left Upper Extremity: WFL   Strength:    -       Right Upper Extremity: Deficits: 3/5  -       Left Upper Extremity: WFL  Fine Motor Coordination:    -       Intact  Left hand, finger to nose, Right hand, finger to nose, Left hand thumb/finger opposition skills, and Right hand thumb/finger opposition skills    AMPAC 6 Click ADL:  AMPAC Total Score: 24    Treatment & Education:  Therapist provided facilitation and instruction of proper body mechanics, energy conservation, and fall prevention strategies during tasks listed above  Patient educated on role of OT, POC, and goals for therapy      Patient clear to ambulate to/from bathroom with RN/PCT, assist x1 .    Patient left HOB elevated with all lines intact, call button in reach, and RN, spouse, and family present.    GOALS:   Pt to increase OOB tolerance to 30 min per day seated in upright chair by d.c.  Pt to increase LB dressing IND to set up/ SBA for donning socks and pants by d.c  Pt to increase IND with stand pivot transfer in order to t/f to and from [facility] by d/c.   Pt to complete toileting with SBA for all components (LB drsg, pericare and stand pivot) by d/c.        History:     Past Medical History:   Diagnosis Date    Aortic regurgitation     Asthma     Chronic fatigue     Chronic pain     Depression     Diabetes     Dysphagia     Gastritis     Gastroparesis     GERD  (gastroesophageal reflux disease)     Hypertension     Intestinal metaplasia of gastric cardia     Irritable bowel syndrome     Sarcoidosis     Sleep apnea     Stroke     12/2015, mini stroke 2/2023         Past Surgical History:   Procedure Laterality Date    APPENDECTOMY      CHOLECYSTECTOMY      COLONOSCOPY  05/17/2019    COLONOSCOPY N/A 5/5/2020    Procedure: COLONOSCOPY;  Surgeon: Garrick López MD;  Location: [facility];  Service: Endoscopy;  Laterality: N/A;  with bx and stool specimen    COLONOSCOPY N/A 4/10/2023    Procedure: COLONOSCOPY;  Surgeon: Forrest Gomes MD;  Location: [facility];  Service: Endoscopy;  Laterality: N/A;    ELBOW SURGERY      ESOPHAGEAL MANOMETRY WITH MEASUREMENT OF IMPEDANCE N/A 6/29/2022    Procedure: MANOMETRY-ESOPHAGEAL-WITH IMPEDANCE;  Surgeon: Concetta Odonnell MD;  Location: [facility] (4TH FLR);  Service: Endoscopy;  Laterality: N/A;  r/o rumination and supragastric belching  hold nortriptyline and norco 24 hours prior to procedure  fully vaccinated, instructions emailed to [email or web address]    ESOPHAGOGASTRODUODENOSCOPY N/A 5/5/2020    Procedure: EGD (ESOPHAGOGASTRODUODENOSCOPY);  Surgeon: Garrick López MD;  Location: [facility];  Service: Endoscopy;  Laterality: N/A;  with biopsy    ESOPHAGOGASTRODUODENOSCOPY N/A 6/2/2021    Procedure: EGD (ESOPHAGOGASTRODUODENOSCOPY);  Surgeon: Garrick López MD;  Location: [facility];  Service: Endoscopy;  Laterality: N/A;    ESOPHAGOGASTRODUODENOSCOPY N/A 6/28/2022    Procedure: ESOPHAGOGASTRODUODENOSCOPY (EGD);  Surgeon: Concetta Odonnell MD;  Location: [facility] (2ND FLR);  Service: Endoscopy;  Laterality: N/A;  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  full liquid diet x3 days, clear liquid diet x1 day prior to procedure  fully vaccinated, instructions emailed to [email or web address]    ESOPHAGOGASTRODUODENOSCOPY N/A 6/13/2023    Procedure: EGD (ESOPHAGOGASTRODUODENOSCOPY);  Surgeon: Forrest Gomes MD;  Location:  [facility];  Service: Endoscopy;  Laterality: N/A;    HAND SURGERY      HYSTERECTOMY      2005, age 39, KRISTAN fibroids    MEDIASTINOSCOPY      NECK SURGERY      UPPER GASTROINTESTINAL ENDOSCOPY  05/17/2019    WRIST SURGERY         Time Tracking:     OT Date of Treatment:    OT Start Time: 1200  OT Stop Time: 1210  OT Total Time (min): 10 min    Billable Minutes: Evaluation 10 minutes    8/8/2024

## 2024-08-08 NOTE — NURSING
Patient is being discharged home via  with family member. Patient assisted into vehicle via RN. IV d/cd, CDI with no redness, swelling, or drainage. Tele removed prior. AVS reviewed. Patients prescriptions escribed to pharmacy of choice. Medication education given. No questions or concerns at this time. All patient needs met and anticipated.

## 2024-08-08 NOTE — PLAN OF CARE
Problem: Gas Exchange Impaired  Goal: Optimal Gas Exchange  Outcome: Progressing  Intervention: Optimize Oxygenation and Ventilation  Flowsheets (Taken 8/8/2024 1252)  Airway/Ventilation Management: airway patency maintained  Head of Bed (HOB) Positioning: HOB elevated   Patient in no apparent distress. Sat's 97-98  % on room air. Treatments given Q 6. . Breo given daily. . Will continue to monitor.

## 2024-08-08 NOTE — PLAN OF CARE
[facility] Care Unit  Discharge Final Note    Primary Care Provider: Gab Levy MD    Expected Discharge Date: 8/8/2024    Verbal follow up appointments with Dr Levy & Zari Landrum NP for pulmonology & outpatient PT/OT provided to patient & spouse. Demonstrated understanding by verbal feedback. Denies any other needs at this time. OK for DC from CM standpoint. Nurse notified.      Final Discharge Note (most recent)       Final Note - 08/08/24 1340          Final Note    Assessment Type Final Discharge Note     Anticipated Discharge Disposition Home or Self Care     What phone number can be called within the next 1-3 days to see how you are doing after discharge? [phone]     Hospital Resources/Appts/Education Provided Appointments scheduled and added to AVS        Post-Acute Status    Discharge Delays None known at this time                     Important Message from Medicare  Important Message from Medicare regarding Discharge Appeal Rights: Given to patient/caregiver, Explained to patient/caregiver, Signed/date by patient/caregiver     Date IMM was signed: 08/07/24  Time IMM was signed: 1642    Contact Info       Gab Levy MD   Specialty: Internal Medicine   Relationship: PCP - General    [address] [place] [address]   Phone: [phone]       Next Steps: Go on 8/22/2024    Instructions: appointment Thursday Aug 22nd at 10:15am for hospital follow up    Zari Landrum NP   Specialty: Pulmonary Disease    [facility]  [address] [place] [address]   Phone: [phone]       Next Steps: Go on 9/27/2024    Instructions: appointment Friday Sept 27th at 11:00am for pulmonology follow up; you will be on a wait list if they have a cancellation

## 2024-08-10 ENCOUNTER — NURSE TRIAGE (OUTPATIENT)
Dept: ADMINISTRATIVE | Facility: CLINIC | Age: 59
End: 2024-08-10
Payer: MEDICARE

## 2024-08-10 LAB
BACTERIA SPEC AEROBE CULT: ABNORMAL
BACTERIA SPEC AEROBE CULT: ABNORMAL
GRAM STN SPEC: ABNORMAL

## 2024-08-10 NOTE — TELEPHONE ENCOUNTER
"[facility] PCP    Patient states she was recently discharged from the hospital for a diagnosis of Syncope. Patient states c/o chest pain and difficulty breathing. Patient states pain is intermittent, pressure-like and increasing in frequency.     Patient advised to Go to ED Now and to initiate NPO status until seen by ED Provider. Patient also advised to contact the [facility] on Call Service for any worsening symptoms. Patient states understanding of care advice.     Reason for Disposition   [1] Chest pain (or "angina") comes and goes AND [2] is happening more often (increasing in frequency) or getting worse (increasing in severity)  (Exception: Chest pains that last only a few seconds.)    Additional Information   Negative: SEVERE difficulty breathing (e.g., struggling for each breath, speaks in single words)   Negative: Difficult to awaken or acting confused (e.g., disoriented, slurred speech)   Negative: Shock suspected (e.g., cold/pale/clammy skin, too weak to stand, low BP, rapid pulse)   Negative: Passed out (e.g., fainted, lost consciousness, blacked out and was not responding)   Negative: [1] Chest pain lasts > 5 minutes AND [2] age > 44   Negative: [1] Chest pain lasts > 5 minutes AND [2] age > 30 AND [3] one or more cardiac risk factors (e.g., diabetes, high blood pressure, high cholesterol, obesity with BMI 30 or higher, smoker, or strong family history of heart disease)   Negative: [1] Chest pain lasts > 5 minutes AND [2] history of heart disease (i.e., angina, heart attack, heart failure, bypass surgery, takes nitroglycerin)   Negative: [1] Chest pain lasts > 5 minutes AND [2] described as crushing, pressure-like, or heavy   Negative: Heart beating < 50 beats per minute OR > 140 beats per minute   Negative: Visible sweat on face or sweat dripping down face   Negative: Sounds like a life-threatening emergency to the triager   Negative: Followed a chest injury   Negative: SEVERE chest pain    Protocols " used: Chest Pain-A-AH

## 2024-08-21 NOTE — HOSPITAL COURSE
Patient admitted for orthostatic syncope in setting of Klebsiella bacterial CAP. Treated with IVF, IV abx. Patient improved clinically. Held BP medications at discharge due to profound hypotension at admit. Sent out on PO abx. Patient has received maximum benefit from inpatient stay and is medically clear for discharge. Provided discharge instructions and return precautions.

## 2024-08-21 NOTE — DISCHARGE SUMMARY
[facility] Medicine  Discharge Summary      Patient Name: Luz Arias  MRN: [id number]  SUZANNE: [id number]  Patient Class: IP- Inpatient  Admission Date: 8/6/2024  Hospital Length of Stay: 1 days  Discharge Date and Time: 8/8/2024  4:37 PM  Attending Physician: No att. providers found   Discharging Provider: Ward Lee MD  Primary Care Provider: Gab Levy MD    Primary Care Team: Networked reference to record PCT     HPI:   Ms. Arias is a 57yo lady with a past medical history of aortic regurge, depression, chronic fatigue, DM2, dysphagia, gastritis, gastroparesis, HTN, IBS, SILVESTRE, CVA and sarcoidosis.  She also has known cervical disc disease with chronic pain.  She has no old TTE on record.      She is followed at [facility] with NP Niesha Oliver, who last saw her in clinic on 7/24/24 for reactive airway disease, pulmonary sarcoidosis, and SILVESTRE on CPAP.  She was complaining of some cough with hemoptysis at that time.  He ordered ACE level, BNP and CT chest without contrast.  He started Augmentin 875mg PO BID(finished 2 days ago), Tessalon Pearls, and told her to continue Breo, duonebs and spiriva.    She was admitted to the hospital up in [place] a week and a half ago with chest pain.  She tells me she had a normal TTE at that time, but a high Ca score on CT.  She was discharged and flew back here where her primary Cardiologist, Dr. Holt, performed a LHC last week (she was told it was also completely normal)    She now presents to [facility] with a syncopal episode.  Apparently the patient's  found her slumped over, so he called 911.  EMS supposedly found her hypotensive at the time, at 80/30 per her 's report to her.  She has also had 6 weeks of worsening cough and severe fatigue.  Her  notes that she had an old stroke and has chronic right sided weakness, but can usually walk with a walker, now to fatigued to do this.  She  "tells me she no longer takes opioids for her neck pain.  She is a little more SOB than usual, but not much.  She has had some recent N/V, but no diarrhea.  She denies fever or chills.     In the ED her VS were BP (!) 104/55   Pulse 80   Temp max 98.6 °F (37 °C) (Oral)   Resp (!) 26   Ht 5' 5" (1.651 m)   Wt 77.1 kg (170 lb)   SpO2 95% RA  Breastfeeding No   BMI 28.29 kg/m².      Labs showed Hg 8.9, WBC 7.9, Cr 0.9, HCO3 24, Gluc 154, ALT 58, BNP < 10, Trop < 0.006, UA: LE trace, Nitrite neg, WBC 4, many bacteria. VBG: pH 7.37, PCO2 43.7.    CXR showed 1. Hypoaeration slightly limits evaluation. 2. Subtle increased markings within the bilateral lower lobes may represent developing pulmonary infiltrates.  Correlate clinically with possible fever and/or elevated white count.  As deemed clinically necessary, further evaluation may be obtained with dedicated PA and lateral views of the chest.    CT chest without contrast showed Bibasilar subsegmental bandlike opacities.  These are favored to reflect atelectasis, although early infectious or inflammatory process would be difficult to entirely exclude.  Clinical correlation is advised.  Scattered coronary artery calcification.    In the ED she was treated with:  Medications   naloxone 0.4 mg/mL injection 1 mg (1 mg Intravenous Given 8/6/24 1404)   lactated ringers bolus 1,000 mL (0 mLs Intravenous Stopped 8/6/24 1715)   cefTRIAXone (Rocephin) 1 g in D5W 100 mL IVPB (MB+) (0 g Intravenous Stopped 8/6/24 1645)   azithromycin (ZITHROMAX) 500 mg in D5W 250 mL IVPB (Vial-Mate) (0 mg Intravenous Stopped 8/6/24 1852)   methylPREDNISolone sodium succinate injection 125 mg (125 mg Intravenous Given 8/6/24 1723)   prochlorperazine injection Soln 10 mg (10 mg Intravenous Given 8/6/24 1957)   morphine injection 2 mg (2 mg Intravenous Given 8/6/24 1956)       * No surgery found *      Hospital Course:   Patient admitted for orthostatic syncope in setting of Klebsiella bacterial " CAP. Treated with IVF, IV abx. Patient improved clinically. Held BP medications at discharge due to profound hypotension at admit. Sent out on PO abx. Patient has received maximum benefit from inpatient stay and is medically clear for discharge. Provided discharge instructions and return precautions.       Goals of Care Treatment Preferences:  Code Status: Full Code      SDOH Screening:  The patient was screened for utility difficulties, food insecurity, transport difficulties, housing insecurity, and interpersonal safety and there were no concerns identified this admission.     Consults:   Consults (From admission, onward)          Status Ordering Provider     Inpatient consult to Dietitian  Once        Provider:  (Not yet assigned)    TAVARES Rowland            No new Assessment & Plan notes have been filed under this hospital service since the last note was generated.  Service: Hospital Medicine    Final Active Diagnoses:    Diagnosis Date Noted POA    PRINCIPAL PROBLEM:  Syncope [R55] 08/07/2024 Yes    Acute bronchitis [J20.9] 08/07/2024 Yes    Pulmonary sarcoidosis [D86.0] 08/07/2024 Yes    Essential hypertension [I10] 08/07/2024 Yes    Type 2 diabetes mellitus, with long-term current use of insulin [E11.9, Z79.4] 08/07/2024 Not Applicable    Obstructive sleep apnea [G47.33] 08/07/2024 Yes    Hemiparesis affecting right side as late effect of cerebrovascular accident [I69.351] 08/07/2024 Not Applicable    Cervical spine disease [M48.9] 05/06/2022 Yes    Anemia of chronic disease [D63.8] 04/29/2020 Unknown      Problems Resolved During this Admission:       Discharged Condition: good    Disposition: Home or Self Care    Follow Up:   Follow-up Information       Gab Levy MD. Go on 8/22/2024.    Specialty: Internal Medicine  Why: appointment Thursday Aug 22nd at 10:15am for hospital follow up  Contact information:  [address] [place] [address] [phone]               Zari Landrum  NP. Go on 9/27/2024.    Specialty: Pulmonary Disease  Why: appointment Friday Sept 27th at 11:00am for pulmonology follow up; you will be on a wait list if they have a cancellation  Contact information:  [facility]  [address]  [place] [address]  [phone]                           Patient Instructions:      Ambulatory referral/consult to Pulmonology   Standing Status: Future   Referral Priority: Routine Referral Type: Consultation   Referral Reason: Specialty Services Required   Requested Specialty: Pulmonary Disease   Number of Visits Requested: 1     Ambulatory referral/consult to Physical/Occupational Therapy   Standing Status: Future   Referral Priority: Routine Referral Type: Physical Medicine   Referral Reason: Specialty Services Required   Number of Visits Requested: 1       Significant Diagnostic Studies: N/A    Pending Diagnostic Studies:       None           Medications:  Reconciled Home Medications:      Medication List        CONTINUE taking these medications      albuterol 90 mcg/actuation inhaler  Commonly known as: PROVENTIL/VENTOLIN HFA  Take or use exactly as directed.Obtain advice for OTCs.Shake well.For inhalation.     ALBUTEROL INHL  Inhale into the lungs. Every 6 hrs prn     aspirin 81 MG EC tablet  Commonly known as: ECOTRIN  Take 81 mg by mouth once daily.     benzonatate 100 MG capsule  Commonly known as: TESSALON  Take 100 mg by mouth 3 (three) times daily as needed.     biotin 10,000 mcg Cap  Take by mouth once daily.     BREO ELLIPTA 100-25 mcg/dose diskus inhaler  Generic drug: fluticasone furoate-vilanteroL  Inhale 1 puff into the lungs.     carBAMazepine 400 MG Tb12  Commonly known as: TEGRETOL XR  Take 400 mg by mouth 2 (two) times daily.     diclofenac sodium 1 % Gel  Commonly known as: VOLTAREN  daily as needed.     diphenhydrAMINE-aluminum-magnesium hydroxide-simethicone-LIDOcaine HCl 2%  Swish and swallow 5 mLs every 6 (six) hours as needed (throat irritation).     EASY  TOUCH ALCOHOL PREP PADS Padm  Generic drug: alcohol swabs  Apply 1 each topically 3 (three) times daily.     ergocalciferol 50,000 unit Cap  Commonly known as: ERGOCALCIFEROL     esomeprazole 40 MG capsule  Commonly known as: NEXIUM  Take 1 capsule (40 mg total) by mouth once daily.     FREESTYLE FREEDOM LITE kit  Generic drug: blood-glucose meter     FREESTYLE LANCETS 28 gauge lancets  Generic drug: lancets  Apply topically 3 (three) times daily.     FREESTYLE LITE STRIPS Strp  Generic drug: blood sugar diagnostic  1 strip 3 (three) times daily.     GAVISCON EXTRA STRENGTH 254-237.5 mg/5 mL Susp  Generic drug: aluminum hydrox-magnesium carb  10 mLs.     HYDROMET 5-1.5 mg/5 mL Syrp  Generic drug: hydrocodone-homatropine 5-1.5 mg/5 ml  5 mLs every 4 (four) hours as needed.     ipratropium 42 mcg (0.06 %) nasal spray  Commonly known as: ATROVENT  2 sprays 2 (two) times daily.     isosorbide mononitrate 30 MG 24 hr tablet  Commonly known as: IMDUR  30 mg once daily.     * LANTUS SOLOSTAR U-100 INSULIN 100 unit/mL (3 mL) Inpn pen  Generic drug: insulin glargine U-100 (Lantus)  Inject 24 Units into the skin every evening.     * LANTUS SOLOSTAR U-100 INSULIN 100 unit/mL (3 mL) Inpn pen  Generic drug: insulin glargine U-100 (Lantus)  24 Units every evening.     levalbuterol 0.63 mg/3 mL nebulizer solution  Commonly known as: XOPENEX  Take 1 ampule by nebulization every 4 (four) hours as needed for Wheezing. Rescue     LIDOcaine 5 %  Commonly known as: LIDODERM  daily as needed.     MAALOX ORAL     metFORMIN 500 MG ER 24hr tablet  Commonly known as: GLUCOPHAGE-XR  Take 500 mg by mouth 2 (two) times daily with meals.     nortriptyline 75 MG Cap  Commonly known as: PAMELOR  TAKE 1 CAPSULE BY MOUTH EVERY NIGHT 1 HOUR BEFORE BEDTIME     ondansetron 8 MG Tbdl  Commonly known as: ZOFRAN-ODT  Take 1 tablet (8 mg total) by mouth every 6 (six) hours as needed (nausea).     prazosin 2 MG Cap  Commonly known as: MINIPRESS  Take 2 mg  by mouth once daily.     promethazine-dextromethorphan 6.25-15 mg/5 mL Syrp  Commonly known as: PROMETHAZINE-DM  5 mLs daily as needed.     rivastigmine 9.5 mg/24 hour Pt24  Commonly known as: EXELON  Place onto the skin once daily.     rosuvastatin 40 MG Tab  Commonly known as: CRESTOR  Take 40 mg by mouth once daily.     tiotropium 18 mcg inhalation capsule  Commonly known as: SPIRIVA  Inhale 36 mcg into the lungs once daily. Controller     TYLENOL EXTRA STRENGTH 500 mg tablet  Generic drug: acetaminophen  Take 500 mg by mouth daily as needed.     valACYclovir 500 MG tablet  Commonly known as: VALTREX  Take 1 tablet (500 mg total) by mouth once daily.     VITAMIN D ORAL  Take by mouth once daily.     vitamin E 600 UNIT capsule  Take 600 Units by mouth once daily.           * This list has 2 medication(s) that are the same as other medications prescribed for you. Read the directions carefully, and ask your doctor or other care provider to review them with you.                STOP taking these medications      amLODIPine 10 MG tablet  Commonly known as: NORVASC     armodafiniL 250 mg tablet  Commonly known as: NUVIGIL     candesartan 32 MG tablet  Commonly known as: ATACAND     cloNIDine 0.1 MG tablet  Commonly known as: CATAPRES     fluticasone-salmeterol 230-21 mcg/dose 230-21 mcg/actuation Hfaa inhaler  Commonly known as: ADVAIR HFA     INSULIN LISPRO SUBQ     linaCLOtide 72 mcg Cap capsule  Commonly known as: LINZESS     memantine 10 MG Tab  Commonly known as: NAMENDA     methylphenidate HCl 10 MG tablet  Commonly known as: RITALIN     MUCINEX 600 mg 12 hr tablet  Generic drug: guaiFENesin     TRULICITY 1.5 mg/0.5 mL pen injector  Generic drug: dulaglutide            ASK your doctor about these medications      fluticasone propionate 50 mcg/actuation nasal spray  Commonly known as: FLONASE     levoFLOXacin 750 MG tablet  Commonly known as: LEVAQUIN  Take 1 tablet (750 mg total) by mouth once daily. for 7  days  Ask about: Should I take this medication?     predniSONE 20 MG tablet  Commonly known as: DELTASONE  Take 2 tablets (40 mg total) by mouth once daily. for 5 days  Ask about: Should I take this medication?              Indwelling Lines/Drains at time of discharge:   Lines/Drains/Airways       None                   Time spent on the discharge of patient: 15 minutes         Ward Lee MD  Department of Hospital Medicine  [facility]

## 2024-09-10 ENCOUNTER — TELEPHONE (OUTPATIENT)
Dept: GASTROENTEROLOGY | Facility: CLINIC | Age: 59
End: 2024-09-10
Payer: MEDICARE

## 2024-09-10 NOTE — TELEPHONE ENCOUNTER
----- Message from Cassandra Driscoll sent at 2024  4:29 PM CDT -----  Regarding: No availability (referral in chart)  Contact: Pt @ [phone]  Pt called in to schedule an appt; no available appts in Epic. Pt is asking for a call back soon to schedule. Thanks.       Patient's DX:K22.4 (ICD-10-CM) - [place] esophagus and problems swallowing         Patient requesting call back or [facility] [phone]     Additional Info: Pt has been awaiting a c/b since  May

## 2024-09-10 NOTE — TELEPHONE ENCOUNTER
MA returned call to patient. [facility] for her to call the office at her convenience.

## 2024-09-10 NOTE — TELEPHONE ENCOUNTER
MA returned patients call. Let her know that she is on the [facility] wait list for Dr. Julian and that it can take several months before an appointment is available. Patient also would like to know where she is on the wait list, let her know that I will have Miss Yohana reach out to her in regards to that. Patient verbalized understanding.

## 2024-09-10 NOTE — TELEPHONE ENCOUNTER
----- Message from Michael Ayala sent at 9/10/2024 11:38 AM CDT -----  Regarding: Luz  Type:  Patient Returning Call     Who Called: Luz     Who Left Message for Patient: Prema Marrufo     Does the patient know what this is regarding?:appointment     Would the patient rather a call back or a response via [facility]? Callback     Best Call Back Number:.[phone]       Additional Information:

## 2024-09-13 ENCOUNTER — CLINICAL SUPPORT (OUTPATIENT)
Dept: REHABILITATION | Facility: HOSPITAL | Age: 59
End: 2024-09-13
Attending: STUDENT IN AN ORGANIZED HEALTH CARE EDUCATION/TRAINING PROGRAM
Payer: MEDICARE

## 2024-09-13 DIAGNOSIS — Z74.09 IMPAIRED FUNCTIONAL MOBILITY, BALANCE, GAIT, AND ENDURANCE: Primary | ICD-10-CM

## 2024-09-13 DIAGNOSIS — R55 SYNCOPE: ICD-10-CM

## 2024-09-13 PROCEDURE — 97161 PT EVAL LOW COMPLEX 20 MIN: CPT | Mod: PN

## 2024-09-13 PROCEDURE — 97110 THERAPEUTIC EXERCISES: CPT | Mod: PN

## 2024-09-13 NOTE — PLAN OF CARE
[facility]   Physical Therapy Initial Evaluation      Name: Luz Arias  Clinic Number: [id number]    Therapy Diagnosis:   Encounter Diagnoses   Name Primary?    Syncope     Impaired functional mobility, balance, gait, and endurance Yes        Physician: Ward Lee MD    Physician Orders: PT Eval and Treat   Medical Diagnosis from Referral: R55 (ICD-10-CM) - Syncope   Evaluation Date: 9/13/2024  Authorization Period Expiration: 12/31/24  Plan of Care Expiration: 12/13/24  Progress Note Due: 10/20/24  Date of Surgery: na   Visit # / Visits authorized: 1/ 1   FOTO: 1/ 3    Precautions: Standard, Fall, and Weightbearing     Time In: 945  Time Out: 930  Total Billable Time: 45 minutes    Subjective     Date of onset: 2014    History of current condition - Jenny reports: she was in the hospital where the doctors thought it would be good to start getting some exercise. She would like to be more mobile. She had a CVA In 2015 where she had IP and OP rehab. Has had a few mini CVAs since 2015.    Falls: 2[place]3 [place] movie [place]     Imaging: see chart for complete list    Prior Therapy: yes for weakness this past year.   Social History:  lives with their spouse  Occupation: retired RN  Prior Level of Function: I   Current Level of Function: I with cane outside of home.    Pain:  Location: bilateral back  and neck  generalized   Description: Aching  Aggravating Factors: constant  Easing Factors: relaxation    Patients goals: ' to get stronger, get healthier,      Medical History:   Past Medical History:   Diagnosis Date    Aortic regurgitation     Asthma     Chronic fatigue     Chronic pain     Depression     Diabetes     Dysphagia     Gastritis     Gastroparesis     GERD (gastroesophageal reflux disease)     Hypertension     Intestinal metaplasia of gastric cardia     Irritable bowel syndrome     Sarcoidosis     Sleep apnea     Stroke     12/2015, mini stroke 2/2023       Surgical  History:   Luz Arias  has a past surgical history that includes Colonoscopy (05/17/2019); Upper gastrointestinal endoscopy (05/17/2019); Esophagogastroduodenoscopy (N/A, 5/5/2020); Colonoscopy (N/A, 5/5/2020); Esophagogastroduodenoscopy (N/A, 6/2/2021); Neck surgery; Cholecystectomy; Appendectomy; Hand surgery; Wrist surgery; Elbow surgery; Mediastinoscopy; Hysterectomy; Esophagogastroduodenoscopy (N/A, 6/28/2022); Esophageal manometry with measurement of impedance (N/A, 6/29/2022); Colonoscopy (N/A, 4/10/2023); and Esophagogastroduodenoscopy (N/A, 6/13/2023).    Medications:   Luz has a current medication list which includes the following prescription(s): albuterol, albuterol, gaviscon extra strength, aspirin, benzonatate, biotin, breo ellipta, carbamazepine, diclofenac sodium, diphenhydrAMINE-aluminum-magnesium hydroxide-simethicone-LIDOcaine HCl 2%, easy touch alcohol prep pads, ergocalciferol, ergocalciferol (vitamin d2), esomeprazole, fluticasone propionate, freestyle freedom lite, freestyle lancets, freestyle lite strips, hydrocodone-homatropine 5-1.5 mg/5 ml, lantus solostar u-100 insulin, ipratropium, isosorbide mononitrate, levalbuterol, lidocaine, mag hydrox/aluminum hyd/simeth, metformin, nortriptyline, ondansetron, prazosin, promethazine-dextromethorphan, rivastigmine, rosuvastatin, tiotropium, tylenol extra strength, valacyclovir, and vitamin e.    Allergies:   Review of patient's allergies indicates:   Allergen Reactions    Sulfa (sulfonamide antibiotics) Anaphylaxis    Sulfamethoxazole-trimethoprim Anaphylaxis and Diarrhea     Other reaction(s): Anaphylaxis, Finding of vomiting, Diarrhea    Temazepam      Sleep walking   Other reaction(s): Sleep related hallucinations    Pantoprazole Nausea And Vomiting    Pregabalin Other (See Comments)    Verapamil      Other reaction(s): Constipation    Zonisamide      Other reaction(s): Anaphylaxis, Diarrhea, Finding of vomiting, Anaphylaxis, Diarrhea,  Finding of vomiting, Anaphylaxis, Diarrhea, Finding of vomiting    Metoclopramide Rash and Anxiety    Sucralfate Other (See Comments) and Nausea Only        Objective      Posture: Standing: Hunches over, decreased knee extension        Range of Motion:   UEs WFL  Les WFL    Upper Extremity Strength  RUE 4/5 grossly  LUE 4/5 grossly    Lower Extremity Strength  RLE 4/5 grossly  LLE 4/5 grossly    Functional mobility:    Gait: Patient ambulating with SPC AD with SBA assistance for unlimited  feet with the following gait abnormalities: decreased stride and step length.   Turns: extra steps on turn around    Transfers:    Sit to stand: With B use of hands    Supine to sit: With min A     Sit to stand 30 sec- 6x with B use of hands     Sensation: normal at this time      PT Evaluation Completed? Yes  Discussed Plan of Care with patient: Yes    Intake Outcome Measure for FOTO neuro Survey    Therapist reviewed FOTO scores for Luz Arias on 9/13/2024.   FOTO report - see Media section or FOTO account episode details.    Intake Score: 57%         Treatment     Total Treatment time (time-based codes) separate from Evaluation: 30 minutes     Jenny received the treatments listed below:      therapeutic exercises to develop strength, endurance, and ROM for 10 minutes including:  Nu step level 1 for 10     Patient Education and Home Exercises     Education provided:   - POC, HEP    Written Home Exercises Provided: Pt instructed to continue prior HEP. Exercises were reviewed and Jenny was able to demonstrate them prior to the end of the session.  Jenny demonstrated good  understanding of the education provided. See EMR under Patient Instructions for exercises provided during therapy sessions.    Assessment     Luz is a 59 y.o. female referred to outpatient Physical Therapy with a medical diagnosis of R55 (ICD-10-CM) - Syncope. Patient presents with antalgic gait with SPC. Pt has decreased balance ans strength at this time. Pt  has completed PT in the past with good results. Pt has had a dizzy spell and completed a fall. At this time pt is in need of PT to work on balance, strengthen, to improve quality of life.     Patient prognosis is Good.   Patient will benefit from skilled outpatient Physical Therapy to address the deficits stated above and in the chart below, provide patient /family education, and to maximize patientt's level of independence.     Plan of care discussed with patient: Yes  Patient's spiritual, cultural and educational needs considered and patient is agreeable to the plan of care and goals as stated below:     Anticipated Barriers for therapy: na    Medical Necessity is demonstrated by the following  History  Co-morbidities and personal factors that may impact the plan of care [x] LOW: no personal factors / co-morbidities  [] MODERATE: 1-2 personal factors / co-morbidities  [] HIGH: 3+ personal factors / co-morbidities    Moderate / High Support Documentation:   Co-morbidities affecting plan of care: na    Personal Factors:   no deficits     Examination  Body Structures and Functions, activity limitations and participation restrictions that may impact the plan of care [x] LOW: addressing 1-2 elements  [] MODERATE: 3+ elements  [] HIGH: 4+ elements (please support below)    Moderate / High Support Documentation: na     Clinical Presentation [x] LOW: stable  [] MODERATE: Evolving  [] HIGH: Unstable     Decision Making/ Complexity Score: low         Goals:  Short Term Goals: 3 weeks   Pt will be compliant with HEP.  Pt will improve quad strength by 1/2 grade to allow for strength to go up and down stairs.   Pt will improve sit <>  30 sec to at least 10 allow for increased functional mobility.  Pt will improve standing dynamic balance to fair + to allow for increased mobility tasks.     Long Term Goals: 6 weeks   Pt will be independent with HEP to allow for increased functional tasks.  Pt will improve FOTO by 10 %  to demonstrate improvement in quality of life.  Pt will improve hip flexor strength by 1/2 grade to allow for normalized body mechanics.   Plan     Plan of care Certification: 9/13/2024 to 12/13/24.    Outpatient Physical Therapy 2 times weekly for 6 weeks to include the following interventions: Fluidotherapy. There ex, there act, balance tranining, manual therapy     Dottie Clements, PT        Physician's Signature: _________________________________________ Date: ________________

## 2024-10-08 LAB
OHS QRS DURATION: 82 MS
OHS QTC CALCULATION: 437 MS

## 2024-11-13 ENCOUNTER — PATIENT MESSAGE (OUTPATIENT)
Dept: GASTROENTEROLOGY | Facility: CLINIC | Age: 59
End: 2024-11-13

## 2024-11-13 ENCOUNTER — OFFICE VISIT (OUTPATIENT)
Dept: GASTROENTEROLOGY | Facility: CLINIC | Age: 59
End: 2024-11-13
Payer: MEDICARE

## 2024-11-13 VITALS
HEART RATE: 74 BPM | SYSTOLIC BLOOD PRESSURE: 127 MMHG | WEIGHT: 172.38 LBS | BODY MASS INDEX: 28.72 KG/M2 | DIASTOLIC BLOOD PRESSURE: 58 MMHG | HEIGHT: 65 IN

## 2024-11-13 DIAGNOSIS — K59.04 CHRONIC IDIOPATHIC CONSTIPATION: Primary | ICD-10-CM

## 2024-11-13 DIAGNOSIS — K62.5 RECTAL BLEEDING: ICD-10-CM

## 2024-11-13 DIAGNOSIS — Z87.19 HISTORY OF HEMORRHOIDS: ICD-10-CM

## 2024-11-13 DIAGNOSIS — J39.2 THROAT IRRITATION: ICD-10-CM

## 2024-11-13 DIAGNOSIS — K21.9 GASTROESOPHAGEAL REFLUX DISEASE, UNSPECIFIED WHETHER ESOPHAGITIS PRESENT: ICD-10-CM

## 2024-11-13 DIAGNOSIS — R11.0 NAUSEA: ICD-10-CM

## 2024-11-13 DIAGNOSIS — K22.4 JACKHAMMER ESOPHAGUS: ICD-10-CM

## 2024-11-13 DIAGNOSIS — K31.84 GASTROPARESIS: ICD-10-CM

## 2024-11-13 PROCEDURE — 99999 PR PBB SHADOW E&M-EST. PATIENT-LVL V: CPT | Mod: PBBFAC,,,

## 2024-11-13 PROCEDURE — 99213 OFFICE O/P EST LOW 20 MIN: CPT | Mod: S$PBB,,,

## 2024-11-13 PROCEDURE — 99215 OFFICE O/P EST HI 40 MIN: CPT | Mod: PBBFAC,PN

## 2024-11-13 RX ORDER — ONDANSETRON 8 MG/1
8 TABLET, ORALLY DISINTEGRATING ORAL EVERY 6 HOURS PRN
Qty: 120 TABLET | Refills: 0 | Status: SHIPPED | OUTPATIENT
Start: 2024-11-13 | End: 2024-12-13

## 2024-11-13 NOTE — PROGRESS NOTES
Subjective:       Patient ID: Luz Arias is a 59 y.o. female Body mass index is 28.69 kg/m².    Chief Complaint: Follow-up    This patient is new to me.  Referring Provider: No ref. provider found for constipation.  Established patient of Dr. Landrum.                   Constipation  This is a chronic problem. The current episode started more than 1 year ago. Her stool frequency is 2 to 3 times per week. The stool is described as firm (reports straining with BMs, last Bm yesterday). The patient is on a high fiber diet. She Exercises regularly. There has Been adequate water intake. Associated symptoms include hematochezia (reports seeing intermittent rectal bleeding sees scant amount on tissue with straining and hard stools hx of hemorrhoids last colonoscopy 2023) and hemorrhoids. Pertinent negatives include no abdominal pain, anorexia, back pain, bloating, diarrhea, difficulty urinating, fecal incontinence, fever, flatus, melena, nausea, rectal pain, vomiting or weight loss. Associated symptoms comments: Pt with hx of GERD controlled on nexium 40mg daily, hx of jackhammer esopahgus - was followed closely by Dr. Odonnell reports DR. Odonnell is no longer at [facility], currently on wait list with for DR. Julian, pt with chronic constipation recent CT of abdomen shows large stool burden has tried miralax,linzess years ago unaware of dose milk of magnesia, oral fiber with minor relieved, reports linzess was too strong was prescribed linzess during last visit in 2023 but pt was unable to take it due to insurance. Pt also with hx of gastroparesis reports chronic nausea takes zofran prn. Reports eats small meals and tries to follow low fiber diet ,  last colonoscopy in 2023 tortuous colon and internal hemorrhoids seen reports fam hx of colon cancer in mother and sister dx in their 60's. . She has tried diet changes, fiber, enemas, laxatives, manual disimpaction, stool softeners and mineral oil (has tried daily fiber and miralax  with prn magnesium citrate & milk of magnesia with mild relief) for the symptoms. The treatment provided mild relief. There is no history of abdominal surgery, endocrine disease, inflammatory bowel disease, irritable bowel syndrome, metabolic disease, neurologic disease, neuromuscular disease, psychiatric history or radiation treatment.       Review of Systems   Constitutional:  Negative for fever and weight loss.   Gastrointestinal:  Positive for anal bleeding, constipation, hematochezia (reports seeing intermittent rectal bleeding sees scant amount on tissue with straining and hard stools hx of hemorrhoids last colonoscopy 2023) and hemorrhoids. Negative for abdominal distention, abdominal pain, anorexia, bloating, blood in stool, diarrhea, flatus, melena, nausea, rectal pain and vomiting.   Genitourinary:  Negative for difficulty urinating.   Musculoskeletal:  Negative for back pain.         No LMP recorded. Patient has had a hysterectomy.  Past Medical History:   Diagnosis Date    Aortic regurgitation     Asthma     Chronic fatigue     Chronic pain     Depression     Diabetes     Dysphagia     Gastritis     Gastroparesis     GERD (gastroesophageal reflux disease)     Hypertension     Intestinal metaplasia of gastric cardia     Irritable bowel syndrome     Sarcoidosis     Sleep apnea     Stroke     12/2015, mini stroke 2/2023     Past Surgical History:   Procedure Laterality Date    APPENDECTOMY      CHOLECYSTECTOMY      COLONOSCOPY  05/17/2019    COLONOSCOPY N/A 5/5/2020    Procedure: COLONOSCOPY;  Surgeon: Garrick López MD;  Location: [facility] ENDO;  Service: Endoscopy;  Laterality: N/A;  with bx and stool specimen    COLONOSCOPY N/A 4/10/2023    Procedure: COLONOSCOPY;  Surgeon: Forrest Gomes MD;  Location: [facility] ENDO;  Service: Endoscopy;  Laterality: N/A;    ELBOW SURGERY      ESOPHAGEAL MANOMETRY WITH MEASUREMENT OF IMPEDANCE N/A 6/29/2022    Procedure: MANOMETRY-ESOPHAGEAL-WITH IMPEDANCE;  Surgeon: Concetta  CRISTELA Odonnell MD;  Location: [facility] ENDO (4TH FLR);  Service: Endoscopy;  Laterality: N/A;  r/o rumination and supragastric belching  hold nortriptyline and norco 24 hours prior to procedure  fully vaccinated, instructions emailed to [email or web address]    ESOPHAGOGASTRODUODENOSCOPY N/A 5/5/2020    Procedure: EGD (ESOPHAGOGASTRODUODENOSCOPY);  Surgeon: Garrick López MD;  Location: [facility] ENDO;  Service: Endoscopy;  Laterality: N/A;  with biopsy    ESOPHAGOGASTRODUODENOSCOPY N/A 6/2/2021    Procedure: EGD (ESOPHAGOGASTRODUODENOSCOPY);  Surgeon: Garrick López MD;  Location: [facility];  Service: Endoscopy;  Laterality: N/A;    ESOPHAGOGASTRODUODENOSCOPY N/A 6/28/2022    Procedure: ESOPHAGOGASTRODUODENOSCOPY (EGD);  Surgeon: Concetta Odonnell MD;  Location: [facility] ENDO (2ND FLR);  Service: Endoscopy;  Laterality: N/A;  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  full liquid diet x3 days, clear liquid diet x1 day prior to procedure  fully vaccinated, instructions emailed to [email or web address]    ESOPHAGOGASTRODUODENOSCOPY N/A 6/13/2023    Procedure: EGD (ESOPHAGOGASTRODUODENOSCOPY);  Surgeon: Forrest Gomes MD;  Location: [facility];  Service: Endoscopy;  Laterality: N/A;    HAND SURGERY      HYSTERECTOMY      2005, age 39, KRISTAN fibroids    MEDIASTINOSCOPY      NECK SURGERY      UPPER GASTROINTESTINAL ENDOSCOPY  05/17/2019    WRIST SURGERY       Family History   Problem Relation Name Age of Onset    Diabetes Mother      Colon cancer Mother  65    Bladder Cancer Mother      Heart disease Mother      Heart disease Father      Kidney failure Father      Diabetes Father      Stroke Father      Heart attack Father      Colon cancer Sister fabrice 65    Diabetes Sister fabrice     Hypertension Sister fabrice     Seizures Brother dinh     Colon polyps Sister jonathan     Diabetes Sister jonathan     Hypertension Sister jonathan     Irritable bowel syndrome Sister yury     Diabetes Sister yury     Hypertension Sister yury      Diabetes Sister prerna     Hypertension Sister prerna     Diabetes Brother clau     Breast cancer Neg Hx      Esophageal cancer Neg Hx      Stomach cancer Neg Hx      Liver cancer Neg Hx      Liver disease Neg Hx      Crohn's disease Neg Hx      Celiac disease Neg Hx      Pancreatic cancer Neg Hx       Social History     Tobacco Use    Smoking status: Never    Smokeless tobacco: Never   Substance Use Topics    Alcohol use: Not Currently     Comment: no drinking anymore    Drug use: Never     Wt Readings from Last 10 Encounters:   11/13/24 78.2 kg (172 lb 6.4 oz)   08/08/24 85 kg (187 lb 6.3 oz)   06/05/24 83.3 kg (183 lb 10.3 oz)   05/20/24 82 kg (180 lb 12.4 oz)   01/25/24 78 kg (171 lb 15.3 oz)   01/20/24 81.6 kg (180 lb)   04/10/23 83 kg (183 lb)   04/04/23 83.4 kg (183 lb 13.8 oz)   01/31/23 85.3 kg (188 lb)   07/09/22 79.4 kg (175 lb)     Lab Results   Component Value Date    WBC 8.66 08/08/2024    HGB 8.9 (L) 08/08/2024    HCT 27.3 (L) 08/08/2024    MCV 86 08/08/2024     08/08/2024     CMP  Sodium   Date Value Ref Range Status   08/08/2024 141 136 - 145 mmol/L Final     Potassium   Date Value Ref Range Status   08/08/2024 3.6 3.5 - 5.1 mmol/L Final     Chloride   Date Value Ref Range Status   08/08/2024 106 95 - 110 mmol/L Final     CO2   Date Value Ref Range Status   08/08/2024 25 23 - 29 mmol/L Final     Glucose   Date Value Ref Range Status   08/08/2024 91 70 - 110 mg/dL Final     BUN   Date Value Ref Range Status   08/08/2024 10 6 - 20 mg/dL Final     Creatinine   Date Value Ref Range Status   08/08/2024 0.7 0.5 - 1.4 mg/dL Final     Calcium   Date Value Ref Range Status   08/08/2024 9.1 8.7 - 10.5 mg/dL Final     Total Protein   Date Value Ref Range Status   08/07/2024 7.2 6.0 - 8.4 g/dL Final     Albumin   Date Value Ref Range Status   08/07/2024 4.0 3.5 - 5.2 g/dL Final     Total Bilirubin   Date Value Ref Range Status   08/07/2024 0.3 0.1 - 1.0 mg/dL Final     Comment:     For infants  "and newborns, interpretation of results should be based  on gestational age, weight and in agreement with clinical  observations.    Premature Infant recommended reference ranges:  Up to 24 hours.............<8.0 mg/dL  Up to 48 hours............<12.0 mg/dL  3-5 days..................<15.0 mg/dL  6-29 days.................<15.0 mg/dL       Alkaline Phosphatase   Date Value Ref Range Status   08/07/2024 78 55 - 135 U/L Final     AST   Date Value Ref Range Status   08/07/2024 28 10 - 40 U/L Final     ALT   Date Value Ref Range Status   08/07/2024 55 (H) 10 - 44 U/L Final     Anion Gap   Date Value Ref Range Status   08/08/2024 10 8 - 16 mmol/L Final     eGFR if    Date Value Ref Range Status   06/23/2022 >60.0 >60 mL/min/1.73 m^2 Final     eGFR if non    Date Value Ref Range Status   06/23/2022 >60.0 >60 mL/min/1.73 m^2 Final     Comment:     Calculation used to obtain the estimated glomerular filtration  rate (eGFR) is the CKD-EPI equation.        Lab Results   Component Value Date    LIPASE 14 05/30/2023     No results found for: "LIPASERES"  No results found for: "TSH"    Reviewed prior medical records including radiology report of CT abdomen pelvis 8/8/24 & endoscopy history (see surgical history/procedures).    Objective:      Physical Exam  Vitals and nursing note reviewed.   Constitutional:       Appearance: Normal appearance. She is normal weight.   Cardiovascular:      Rate and Rhythm: Normal rate and regular rhythm.      Heart sounds: Normal heart sounds.   Pulmonary:      Breath sounds: Normal breath sounds.   Abdominal:      General: Bowel sounds are normal.      Palpations: Abdomen is soft.      Tenderness: There is no abdominal tenderness.   Skin:     General: Skin is warm and dry.      Coloration: Skin is not jaundiced.   Neurological:      Mental Status: She is alert and oriented to person, place, and time.   Psychiatric:         Mood and Affect: Mood normal.         " Behavior: Behavior normal.         Assessment:       1. Chronic idiopathic constipation    2. Nausea    3. Gastroparesis    4. Gastroesophageal reflux disease, unspecified whether esophagitis present    5. Jackhammer esophagus        Plan:       Chronic idiopathic constipation  -  START   linaCLOtide (LINZESS) 72 mcg Cap capsule; Take 1 capsule (72 mcg total) by mouth before breakfast.  Dispense: 30 capsule; Refill: 2  -would like to start on low dose patient will follow up if low dose does not help and we can increase dose of linzess  -Recommend daily exercise as tolerated, adequate water intake (six 8-oz glasses of water daily)  -Recommend trying OTC MiraLax once daily (17g PO) as directed  -If still no improvement with these measures, call/follow-up    Rectal bleeding   -Avoid straining and constipation   -If continues can refer to colorectal surgeon for further eval of hemorrhoids    -If rectal bleeding occurs and feel lightheaded faint dizzy report to ED     History of hemorrhoids   - avoid constipation and straining with bowel movements;   - recommend SITZ baths  - possible referral to colorectal surgery if symptoms persist    Nausea  -CONTINUE     ondansetron (ZOFRAN-ODT) 8 MG TbDL; Take 1 tablet (8 mg total) by mouth every 6 (six) hours as needed (nausea).  Dispense: 120 tablet; Refill: 0   -start on constipation regimen   -follow gastroparesis lifestyle modifications    Gastroparesis  -     Ambulatory referral/consult to General Surgery; Future; Expected date: 11/20/2024   -Pt interested in further management for gastroparesis  Recommend small frequent meals instead of 3 big meals a day, low fat meals & low residue diet.  Avoid: high fiber (insoluble fiber), red meats, dairy, and non-digestible solids (peels, fruit pulp, etc). Avoid NSAIDs and narcotics.    Gastroesophageal reflux disease, unspecified whether esophagitis present   Continue Nexium 40 mg daily   -Take PPI 30min-1hr before eating  breakfast  -Educated patient on lifestyle modifications to help control/reduce reflux/abdominal pain including: avoid large meals, avoid eating within 2-3 hours of bedtime (avoid late night eating & lying down soon after eating), elevate head of bed if nocturnal symptoms are present, smoking cessation (if current smoker), & weight loss (if overweight).   -Educated to avoid known foods which trigger reflux symptoms & to minimize/avoid high-fat foods, chocolate, caffeine, citrus, alcohol, & tomato products.  -Advised to avoid/limit use of NSAID's, since they can cause GI upset, bleeding, and/or ulcers. If needed, take with food.     Jamia esophagus   -Continue f/u with Dr. Julian    Follow up if symptoms worsen or fail to improve.      If no improvement in symptoms or symptoms worsen, call/follow-up at clinic or go to ER.       [facility] - GASTROENTEROLOGY  [facility], [place]     Dictation software program was used for this note. Please expect some simple typographical  errors in this note.    Encounter includes face to face time and non-face to face time preparing to see the patient (eg, review of tests), obtaining and/or reviewing separately obtained history, documenting clinical information in the electronic or other health record, independently interpreting results (not separately reported) and communicating results to the patient/family/caregiver, or care coordination (not separately reported).

## 2024-11-19 ENCOUNTER — TELEPHONE (OUTPATIENT)
Dept: GASTROENTEROLOGY | Facility: CLINIC | Age: 59
End: 2024-11-19
Payer: MEDICARE

## 2024-11-19 NOTE — TELEPHONE ENCOUNTER
----- Message from Amadou sent at 11/19/2024  9:47 AM CST -----  Regarding: pharmacy  Contact: patient  Type:  Pharmacy Calling to Clarify an RX    Name of Caller:  [facility] - [place], [place] - [address]  [address] [facility]  [address]  [place] [address]  Phone: [phone] Fax: [phone]  Pharmacy Name:  Prescription Name:ondansetron (ZOFRAN-ODT) 8 MG TbDL  What do they need to clarify?:directions/ are to high  Best Call Back Number:  Additional Information: patient is at pharmacy now    [phone]  
Spoke with Pharmacy and okay per ANTHONY Mcaiel to change sig to Q8   
3-point gait

## 2025-01-07 ENCOUNTER — NURSE TRIAGE (OUTPATIENT)
Dept: ADMINISTRATIVE | Facility: CLINIC | Age: 60
End: 2025-01-07
Payer: MEDICARE

## 2025-01-07 ENCOUNTER — OCHSNER VIRTUAL EMERGENCY DEPARTMENT (OUTPATIENT)
Facility: CLINIC | Age: 60
End: 2025-01-07
Payer: MEDICARE

## 2025-01-07 NOTE — PLAN OF CARE-OVED
[facility] Plan of Care Note    Referral source: Nurse On-Call      Reason for consult: Vomiting      Additional History: She has a hx of a hiatal hernia and gastroparesis. She c/o vomiting x 5. Has not been able to keep down fluids.       Disposition recommended: Specialist Referral: GI      Additional Recommendations: Made GI appointment tomorrow.  Advised going to ED if continues to vomit today

## 2025-01-07 NOTE — TELEPHONE ENCOUNTER
Patient has a hx of a hiatal hernia and gastroparesis. She c/o vomiting x 5. Has not been able to keep down fluids. Dispo is ED or PCP with approval. Will refer to Fab[facility]. Per Dr. Shore, the patient should go to the ER if she continues to vomit. An appointment was made with GI to for tomorrow. Patient verbalizes understanding. Advised the patient to call back with any further questions or if symptoms worsen.        Reason for Disposition   High-risk adult (e.g., brain tumor, V-P shunt, hernia)    Additional Information   Negative: Shock suspected (e.g., cold/pale/clammy skin, too weak to stand, low BP, rapid pulse)   Negative: Difficult to awaken or acting confused (e.g., disoriented, slurred speech)   Negative: Sounds like a life-threatening emergency to the triager   Negative: Vomiting red blood or black (coffee ground) material   Negative: Vomiting and hernia is more painful or swollen than usual   Negative: Recent head injury (within 3 days)   Negative: Recent abdominal injury (within 7 days)   Negative: Insulin-dependent diabetes and glucose > 240 mg/dL (13 mmol/L)   Negative: Severe pain in one eye   Negative: SEVERE vomiting (e.g., 6 or more times/day)  (Exception: Patient sounds well, is drinking liquids, does not sound dehydrated, and vomiting has lasted less than 24 hours.)   Negative: MODERATE vomiting (e.g., 3 - 5 times/day) and age > 60 years   Negative: Constant abdominal pain lasting > 2 hours    Protocols used: Vomiting-A-OH

## 2025-01-09 NOTE — H&P (VIEW-ONLY)
"Subjective:       Patient ID: Luz Arias is a 59 y.o. female Body mass index is 27.51 kg/m².    Chief Complaint: Emesis (Constipation, dark stools, rectal bleed)    This patient is new to me.  Referring Provider: No ref. provider found for vomiting.  Established patient of Dr. Landrum.     Gastroparesis Diagnosed in 2022 - "thinks so" when asked if she is following a low fiber diet/gastroparesis diet.     Currently on linzess 72 mcg     Have an appointment with Gen surg at end of month to discuss the gastroparesis      11/2024 OV with ANTHONY Spivey  Patient ID: Luz Arias is a 59 y.o. female Body mass index is 28.69 kg/m².     Chief Complaint: Follow-up     This patient is new to me.  Referring Provider: No ref. provider found for constipation.  Established patient of Dr. Landrum.     Constipation  This is a chronic problem. The current episode started more than 1 year ago. Her stool frequency is 2 to 3 times per week. The stool is described as firm (reports straining with BMs, last Bm yesterday). The patient is on a high fiber diet. She Exercises regularly. There has Been adequate water intake. Associated symptoms include hematochezia (reports seeing intermittent rectal bleeding sees scant amount on tissue with straining and hard stools hx of hemorrhoids last colonoscopy 2023) and hemorrhoids. Pertinent negatives include no abdominal pain, anorexia, back pain, bloating, diarrhea, difficulty urinating, fecal incontinence, fever, flatus, melena, nausea, rectal pain, vomiting or weight loss. Associated symptoms comments: Pt with hx of GERD controlled on nexium 40mg daily, hx of jackhammer esopahgus - was followed closely by Dr. Odonnell reports DR. Odonnell is no longer at [facility], currently on wait list with for DR. Julian, pt with chronic constipation recent CT of abdomen shows large stool burden has tried miralax,linzess years ago unaware of dose milk of magnesia, oral fiber with minor relieved, reports linzess was " too strong was prescribed linzess during last visit in 2023 but pt was unable to take it due to insurance. Pt also with hx of gastroparesis reports chronic nausea takes zofran prn. Reports eats small meals and tries to follow low fiber diet ,  last colonoscopy in 2023 tortuous colon and internal hemorrhoids seen reports fam hx of colon cancer in mother and sister dx in their 60's. . She has tried diet changes, fiber, enemas, laxatives, manual disimpaction, stool softeners and mineral oil (has tried daily fiber and miralax with prn magnesium citrate & milk of magnesia with mild relief) for the symptoms. The treatment provided mild relief. There is no history of abdominal surgery, endocrine disease, inflammatory bowel disease, irritable bowel syndrome, metabolic disease, neurologic disease, neuromuscular disease, psychiatric history or radiation treatment.     Chronic idiopathic constipation  -  START   linaCLOtide (LINZESS) 72 mcg Cap capsule; Take 1 capsule (72 mcg total) by mouth before breakfast.  Dispense: 30 capsule; Refill: 2  -would like to start on low dose patient will follow up if low dose does not help and we can increase dose of linzess  -Recommend daily exercise as tolerated, adequate water intake (six 8-oz glasses of water daily)  -Recommend trying OTC MiraLax once daily (17g PO) as directed  -If still no improvement with these measures, call/follow-up     Rectal bleeding              -Avoid straining and constipation              -If continues can refer to colorectal surgeon for further eval of hemorrhoids               -If rectal bleeding occurs and feel lightheaded faint dizzy report to ED      History of hemorrhoids              - avoid constipation and straining with bowel movements;   - recommend SITZ baths  - possible referral to colorectal surgery if symptoms persist     Nausea  -CONTINUE     ondansetron (ZOFRAN-ODT) 8 MG TbDL; Take 1 tablet (8 mg total) by mouth every 6 (six) hours as needed  (nausea).  Dispense: 120 tablet; Refill: 0              -start on constipation regimen              -follow gastroparesis lifestyle modifications     Gastroparesis  -     Ambulatory referral/consult to General Surgery; Future; Expected date: 11/20/2024              -Pt interested in further management for gastroparesis  Recommend small frequent meals instead of 3 big meals a day, low fat meals & low residue diet.  Avoid: high fiber (insoluble fiber), red meats, dairy, and non-digestible solids (peels, fruit pulp, etc). Avoid NSAIDs and narcotics.     Gastroesophageal reflux disease, unspecified whether esophagitis present              Continue Nexium 40 mg daily              -Take PPI 30min-1hr before eating breakfast  -Educated patient on lifestyle modifications to help control/reduce reflux/abdominal pain including: avoid large meals, avoid eating within 2-3 hours of bedtime (avoid late night eating & lying down soon after eating), elevate head of bed if nocturnal symptoms are present, smoking cessation (if current smoker), & weight loss (if overweight).   -Educated to avoid known foods which trigger reflux symptoms & to minimize/avoid high-fat foods, chocolate, caffeine, citrus, alcohol, & tomato products.  -Advised to avoid/limit use of NSAID's, since they can cause GI upset, bleeding, and/or ulcers. If needed, take with food.      Jackhammer esophagus              -Continue f/u with Dr. Julian    4/2022 Gastric Emptying Study  FINDINGS:  At 228 minutes the percentage of retention is 32 %.  The T1/2 gastric emptying time is calculated at 176 minutes.     Impression:     Delayed gastric emptying time.    6/2023 EGD with Dr Landrum  Findings:       Diffuse, white plaques were found in the proximal esophagus.        Brushings for KOH prep were obtained in the upper third of the        esophagus. Verification of patient identification for the specimen        was done. Estimated blood loss was minimal.        Localized  mildly erythematous mucosa without bleeding was found in        the gastric antrum. Biopsies were taken with a cold forceps for        Helicobacter pylori testing. Verification of patient identification        for the specimen was done. Estimated blood loss was minimal.        The duodenal bulb, first portion of the duodenum and second portion        of the duodenum were normal.   Recommendation:        - Discharge patient to home.                          - Advance diet as tolerated.                          - Continue present medications.                          - Await pathology results.                          - Diflucan (fluconazole) 400 mg PO x 1 then 200 mg                          PO daily for total of 14 days     4/2023 Colonoscopy with Dr Landrum (Repeat 5 years - 4/2028)  Findings:       The perianal and digital rectal examinations were normal.        The colon (entire examined portion) appeared normal.        The left colon was moderately tortuous. Advancing the scope required        applying abdominal pressure.        Non-bleeding internal hemorrhoids were found during endoscopy. The        hemorrhoids were medium-sized.         Review of Systems   Constitutional:  Positive for activity change and appetite change. Negative for fatigue, fever and unexpected weight change.   HENT:  Negative for sore throat and trouble swallowing.    Respiratory:  Negative for cough and shortness of breath.    Cardiovascular:  Negative for chest pain.   Gastrointestinal:  Positive for abdominal pain, blood in stool and nausea. Negative for abdominal distention, anal bleeding, constipation, diarrhea, rectal pain and vomiting.       No LMP recorded. Patient has had a hysterectomy.  Past Medical History:   Diagnosis Date    Aortic regurgitation     Asthma     Chronic fatigue     Chronic pain     Depression     Diabetes     Dysphagia     Gastritis     Gastroparesis     GERD (gastroesophageal reflux disease)     Hypertension      Intestinal metaplasia of gastric cardia     Irritable bowel syndrome     Sarcoidosis     Sleep apnea     Stroke     12/2015, mini stroke 2/2023     Past Surgical History:   Procedure Laterality Date    APPENDECTOMY      CHOLECYSTECTOMY      COLONOSCOPY  05/17/2019    COLONOSCOPY N/A 5/5/2020    Procedure: COLONOSCOPY;  Surgeon: Garrick López MD;  Location: [facility];  Service: Endoscopy;  Laterality: N/A;  with bx and stool specimen    COLONOSCOPY N/A 4/10/2023    Procedure: COLONOSCOPY;  Surgeon: Forrest Gomes MD;  Location: [facility];  Service: Endoscopy;  Laterality: N/A;    ELBOW SURGERY      ESOPHAGEAL MANOMETRY WITH MEASUREMENT OF IMPEDANCE N/A 6/29/2022    Procedure: MANOMETRY-ESOPHAGEAL-WITH IMPEDANCE;  Surgeon: Concetta Odonnell MD;  Location: [facility] (4TH FLR);  Service: Endoscopy;  Laterality: N/A;  r/o rumination and supragastric belching  hold nortriptyline and norco 24 hours prior to procedure  fully vaccinated, instructions emailed to [email or web address]    ESOPHAGOGASTRODUODENOSCOPY N/A 5/5/2020    Procedure: EGD (ESOPHAGOGASTRODUODENOSCOPY);  Surgeon: Garrick López MD;  Location: [facility];  Service: Endoscopy;  Laterality: N/A;  with biopsy    ESOPHAGOGASTRODUODENOSCOPY N/A 6/2/2021    Procedure: EGD (ESOPHAGOGASTRODUODENOSCOPY);  Surgeon: Garrick López MD;  Location: [facility];  Service: Endoscopy;  Laterality: N/A;    ESOPHAGOGASTRODUODENOSCOPY N/A 6/28/2022    Procedure: ESOPHAGOGASTRODUODENOSCOPY (EGD);  Surgeon: Concetta Odonnell MD;  Location: [facility] (2ND FLR);  Service: Endoscopy;  Laterality: N/A;  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  full liquid diet x3 days, clear liquid diet x1 day prior to procedure  fully vaccinated, instructions emailed to [email or web address]    ESOPHAGOGASTRODUODENOSCOPY N/A 6/13/2023    Procedure: EGD (ESOPHAGOGASTRODUODENOSCOPY);  Surgeon: Forrest Gomes MD;  Location: [facility];  Service: Endoscopy;  Laterality: N/A;    HAND  SURGERY      HYSTERECTOMY      2005, age 39, KRISTAN fibroids    MEDIASTINOSCOPY      NECK SURGERY      UPPER GASTROINTESTINAL ENDOSCOPY  05/17/2019    WRIST SURGERY       Family History   Problem Relation Name Age of Onset    Diabetes Mother      Colon cancer Mother  65    Bladder Cancer Mother      Heart disease Mother      Heart disease Father      Kidney failure Father      Diabetes Father      Stroke Father      Heart attack Father      Colon cancer Sister fabrice 65    Diabetes Sister fabrice     Hypertension Sister fabrice     Seizures Brother dinh     Colon polyps Sister jonathan     Diabetes Sister jonathan     Hypertension Sister jonathan     Irritable bowel syndrome Sister yury     Diabetes Sister yury     Hypertension Sister yury     Diabetes Sister prerna     Hypertension Sister prerna     Diabetes Brother clau     Breast cancer Neg Hx      Esophageal cancer Neg Hx      Stomach cancer Neg Hx      Liver cancer Neg Hx      Liver disease Neg Hx      Crohn's disease Neg Hx      Celiac disease Neg Hx      Pancreatic cancer Neg Hx       Social History     Tobacco Use    Smoking status: Never    Smokeless tobacco: Never   Substance Use Topics    Alcohol use: Not Currently     Comment: no drinking anymore    Drug use: Never     Wt Readings from Last 10 Encounters:   01/16/25 75 kg (165 lb 5.5 oz)   01/13/25 75.8 kg (167 lb)   11/13/24 78.2 kg (172 lb 6.4 oz)   08/08/24 85 kg (187 lb 6.3 oz)   06/05/24 83.3 kg (183 lb 10.3 oz)   05/20/24 82 kg (180 lb 12.4 oz)   01/25/24 78 kg (171 lb 15.3 oz)   01/20/24 81.6 kg (180 lb)   04/10/23 83 kg (183 lb)   04/04/23 83.4 kg (183 lb 13.8 oz)     Lab Results   Component Value Date    WBC 8.66 08/08/2024    HGB 8.9 (L) 08/08/2024    HCT 27.3 (L) 08/08/2024    MCV 86 08/08/2024     08/08/2024     CMP  Sodium   Date Value Ref Range Status   08/08/2024 141 136 - 145 mmol/L Final     Potassium   Date Value Ref Range Status   08/08/2024 3.6 3.5 - 5.1 mmol/L Final     Chloride  "  Date Value Ref Range Status   08/08/2024 106 95 - 110 mmol/L Final     CO2   Date Value Ref Range Status   08/08/2024 25 23 - 29 mmol/L Final     Glucose   Date Value Ref Range Status   08/08/2024 91 70 - 110 mg/dL Final     BUN   Date Value Ref Range Status   08/08/2024 10 6 - 20 mg/dL Final     Creatinine   Date Value Ref Range Status   08/08/2024 0.7 0.5 - 1.4 mg/dL Final     Calcium   Date Value Ref Range Status   08/08/2024 9.1 8.7 - 10.5 mg/dL Final     Total Protein   Date Value Ref Range Status   08/07/2024 7.2 6.0 - 8.4 g/dL Final     Albumin   Date Value Ref Range Status   08/07/2024 4.0 3.5 - 5.2 g/dL Final     Total Bilirubin   Date Value Ref Range Status   08/07/2024 0.3 0.1 - 1.0 mg/dL Final     Comment:     For infants and newborns, interpretation of results should be based  on gestational age, weight and in agreement with clinical  observations.    Premature Infant recommended reference ranges:  Up to 24 hours.............<8.0 mg/dL  Up to 48 hours............<12.0 mg/dL  3-5 days..................<15.0 mg/dL  6-29 days.................<15.0 mg/dL       Alkaline Phosphatase   Date Value Ref Range Status   08/07/2024 78 55 - 135 U/L Final     AST   Date Value Ref Range Status   08/07/2024 28 10 - 40 U/L Final     ALT   Date Value Ref Range Status   08/07/2024 55 (H) 10 - 44 U/L Final     Anion Gap   Date Value Ref Range Status   08/08/2024 10 8 - 16 mmol/L Final     eGFR if    Date Value Ref Range Status   06/23/2022 >60.0 >60 mL/min/1.73 m^2 Final     eGFR if non    Date Value Ref Range Status   06/23/2022 >60.0 >60 mL/min/1.73 m^2 Final     Comment:     Calculation used to obtain the estimated glomerular filtration  rate (eGFR) is the CKD-EPI equation.        No results found for: "AMYLASE"  Lab Results   Component Value Date    LIPASE 14 05/30/2023     No results found for: "LIPASERES"  No results found for: "TSH"    Reviewed prior medical records including " radiology report of BREEZY Song NP 11/2024 & endoscopy history (see surgical history).    Objective:      Physical Exam  Vitals and nursing note reviewed.   Constitutional:       General: She is not in acute distress.     Appearance: She is not ill-appearing.   HENT:      Head: Normocephalic and atraumatic.      Mouth/Throat:      Mouth: Mucous membranes are moist.      Pharynx: Oropharynx is clear.   Eyes:      Conjunctiva/sclera: Conjunctivae normal.   Cardiovascular:      Rate and Rhythm: Normal rate and regular rhythm.      Pulses: Normal pulses.   Pulmonary:      Effort: Pulmonary effort is normal. No respiratory distress.   Abdominal:      General: Abdomen is flat. There is no distension.      Palpations: Abdomen is soft.      Tenderness: There is no abdominal tenderness.   Skin:     General: Skin is warm and dry.      Capillary Refill: Capillary refill takes 2 to 3 seconds.   Neurological:      Mental Status: She is alert and oriented to person, place, and time.         Assessment:       1. Gastroparesis    2. Chronic idiopathic constipation    3. Rectal bleeding    4. History of hemorrhoids        Plan:       Gastroparesis  Keep currently scheduled with general surgery re: gastric stimulator    Discussed diagnosis and possibly etiologies and that it can be idiopathetic, may also improve over time or can be lifelong, reviewed AVS educational material on diagnosis and given to patient, patient verbalized understanding    Recommend small frequent meals instead of 3 big meals a day, low fat meals & low residue diet.    Avoid: high fiber (insoluble fiber), red meats, dairy, and non-digestible solids (peels, fruit pulp, etc). Avoid NSAIDs and narcotics.    -EGD and gastric emptying studies - completed. Repeating EGD for possible upper GI bleed/ulcer    -Discussed about possible medical therapy and discussed that this would be managed by one of the GI doctors, lastly possible referral to general surgery for  surgical options, patient verbalized understanding     Chronic idiopathic constipation  Recommend daily exercise as tolerated, adequate water intake (six 8-oz glasses of water daily), and high fiber diet. OTC fiber supplements are recommended if diet does not reach daily fiber goal (20-30 grams daily), such as Metamucil, Citrucel, or FiberCon (take as directed, separate from other oral medications by >2 hours).  -Recommend taking an OTC stool softener such as Colace as directed to avoid hard stools and straining with bowel movements PRN  -Recommend trying OTC MiraLax once daily (17g PO) as directed  - If no improvement with above recommendations, try intermittently dosed Dulcolax OTC as directed (every 3-4  days) PRN to facilitate bowel movements  -If still no improvement with these measures, call/follow-up    Trial of amitiza 24 mcg BID     -     Ambulatory referral/consult to Colorectal Surgery; Future; Expected date: 01/23/2025  -     lubiprostone (AMITIZA) 24 MCG Cap; Take 1 capsule (24 mcg total) by mouth 2 (two) times daily with meals.  Dispense: 60 capsule; Refill: 11    Rectal bleeding & History of hemorrhoids  - schedule Colonoscopy, discussed procedure with the patient, including risks and benefits, patient verbalized understanding  - discussed about different etiologies that can cause rectal bleeding, such as but not limited to diverticulosis, polyps, colon mass, colon inflammation or infection, anal fissure or hemorrhoids.   - You may resume normal activity as long as you feel well.  - Avoid/minimize NSAIDs such as ibuprofen (Advil, Motrin) and naproxen (Aleve and Naprosyn).  - Avoid alcohol.   - avoid constipation and straining with bowel movements; try using an OTC stool softener as directed and increase fiber in diet (20-30 grams daily)/OTC fiber supplement such as metamucil (take as directed)  - recommend SITZ baths  - possible referral to colorectal surgery if symptoms persist    -     Ambulatory  referral/consult to Colorectal Surgery; Future; Expected date: 01/23/2025    Follow up if symptoms worsen or fail to improve.      If no improvement in symptoms or symptoms worsen, call/follow-up at clinic or go to ER.       POOJA Miller, CONNER-C    Encounter includes face to face time and non-face to face time preparing to see the patient (eg, review of tests), obtaining and/or reviewing separately obtained history, documenting clinical information in the electronic or other health record, independently interpreting results (not separately reported) and communicating results to the patient/family/caregiver, or care coordination (not separately reported).     A dictation software program was used for this note. Please expect some simple typographical errors in this note.

## 2025-01-09 NOTE — PROGRESS NOTES
"Subjective:       Patient ID: Luz Arias is a 59 y.o. female Body mass index is 27.51 kg/m².    Chief Complaint: Emesis (Constipation, dark stools, rectal bleed)    This patient is new to me.  Referring Provider: No ref. provider found for vomiting.  Established patient of Dr. Landrum.     Gastroparesis Diagnosed in 2022 - "thinks so" when asked if she is following a low fiber diet/gastroparesis diet.     Currently on linzess 72 mcg     Have an appointment with Gen surg at end of month to discuss the gastroparesis      11/2024 OV with ANTHONY Spivey  Patient ID: Luz Arias is a 59 y.o. female Body mass index is 28.69 kg/m².     Chief Complaint: Follow-up     This patient is new to me.  Referring Provider: No ref. provider found for constipation.  Established patient of Dr. Landrum.     Constipation  This is a chronic problem. The current episode started more than 1 year ago. Her stool frequency is 2 to 3 times per week. The stool is described as firm (reports straining with BMs, last Bm yesterday). The patient is on a high fiber diet. She Exercises regularly. There has Been adequate water intake. Associated symptoms include hematochezia (reports seeing intermittent rectal bleeding sees scant amount on tissue with straining and hard stools hx of hemorrhoids last colonoscopy 2023) and hemorrhoids. Pertinent negatives include no abdominal pain, anorexia, back pain, bloating, diarrhea, difficulty urinating, fecal incontinence, fever, flatus, melena, nausea, rectal pain, vomiting or weight loss. Associated symptoms comments: Pt with hx of GERD controlled on nexium 40mg daily, hx of jackhammer esopahgus - was followed closely by Dr. Odonnell reports DR. Odonnell is no longer at [facility], currently on wait list with for DR. Julian, pt with chronic constipation recent CT of abdomen shows large stool burden has tried miralax,linzess years ago unaware of dose milk of magnesia, oral fiber with minor relieved, reports linzess was " too strong was prescribed linzess during last visit in 2023 but pt was unable to take it due to insurance. Pt also with hx of gastroparesis reports chronic nausea takes zofran prn. Reports eats small meals and tries to follow low fiber diet ,  last colonoscopy in 2023 tortuous colon and internal hemorrhoids seen reports fam hx of colon cancer in mother and sister dx in their 60's. . She has tried diet changes, fiber, enemas, laxatives, manual disimpaction, stool softeners and mineral oil (has tried daily fiber and miralax with prn magnesium citrate & milk of magnesia with mild relief) for the symptoms. The treatment provided mild relief. There is no history of abdominal surgery, endocrine disease, inflammatory bowel disease, irritable bowel syndrome, metabolic disease, neurologic disease, neuromuscular disease, psychiatric history or radiation treatment.     Chronic idiopathic constipation  -  START   linaCLOtide (LINZESS) 72 mcg Cap capsule; Take 1 capsule (72 mcg total) by mouth before breakfast.  Dispense: 30 capsule; Refill: 2  -would like to start on low dose patient will follow up if low dose does not help and we can increase dose of linzess  -Recommend daily exercise as tolerated, adequate water intake (six 8-oz glasses of water daily)  -Recommend trying OTC MiraLax once daily (17g PO) as directed  -If still no improvement with these measures, call/follow-up     Rectal bleeding              -Avoid straining and constipation              -If continues can refer to colorectal surgeon for further eval of hemorrhoids               -If rectal bleeding occurs and feel lightheaded faint dizzy report to ED      History of hemorrhoids              - avoid constipation and straining with bowel movements;   - recommend SITZ baths  - possible referral to colorectal surgery if symptoms persist     Nausea  -CONTINUE     ondansetron (ZOFRAN-ODT) 8 MG TbDL; Take 1 tablet (8 mg total) by mouth every 6 (six) hours as needed  (nausea).  Dispense: 120 tablet; Refill: 0              -start on constipation regimen              -follow gastroparesis lifestyle modifications     Gastroparesis  -     Ambulatory referral/consult to General Surgery; Future; Expected date: 11/20/2024              -Pt interested in further management for gastroparesis  Recommend small frequent meals instead of 3 big meals a day, low fat meals & low residue diet.  Avoid: high fiber (insoluble fiber), red meats, dairy, and non-digestible solids (peels, fruit pulp, etc). Avoid NSAIDs and narcotics.     Gastroesophageal reflux disease, unspecified whether esophagitis present              Continue Nexium 40 mg daily              -Take PPI 30min-1hr before eating breakfast  -Educated patient on lifestyle modifications to help control/reduce reflux/abdominal pain including: avoid large meals, avoid eating within 2-3 hours of bedtime (avoid late night eating & lying down soon after eating), elevate head of bed if nocturnal symptoms are present, smoking cessation (if current smoker), & weight loss (if overweight).   -Educated to avoid known foods which trigger reflux symptoms & to minimize/avoid high-fat foods, chocolate, caffeine, citrus, alcohol, & tomato products.  -Advised to avoid/limit use of NSAID's, since they can cause GI upset, bleeding, and/or ulcers. If needed, take with food.      Jackhammer esophagus              -Continue f/u with Dr. Julian    4/2022 Gastric Emptying Study  FINDINGS:  At 228 minutes the percentage of retention is 32 %.  The T1/2 gastric emptying time is calculated at 176 minutes.     Impression:     Delayed gastric emptying time.    6/2023 EGD with Dr Landrum  Findings:       Diffuse, white plaques were found in the proximal esophagus.        Brushings for KOH prep were obtained in the upper third of the        esophagus. Verification of patient identification for the specimen        was done. Estimated blood loss was minimal.        Localized  mildly erythematous mucosa without bleeding was found in        the gastric antrum. Biopsies were taken with a cold forceps for        Helicobacter pylori testing. Verification of patient identification        for the specimen was done. Estimated blood loss was minimal.        The duodenal bulb, first portion of the duodenum and second portion        of the duodenum were normal.   Recommendation:        - Discharge patient to home.                          - Advance diet as tolerated.                          - Continue present medications.                          - Await pathology results.                          - Diflucan (fluconazole) 400 mg PO x 1 then 200 mg                          PO daily for total of 14 days     4/2023 Colonoscopy with Dr Landrum (Repeat 5 years - 4/2028)  Findings:       The perianal and digital rectal examinations were normal.        The colon (entire examined portion) appeared normal.        The left colon was moderately tortuous. Advancing the scope required        applying abdominal pressure.        Non-bleeding internal hemorrhoids were found during endoscopy. The        hemorrhoids were medium-sized.         Review of Systems   Constitutional:  Positive for activity change and appetite change. Negative for fatigue, fever and unexpected weight change.   HENT:  Negative for sore throat and trouble swallowing.    Respiratory:  Negative for cough and shortness of breath.    Cardiovascular:  Negative for chest pain.   Gastrointestinal:  Positive for abdominal pain, blood in stool and nausea. Negative for abdominal distention, anal bleeding, constipation, diarrhea, rectal pain and vomiting.       No LMP recorded. Patient has had a hysterectomy.  Past Medical History:   Diagnosis Date    Aortic regurgitation     Asthma     Chronic fatigue     Chronic pain     Depression     Diabetes     Dysphagia     Gastritis     Gastroparesis     GERD (gastroesophageal reflux disease)     Hypertension      Intestinal metaplasia of gastric cardia     Irritable bowel syndrome     Sarcoidosis     Sleep apnea     Stroke     12/2015, mini stroke 2/2023     Past Surgical History:   Procedure Laterality Date    APPENDECTOMY      CHOLECYSTECTOMY      COLONOSCOPY  05/17/2019    COLONOSCOPY N/A 5/5/2020    Procedure: COLONOSCOPY;  Surgeon: Garrick López MD;  Location: [facility];  Service: Endoscopy;  Laterality: N/A;  with bx and stool specimen    COLONOSCOPY N/A 4/10/2023    Procedure: COLONOSCOPY;  Surgeon: Forrest Gomes MD;  Location: [facility];  Service: Endoscopy;  Laterality: N/A;    ELBOW SURGERY      ESOPHAGEAL MANOMETRY WITH MEASUREMENT OF IMPEDANCE N/A 6/29/2022    Procedure: MANOMETRY-ESOPHAGEAL-WITH IMPEDANCE;  Surgeon: Concetta Odonnell MD;  Location: [facility] (4TH FLR);  Service: Endoscopy;  Laterality: N/A;  r/o rumination and supragastric belching  hold nortriptyline and norco 24 hours prior to procedure  fully vaccinated, instructions emailed to [email or web address]    ESOPHAGOGASTRODUODENOSCOPY N/A 5/5/2020    Procedure: EGD (ESOPHAGOGASTRODUODENOSCOPY);  Surgeon: Garrick López MD;  Location: [facility];  Service: Endoscopy;  Laterality: N/A;  with biopsy    ESOPHAGOGASTRODUODENOSCOPY N/A 6/2/2021    Procedure: EGD (ESOPHAGOGASTRODUODENOSCOPY);  Surgeon: Garrick López MD;  Location: [facility];  Service: Endoscopy;  Laterality: N/A;    ESOPHAGOGASTRODUODENOSCOPY N/A 6/28/2022    Procedure: ESOPHAGOGASTRODUODENOSCOPY (EGD);  Surgeon: Concetta Odonnell MD;  Location: [facility] (2ND FLR);  Service: Endoscopy;  Laterality: N/A;  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  full liquid diet x3 days, clear liquid diet x1 day prior to procedure  fully vaccinated, instructions emailed to [employer]    ESOPHAGOGASTRODUODENOSCOPY N/A 6/13/2023    Procedure: EGD (ESOPHAGOGASTRODUODENOSCOPY);  Surgeon: Forrest Gomes MD;  Location: [facility];  Service: Endoscopy;  Laterality: N/A;    HAND  SURGERY      HYSTERECTOMY      2005, age 39, KRISTAN fibroids    MEDIASTINOSCOPY      NECK SURGERY      UPPER GASTROINTESTINAL ENDOSCOPY  05/17/2019    WRIST SURGERY       Family History   Problem Relation Name Age of Onset    Diabetes Mother      Colon cancer Mother  65    Bladder Cancer Mother      Heart disease Mother      Heart disease Father      Kidney failure Father      Diabetes Father      Stroke Father      Heart attack Father      Colon cancer Sister fabrice 65    Diabetes Sister fabrice     Hypertension Sister fabrice     Seizures Brother dinh     Colon polyps Sister jonathan     Diabetes Sister jonathan     Hypertension Sister jonathan     Irritable bowel syndrome Sister yury     Diabetes Sister uyry     Hypertension Sister yury     Diabetes Sister prerna     Hypertension Sister prerna     Diabetes Brother clau     Breast cancer Neg Hx      Esophageal cancer Neg Hx      Stomach cancer Neg Hx      Liver cancer Neg Hx      Liver disease Neg Hx      Crohn's disease Neg Hx      Celiac disease Neg Hx      Pancreatic cancer Neg Hx       Social History     Tobacco Use    Smoking status: Never    Smokeless tobacco: Never   Substance Use Topics    Alcohol use: Not Currently     Comment: no drinking anymore    Drug use: Never     Wt Readings from Last 10 Encounters:   01/16/25 75 kg (165 lb 5.5 oz)   01/13/25 75.8 kg (167 lb)   11/13/24 78.2 kg (172 lb 6.4 oz)   08/08/24 85 kg (187 lb 6.3 oz)   06/05/24 83.3 kg (183 lb 10.3 oz)   05/20/24 82 kg (180 lb 12.4 oz)   01/25/24 78 kg (171 lb 15.3 oz)   01/20/24 81.6 kg (180 lb)   04/10/23 83 kg (183 lb)   04/04/23 83.4 kg (183 lb 13.8 oz)     Lab Results   Component Value Date    WBC 8.66 08/08/2024    HGB 8.9 (L) 08/08/2024    HCT 27.3 (L) 08/08/2024    MCV 86 08/08/2024     08/08/2024     CMP  Sodium   Date Value Ref Range Status   08/08/2024 141 136 - 145 mmol/L Final     Potassium   Date Value Ref Range Status   08/08/2024 3.6 3.5 - 5.1 mmol/L Final     Chloride  "  Date Value Ref Range Status   08/08/2024 106 95 - 110 mmol/L Final     CO2   Date Value Ref Range Status   08/08/2024 25 23 - 29 mmol/L Final     Glucose   Date Value Ref Range Status   08/08/2024 91 70 - 110 mg/dL Final     BUN   Date Value Ref Range Status   08/08/2024 10 6 - 20 mg/dL Final     Creatinine   Date Value Ref Range Status   08/08/2024 0.7 0.5 - 1.4 mg/dL Final     Calcium   Date Value Ref Range Status   08/08/2024 9.1 8.7 - 10.5 mg/dL Final     Total Protein   Date Value Ref Range Status   08/07/2024 7.2 6.0 - 8.4 g/dL Final     Albumin   Date Value Ref Range Status   08/07/2024 4.0 3.5 - 5.2 g/dL Final     Total Bilirubin   Date Value Ref Range Status   08/07/2024 0.3 0.1 - 1.0 mg/dL Final     Comment:     For infants and newborns, interpretation of results should be based  on gestational age, weight and in agreement with clinical  observations.    Premature Infant recommended reference ranges:  Up to 24 hours.............<8.0 mg/dL  Up to 48 hours............<12.0 mg/dL  3-5 days..................<15.0 mg/dL  6-29 days.................<15.0 mg/dL       Alkaline Phosphatase   Date Value Ref Range Status   08/07/2024 78 55 - 135 U/L Final     AST   Date Value Ref Range Status   08/07/2024 28 10 - 40 U/L Final     ALT   Date Value Ref Range Status   08/07/2024 55 (H) 10 - 44 U/L Final     Anion Gap   Date Value Ref Range Status   08/08/2024 10 8 - 16 mmol/L Final     eGFR if    Date Value Ref Range Status   06/23/2022 >60.0 >60 mL/min/1.73 m^2 Final     eGFR if non    Date Value Ref Range Status   06/23/2022 >60.0 >60 mL/min/1.73 m^2 Final     Comment:     Calculation used to obtain the estimated glomerular filtration  rate (eGFR) is the CKD-EPI equation.        No results found for: "AMYLASE"  Lab Results   Component Value Date    LIPASE 14 05/30/2023     No results found for: "LIPASERES"  No results found for: "TSH"    Reviewed prior medical records including " radiology report of BREEZY Song NP 11/2024 & endoscopy history (see surgical history).    Objective:      Physical Exam  Vitals and nursing note reviewed.   Constitutional:       General: She is not in acute distress.     Appearance: She is not ill-appearing.   HENT:      Head: Normocephalic and atraumatic.      Mouth/Throat:      Mouth: Mucous membranes are moist.      Pharynx: Oropharynx is clear.   Eyes:      Conjunctiva/sclera: Conjunctivae normal.   Cardiovascular:      Rate and Rhythm: Normal rate and regular rhythm.      Pulses: Normal pulses.   Pulmonary:      Effort: Pulmonary effort is normal. No respiratory distress.   Abdominal:      General: Abdomen is flat. There is no distension.      Palpations: Abdomen is soft.      Tenderness: There is no abdominal tenderness.   Skin:     General: Skin is warm and dry.      Capillary Refill: Capillary refill takes 2 to 3 seconds.   Neurological:      Mental Status: She is alert and oriented to person, place, and time.         Assessment:       1. Gastroparesis    2. Chronic idiopathic constipation    3. Rectal bleeding    4. History of hemorrhoids        Plan:       Gastroparesis  Keep currently scheduled with general surgery re: gastric stimulator    Discussed diagnosis and possibly etiologies and that it can be idiopathetic, may also improve over time or can be lifelong, reviewed AVS educational material on diagnosis and given to patient, patient verbalized understanding    Recommend small frequent meals instead of 3 big meals a day, low fat meals & low residue diet.    Avoid: high fiber (insoluble fiber), red meats, dairy, and non-digestible solids (peels, fruit pulp, etc). Avoid NSAIDs and narcotics.    -EGD and gastric emptying studies - completed. Repeating EGD for possible upper GI bleed/ulcer    -Discussed about possible medical therapy and discussed that this would be managed by one of the GI doctors, lastly possible referral to general surgery for  surgical options, patient verbalized understanding     Chronic idiopathic constipation  Recommend daily exercise as tolerated, adequate water intake (six 8-oz glasses of water daily), and high fiber diet. OTC fiber supplements are recommended if diet does not reach daily fiber goal (20-30 grams daily), such as Metamucil, Citrucel, or FiberCon (take as directed, separate from other oral medications by >2 hours).  -Recommend taking an OTC stool softener such as Colace as directed to avoid hard stools and straining with bowel movements PRN  -Recommend trying OTC MiraLax once daily (17g PO) as directed  - If no improvement with above recommendations, try intermittently dosed Dulcolax OTC as directed (every 3-4  days) PRN to facilitate bowel movements  -If still no improvement with these measures, call/follow-up    Trial of amitiza 24 mcg BID     -     Ambulatory referral/consult to Colorectal Surgery; Future; Expected date: 01/23/2025  -     lubiprostone (AMITIZA) 24 MCG Cap; Take 1 capsule (24 mcg total) by mouth 2 (two) times daily with meals.  Dispense: 60 capsule; Refill: 11    Rectal bleeding & History of hemorrhoids  - schedule Colonoscopy, discussed procedure with the patient, including risks and benefits, patient verbalized understanding  - discussed about different etiologies that can cause rectal bleeding, such as but not limited to diverticulosis, polyps, colon mass, colon inflammation or infection, anal fissure or hemorrhoids.   - You may resume normal activity as long as you feel well.  - Avoid/minimize NSAIDs such as ibuprofen (Advil, Motrin) and naproxen (Aleve and Naprosyn).  - Avoid alcohol.   - avoid constipation and straining with bowel movements; try using an OTC stool softener as directed and increase fiber in diet (20-30 grams daily)/OTC fiber supplement such as metamucil (take as directed)  - recommend SITZ baths  - possible referral to colorectal surgery if symptoms persist    -     Ambulatory  referral/consult to Colorectal Surgery; Future; Expected date: 01/23/2025    Follow up if symptoms worsen or fail to improve.      If no improvement in symptoms or symptoms worsen, call/follow-up at clinic or go to ER.       POOJA Miller, CONNER-C    Encounter includes face to face time and non-face to face time preparing to see the patient (eg, review of tests), obtaining and/or reviewing separately obtained history, documenting clinical information in the electronic or other health record, independently interpreting results (not separately reported) and communicating results to the patient/family/caregiver, or care coordination (not separately reported).     A dictation software program was used for this note. Please expect some simple typographical errors in this note.

## 2025-01-13 ENCOUNTER — OFFICE VISIT (OUTPATIENT)
Dept: OBSTETRICS AND GYNECOLOGY | Facility: CLINIC | Age: 60
End: 2025-01-13
Payer: MEDICARE

## 2025-01-13 VITALS
DIASTOLIC BLOOD PRESSURE: 72 MMHG | BODY MASS INDEX: 27.82 KG/M2 | SYSTOLIC BLOOD PRESSURE: 120 MMHG | HEIGHT: 65 IN | WEIGHT: 167 LBS

## 2025-01-13 DIAGNOSIS — B00.9 HSV (HERPES SIMPLEX VIRUS) INFECTION: ICD-10-CM

## 2025-01-13 DIAGNOSIS — Z01.419 WOMEN'S ANNUAL ROUTINE GYNECOLOGICAL EXAMINATION: Primary | ICD-10-CM

## 2025-01-13 DIAGNOSIS — Z76.0 MEDICATION REFILL: ICD-10-CM

## 2025-01-13 DIAGNOSIS — A60.9 HSV (HERPES SIMPLEX VIRUS) ANOGENITAL INFECTION: ICD-10-CM

## 2025-01-13 PROCEDURE — G0101 CA SCREEN;PELVIC/BREAST EXAM: HCPCS | Mod: S$PBB,,, | Performed by: OBSTETRICS & GYNECOLOGY

## 2025-01-13 PROCEDURE — 99214 OFFICE O/P EST MOD 30 MIN: CPT | Mod: PBBFAC | Performed by: OBSTETRICS & GYNECOLOGY

## 2025-01-13 PROCEDURE — 99999 PR PBB SHADOW E&M-EST. PATIENT-LVL IV: CPT | Mod: PBBFAC,,, | Performed by: OBSTETRICS & GYNECOLOGY

## 2025-01-13 RX ORDER — ACYCLOVIR 400 MG/1
400 TABLET ORAL DAILY
Qty: 90 TABLET | Refills: 4 | Status: SHIPPED | OUTPATIENT
Start: 2025-01-13 | End: 2026-04-08

## 2025-01-13 RX ORDER — VALACYCLOVIR HYDROCHLORIDE 1 G/1
500 TABLET, FILM COATED ORAL 2 TIMES DAILY
Qty: 60 TABLET | Refills: 1 | Status: SHIPPED | OUTPATIENT
Start: 2025-01-13 | End: 2026-01-13

## 2025-01-13 NOTE — PROGRESS NOTES
Annual Well Woman's Exam  History & Physical      SUBJECTIVE:     History of Present Illness:  Patient is a 59 y.o. female presents for her annual exam. She is s/p TVH in 2005. She desires a refill of her HSV meds today. She takes daily acyclovir for suppression and Valtex for outbreaks.    Chief Complaint   Patient presents with    Well Woman       Review of patient's allergies indicates:   Allergen Reactions    Sulfa (sulfonamide antibiotics) Anaphylaxis    Sulfamethoxazole-trimethoprim Anaphylaxis and Diarrhea     Other reaction(s): Anaphylaxis, Finding of vomiting, Diarrhea    Temazepam      Sleep walking   Other reaction(s): Sleep related hallucinations    Pantoprazole Nausea And Vomiting    Pregabalin Other (See Comments)    Verapamil      Other reaction(s): Constipation    Zonisamide      Other reaction(s): Anaphylaxis, Diarrhea, Finding of vomiting, Anaphylaxis, Diarrhea, Finding of vomiting, Anaphylaxis, Diarrhea, Finding of vomiting    Metoclopramide Rash and Anxiety    Sucralfate Other (See Comments) and Nausea Only       Current Outpatient Medications   Medication Sig Dispense Refill    ALBUTEROL INHL Inhale into the lungs. Every 6 hrs prn      aluminum hydrox-magnesium carb (GAVISCON EXTRA STRENGTH) 254-237.5 mg/5 mL Susp 10 mLs.      aspirin (ECOTRIN) 81 MG EC tablet Take 81 mg by mouth once daily.      benzonatate (TESSALON) 100 MG capsule Take 100 mg by mouth 3 (three) times daily as needed.      biotin 10,000 mcg Cap Take by mouth once daily.      BREO ELLIPTA 100-25 mcg/dose diskus inhaler Inhale 1 puff into the lungs.      carBAMazepine (TEGRETOL XR) 400 MG Tb12 Take 400 mg by mouth 2 (two) times daily.      diclofenac sodium (VOLTAREN) 1 % Gel daily as needed.      diphenhydrAMINE-aluminum-magnesium hydroxide-simethicone-LIDOcaine HCl 2% Swish and swallow 5 mLs every 6 (six) hours as needed (throat irritation). 120 mL 2    EASY TOUCH ALCOHOL PREP PADS PadM Apply 1 each topically 3 (three) times  daily.      ergocalciferol (ERGOCALCIFEROL) 50,000 unit Cap       ergocalciferol, vitamin D2, (VITAMIN D ORAL) Take by mouth once daily.      fluticasone propionate (FLONASE) 50 mcg/actuation nasal spray       FREESTYLE FREEDOM LITE kit       FREESTYLE LANCETS 28 gauge lancets Apply topically 3 (three) times daily.      FREESTYLE LITE STRIPS Strp 1 strip 3 (three) times daily.      insulin glargine U-100, Lantus, (LANTUS SOLOSTAR U-100 INSULIN) 100 unit/mL (3 mL) InPn pen Inject 24 Units into the skin every evening.      ipratropium (ATROVENT) 42 mcg (0.06 %) nasal spray 2 sprays 2 (two) times daily.      levalbuterol (XOPENEX) 0.63 mg/3 mL nebulizer solution Take 1 ampule by nebulization every 4 (four) hours as needed for Wheezing. Rescue      lidocaine (LIDODERM) 5 % daily as needed.      linaCLOtide (LINZESS) 72 mcg Cap capsule Take 1 capsule (72 mcg total) by mouth before breakfast. 30 capsule 2    mag hydrox/aluminum hyd/simeth (MAALOX ORAL)       metFORMIN (GLUCOPHAGE-XR) 500 MG XR 24hr tablet Take 500 mg by mouth 2 (two) times daily with meals.      nortriptyline (PAMELOR) 75 MG Cap TAKE 1 CAPSULE BY MOUTH EVERY NIGHT 1 HOUR BEFORE BEDTIME      promethazine-dextromethorphan (PROMETHAZINE-DM) 6.25-15 mg/5 mL Syrp 5 mLs daily as needed.      rosuvastatin (CRESTOR) 40 MG Tab Take 40 mg by mouth once daily.      tiotropium (SPIRIVA) 18 mcg inhalation capsule Inhale 36 mcg into the lungs once daily. Controller      TYLENOL EXTRA STRENGTH 500 mg tablet Take 500 mg by mouth daily as needed.      vitamin E 600 UNIT capsule Take 600 Units by mouth once daily.      acyclovir (ZOVIRAX) 400 MG tablet Take 1 tablet (400 mg total) by mouth once daily. 90 tablet 4    esomeprazole (NEXIUM) 40 MG capsule Take 1 capsule (40 mg total) by mouth once daily. 30 capsule 6    hydrocodone-homatropine 5-1.5 mg/5 ml (HYDROMET) 5-1.5 mg/5 mL Syrp 5 mLs every 4 (four) hours as needed.      isosorbide mononitrate (IMDUR) 30 MG 24 hr  tablet 30 mg once daily.      prazosin (MINIPRESS) 2 MG Cap Take 2 mg by mouth once daily.      rivastigmine (EXELON) 9.5 mg/24 hour PT24 Place onto the skin once daily.      valACYclovir (VALTREX) 1000 MG tablet Take 0.5 tablets (500 mg total) by mouth 2 (two) times daily. 60 tablet 1     No current facility-administered medications for this visit.     OB History          3    Para   1    Term           0    AB   2    Living   1         SAB        IAB   2    Ectopic        Multiple        Live Births   1             No LMP recorded. Patient has had a hysterectomy.      Past Medical History:   Diagnosis Date    Aortic regurgitation     Asthma     Chronic fatigue     Chronic pain     Depression     Diabetes     Dysphagia     Gastritis     Gastroparesis     GERD (gastroesophageal reflux disease)     Hypertension     Intestinal metaplasia of gastric cardia     Irritable bowel syndrome     Sarcoidosis     Sleep apnea     Stroke     2015, mini stroke 2023     Past Surgical History:   Procedure Laterality Date    APPENDECTOMY      CHOLECYSTECTOMY      COLONOSCOPY  2019    COLONOSCOPY N/A 2020    Procedure: COLONOSCOPY;  Surgeon: Garrick López MD;  Location: [facility] ENDO;  Service: Endoscopy;  Laterality: N/A;  with bx and stool specimen    COLONOSCOPY N/A 4/10/2023    Procedure: COLONOSCOPY;  Surgeon: Forrest Gomes MD;  Location: [facility];  Service: Endoscopy;  Laterality: N/A;    ELBOW SURGERY      ESOPHAGEAL MANOMETRY WITH MEASUREMENT OF IMPEDANCE N/A 2022    Procedure: MANOMETRY-ESOPHAGEAL-WITH IMPEDANCE;  Surgeon: Concetta Odonnell MD;  Location: [facility] ([address] [place] [address]);  Service: Endoscopy;  Laterality: N/A;  r/o rumination and supragastric belching  hold nortriptyline and norco 24 hours prior to procedure  fully vaccinated, instructions emailed to [email or web address]    ESOPHAGOGASTRODUODENOSCOPY N/A 2020    Procedure: EGD (ESOPHAGOGASTRODUODENOSCOPY);  Surgeon: Garrick DIOR  MD Rene;  Location: [facility] ENDO;  Service: Endoscopy;  Laterality: N/A;  with biopsy    ESOPHAGOGASTRODUODENOSCOPY N/A 6/2/2021    Procedure: EGD (ESOPHAGOGASTRODUODENOSCOPY);  Surgeon: Garrick López MD;  Location: [facility];  Service: Endoscopy;  Laterality: N/A;    ESOPHAGOGASTRODUODENOSCOPY N/A 6/28/2022    Procedure: ESOPHAGOGASTRODUODENOSCOPY (EGD);  Surgeon: Concetta Odonnell MD;  Location: [facility] (2ND FLR);  Service: Endoscopy;  Laterality: N/A;  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  full liquid diet x3 days, clear liquid diet x1 day prior to procedure  fully vaccinated, instructions emailed to [email or web address]    ESOPHAGOGASTRODUODENOSCOPY N/A 6/13/2023    Procedure: EGD (ESOPHAGOGASTRODUODENOSCOPY);  Surgeon: Forrest Gomes MD;  Location: [facility];  Service: Endoscopy;  Laterality: N/A;    HAND SURGERY      HYSTERECTOMY      2005, age 39, KRISTAN fibroids    MEDIASTINOSCOPY      NECK SURGERY      UPPER GASTROINTESTINAL ENDOSCOPY  05/17/2019    WRIST SURGERY       Family History   Problem Relation Name Age of Onset    Diabetes Mother      Colon cancer Mother  65    Bladder Cancer Mother      Heart disease Mother      Heart disease Father      Kidney failure Father      Diabetes Father      Stroke Father      Heart attack Father      Colon cancer Sister fabrice 65    Diabetes Sister fabrice     Hypertension Sister fabrice     Seizures Brother dinh     Colon polyps Sister jonathan     Diabetes Sister jonathan     Hypertension Sister jonathan     Irritable bowel syndrome Sister yury     Diabetes Sister yury     Hypertension Sister yury     Diabetes Sister prerna     Hypertension Sister prerna     Diabetes Brother clau     Breast cancer Neg Hx      Esophageal cancer Neg Hx      Stomach cancer Neg Hx      Liver cancer Neg Hx      Liver disease Neg Hx      Crohn's disease Neg Hx      Celiac disease Neg Hx      Pancreatic cancer Neg Hx       Social History     Tobacco Use    Smoking status: Never  "   Smokeless tobacco: Never   Substance Use Topics    Alcohol use: Not Currently     Comment: no drinking anymore    Drug use: Never        OBJECTIVE:     Vital Signs (Most Recent)  BP: 120/72 (01/13/25 1011)  5' 5" (1.651 m)  75.8 kg (167 lb)     Physical Exam:  Physical Exam  Vitals reviewed.   Constitutional:       Appearance: Normal appearance.   HENT:      Head: Normocephalic.   Neck:      Thyroid: No thyroid mass, thyromegaly or thyroid tenderness.   Pulmonary:      Effort: Pulmonary effort is normal.   Chest:   Breasts:     Right: Normal.      Left: Normal.   Abdominal:      General: Abdomen is flat.      Palpations: Abdomen is soft.   Genitourinary:     General: Normal vulva.      Vagina: Normal.      Uterus: Absent.       Adnexa: Right adnexa normal and left adnexa normal.   Lymphadenopathy:      Upper Body:      Right upper body: No axillary adenopathy.      Left upper body: No axillary adenopathy.   Skin:     General: Skin is warm and dry.   Neurological:      General: No focal deficit present.      Mental Status: She is alert and oriented to person, place, and time.   Psychiatric:         Mood and Affect: Mood normal.         Behavior: Behavior normal.         ASSESSMENT/PLAN:       ICD-10-CM ICD-9-CM   1. Women's annual routine gynecological examination  Z01.419 V72.31   2. HSV (herpes simplex virus) infection  B00.9 054.9   3. HSV (herpes simplex virus) anogenital infection  A60.9 054.9   4. Medication refill  Z76.0 V68.1       PLAN:    Patient is status post hysterectomy.  Pap smears no longer indicated.  Mammogram is up-to-date  Screening colonoscopy due next year  Anti-viral meds ordered.   Follow up in 1 year for annual exam or sooner as needed    Yasemin Valencia MD, FACOG   Gynecology    [address] [address]    [phone]             "

## 2025-01-16 ENCOUNTER — OFFICE VISIT (OUTPATIENT)
Dept: SURGERY | Facility: CLINIC | Age: 60
End: 2025-01-16
Payer: MEDICARE

## 2025-01-16 ENCOUNTER — LAB VISIT (OUTPATIENT)
Dept: LAB | Facility: HOSPITAL | Age: 60
End: 2025-01-16
Attending: SURGERY
Payer: MEDICARE

## 2025-01-16 ENCOUNTER — OFFICE VISIT (OUTPATIENT)
Dept: GASTROENTEROLOGY | Facility: CLINIC | Age: 60
End: 2025-01-16
Payer: MEDICARE

## 2025-01-16 VITALS
SYSTOLIC BLOOD PRESSURE: 138 MMHG | HEIGHT: 65 IN | BODY MASS INDEX: 27.75 KG/M2 | WEIGHT: 166.56 LBS | HEART RATE: 86 BPM | DIASTOLIC BLOOD PRESSURE: 63 MMHG

## 2025-01-16 VITALS — BODY MASS INDEX: 27.56 KG/M2 | WEIGHT: 165.38 LBS | HEIGHT: 65 IN

## 2025-01-16 DIAGNOSIS — Z87.19 HISTORY OF HEMORRHOIDS: ICD-10-CM

## 2025-01-16 DIAGNOSIS — K62.5 RECTAL BLEEDING: ICD-10-CM

## 2025-01-16 DIAGNOSIS — Z01.818 PRE-OP TESTING: ICD-10-CM

## 2025-01-16 DIAGNOSIS — Z87.11 HISTORY OF GASTRIC ULCER: ICD-10-CM

## 2025-01-16 DIAGNOSIS — K31.84 GASTROPARESIS: Primary | ICD-10-CM

## 2025-01-16 DIAGNOSIS — R19.5 DARK STOOLS: ICD-10-CM

## 2025-01-16 DIAGNOSIS — K59.04 CHRONIC IDIOPATHIC CONSTIPATION: ICD-10-CM

## 2025-01-16 LAB
ANION GAP SERPL CALC-SCNC: 15 MMOL/L (ref 8–16)
BASOPHILS # BLD AUTO: 0.03 K/UL (ref 0–0.2)
BASOPHILS NFR BLD: 0.3 % (ref 0–1.9)
BUN SERPL-MCNC: 14 MG/DL (ref 6–20)
CALCIUM SERPL-MCNC: 9.7 MG/DL (ref 8.7–10.5)
CHLORIDE SERPL-SCNC: 101 MMOL/L (ref 95–110)
CO2 SERPL-SCNC: 24 MMOL/L (ref 23–29)
CREAT SERPL-MCNC: 0.9 MG/DL (ref 0.5–1.4)
DIFFERENTIAL METHOD BLD: ABNORMAL
EOSINOPHIL # BLD AUTO: 0.1 K/UL (ref 0–0.5)
EOSINOPHIL NFR BLD: 0.9 % (ref 0–8)
ERYTHROCYTE [DISTWIDTH] IN BLOOD BY AUTOMATED COUNT: 13.2 % (ref 11.5–14.5)
EST. GFR  (NO RACE VARIABLE): >60 ML/MIN/1.73 M^2
GLUCOSE SERPL-MCNC: 99 MG/DL (ref 70–110)
HCT VFR BLD AUTO: 31.9 % (ref 37–48.5)
HGB BLD-MCNC: 10.1 G/DL (ref 12–16)
IMM GRANULOCYTES # BLD AUTO: 0.02 K/UL (ref 0–0.04)
IMM GRANULOCYTES NFR BLD AUTO: 0.2 % (ref 0–0.5)
LYMPHOCYTES # BLD AUTO: 4 K/UL (ref 1–4.8)
LYMPHOCYTES NFR BLD: 43.3 % (ref 18–48)
MCH RBC QN AUTO: 28.3 PG (ref 27–31)
MCHC RBC AUTO-ENTMCNC: 31.7 G/DL (ref 32–36)
MCV RBC AUTO: 89 FL (ref 82–98)
MONOCYTES # BLD AUTO: 0.7 K/UL (ref 0.3–1)
MONOCYTES NFR BLD: 7.5 % (ref 4–15)
NEUTROPHILS # BLD AUTO: 4.4 K/UL (ref 1.8–7.7)
NEUTROPHILS NFR BLD: 47.8 % (ref 38–73)
NRBC BLD-RTO: 0 /100 WBC
PLATELET # BLD AUTO: 332 K/UL (ref 150–450)
PLATELET BLD QL SMEAR: ABNORMAL
PMV BLD AUTO: 9.4 FL (ref 9.2–12.9)
POTASSIUM SERPL-SCNC: 4 MMOL/L (ref 3.5–5.1)
RBC # BLD AUTO: 3.57 M/UL (ref 4–5.4)
SODIUM SERPL-SCNC: 140 MMOL/L (ref 136–145)
WBC # BLD AUTO: 9.2 K/UL (ref 3.9–12.7)

## 2025-01-16 PROCEDURE — 80048 BASIC METABOLIC PNL TOTAL CA: CPT | Performed by: SURGERY

## 2025-01-16 PROCEDURE — 99999 PR PBB SHADOW E&M-EST. PATIENT-LVL V: CPT | Mod: PBBFAC,,, | Performed by: SURGERY

## 2025-01-16 PROCEDURE — 99215 OFFICE O/P EST HI 40 MIN: CPT | Mod: PBBFAC,PN

## 2025-01-16 PROCEDURE — 99214 OFFICE O/P EST MOD 30 MIN: CPT | Mod: S$PBB,,, | Performed by: SURGERY

## 2025-01-16 PROCEDURE — 99215 OFFICE O/P EST HI 40 MIN: CPT | Mod: PBBFAC,27 | Performed by: SURGERY

## 2025-01-16 PROCEDURE — 99999 PR PBB SHADOW E&M-EST. PATIENT-LVL V: CPT | Mod: PBBFAC,,,

## 2025-01-16 PROCEDURE — 36415 COLL VENOUS BLD VENIPUNCTURE: CPT | Performed by: SURGERY

## 2025-01-16 PROCEDURE — 99214 OFFICE O/P EST MOD 30 MIN: CPT | Mod: S$PBB,,,

## 2025-01-16 PROCEDURE — 85025 COMPLETE CBC W/AUTO DIFF WBC: CPT | Performed by: SURGERY

## 2025-01-16 RX ORDER — POLYETHYLENE GLYCOL 3350 17 G/17G
POWDER, FOR SOLUTION ORAL
COMMUNITY

## 2025-01-16 RX ORDER — LUBIPROSTONE 24 UG/1
24 CAPSULE ORAL 2 TIMES DAILY WITH MEALS
Qty: 60 CAPSULE | Refills: 11 | Status: SHIPPED | OUTPATIENT
Start: 2025-01-16

## 2025-01-16 NOTE — LETTER
January 16, 2025        Gab Levy MD  [address] [facility] [place] [address]             [facility] 2nd [facility]  [address]  [facility] [address]  Phone: [phone]   Patient: Luz Arias   MR Number: [id number]   YOB: 1965   Date of Visit: 1/16/2025       Dear Dr. Levy:    Thank you for referring Luz Arias to me for evaluation. Attached you will find relevant portions of my assessment and plan of care.    If you have questions, please do not hesitate to call me. I look forward to following Luz Arias along with you.    Sincerely,      Jasmeet Franz MD            CC  No Recipients    Enclosure

## 2025-01-16 NOTE — PROGRESS NOTES
"History & Physical    SUBJECTIVE:     History of Present Illness:  Patient is a 59 y.o. female presents with bmi 27, eknneth on cpap, anemia of chronic disease, hx bronchitis, pilmonary sarcoidosis, essential htn, dm2 with long term insulin, right cva with imparied function, dysphagia, gerd and gastroparesis.  She has had trouble for a number of years.  She says symptoms are worsening despite medication.  She also has reflux despite medication.  She also has severe constipation despite medication and has tried linzess and is changing to amitiza.  She has bee to the ER 7/2024 in [place] for symptoms and has had other cardiac work ups for chest pain.  She hasn't had this recently.    She is on zofran qam and sometimes at night, phenergan rarely (makes her tired), nexium daily, and gaviscon qhs.  She has tried reglan but it made her "crazy".  She thinks she has tried erythromycin.  She take a a lidocaine combination/gi coctail to help her swallow.    Post Gastric Pacemaker Symptom Monitor    Severity/Frequency    Vomiting- 3/2  Nausea-3/4  Early Satiety-3/4  Bloating-3/4  Postprandial fullness-3/4  Epigastric pain-3/3  Epigastric burning-3/4    GERD Questionnaire     daily PPI with occasional gaviscon  has Typical heartburn  has Regurgitation  has Dysphagia solids  has Dysphagia liquids  has Hoarseness  has Sore throat  has Cough  Possibly has Asthma  no Chest pain  no Water brash, has dry mouth  has Globus  has Nausea  has Vomiting    Eckardt Score (none =0, occ=1, daily=2, every meal=3, weight: 0=0, <5=1, 5-10=2, >10=3)  Dysphagia=2  Regurgitation=2  Chest pain=0  Weight loss (kg)=0  Total=4    Nutrition: she eats little, is bmi 27/166lb today.  Note 4/4/23 shows weight 183/bmi 30.     shows no narcotics.    No chief complaint on file.      Review of patient's allergies indicates:   Allergen Reactions    Sulfa (sulfonamide antibiotics) Anaphylaxis    Sulfamethoxazole-trimethoprim Anaphylaxis and Diarrhea     Other " reaction(s): Anaphylaxis, Finding of vomiting, Diarrhea    Temazepam      Sleep walking   Other reaction(s): Sleep related hallucinations    Pantoprazole Nausea And Vomiting    Pregabalin Other (See Comments)    Verapamil      Other reaction(s): Constipation    Zonisamide      Other reaction(s): Anaphylaxis, Diarrhea, Finding of vomiting, Anaphylaxis, Diarrhea, Finding of vomiting, Anaphylaxis, Diarrhea, Finding of vomiting    Metoclopramide Rash and Anxiety    Sucralfate Other (See Comments) and Nausea Only       Current Outpatient Medications   Medication Sig Dispense Refill    acyclovir (ZOVIRAX) 400 MG tablet Take 1 tablet (400 mg total) by mouth once daily. 90 tablet 4    ALBUTEROL INHL Inhale into the lungs. Every 6 hrs prn      aluminum hydrox-magnesium carb (GAVISCON EXTRA STRENGTH) 254-237.5 mg/5 mL Susp 10 mLs.      aspirin (ECOTRIN) 81 MG EC tablet Take 81 mg by mouth once daily.      benzonatate (TESSALON) 100 MG capsule Take 100 mg by mouth 3 (three) times daily as needed.      biotin 10,000 mcg Cap Take by mouth once daily.      BREO ELLIPTA 100-25 mcg/dose diskus inhaler Inhale 1 puff into the lungs.      carBAMazepine (TEGRETOL XR) 400 MG Tb12 Take 400 mg by mouth 2 (two) times daily.      diclofenac sodium (VOLTAREN) 1 % Gel daily as needed.      diphenhydrAMINE-aluminum-magnesium hydroxide-simethicone-LIDOcaine HCl 2% Swish and swallow 5 mLs every 6 (six) hours as needed (throat irritation). 120 mL 2    EASY TOUCH ALCOHOL PREP PADS PadM Apply 1 each topically 3 (three) times daily.      ergocalciferol (ERGOCALCIFEROL) 50,000 unit Cap       ergocalciferol, vitamin D2, (VITAMIN D ORAL) Take by mouth once daily.      fluticasone propionate (FLONASE) 50 mcg/actuation nasal spray       FREESTYLE FREEDOM LITE kit       FREESTYLE LANCETS 28 gauge lancets Apply topically 3 (three) times daily.      FREESTYLE LITE STRIPS Strp 1 strip 3 (three) times daily.      insulin glargine U-100, Lantus, (LANTUS  SOLOSTAR U-100 INSULIN) 100 unit/mL (3 mL) InPn pen Inject 24 Units into the skin every evening.      ipratropium (ATROVENT) 42 mcg (0.06 %) nasal spray 2 sprays 2 (two) times daily.      levalbuterol (XOPENEX) 0.63 mg/3 mL nebulizer solution Take 1 ampule by nebulization every 4 (four) hours as needed for Wheezing. Rescue      lidocaine (LIDODERM) 5 % daily as needed.      lubiprostone (AMITIZA) 24 MCG Cap Take 1 capsule (24 mcg total) by mouth 2 (two) times daily with meals. 60 capsule 11    metFORMIN (GLUCOPHAGE-XR) 500 MG XR 24hr tablet Take 500 mg by mouth 2 (two) times daily with meals.      nortriptyline (PAMELOR) 75 MG Cap TAKE 1 CAPSULE BY MOUTH EVERY NIGHT 1 HOUR BEFORE BEDTIME      polyethylene glycol (GLYCOLAX) 17 gram PwPk Take by mouth.      promethazine-dextromethorphan (PROMETHAZINE-DM) 6.25-15 mg/5 mL Syrp 5 mLs daily as needed.      rosuvastatin (CRESTOR) 40 MG Tab Take 40 mg by mouth once daily.      tiotropium (SPIRIVA) 18 mcg inhalation capsule Inhale 36 mcg into the lungs once daily. Controller      TYLENOL EXTRA STRENGTH 500 mg tablet Take 500 mg by mouth daily as needed.      valACYclovir (VALTREX) 1000 MG tablet Take 0.5 tablets (500 mg total) by mouth 2 (two) times daily. 60 tablet 1    vitamin E 600 UNIT capsule Take 600 Units by mouth once daily.      esomeprazole (NEXIUM) 40 MG capsule Take 1 capsule (40 mg total) by mouth once daily. 30 capsule 6    hydrocodone-homatropine 5-1.5 mg/5 ml (HYDROMET) 5-1.5 mg/5 mL Syrp 5 mLs every 4 (four) hours as needed.      isosorbide mononitrate (IMDUR) 30 MG 24 hr tablet 30 mg once daily.      mag hydrox/aluminum hyd/simeth (MAALOX ORAL)       prazosin (MINIPRESS) 2 MG Cap Take 2 mg by mouth once daily.      rivastigmine (EXELON) 9.5 mg/24 hour PT24 Place onto the skin once daily.       No current facility-administered medications for this visit.       Past Medical History:   Diagnosis Date    Aortic regurgitation     Asthma     Chronic fatigue      Chronic pain     Depression     Diabetes     Dysphagia     Gastritis     Gastroparesis     GERD (gastroesophageal reflux disease)     Hypertension     Intestinal metaplasia of gastric cardia     Irritable bowel syndrome     Sarcoidosis     Sleep apnea     Stroke     12/2015, mini stroke 2/2023     Past Surgical History:   Procedure Laterality Date    APPENDECTOMY      CHOLECYSTECTOMY      COLONOSCOPY  05/17/2019    COLONOSCOPY N/A 5/5/2020    Procedure: COLONOSCOPY;  Surgeon: Garrick López MD;  Location: [facility];  Service: Endoscopy;  Laterality: N/A;  with bx and stool specimen    COLONOSCOPY N/A 4/10/2023    Procedure: COLONOSCOPY;  Surgeon: Forrest Gomes MD;  Location: [facility];  Service: Endoscopy;  Laterality: N/A;    ELBOW SURGERY      ESOPHAGEAL MANOMETRY WITH MEASUREMENT OF IMPEDANCE N/A 6/29/2022    Procedure: MANOMETRY-ESOPHAGEAL-WITH IMPEDANCE;  Surgeon: Concetta Odonnell MD;  Location: [facility] (4TH FLR);  Service: Endoscopy;  Laterality: N/A;  r/o rumination and supragastric belching  hold nortriptyline and norco 24 hours prior to procedure  fully vaccinated, instructions emailed to [email or web address]    ESOPHAGOGASTRODUODENOSCOPY N/A 5/5/2020    Procedure: EGD (ESOPHAGOGASTRODUODENOSCOPY);  Surgeon: Garrick López MD;  Location: [facility];  Service: Endoscopy;  Laterality: N/A;  with biopsy    ESOPHAGOGASTRODUODENOSCOPY N/A 6/2/2021    Procedure: EGD (ESOPHAGOGASTRODUODENOSCOPY);  Surgeon: Garrick López MD;  Location: [facility];  Service: Endoscopy;  Laterality: N/A;    ESOPHAGOGASTRODUODENOSCOPY N/A 6/28/2022    Procedure: ESOPHAGOGASTRODUODENOSCOPY (EGD);  Surgeon: Concetta Odonnell MD;  Location: [facility] ENDO (2ND FLR);  Service: Endoscopy;  Laterality: N/A;  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  full liquid diet x3 days, clear liquid diet x1 day prior to procedure  fully vaccinated, instructions emailed to [email or web address]    ESOPHAGOGASTRODUODENOSCOPY N/A 6/13/2023     "Procedure: EGD (ESOPHAGOGASTRODUODENOSCOPY);  Surgeon: Forrest Gomes MD;  Location: [facility];  Service: Endoscopy;  Laterality: N/A;    HAND SURGERY      HYSTERECTOMY      2005, age 39, KRISTAN fibroids    MEDIASTINOSCOPY      NECK SURGERY      UPPER GASTROINTESTINAL ENDOSCOPY  05/17/2019    WRIST SURGERY       Family History   Problem Relation Name Age of Onset    Diabetes Mother      Colon cancer Mother  65    Bladder Cancer Mother      Heart disease Mother      Heart disease Father      Kidney failure Father      Diabetes Father      Stroke Father      Heart attack Father      Colon cancer Sister fabrice 65    Diabetes Sister fabrice     Hypertension Sister fabrice     Seizures Brother dinh     Colon polyps Sister jonathan     Diabetes Sister jonathan     Hypertension Sister jonathan     Irritable bowel syndrome Sister yury     Diabetes Sister yury     Hypertension Sister yury     Diabetes Sister prerna     Hypertension Sister prerna     Diabetes Brother clau     Breast cancer Neg Hx      Esophageal cancer Neg Hx      Stomach cancer Neg Hx      Liver cancer Neg Hx      Liver disease Neg Hx      Crohn's disease Neg Hx      Celiac disease Neg Hx      Pancreatic cancer Neg Hx       Social History     Tobacco Use    Smoking status: Never    Smokeless tobacco: Never   Substance Use Topics    Alcohol use: Not Currently     Comment: no drinking anymore    Drug use: Never        Review of Systems:  Review of Systems   Constitutional:  Negative for fever.   Respiratory:  Negative for chest tightness and shortness of breath.    Cardiovascular:  Negative for chest pain.   Genitourinary:  Negative for difficulty urinating.   Hematological:  Does not bruise/bleed easily.       OBJECTIVE:     Vital Signs (Most Recent)  Pulse: 86 (01/16/25 1522)  BP: 138/63 (01/16/25 1522)  5' 5" (1.651 m)  75.5 kg (166 lb 8.9 oz)     Physical Exam:  Physical Exam  Vitals reviewed.   Constitutional:       Appearance: Normal appearance. "   Abdominal:      General: There is no distension.      Palpations: Abdomen is soft. There is no mass.   Skin:     General: Skin is warm and dry.   Neurological:      General: No focal deficit present.      Mental Status: She is oriented to person, place, and time.   Psychiatric:         Mood and Affect: Mood normal.         Behavior: Behavior normal.         Thought Content: Thought content normal.         Judgment: Judgment normal.         Laboratory  CBC: Reviewed  CMP: Reviewed  Elevated glc c/w dm, anemia of chronic disease    Diagnostic Results:  Ct 2024 reviewed, films viewed, stomach and vessels appear ok, other findings in report  Egd 2023 reviewed, possible candidiasis, gastritis, other findings in report  Ges 2022 reviewed, at 228 minutes the percentage of retention is 32 %, T1/2 gastric emptying time is calculated at 176 minutes.     ASSESSMENT/PLAN:     Severe gastroparesis despite appropriate medications.  Dysphagia, gerd and constipation symptoms also.  Significant but not severe weight loss.    PLAN:       For robotic gastric stimulator with egd.  Pcp clearance.  I discussed pyloric procedures and gastrectomy.

## 2025-01-31 ENCOUNTER — PATIENT MESSAGE (OUTPATIENT)
Dept: SURGERY | Facility: CLINIC | Age: 60
End: 2025-01-31
Payer: MEDICARE

## 2025-01-31 ENCOUNTER — PATIENT MESSAGE (OUTPATIENT)
Dept: GASTROENTEROLOGY | Facility: CLINIC | Age: 60
End: 2025-01-31
Payer: MEDICARE

## 2025-02-06 ENCOUNTER — ANESTHESIA (OUTPATIENT)
Dept: ENDOSCOPY | Facility: HOSPITAL | Age: 60
End: 2025-02-06
Payer: MEDICARE

## 2025-02-06 ENCOUNTER — ANESTHESIA EVENT (OUTPATIENT)
Dept: ENDOSCOPY | Facility: HOSPITAL | Age: 60
End: 2025-02-06
Payer: MEDICARE

## 2025-02-06 ENCOUNTER — HOSPITAL ENCOUNTER (OUTPATIENT)
Facility: HOSPITAL | Age: 60
Discharge: HOME | End: 2025-02-06
Attending: INTERNAL MEDICINE | Admitting: INTERNAL MEDICINE
Payer: MEDICARE

## 2025-02-06 DIAGNOSIS — K31.84 GASTROPARESIS: ICD-10-CM

## 2025-02-06 PROCEDURE — 43249 ESOPH EGD DILATION <30 MM: CPT | Mod: ,,, | Performed by: INTERNAL MEDICINE

## 2025-02-06 PROCEDURE — 63600175 PHARM REV CODE 636 W HCPCS: Performed by: NURSE ANESTHETIST, CERTIFIED REGISTERED

## 2025-02-06 PROCEDURE — 63600175 PHARM REV CODE 636 W HCPCS: Performed by: ANESTHESIOLOGY

## 2025-02-06 PROCEDURE — D9220A PRA ANESTHESIA: Mod: CRNA,,, | Performed by: NURSE ANESTHETIST, CERTIFIED REGISTERED

## 2025-02-06 PROCEDURE — 37000008 HC ANESTHESIA 1ST 15 MINUTES: Performed by: INTERNAL MEDICINE

## 2025-02-06 PROCEDURE — 25000003 PHARM REV CODE 250: Performed by: INTERNAL MEDICINE

## 2025-02-06 PROCEDURE — 27201012 HC FORCEPS, HOT/COLD, DISP: Performed by: INTERNAL MEDICINE

## 2025-02-06 PROCEDURE — D9220A PRA ANESTHESIA: Mod: ANES,,, | Performed by: ANESTHESIOLOGY

## 2025-02-06 PROCEDURE — 43249 ESOPH EGD DILATION <30 MM: CPT | Performed by: INTERNAL MEDICINE

## 2025-02-06 PROCEDURE — 45380 COLONOSCOPY AND BIOPSY: CPT | Performed by: INTERNAL MEDICINE

## 2025-02-06 PROCEDURE — 37000009 HC ANESTHESIA EA ADD 15 MINS: Performed by: INTERNAL MEDICINE

## 2025-02-06 PROCEDURE — 43239 EGD BIOPSY SINGLE/MULTIPLE: CPT | Mod: 59 | Performed by: INTERNAL MEDICINE

## 2025-02-06 PROCEDURE — 45380 COLONOSCOPY AND BIOPSY: CPT | Mod: ,,, | Performed by: INTERNAL MEDICINE

## 2025-02-06 PROCEDURE — 88312 SPECIAL STAINS GROUP 1: CPT | Mod: TC | Performed by: PATHOLOGY

## 2025-02-06 PROCEDURE — 43239 EGD BIOPSY SINGLE/MULTIPLE: CPT | Mod: 59,,, | Performed by: INTERNAL MEDICINE

## 2025-02-06 RX ORDER — SODIUM CHLORIDE 9 MG/ML
INJECTION, SOLUTION INTRAVENOUS CONTINUOUS
Status: DISCONTINUED | OUTPATIENT
Start: 2025-02-06 | End: 2025-02-06 | Stop reason: HOSPADM

## 2025-02-06 RX ORDER — PHENYLEPHRINE HYDROCHLORIDE 10 MG/ML
INJECTION INTRAVENOUS
Status: DISCONTINUED | OUTPATIENT
Start: 2025-02-06 | End: 2025-02-06

## 2025-02-06 RX ORDER — CLONIDINE HYDROCHLORIDE 0.1 MG/1
0.1 TABLET ORAL 2 TIMES DAILY PRN
COMMUNITY

## 2025-02-06 RX ORDER — METOPROLOL TARTRATE 50 MG/1
50 TABLET ORAL 2 TIMES DAILY
COMMUNITY

## 2025-02-06 RX ORDER — PROPOFOL 10 MG/ML
VIAL (ML) INTRAVENOUS
Status: DISCONTINUED | OUTPATIENT
Start: 2025-02-06 | End: 2025-02-06

## 2025-02-06 RX ORDER — ONDANSETRON HYDROCHLORIDE 2 MG/ML
4 INJECTION, SOLUTION INTRAVENOUS ONCE
Status: COMPLETED | OUTPATIENT
Start: 2025-02-06 | End: 2025-02-06

## 2025-02-06 RX ORDER — CANDESARTAN 32 MG/1
32 TABLET ORAL DAILY
COMMUNITY

## 2025-02-06 RX ORDER — TRIAMTERENE AND HYDROCHLOROTHIAZIDE 75; 50 MG/1; MG/1
1 TABLET ORAL DAILY
COMMUNITY

## 2025-02-06 RX ORDER — LIDOCAINE HYDROCHLORIDE 20 MG/ML
INJECTION, SOLUTION EPIDURAL; INFILTRATION; INTRACAUDAL; PERINEURAL
Status: DISCONTINUED | OUTPATIENT
Start: 2025-02-06 | End: 2025-02-06

## 2025-02-06 RX ADMIN — PROPOFOL 30 MG: 10 INJECTION, EMULSION INTRAVENOUS at 09:02

## 2025-02-06 RX ADMIN — PROPOFOL 100 MG: 10 INJECTION, EMULSION INTRAVENOUS at 09:02

## 2025-02-06 RX ADMIN — GLYCOPYRROLATE 0.2 MG: 0.2 INJECTION, SOLUTION INTRAMUSCULAR; INTRAVITREAL at 09:02

## 2025-02-06 RX ADMIN — PHENYLEPHRINE HYDROCHLORIDE 200 MCG: 10 INJECTION INTRAVENOUS at 09:02

## 2025-02-06 RX ADMIN — ONDANSETRON 4 MG: 2 INJECTION INTRAMUSCULAR; INTRAVENOUS at 10:02

## 2025-02-06 RX ADMIN — LIDOCAINE HYDROCHLORIDE 100 MG: 20 INJECTION, SOLUTION EPIDURAL; INFILTRATION; INTRACAUDAL; PERINEURAL at 09:02

## 2025-02-06 RX ADMIN — SODIUM CHLORIDE: 9 INJECTION, SOLUTION INTRAVENOUS at 08:02

## 2025-02-06 NOTE — ANESTHESIA PREPROCEDURE EVALUATION
02/06/2025  Luz Arias is a 59 y.o., female.      Pre-op Assessment          Review of Systems  Cardiovascular:     Hypertension                                    Hypertension         Pulmonary:    Asthma    Sleep Apnea   Asthma:    Obstructive Sleep Apnea (SILVESTRE).           Hepatic/GI:     GERD         Gerd          Neurological:   CVA                       CVA - Cerebrovasular Accident                 Endocrine:  Diabetes    Diabetes                      Psych:  Psychiatric History                  Physical Exam  General: Well nourished        Anesthesia Plan  Type of Anesthesia, risks & benefits discussed:    Anesthesia Type: Gen Natural Airway  Intra-op Monitoring Plan: Standard ASA Monitors  Induction:  IV  Informed Consent: Informed consent signed with the Patient and all parties understand the risks and agree with anesthesia plan.  All questions answered.   ASA Score: 4  Day of Surgery Review of History & Physical: H&P Update referred to the surgeon/provider.    Ready For Surgery From Anesthesia Perspective.     .

## 2025-02-06 NOTE — TRANSFER OF CARE
"Anesthesia Transfer of Care Note    Patient: Luz Arias    Procedure(s) Performed: Procedure(s) (LRB):  EGD (ESOPHAGOGASTRODUODENOSCOPY) (N/A)  COLONOSCOPY (N/A)    Patient location: PACU    Anesthesia Type: general    Transport from OR: Transported from OR on room air with adequate spontaneous ventilation    Post pain: adequate analgesia    Post assessment: no apparent anesthetic complications    Post vital signs: stable    Level of consciousness: sedated and responds to stimulation    Nausea/Vomiting: no nausea/vomiting    Complications: none    Transfer of care protocol was followed      Last vitals: Visit Vitals  BP (!) 120/58 (BP Location: Left arm, Patient Position: Lying)   Pulse 71   Temp 36.3 °C (97.3 °F) (Skin)   Resp 16   Ht 5' 5" (1.651 m)   Wt 74.8 kg (165 lb)   SpO2 98%   BMI 27.46 kg/m²     "

## 2025-02-06 NOTE — ANESTHESIA POSTPROCEDURE EVALUATION
Anesthesia Post Evaluation    Patient: Luz Arias    Procedure(s) Performed: Procedure(s) (LRB):  EGD (ESOPHAGOGASTRODUODENOSCOPY) (N/A)  COLONOSCOPY (N/A)    Final Anesthesia Type: general      Patient location during evaluation: PACU  Patient participation: Yes- Able to Participate  Level of consciousness: awake and alert  Post-procedure vital signs: reviewed and stable  Pain management: adequate  Airway patency: patent    PONV status at discharge: No PONV  Anesthetic complications: no      Cardiovascular status: blood pressure returned to baseline  Respiratory status: unassisted and room air  Hydration status: euvolemic  Follow-up not needed.              Vitals Value Taken Time   /51 02/06/25 1012   Temp 36.2 °C (97.2 °F) 02/06/25 0950   Pulse 68 02/06/25 1015   Resp 18 02/06/25 1015   SpO2 100 % 02/06/25 1015   Vitals shown include unfiled device data.      Event Time   Out of Recovery 10:45:00         Pain/Yamila Score: Yamila Score: 10 (2/6/2025 10:05 AM)

## 2025-02-06 NOTE — PROVATION PATIENT INSTRUCTIONS
Discharge Summary/Instructions after an Endoscopic Procedure  Patient Name: Luz Arias  Patient MRN: [id number]  Patient YOB: 1965 Thursday, February 6, 2025  Bobbi Landrum MD  Dear patient,  As a result of recent federal legislation (The Federal Cures Act), you may   receive lab or pathology results from your procedure in your [facility]   account before your physician is able to contact you. Your physician or   their representative will relay the results to you with their   recommendations at their soonest availability.  Thank you,  RESTRICTIONS:  During your procedure today, you received medications for sedation.  These   medications may affect your judgment, balance and coordination.  Therefore,   for 24 hours, you have the following restrictions:   - DO NOT drive a car, operate machinery, make legal/financial decisions,   sign important papers or drink alcohol.    ACTIVITY:  Today: no heavy lifting, straining or running due to procedural   sedation/anesthesia.  The following day: return to full activity including work.  DIET:  Eat and drink normally unless instructed otherwise.     TREATMENT FOR COMMON SIDE EFFECTS:  - Mild abdominal pain, nausea, belching, bloating or excessive gas:  rest,   eat lightly and use a heating pad.  - Sore Throat: treat with throat lozenges and/or gargle with warm salt   water.  - Because air was used during the procedure, expelling large amounts of air   from your rectum or belching is normal.  - If a bowel prep was taken, you may not have a bowel movement for 1-3 days.    This is normal.  SYMPTOMS TO WATCH FOR AND REPORT TO YOUR PHYSICIAN:  1. Abdominal pain or bloating, other than gas cramps.  2. Chest pain.  3. Back pain.  4. Signs of infection such as: chills or fever occurring within 24 hours   after the procedure.  5. Rectal bleeding, which would show as bright red, maroon, or black stools.   (A tablespoon of blood from the rectum is not serious,  especially if   hemorrhoids are present.)  6. Vomiting.  7. Weakness or dizziness.  GO DIRECTLY TO THE NEAREST EMERGENCY ROOM IF YOU HAVE ANY OF THE FOLLOWING:      Difficulty breathing              Chills and/or fever over 101 F   Persistent vomiting and/or vomiting blood   Severe abdominal pain   Severe chest pain   Black, tarry stools   Bleeding- more than one tablespoon   Any other symptom or condition that you feel may need urgent attention  Your doctor recommends these additional instructions:  If any biopsies were taken, your doctors clinic will contact you in 1 to 2   weeks with any results.  - Discharge patient to home (with escort).   - Patient has a contact number available for emergencies.  The signs and   symptoms of potential delayed complications were discussed with the   patient.  Return to normal activities tomorrow.  Written discharge   instructions were provided to the patient.   - Resume previous diet.   - Continue present medications.   - Await pathology results.   - Repeat colonoscopy in 5-10 years for surveillance based on pathology   results.   - Consider changing Amit[employer] to [employer]   - Return to my office PRN.  For questions, problems or results please call your physician - Bobbi Landrum MD at Work:  [phone].  [facility], EMERGENCY ROOM PHONE NUMBER: [phone]  IF A COMPLICATION OR EMERGENCY SITUATION ARISES AND YOU ARE UNABLE TO REACH   YOUR PHYSICIAN - GO DIRECTLY TO THE EMERGENCY ROOM.  Bobbi Landrum MD  2/6/2025 9:51:23 AM  This report has been verified and signed electronically.  Dear patient,  As a result of recent federal legislation (The Federal Cures Act), you may   receive lab or pathology results from your procedure in your [facility]   account before your physician is able to contact you. Your physician or   their representative will relay the results to you with their   recommendations at their soonest availability.  Thank  you,  PROVATION

## 2025-02-06 NOTE — PROVATION PATIENT INSTRUCTIONS
Discharge Summary/Instructions after an Endoscopic Procedure  Patient Name: Luz Arias  Patient MRN: [id number]  Patient YOB: 1965 Thursday, February 6, 2025  Bobbi Landrum MD  Dear patient,  As a result of recent federal legislation (The Federal Cures Act), you may   receive lab or pathology results from your procedure in your [facility]   account before your physician is able to contact you. Your physician or   their representative will relay the results to you with their   recommendations at their soonest availability.  Thank you,  RESTRICTIONS:  During your procedure today, you received medications for sedation.  These   medications may affect your judgment, balance and coordination.  Therefore,   for 24 hours, you have the following restrictions:   - DO NOT drive a car, operate machinery, make legal/financial decisions,   sign important papers or drink alcohol.    ACTIVITY:  Today: no heavy lifting, straining or running due to procedural   sedation/anesthesia.  The following day: return to full activity including work.  DIET:  Eat and drink normally unless instructed otherwise.     TREATMENT FOR COMMON SIDE EFFECTS:  - Mild abdominal pain, nausea, belching, bloating or excessive gas:  rest,   eat lightly and use a heating pad.  - Sore Throat: treat with throat lozenges and/or gargle with warm salt   water.  - Because air was used during the procedure, expelling large amounts of air   from your rectum or belching is normal.  - If a bowel prep was taken, you may not have a bowel movement for 1-3 days.    This is normal.  SYMPTOMS TO WATCH FOR AND REPORT TO YOUR PHYSICIAN:  1. Abdominal pain or bloating, other than gas cramps.  2. Chest pain.  3. Back pain.  4. Signs of infection such as: chills or fever occurring within 24 hours   after the procedure.  5. Rectal bleeding, which would show as bright red, maroon, or black stools.   (A tablespoon of blood from the rectum is not serious,  especially if   hemorrhoids are present.)  6. Vomiting.  7. Weakness or dizziness.  GO DIRECTLY TO THE NEAREST EMERGENCY ROOM IF YOU HAVE ANY OF THE FOLLOWING:      Difficulty breathing              Chills and/or fever over 101 F   Persistent vomiting and/or vomiting blood   Severe abdominal pain   Severe chest pain   Black, tarry stools   Bleeding- more than one tablespoon   Any other symptom or condition that you feel may need urgent attention  Your doctor recommends these additional instructions:  If any biopsies were taken, your doctors clinic will contact you in 1 to 2   weeks with any results.  - Await pathology results.   - Discharge patient to home (with escort).   - Patient has a contact number available for emergencies.  The signs and   symptoms of potential delayed complications were discussed with the   patient.  Return to normal activities tomorrow.  Written discharge   instructions were provided to the patient.   - Resume previous diet.   - Continue present medications.  For questions, problems or results please call your physician - Bobbi Landrum MD at Work:  [phone].  [facility] PHONE NUMBER: [phone]  IF A COMPLICATION OR EMERGENCY SITUATION ARISES AND YOU ARE UNABLE TO REACH   YOUR PHYSICIAN - GO DIRECTLY TO THE EMERGENCY ROOM.  Bobbi Landrum MD  2/6/2025 9:51:55 AM  This report has been verified and signed electronically.  Dear patient,  As a result of recent federal legislation (The Federal Cures Act), you may   receive lab or pathology results from your procedure in your [facility]   account before your physician is able to contact you. Your physician or   their representative will relay the results to you with their   recommendations at their soonest availability.  Thank you,  PROVATION

## 2025-02-07 VITALS
TEMPERATURE: 97 F | DIASTOLIC BLOOD PRESSURE: 53 MMHG | RESPIRATION RATE: 17 BRPM | OXYGEN SATURATION: 100 % | HEIGHT: 65 IN | WEIGHT: 165 LBS | BODY MASS INDEX: 27.49 KG/M2 | SYSTOLIC BLOOD PRESSURE: 110 MMHG | HEART RATE: 68 BPM

## 2025-02-17 NOTE — PRE-PROCEDURE INSTRUCTIONS
PreOp Instructions given:     -- Medication information (what to hold and what to take)   -- NPO guidelines as follows: (or as per your Surgeon)  Stop ALL solid food, gum, candy 8 hours before arrival time.  Stop all CLOUDY liquids: coffee with creamer, cloudy juices, 8 hours prior to arrival time.  The patient should be ENCOURAGED to drink carbohydrate-rich clear liquids (sports drinks, clear juices) until 2 hours prior to arrival time.  Stop clear liquids 2 hours prior to arrival time.  CLEAR liquids include water, black coffee NO creamer, clear oral rehydration drinks, clear sports drinks and clear fruit juices (no orange juice, no pulpy juices, no apple cider).   IF IN DOUBT, drink water instead.   NOTHING TO DRINK 2 hours before to surgery/procedure  time. If you are told to take medication on the morning of surgery, it may be taken with a sip of water.   -- *Arrival place and directions given*.  Time to be given the day before procedure by the Surgeon's Office   -- Bathe with antibacterial soap (dial or Hibiclens as instructed)  -- Don't wear any jewelry or valuables and not metals on skin or hair AM of surgery   -- No makeup or moisturizer to face   -- No perfume/cologne, powder, lotions, aftershave or deodorant     Pt verbalized understanding.            *If going to , see below:      Directions and Instructions for [facility] [place]   At [facility], we have an outstanding team of physicians, anesthesiologists, CRNAs, Registered Nurses, Surgical Technologists, and other ancillary team members all focused on your surgical and procedural care.   Before Your Procedure:   The physician's office will call you with a specific arrival time and directions a day or two before your scheduled procedure. You may also receive these instructions through your [facility] portal.   Day of Procedure:   Please be sure to arrive at the arrival time given or you may risk your surgery being delayed  or canceled. The arrival time is earlier than your scheduled surgery or procedure time. In the winter months please dress warm and bring blankets for you or your child as the waiting room may be cold. If you have difficulty locating the facility, please give us a call at [phone].   Directions:   The [facility] is located on the 1st floor of the hospital building near the [place] entrance.   Parking:   You will park in the [facility] Parking Garage (note location on map). [facility] opens at 5:00 a.m. and has a drop off area by the entrance.  parking is available starting at 7:00 a.m. Please see below for further  parking instructions.   Directions from the parking garage elevators   Blue [facility] Elevators: From the parking garage, take the blue [employer] elevators (located in the center of the parking garage) to the 1st floor of the garage. You will then take a right once off the elevators then another right to the outside of the parking garage. You will [facility]. You will walk down the sidewalk, pass the  curve at the [place] entrance and continue to follow the sidewalk. You will pass the radiation oncology entrance on your right. Continue to follow the sidewalk to the [facility] glass door entrance.   Hospital Entrance (Inside Route): If a mostly inside route is preferred: Take the inside elevator bank (located at the far north end of the garage) from the parking garage to the 1st floor. On the 1st floor walk past [employer]. Keep walking down the center of the hallway towards the hospital elevators. Once you reach the red brick symone, take a left and go past the hospital elevators. Take another left and follow the blue and white [employer] signs around the hallway to the end. Go outside of the door. You will see the [facility] entrance to your right.   Drop Off:   There is a drop off area  at the doors of the [facility] for your convenience. If utilized for pediatric patients, an adult must accompany the patient into the surgery center while another adult olivares the vehicle.   [place] (at 7:00 a.m.):   Upon check-in, please let the  know that you are utilizing [employer] parking which is free. The . will then call  for your car to be picked up. Your keys and phone number will be collected and given to [employer] services. You will then be given a ticket. Upon discharge,  will be notified to bring your vehicle back when you are ready.           Directions to [facility] [place]:  [phone]     From 1st floor garage elevators: go past [facility] [employer] shop. Look for Black piano on side of coffee shop. Take Atrium (gold) elevator by piano up to 2nd floor. When you exit elevator follow long hallway (you should see a sign hanging from the ceiling that says Day of Surgery Family Waiting Room. When hallway ends you will be entering the day of surgery waiting area. Check in at the desk for your procedure.      From [facility] entrance: Make a right after you enter the door to the hospital. On your Left, take Concourse elevator to 2nd floor. Check in at desk      From Lab desk on 2nd floor: Exit lab area toward hospital atrium. You should see a sign that says Day of Surgery Family Waiting Area. Make a Left and follow long hallway (you should see a sign hanging from the ceiling that says Day of Surgery Family Waiting Room. When hallway ends you will be entering the day of surgery waiting area. Check in at the desk for your procedure.

## 2025-02-18 ENCOUNTER — TELEPHONE (OUTPATIENT)
Dept: SURGERY | Facility: CLINIC | Age: 60
End: 2025-02-18
Payer: MEDICARE

## 2025-02-18 ENCOUNTER — ANESTHESIA EVENT (OUTPATIENT)
Dept: SURGERY | Facility: HOSPITAL | Age: 60
End: 2025-02-18
Payer: MEDICARE

## 2025-02-18 NOTE — ANESTHESIA PREPROCEDURE EVALUATION
[facility]  Anesthesia Pre-Operative Evaluation    Patient Name: Luz Arias  YOB: 1965  MRN: [id number]    SUBJECTIVE:   02/18/2025    Pre-operative evaluation for Procedure(s) (LRB):  XI ROBOTIC INSERTION, GASTRIC ELECTRICAL STIMULATOR with leads and EGD (N/A)    Luz Arias is a 59 y.o. female with a PMHx significant for HTN, T2DM, asthma, SILVESTRE, pulmonary sarcoidosis, CVA with hemiparesis, cervical spine disease, GERD, gastroparesis. Patient now presents for the above procedure(s).    Previous Airway: None documented.    LDA: None documented.     Transthoracic Echo :  Results for orders placed during the hospital encounter of 08/06/24    Echo    Interpretation Summary    Left Ventricle: Left ventricle was not well visualized due to poor sonic window. The left ventricle is normal in size. Moderately increased wall thickness. There is moderate asymmetric hypertrophy. There is normal systolic function with a visually estimated ejection fraction of 60 - 65%. Ejection fraction by visual approximation is 65%. There is normal diastolic function.    Right Ventricle: Normal right ventricular cavity size. Systolic function is normal. TAPSE is 2.10 cm.    Aortic Valve: The aortic valve is a trileaflet valve.    IVC/SVC: Normal venous pressure at 3 mmHg.    Pericardium: There is a trivial effusion. No indication of cardiac tamponade.    Difficult windows, Lumason contrast used for wall motion analysis.    Problem List[1]    Review of patient's allergies indicates:   Allergen Reactions    Sulfa (sulfonamide antibiotics) Anaphylaxis    Sulfamethoxazole-trimethoprim Anaphylaxis and Diarrhea     Other reaction(s): Anaphylaxis, Finding of vomiting, Diarrhea    Temazepam      Sleep walking   Other reaction(s): Sleep related hallucinations    Pantoprazole Nausea And Vomiting    Pregabalin Other (See Comments)    Verapamil      Other reaction(s): Constipation    Zonisamide       Other reaction(s): Anaphylaxis, Diarrhea, Finding of vomiting, Anaphylaxis, Diarrhea, Finding of vomiting, Anaphylaxis, Diarrhea, Finding of vomiting    Metoclopramide Rash and Anxiety    Sucralfate Other (See Comments) and Nausea Only     Current Outpatient Medications   Medication Instructions    acyclovir (ZOVIRAX) 400 mg, Oral, Daily    ALBUTEROL INHL Inhale into the lungs. Every 6 hrs prn    aluminum hydrox-magnesium carb (GAVISCON EXTRA STRENGTH) 254-237.5 mg/5 mL Susp 10 mLs    aspirin (ECOTRIN) 81 mg, Daily    benzonatate (TESSALON) 100 mg, 3 times daily PRN    biotin 10,000 mcg Cap Daily    BREO ELLIPTA 100-25 mcg/dose diskus inhaler 1 puff    candesartan (ATACAND) 32 mg, Daily    carBAMazepine (TEGRETOL XR) 400 mg, 2 times daily    cloNIDine (CATAPRES) 0.1 mg, 2 times daily PRN    diclofenac sodium (VOLTAREN) 1 % Gel Daily PRN    diphenhydrAMINE-aluminum-magnesium hydroxide-simethicone-LIDOcaine HCl 2% 5 mLs, Swish & Swallow, Every 6 hours PRN    EASY TOUCH ALCOHOL PREP PADS PadM 1 each, 3 times daily    ergocalciferol (ERGOCALCIFEROL) 50,000 unit Cap     ergocalciferol, vitamin D2, (VITAMIN D ORAL) Daily    esomeprazole (NEXIUM) 40 mg, Oral, Daily    fluticasone propionate (FLONASE) 50 mcg/actuation nasal spray No dose, route, or frequency recorded.    FREESTYLE FREEDOM LITE kit     FREESTYLE LANCETS 28 gauge lancets 3 times daily    FREESTYLE LITE STRIPS Strp 1 strip, 3 times daily    ipratropium (ATROVENT) 42 mcg (0.06 %) nasal spray 2 sprays, 2 times daily    LANTUS SOLOSTAR U-100 INSULIN 24 Units, Nightly    levalbuterol (XOPENEX) 0.63 mg/3 mL nebulizer solution 1 ampule, Every 4 hours PRN    lidocaine (LIDODERM) 5 % Daily PRN    lubiprostone (AMITIZA) 24 mcg, Oral, 2 times daily with meals    metFORMIN (GLUCOPHAGE-XR) 500 mg, 2 times daily with meals    metoprolol tartrate (LOPRESSOR) 50 mg, 2 times daily    nortriptyline (PAMELOR) 75 MG Cap TAKE 1 CAPSULE BY MOUTH  EVERY NIGHT 1 HOUR BEFORE BEDTIME    polyethylene glycol (GLYCOLAX) 17 gram PwPk Take by mouth.    promethazine-dextromethorphan (PROMETHAZINE-DM) 6.25-15 mg/5 mL Syrp 5 mLs, Daily PRN    rosuvastatin (CRESTOR) 40 mg, Daily    tiotropium (SPIRIVA) 36 mcg, Daily    triamterene-hydrochlorothiazide 75-50 mg (MAXZIDE) 75-50 mg per tablet 1 tablet, Daily    TYLENOL EXTRA STRENGTH 500 mg, Daily PRN    valACYclovir (VALTREX) 500 mg, Oral, 2 times daily    vitamin E 600 Units, Daily     Past Surgical History:   Procedure Laterality Date    APPENDECTOMY      CHOLECYSTECTOMY      COLONOSCOPY  05/17/2019    COLONOSCOPY N/A 5/5/2020    Procedure: COLONOSCOPY;  Surgeon: Garrick López MD;  Location: [facility];  Service: Endoscopy;  Laterality: N/A;  with bx and stool specimen    COLONOSCOPY N/A 4/10/2023    Procedure: COLONOSCOPY;  Surgeon: Forrest Gomes MD;  Location: [facility];  Service: Endoscopy;  Laterality: N/A;    COLONOSCOPY N/A 2/6/2025    Procedure: COLONOSCOPY;  Surgeon: Bobbi Landrum MD;  Location: [facility];  Service: Endoscopy;  Laterality: N/A;    ELBOW SURGERY      ESOPHAGEAL MANOMETRY WITH MEASUREMENT OF IMPEDANCE N/A 6/29/2022    Procedure: MANOMETRY-ESOPHAGEAL-WITH IMPEDANCE;  Surgeon: Concetta Odonnell MD;  Location: [address]);  Service: Endoscopy;  Laterality: N/A;  r/o rumination and supragastric belching  hold nortriptyline and norco 24 hours prior to procedure  fully vaccinated, instructions emailed to [email or web address]    ESOPHAGOGASTRODUODENOSCOPY N/A 5/5/2020    Procedure: EGD (ESOPHAGOGASTRODUODENOSCOPY);  Surgeon: Garrick López MD;  Location: [facility];  Service: Endoscopy;  Laterality: N/A;  with biopsy    ESOPHAGOGASTRODUODENOSCOPY N/A 6/2/2021    Procedure: EGD (ESOPHAGOGASTRODUODENOSCOPY);  Surgeon: Garrick López MD;  Location: [facility];  Service: Endoscopy;  Laterality: N/A;    ESOPHAGOGASTRODUODENOSCOPY N/A 6/28/2022    Procedure:  "ESOPHAGOGASTRODUODENOSCOPY (EGD);  Surgeon: Concetta Odonnell MD;  [facility]  Service: Endoscopy;  Laterality: N/A;  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  full liquid diet x3 days, clear liquid diet x1 day prior to procedure  fully vaccinated, instructions emailed to [email or web address]    ESOPHAGOGASTRODUODENOSCOPY N/A 6/13/2023    Procedure: EGD (ESOPHAGOGASTRODUODENOSCOPY);  Surgeon: Forrest Gomes MD;  Location: [facility] END[facility];  Service: Endoscopy;  Laterality: N/A;    ESOPHAGOGASTRODUODENOSCOPY N/A 2/6/2025    Procedure: EGD (ESOPHAGOGASTRODUODENOSCOPY);  Surgeon: Bobbi Landrum MD;  Location: [facility] END[facility];  Service: Endoscopy;  Laterality: N/A;    HAND SURGERY      HYSTERECTOMY      2005, age 39, KRISTAN fibroids    MEDIASTINOSCOPY      NECK SURGERY      UPPER GASTROINTESTINAL ENDOSCOPY  05/17/2019    WRIST SURGERY       Social History     Substance and Sexual Activity   Drug Use Never     Alcohol Use: Not At Risk (8/7/2024)    AUDIT-C     Frequency of Alcohol Consumption: Never     Average Number of Drinks: Patient does not drink     Frequency of Binge Drinking: Never     Tobacco Use: Low Risk  (2/6/2025)    Patient History     Smoking Tobacco Use: Never     Smokeless Tobacco Use: Never     Passive Exposure: Not on file     OBJECTIVE:     Vital Signs Range:      1/16/2025     3:22 PM 2/6/2025     8:51 AM 2/7/2025     8:46 AM   Vitals - 1 value per visit   SYSTOLIC 138 120    DIASTOLIC 63 58    Pulse 86 71    Temp  36.3 °C (97.3 °F)    Resp  16    SPO2  98 %    Weight (lb) 166.56 165    Weight (kg) 75.55 74.844    Height 5' 5" (1.651 m) 5' 5" (1.651 m)    BMI (Calculated) 27.7 27.5    Pain Score   Zero       CBC:   Lab Results   Component Value Date    WBC 9.20 01/16/2025    HGB 10.1 (L) 01/16/2025    HCT 31.9 (L) 01/16/2025    MCV 89 01/16/2025     01/16/2025     CMP:   Sodium   Date Value Ref Range Status   01/16/2025 140 136 - 145 mmol/L Final     Potassium " "  Date Value Ref Range Status   01/16/2025 4.0 3.5 - 5.1 mmol/L Final     Chloride   Date Value Ref Range Status   01/16/2025 101 95 - 110 mmol/L Final     CO2   Date Value Ref Range Status   01/16/2025 24 23 - 29 mmol/L Final     Glucose   Date Value Ref Range Status   01/16/2025 99 70 - 110 mg/dL Final     BUN   Date Value Ref Range Status   01/16/2025 14 6 - 20 mg/dL Final     Creatinine   Date Value Ref Range Status   01/16/2025 0.9 0.5 - 1.4 mg/dL Final     Calcium   Date Value Ref Range Status   01/16/2025 9.7 8.7 - 10.5 mg/dL Final     Total Protein   Date Value Ref Range Status   08/07/2024 7.2 6.0 - 8.4 g/dL Final     Albumin   Date Value Ref Range Status   08/07/2024 4.0 3.5 - 5.2 g/dL Final     Total Bilirubin   Date Value Ref Range Status   08/07/2024 0.3 0.1 - 1.0 mg/dL Final     Comment:     For infants and newborns, interpretation of results should be based  on gestational age, weight and in agreement with clinical  observations.    Premature Infant recommended reference ranges:  Up to 24 hours.............<8.0 mg/dL  Up to 48 hours............<12.0 mg/dL  3-5 days..................<15.0 mg/dL  6-29 days.................<15.0 mg/dL       Alkaline Phosphatase   Date Value Ref Range Status   08/07/2024 78 55 - 135 U/L Final     AST   Date Value Ref Range Status   08/07/2024 28 10 - 40 U/L Final     ALT   Date Value Ref Range Status   08/07/2024 55 (H) 10 - 44 U/L Final     Anion Gap   Date Value Ref Range Status   01/16/2025 15 8 - 16 mmol/L Final     eGFR if    Date Value Ref Range Status   06/23/2022 >60.0 >60 mL/min/1.73 m^2 Final     eGFR if non    Date Value Ref Range Status   06/23/2022 >60.0 >60 mL/min/1.73 m^2 Final     Comment:     Calculation used to obtain the estimated glomerular filtration  rate (eGFR) is the CKD-EPI equation.          INR:  No results found for: "INR", "PROTIME"    Cardiac Studies    EKG:   Results for orders placed or performed in visit on " 08/07/24   EKG 12-lead    Collection Time: 08/07/24 12:59 PM   Result Value Ref Range    QRS Duration 82 ms    [facility] QTC Calculation 437 ms    Narrative    Test Reason :     Vent. Rate : 091 BPM     Atrial Rate : 091 BPM     P-R Int : 190 ms          QRS Dur : 082 ms      QT Int : 356 ms       P-R-T Axes : 067 049 170 degrees     QTc Int : 437 ms    Normal sinus rhythm  T wave abnormality, consider inferior ischemia  T wave abnormality, consider anterolateral ischemia  Abnormal ECG  When compared with ECG of 06-AUG-2024 12:55,  No significant change was found  Confirmed by Zaire Gruber MD ([id number]) on 10/8/2024 8:50:28 AM    Referred By: TAVARES ALONSO           Confirmed By:Zaire Gruber MD       Transthoracic Echo:  Results for orders placed during the hospital encounter of 08/06/24    Echo    Interpretation Summary    Left Ventricle: Left ventricle was not well visualized due to poor sonic window. The left ventricle is normal in size. Moderately increased wall thickness. There is moderate asymmetric hypertrophy. There is normal systolic function with a visually estimated ejection fraction of 60 - 65%. Ejection fraction by visual approximation is 65%. There is normal diastolic function.    Right Ventricle: Normal right ventricular cavity size. Systolic function is normal. TAPSE is 2.10 cm.    Aortic Valve: The aortic valve is a trileaflet valve.    IVC/SVC: Normal venous pressure at 3 mmHg.    Pericardium: There is a trivial effusion. No indication of cardiac tamponade.    Difficult windows, Lumason contrast used for wall motion analysis.      Transesophageal Echo:  No results found for this or any previous visit.      Nuclear Stress Test:  No results found for this or any previous visit.      Stress Echo:  No results found for this or any previous visit.      Nuclear Stress Echo:  No results found for this or any previous visit.      Cardiac Catheterization:  No results found for this or any previous  visit.      Cardiac Device Check:  No results found for this or any previous visit.      ASSESSMENT/PLAN:                                                                                                                02/18/2025  Luz Arias is a 59 y.o., female.      Pre-op Assessment    I have reviewed the Patient Summary Reports.     I have reviewed the Nursing Notes. I have reviewed the NPO Status.   I have reviewed the Medications.     Review of Systems  Cardiovascular:     Hypertension                                          Pulmonary:    Asthma    Sleep Apnea                Hepatic/GI:     GERD                Neurological:   CVA                                    Endocrine:  Diabetes                  Anesthesia Plan  Type of Anesthesia, risks & benefits discussed:    Anesthesia Type: Gen ETT  Intra-op Monitoring Plan: Standard ASA Monitors  Post Op Pain Control Plan: multimodal analgesia and peripheral nerve block  Induction:  IV  Airway Plan: Direct and Video  Informed Consent: Informed consent signed with the Patient and all parties understand the risks and agree with anesthesia plan.  All questions answered.   ASA Score: 4  Day of Surgery Review of History & Physical: H&P Update referred to the surgeon/provider.    Ready For Surgery From Anesthesia Perspective.   .             [1]  Patient Active Problem List  Diagnosis    Gastroesophageal reflux disease    Nausea    Abdominal pain    Anemia of chronic disease    History of cholecystectomy    Diverticula of colon    Constipation    GERD (gastroesophageal reflux disease)    Esophageal stricture    Dysphagia    Cervical spine disease    Syncope    Acute bronchitis    Pulmonary sarcoidosis    Essential hypertension    Type 2 diabetes mellitus, with long-term current use of insulin    Obstructive sleep apnea    Hemiparesis affecting right side as late effect of cerebrovascular accident    Impaired functional mobility, balance, gait, and  endurance    Gastroparesis

## 2025-02-19 ENCOUNTER — HOSPITAL ENCOUNTER (OUTPATIENT)
Facility: HOSPITAL | Age: 60
Discharge: HOME | End: 2025-02-20
Attending: SURGERY | Admitting: SURGERY
Payer: MEDICARE

## 2025-02-19 ENCOUNTER — ANESTHESIA (OUTPATIENT)
Dept: SURGERY | Facility: HOSPITAL | Age: 60
End: 2025-02-19
Payer: MEDICARE

## 2025-02-19 DIAGNOSIS — K31.84 GASTROPARESIS: Primary | ICD-10-CM

## 2025-02-19 LAB
ALBUMIN SERPL BCP-MCNC: 3.7 G/DL (ref 3.5–5.2)
ALP SERPL-CCNC: 61 U/L (ref 40–150)
ALT SERPL W/O P-5'-P-CCNC: 15 U/L (ref 10–44)
ANION GAP SERPL CALC-SCNC: 9 MMOL/L (ref 8–16)
AST SERPL-CCNC: 27 U/L (ref 10–40)
BILIRUB SERPL-MCNC: 0.1 MG/DL (ref 0.1–1)
BUN SERPL-MCNC: 19 MG/DL (ref 6–20)
CALCIUM SERPL-MCNC: 9 MG/DL (ref 8.7–10.5)
CHLORIDE SERPL-SCNC: 107 MMOL/L (ref 95–110)
CO2 SERPL-SCNC: 24 MMOL/L (ref 23–29)
CREAT SERPL-MCNC: 0.8 MG/DL (ref 0.5–1.4)
EST. GFR  (NO RACE VARIABLE): >60 ML/MIN/1.73 M^2
ESTIMATED AVG GLUCOSE: 134 MG/DL (ref 68–131)
GLUCOSE SERPL-MCNC: 144 MG/DL (ref 70–110)
HBA1C MFR BLD: 6.3 % (ref 4–5.6)
POCT GLUCOSE: 130 MG/DL (ref 70–110)
POCT GLUCOSE: 164 MG/DL (ref 70–110)
POTASSIUM SERPL-SCNC: 3.8 MMOL/L (ref 3.5–5.1)
PROT SERPL-MCNC: 6.9 G/DL (ref 6–8.4)
SODIUM SERPL-SCNC: 140 MMOL/L (ref 136–145)

## 2025-02-19 PROCEDURE — 25000003 PHARM REV CODE 250

## 2025-02-19 PROCEDURE — 27100171 HC OXYGEN HIGH FLOW UP TO 24 HOURS

## 2025-02-19 PROCEDURE — C1767 GENERATOR, NEURO NON-RECHARG: HCPCS | Performed by: SURGERY

## 2025-02-19 PROCEDURE — 63600175 PHARM REV CODE 636 W HCPCS

## 2025-02-19 PROCEDURE — 36000711: Performed by: SURGERY

## 2025-02-19 PROCEDURE — 71000015 HC POSTOP RECOV 1ST HR: Performed by: SURGERY

## 2025-02-19 PROCEDURE — 27201423 OPTIME MED/SURG SUP & DEVICES STERILE SUPPLY: Performed by: SURGERY

## 2025-02-19 PROCEDURE — 36000710: Performed by: SURGERY

## 2025-02-19 PROCEDURE — 43647 LAP IMPL ELECTRODE ANTRUM: CPT | Mod: ,,, | Performed by: SURGERY

## 2025-02-19 PROCEDURE — 95980 IO ANAL GAST N-STIM INIT: CPT | Mod: ,,, | Performed by: SURGERY

## 2025-02-19 PROCEDURE — 37000009 HC ANESTHESIA EA ADD 15 MINS: Performed by: SURGERY

## 2025-02-19 PROCEDURE — 37000008 HC ANESTHESIA 1ST 15 MINUTES: Performed by: SURGERY

## 2025-02-19 PROCEDURE — 64590 INS/RPL PRPH SAC/GSTR NPG/R: CPT | Mod: 51,,, | Performed by: SURGERY

## 2025-02-19 PROCEDURE — 82962 GLUCOSE BLOOD TEST: CPT | Performed by: SURGERY

## 2025-02-19 PROCEDURE — 64461 PVB THORACIC SINGLE INJ SITE: CPT

## 2025-02-19 PROCEDURE — 83036 HEMOGLOBIN GLYCOSYLATED A1C: CPT

## 2025-02-19 PROCEDURE — 27000190 HC CPAP FULL FACE MASK W/VALVE

## 2025-02-19 PROCEDURE — 94761 N-INVAS EAR/PLS OXIMETRY MLT: CPT

## 2025-02-19 PROCEDURE — 63600175 PHARM REV CODE 636 W HCPCS: Performed by: STUDENT IN AN ORGANIZED HEALTH CARE EDUCATION/TRAINING PROGRAM

## 2025-02-19 PROCEDURE — 71000033 HC RECOVERY, INTIAL HOUR: Performed by: SURGERY

## 2025-02-19 PROCEDURE — 94660 CPAP INITIATION&MGMT: CPT

## 2025-02-19 PROCEDURE — 71000016 HC POSTOP RECOV ADDL HR: Performed by: SURGERY

## 2025-02-19 PROCEDURE — 99900035 HC TECH TIME PER 15 MIN (STAT)

## 2025-02-19 PROCEDURE — 63600175 PHARM REV CODE 636 W HCPCS: Mod: JZ,TB

## 2025-02-19 PROCEDURE — 80053 COMPREHEN METABOLIC PANEL: CPT

## 2025-02-19 DEVICE — GENERATOR NEUROSTIM GASTRIC: Type: IMPLANTABLE DEVICE | Site: ABDOMEN | Status: FUNCTIONAL

## 2025-02-19 RX ORDER — BENZONATATE 100 MG/1
100 CAPSULE ORAL 3 TIMES DAILY PRN
Status: DISCONTINUED | OUTPATIENT
Start: 2025-02-19 | End: 2025-02-20 | Stop reason: HOSPADM

## 2025-02-19 RX ORDER — PROCHLORPERAZINE EDISYLATE 5 MG/ML
5 INJECTION INTRAMUSCULAR; INTRAVENOUS EVERY 6 HOURS PRN
Status: DISCONTINUED | OUTPATIENT
Start: 2025-02-19 | End: 2025-02-20 | Stop reason: HOSPADM

## 2025-02-19 RX ORDER — SODIUM CHLORIDE, SODIUM LACTATE, POTASSIUM CHLORIDE, CALCIUM CHLORIDE 600; 310; 30; 20 MG/100ML; MG/100ML; MG/100ML; MG/100ML
INJECTION, SOLUTION INTRAVENOUS CONTINUOUS
Status: DISCONTINUED | OUTPATIENT
Start: 2025-02-19 | End: 2025-02-20

## 2025-02-19 RX ORDER — TALC
6 POWDER (GRAM) TOPICAL NIGHTLY PRN
Status: DISCONTINUED | OUTPATIENT
Start: 2025-02-19 | End: 2025-02-20 | Stop reason: HOSPADM

## 2025-02-19 RX ORDER — MIDAZOLAM HYDROCHLORIDE 1 MG/ML
.5-4 INJECTION, SOLUTION INTRAMUSCULAR; INTRAVENOUS
Status: DISCONTINUED | OUTPATIENT
Start: 2025-02-19 | End: 2025-02-19 | Stop reason: HOSPADM

## 2025-02-19 RX ORDER — PHENYLEPHRINE HCL IN 0.9% NACL 1 MG/10 ML
SYRINGE (ML) INTRAVENOUS
Status: DISCONTINUED | OUTPATIENT
Start: 2025-02-19 | End: 2025-02-19

## 2025-02-19 RX ORDER — NORTRIPTYLINE HYDROCHLORIDE 25 MG/1
75 CAPSULE ORAL NIGHTLY
Status: DISCONTINUED | OUTPATIENT
Start: 2025-02-19 | End: 2025-02-20 | Stop reason: HOSPADM

## 2025-02-19 RX ORDER — INSULIN ASPART 100 [IU]/ML
0-10 INJECTION, SOLUTION INTRAVENOUS; SUBCUTANEOUS EVERY 6 HOURS PRN
Status: DISCONTINUED | OUTPATIENT
Start: 2025-02-19 | End: 2025-02-20 | Stop reason: HOSPADM

## 2025-02-19 RX ORDER — METOPROLOL TARTRATE 50 MG/1
50 TABLET ORAL 2 TIMES DAILY
Status: DISCONTINUED | OUTPATIENT
Start: 2025-02-20 | End: 2025-02-20 | Stop reason: HOSPADM

## 2025-02-19 RX ORDER — LIDOCAINE HYDROCHLORIDE 10 MG/ML
1 INJECTION, SOLUTION EPIDURAL; INFILTRATION; INTRACAUDAL; PERINEURAL ONCE AS NEEDED
Status: DISCONTINUED | OUTPATIENT
Start: 2025-02-19 | End: 2025-02-20 | Stop reason: HOSPADM

## 2025-02-19 RX ORDER — HALOPERIDOL 5 MG/ML
0.5 INJECTION INTRAMUSCULAR EVERY 10 MIN PRN
Status: DISCONTINUED | OUTPATIENT
Start: 2025-02-19 | End: 2025-02-19 | Stop reason: HOSPADM

## 2025-02-19 RX ORDER — PROCHLORPERAZINE EDISYLATE 5 MG/ML
5 INJECTION INTRAMUSCULAR; INTRAVENOUS EVERY 30 MIN PRN
Status: DISCONTINUED | OUTPATIENT
Start: 2025-02-19 | End: 2025-02-19 | Stop reason: HOSPADM

## 2025-02-19 RX ORDER — KETOROLAC TROMETHAMINE 15 MG/ML
15 INJECTION, SOLUTION INTRAMUSCULAR; INTRAVENOUS EVERY 6 HOURS PRN
Status: DISCONTINUED | OUTPATIENT
Start: 2025-02-19 | End: 2025-02-20 | Stop reason: HOSPADM

## 2025-02-19 RX ORDER — VALACYCLOVIR HYDROCHLORIDE 500 MG/1
500 TABLET, FILM COATED ORAL 2 TIMES DAILY
Status: DISCONTINUED | OUTPATIENT
Start: 2025-02-20 | End: 2025-02-20 | Stop reason: HOSPADM

## 2025-02-19 RX ORDER — GLUCAGON 1 MG
1 KIT INJECTION
Status: DISCONTINUED | OUTPATIENT
Start: 2025-02-19 | End: 2025-02-19 | Stop reason: HOSPADM

## 2025-02-19 RX ORDER — GLUCAGON 1 MG
1 KIT INJECTION
Status: DISCONTINUED | OUTPATIENT
Start: 2025-02-19 | End: 2025-02-20 | Stop reason: HOSPADM

## 2025-02-19 RX ORDER — LIDOCAINE HYDROCHLORIDE 10 MG/ML
INJECTION, SOLUTION EPIDURAL; INFILTRATION; INTRACAUDAL; PERINEURAL
Status: DISPENSED
Start: 2025-02-19 | End: 2025-02-19

## 2025-02-19 RX ORDER — BUPIVACAINE HYDROCHLORIDE 7.5 MG/ML
INJECTION, SOLUTION EPIDURAL; RETROBULBAR
Status: COMPLETED | OUTPATIENT
Start: 2025-02-19 | End: 2025-02-19

## 2025-02-19 RX ORDER — DEXAMETHASONE SODIUM PHOSPHATE 4 MG/ML
INJECTION, SOLUTION INTRA-ARTICULAR; INTRALESIONAL; INTRAMUSCULAR; INTRAVENOUS; SOFT TISSUE
Status: DISCONTINUED | OUTPATIENT
Start: 2025-02-19 | End: 2025-02-19

## 2025-02-19 RX ORDER — SCOPOLAMINE 1 MG/3D
1 PATCH, EXTENDED RELEASE TRANSDERMAL ONCE
Status: DISCONTINUED | OUTPATIENT
Start: 2025-02-19 | End: 2025-02-20 | Stop reason: HOSPADM

## 2025-02-19 RX ORDER — ACETAMINOPHEN 10 MG/ML
INJECTION, SOLUTION INTRAVENOUS
Status: DISCONTINUED | OUTPATIENT
Start: 2025-02-19 | End: 2025-02-19

## 2025-02-19 RX ORDER — DEXMEDETOMIDINE HYDROCHLORIDE 100 UG/ML
INJECTION, SOLUTION INTRAVENOUS
Status: DISCONTINUED | OUTPATIENT
Start: 2025-02-19 | End: 2025-02-19

## 2025-02-19 RX ORDER — PROPOFOL 10 MG/ML
VIAL (ML) INTRAVENOUS
Status: DISCONTINUED | OUTPATIENT
Start: 2025-02-19 | End: 2025-02-19

## 2025-02-19 RX ORDER — KETAMINE HCL IN 0.9 % NACL 50 MG/5 ML
SYRINGE (ML) INTRAVENOUS
Status: DISCONTINUED | OUTPATIENT
Start: 2025-02-19 | End: 2025-02-19

## 2025-02-19 RX ORDER — SODIUM CHLORIDE 0.9 % (FLUSH) 0.9 %
10 SYRINGE (ML) INJECTION
Status: DISCONTINUED | OUTPATIENT
Start: 2025-02-19 | End: 2025-02-19 | Stop reason: HOSPADM

## 2025-02-19 RX ORDER — ENOXAPARIN SODIUM 100 MG/ML
40 INJECTION SUBCUTANEOUS EVERY 24 HOURS
Status: DISCONTINUED | OUTPATIENT
Start: 2025-02-19 | End: 2025-02-20 | Stop reason: HOSPADM

## 2025-02-19 RX ORDER — ACETAMINOPHEN 10 MG/ML
1000 INJECTION, SOLUTION INTRAVENOUS EVERY 8 HOURS
Status: DISCONTINUED | OUTPATIENT
Start: 2025-02-19 | End: 2025-02-20

## 2025-02-19 RX ORDER — CEFAZOLIN 2 G/1
2 INJECTION, POWDER, FOR SOLUTION INTRAMUSCULAR; INTRAVENOUS
Status: DISCONTINUED | OUTPATIENT
Start: 2025-02-19 | End: 2025-02-19 | Stop reason: HOSPADM

## 2025-02-19 RX ORDER — FAMOTIDINE 10 MG/ML
40 INJECTION INTRAVENOUS DAILY
Status: DISCONTINUED | OUTPATIENT
Start: 2025-02-20 | End: 2025-02-20 | Stop reason: HOSPADM

## 2025-02-19 RX ORDER — ASPIRIN 81 MG/1
81 TABLET ORAL DAILY
Status: DISCONTINUED | OUTPATIENT
Start: 2025-02-20 | End: 2025-02-20 | Stop reason: HOSPADM

## 2025-02-19 RX ORDER — ROCURONIUM BROMIDE 10 MG/ML
INJECTION, SOLUTION INTRAVENOUS
Status: DISCONTINUED | OUTPATIENT
Start: 2025-02-19 | End: 2025-02-19

## 2025-02-19 RX ORDER — ONDANSETRON HYDROCHLORIDE 2 MG/ML
INJECTION, SOLUTION INTRAVENOUS
Status: DISCONTINUED | OUTPATIENT
Start: 2025-02-19 | End: 2025-02-19

## 2025-02-19 RX ORDER — IPRATROPIUM BROMIDE AND ALBUTEROL SULFATE 2.5; .5 MG/3ML; MG/3ML
3 SOLUTION RESPIRATORY (INHALATION) EVERY 4 HOURS PRN
Status: DISCONTINUED | OUTPATIENT
Start: 2025-02-19 | End: 2025-02-20 | Stop reason: HOSPADM

## 2025-02-19 RX ORDER — SODIUM CHLORIDE 9 MG/ML
INJECTION, SOLUTION INTRAVENOUS CONTINUOUS
Status: DISCONTINUED | OUTPATIENT
Start: 2025-02-19 | End: 2025-02-20

## 2025-02-19 RX ORDER — CEFAZOLIN SODIUM 1 G/3ML
INJECTION, POWDER, FOR SOLUTION INTRAMUSCULAR; INTRAVENOUS
Status: DISCONTINUED | OUTPATIENT
Start: 2025-02-19 | End: 2025-02-19

## 2025-02-19 RX ORDER — METHOCARBAMOL 100 MG/ML
500 INJECTION, SOLUTION INTRAMUSCULAR; INTRAVENOUS EVERY 6 HOURS
Status: COMPLETED | OUTPATIENT
Start: 2025-02-19 | End: 2025-02-20

## 2025-02-19 RX ORDER — ONDANSETRON 8 MG/1
8 TABLET, ORALLY DISINTEGRATING ORAL EVERY 8 HOURS PRN
Status: DISCONTINUED | OUTPATIENT
Start: 2025-02-19 | End: 2025-02-20 | Stop reason: HOSPADM

## 2025-02-19 RX ORDER — SUCCINYLCHOLINE CHLORIDE 20 MG/ML
INJECTION INTRAMUSCULAR; INTRAVENOUS
Status: DISCONTINUED | OUTPATIENT
Start: 2025-02-19 | End: 2025-02-19

## 2025-02-19 RX ORDER — SODIUM CHLORIDE 0.9 % (FLUSH) 0.9 %
10 SYRINGE (ML) INJECTION
Status: DISCONTINUED | OUTPATIENT
Start: 2025-02-19 | End: 2025-02-20 | Stop reason: HOSPADM

## 2025-02-19 RX ORDER — LIDOCAINE HYDROCHLORIDE 20 MG/ML
INJECTION, SOLUTION EPIDURAL; INFILTRATION; INTRACAUDAL; PERINEURAL
Status: DISCONTINUED | OUTPATIENT
Start: 2025-02-19 | End: 2025-02-19

## 2025-02-19 RX ORDER — ACETAMINOPHEN 325 MG/1
650 TABLET ORAL EVERY 8 HOURS PRN
Status: DISCONTINUED | OUTPATIENT
Start: 2025-02-19 | End: 2025-02-20 | Stop reason: HOSPADM

## 2025-02-19 RX ORDER — GABAPENTIN 300 MG/1
300 CAPSULE ORAL 3 TIMES DAILY
Status: DISCONTINUED | OUTPATIENT
Start: 2025-02-19 | End: 2025-02-20 | Stop reason: HOSPADM

## 2025-02-19 RX ADMIN — KETOROLAC TROMETHAMINE 15 MG: 15 INJECTION, SOLUTION INTRAMUSCULAR; INTRAVENOUS at 10:02

## 2025-02-19 RX ADMIN — CEFAZOLIN 3 G: 330 INJECTION, POWDER, FOR SOLUTION INTRAMUSCULAR; INTRAVENOUS at 08:02

## 2025-02-19 RX ADMIN — SODIUM CHLORIDE: 0.9 INJECTION, SOLUTION INTRAVENOUS at 07:02

## 2025-02-19 RX ADMIN — METHOCARBAMOL 500 MG: 100 INJECTION INTRAMUSCULAR; INTRAVENOUS at 05:02

## 2025-02-19 RX ADMIN — METHOCARBAMOL 500 MG: 100 INJECTION INTRAMUSCULAR; INTRAVENOUS at 10:02

## 2025-02-19 RX ADMIN — SODIUM CHLORIDE, POTASSIUM CHLORIDE, SODIUM LACTATE AND CALCIUM CHLORIDE: 600; 310; 30; 20 INJECTION, SOLUTION INTRAVENOUS at 08:02

## 2025-02-19 RX ADMIN — SUCCINYLCHOLINE 200 MG: 20 INJECTION, SOLUTION INTRAMUSCULAR; INTRAVENOUS at 08:02

## 2025-02-19 RX ADMIN — DEXMEDETOMIDINE 12 MCG: 100 INJECTION, SOLUTION, CONCENTRATE INTRAVENOUS at 08:02

## 2025-02-19 RX ADMIN — Medication 200 MCG: at 09:02

## 2025-02-19 RX ADMIN — SCOPOLAMINE 1 PATCH: 1.5 PATCH, EXTENDED RELEASE TRANSDERMAL at 06:02

## 2025-02-19 RX ADMIN — ROCURONIUM BROMIDE 10 MG: 10 INJECTION, SOLUTION INTRAVENOUS at 08:02

## 2025-02-19 RX ADMIN — ONDANSETRON 4 MG: 2 INJECTION INTRAMUSCULAR; INTRAVENOUS at 09:02

## 2025-02-19 RX ADMIN — SODIUM CHLORIDE: 9 INJECTION, SOLUTION INTRAVENOUS at 06:02

## 2025-02-19 RX ADMIN — KETOROLAC TROMETHAMINE 15 MG: 15 INJECTION, SOLUTION INTRAMUSCULAR; INTRAVENOUS at 04:02

## 2025-02-19 RX ADMIN — SODIUM CHLORIDE, POTASSIUM CHLORIDE, SODIUM LACTATE AND CALCIUM CHLORIDE: 600; 310; 30; 20 INJECTION, SOLUTION INTRAVENOUS at 10:02

## 2025-02-19 RX ADMIN — ACETAMINOPHEN 1000 MG: 10 INJECTION, SOLUTION INTRAVENOUS at 10:02

## 2025-02-19 RX ADMIN — PROPOFOL 140 MG: 10 INJECTION, EMULSION INTRAVENOUS at 08:02

## 2025-02-19 RX ADMIN — BUPIVACAINE HYDROCHLORIDE 30 ML: 7.5 INJECTION, SOLUTION EPIDURAL; RETROBULBAR at 07:02

## 2025-02-19 RX ADMIN — Medication 30 MG: at 08:02

## 2025-02-19 RX ADMIN — ONDANSETRON 8 MG: 8 TABLET, ORALLY DISINTEGRATING ORAL at 08:02

## 2025-02-19 RX ADMIN — Medication 10 MG: at 09:02

## 2025-02-19 RX ADMIN — ENOXAPARIN SODIUM 40 MG: 40 INJECTION SUBCUTANEOUS at 04:02

## 2025-02-19 RX ADMIN — SUGAMMADEX 400 MG: 100 INJECTION, SOLUTION INTRAVENOUS at 09:02

## 2025-02-19 RX ADMIN — ROCURONIUM BROMIDE 40 MG: 10 INJECTION, SOLUTION INTRAVENOUS at 08:02

## 2025-02-19 RX ADMIN — DEXAMETHASONE SODIUM PHOSPHATE 4 MG: 4 INJECTION, SOLUTION INTRAMUSCULAR; INTRAVENOUS at 08:02

## 2025-02-19 RX ADMIN — MIDAZOLAM 2 MG: 1 INJECTION INTRAMUSCULAR; INTRAVENOUS at 07:02

## 2025-02-19 RX ADMIN — LIDOCAINE HYDROCHLORIDE 80 MG: 20 INJECTION, SOLUTION EPIDURAL; INFILTRATION; INTRACAUDAL; PERINEURAL at 08:02

## 2025-02-19 RX ADMIN — ACETAMINOPHEN 1000 MG: 10 INJECTION, SOLUTION INTRAVENOUS at 08:02

## 2025-02-19 RX ADMIN — BENZONATATE 100 MG: 100 CAPSULE ORAL at 08:02

## 2025-02-19 RX ADMIN — Medication 100 MCG: at 08:02

## 2025-02-19 RX ADMIN — DEXMEDETOMIDINE 4 MCG: 100 INJECTION, SOLUTION, CONCENTRATE INTRAVENOUS at 09:02

## 2025-02-19 RX ADMIN — NORTRIPTYLINE HYDROCHLORIDE 75 MG: 25 CAPSULE ORAL at 08:02

## 2025-02-19 NOTE — BRIEF OP NOTE
Operative Note       Surgery Date: 2/19/2025     Surgeons and Role:     * Jasmeet Franz MD - Primary     * Natty Stein MD - Resident - Assisting    Pre-op Diagnosis:  Gastroparesis [K31.84]    Post-op Diagnosis:  Gastroparesis [K31.84]    Procedure(s) (LRB):  XI ROBOTIC INSERTION, GASTRIC ELECTRICAL STIMULATOR with leads and EGD (N/A)  Intraoperative programming gastric neurostimulator    Anesthesia: General/Regional    Procedure in Detail/Findings:  Gstim, leads, programming and egd without apparent complication    Estimated Blood Loss: Minimal           Specimens (From admission, onward)      None          Implants:   Implant Name Type Inv. Item Serial No.  Lot No. LRB No. Used Action   GENERATOR NEUROSTIM GASTRIC - [id number]  GENERATOR NEUROSTIM GASTRIC [id number] [employer]  N/A 1 Implanted              Disposition: PACU - hemodynamically stable.           Condition: Good    Attestation:  I was present and scrubbed for the entire procedure.

## 2025-02-19 NOTE — H&P
"History & Physical     SUBJECTIVE:      History of Present Illness:  Patient is a 59 y.o. female presents with bmi 27, kenneth on cpap, anemia of chronic disease, hx bronchitis, pilmonary sarcoidosis, essential htn, dm2 with long term insulin, right cva with imparied function, dysphagia, gerd and gastroparesis.  She has had trouble for a number of years.  She says symptoms are worsening despite medication.  She also has reflux despite medication.  She also has severe constipation despite medication and has tried linzess and is changing to amitiza.  She has bee to the ER 7/2024 in [place] for symptoms and has had other cardiac work ups for chest pain.  She hasn't had this recently.     She is on zofran qam and sometimes at night, phenergan rarely (makes her tired), nexium daily, and gaviscon qhs.  She has tried reglan but it made her "crazy".  She thinks she has tried erythromycin.  She take a a lidocaine combination/gi coctail to help her swallow.     Post Gastric Pacemaker Symptom Monitor     Severity/Frequency     Vomiting- 3/2  Nausea-3/4  Early Satiety-3/4  Bloating-3/4  Postprandial fullness-3/4  Epigastric pain-3/3  Epigastric burning-3/4     GERD Questionnaire      daily PPI with occasional gaviscon  has Typical heartburn  has Regurgitation  has Dysphagia solids  has Dysphagia liquids  has Hoarseness  has Sore throat  has Cough  Possibly has Asthma  no Chest pain  no Water brash, has dry mouth  has Globus  has Nausea  has Vomiting     Eckardt Score (none =0, occ=1, daily=2, every meal=3, weight: 0=0, <5=1, 5-10=2, >10=3)  Dysphagia=2  Regurgitation=2  Chest pain=0  Weight loss (kg)=0  Total=4     Nutrition: she eats little, is bmi 27/166lb today.  Note 4/4/23 shows weight 183/bmi 30.    Interval history 2/19/25:  There are no significant changes in history since I last saw her.      shows no narcotics.     No chief complaint on file.              Review of patient's allergies indicates:   Allergen Reactions    " Sulfa (sulfonamide antibiotics) Anaphylaxis    Sulfamethoxazole-trimethoprim Anaphylaxis and Diarrhea       Other reaction(s): Anaphylaxis, Finding of vomiting, Diarrhea    Temazepam         Sleep walking   Other reaction(s): Sleep related hallucinations    Pantoprazole Nausea And Vomiting    Pregabalin Other (See Comments)    Verapamil         Other reaction(s): Constipation    Zonisamide         Other reaction(s): Anaphylaxis, Diarrhea, Finding of vomiting, Anaphylaxis, Diarrhea, Finding of vomiting, Anaphylaxis, Diarrhea, Finding of vomiting    Metoclopramide Rash and Anxiety    Sucralfate Other (See Comments) and Nausea Only         Current Medications          Current Outpatient Medications   Medication Sig Dispense Refill    acyclovir (ZOVIRAX) 400 MG tablet Take 1 tablet (400 mg total) by mouth once daily. 90 tablet 4    ALBUTEROL INHL Inhale into the lungs. Every 6 hrs prn        aluminum hydrox-magnesium carb (GAVISCON EXTRA STRENGTH) 254-237.5 mg/5 mL Susp 10 mLs.        aspirin (ECOTRIN) 81 MG EC tablet Take 81 mg by mouth once daily.        benzonatate (TESSALON) 100 MG capsule Take 100 mg by mouth 3 (three) times daily as needed.        biotin 10,000 mcg Cap Take by mouth once daily.        BREO ELLIPTA 100-25 mcg/dose diskus inhaler Inhale 1 puff into the lungs.        carBAMazepine (TEGRETOL XR) 400 MG Tb12 Take 400 mg by mouth 2 (two) times daily.        diclofenac sodium (VOLTAREN) 1 % Gel daily as needed.        diphenhydrAMINE-aluminum-magnesium hydroxide-simethicone-LIDOcaine HCl 2% Swish and swallow 5 mLs every 6 (six) hours as needed (throat irritation). 120 mL 2    EASY TOUCH ALCOHOL PREP PADS PadM Apply 1 each topically 3 (three) times daily.        ergocalciferol (ERGOCALCIFEROL) 50,000 unit Cap          ergocalciferol, vitamin D2, (VITAMIN D ORAL) Take by mouth once daily.        fluticasone propionate (FLONASE) 50 mcg/actuation nasal spray          FREESTYLE FREEDOM LITE kit           FREESTYLE LANCETS 28 gauge lancets Apply topically 3 (three) times daily.        FREESTYLE LITE STRIPS Strp 1 strip 3 (three) times daily.        insulin glargine U-100, Lantus, (LANTUS SOLOSTAR U-100 INSULIN) 100 unit/mL (3 mL) InPn pen Inject 24 Units into the skin every evening.        ipratropium (ATROVENT) 42 mcg (0.06 %) nasal spray 2 sprays 2 (two) times daily.        levalbuterol (XOPENEX) 0.63 mg/3 mL nebulizer solution Take 1 ampule by nebulization every 4 (four) hours as needed for Wheezing. Rescue        lidocaine (LIDODERM) 5 % daily as needed.        lubiprostone (AMITIZA) 24 MCG Cap Take 1 capsule (24 mcg total) by mouth 2 (two) times daily with meals. 60 capsule 11    metFORMIN (GLUCOPHAGE-XR) 500 MG XR 24hr tablet Take 500 mg by mouth 2 (two) times daily with meals.        nortriptyline (PAMELOR) 75 MG Cap TAKE 1 CAPSULE BY MOUTH EVERY NIGHT 1 HOUR BEFORE BEDTIME        polyethylene glycol (GLYCOLAX) 17 gram PwPk Take by mouth.        promethazine-dextromethorphan (PROMETHAZINE-DM) 6.25-15 mg/5 mL Syrp 5 mLs daily as needed.        rosuvastatin (CRESTOR) 40 MG Tab Take 40 mg by mouth once daily.        tiotropium (SPIRIVA) 18 mcg inhalation capsule Inhale 36 mcg into the lungs once daily. Controller        TYLENOL EXTRA STRENGTH 500 mg tablet Take 500 mg by mouth daily as needed.        valACYclovir (VALTREX) 1000 MG tablet Take 0.5 tablets (500 mg total) by mouth 2 (two) times daily. 60 tablet 1    vitamin E 600 UNIT capsule Take 600 Units by mouth once daily.        esomeprazole (NEXIUM) 40 MG capsule Take 1 capsule (40 mg total) by mouth once daily. 30 capsule 6    hydrocodone-homatropine 5-1.5 mg/5 ml (HYDROMET) 5-1.5 mg/5 mL Syrp 5 mLs every 4 (four) hours as needed.        isosorbide mononitrate (IMDUR) 30 MG 24 hr tablet 30 mg once daily.        mag hydrox/aluminum hyd/simeth (MAALOX ORAL)          prazosin (MINIPRESS) 2 MG Cap Take 2 mg by mouth once daily.        rivastigmine (EXELON) 9.5  mg/24 hour PT24 Place onto the skin once daily.          No current facility-administered medications for this visit.                 Past Medical History:   Diagnosis Date    Aortic regurgitation      Asthma      Chronic fatigue      Chronic pain      Depression      Diabetes      Dysphagia      Gastritis      Gastroparesis      GERD (gastroesophageal reflux disease)      Hypertension      Intestinal metaplasia of gastric cardia      Irritable bowel syndrome      Sarcoidosis      Sleep apnea      Stroke       12/2015, mini stroke 2/2023            Past Surgical History:   Procedure Laterality Date    APPENDECTOMY        CHOLECYSTECTOMY        COLONOSCOPY   05/17/2019    COLONOSCOPY N/A 5/5/2020     Procedure: COLONOSCOPY;  Surgeon: Garrick López MD;  Location: [facility];  Service: Endoscopy;  Laterality: N/A;  with bx and stool specimen    COLONOSCOPY N/A 4/10/2023     Procedure: COLONOSCOPY;  Surgeon: Forrest Gomes MD;  Location: [facility];  Service: Endoscopy;  Laterality: N/A;    ELBOW SURGERY        ESOPHAGEAL MANOMETRY WITH MEASUREMENT OF IMPEDANCE N/A 6/29/2022     Procedure: MANOMETRY-ESOPHAGEAL-WITH IMPEDANCE;  Surgeon: Concetta Odonnell MD;  Location: [facility] (4TH FLR);  Service: Endoscopy;  Laterality: N/A;  r/o rumination and supragastric belching  hold nortriptyline and norco 24 hours prior to procedure  fully vaccinated, instructions emailed to [email or web address]    ESOPHAGOGASTRODUODENOSCOPY N/A 5/5/2020     Procedure: EGD (ESOPHAGOGASTRODUODENOSCOPY);  Surgeon: Garrick López MD;  Location: [facility];  Service: Endoscopy;  Laterality: N/A;  with biopsy    ESOPHAGOGASTRODUODENOSCOPY N/A 6/2/2021     Procedure: EGD (ESOPHAGOGASTRODUODENOSCOPY);  Surgeon: Garrick López MD;  Location: [facility];  Service: Endoscopy;  Laterality: N/A;    ESOPHAGOGASTRODUODENOSCOPY N/A 6/28/2022     Procedure: ESOPHAGOGASTRODUODENOSCOPY (EGD);  Surgeon: Concetta Odonnell MD;  Location: [facility] (2ND FLR);   Service: Endoscopy;  Laterality: N/A;  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  full liquid diet x3 days, clear liquid diet x1 day prior to procedure  fully vaccinated, instructions emailed to [email or web address]    ESOPHAGOGASTRODUODENOSCOPY N/A 6/13/2023     Procedure: EGD (ESOPHAGOGASTRODUODENOSCOPY);  Surgeon: Forrest Gomes MD;  Location: [facility];  Service: Endoscopy;  Laterality: N/A;    HAND SURGERY        HYSTERECTOMY         2005, age 39, KRISTAN fibroids    MEDIASTINOSCOPY        NECK SURGERY        UPPER GASTROINTESTINAL ENDOSCOPY   05/17/2019    WRIST SURGERY                 Family History   Problem Relation Name Age of Onset    Diabetes Mother        Colon cancer Mother   65    Bladder Cancer Mother        Heart disease Mother        Heart disease Father        Kidney failure Father        Diabetes Father        Stroke Father        Heart attack Father        Colon cancer Sister fabrice 65    Diabetes Sister fabrice      Hypertension Sister fabrice      Seizures Brother dinh      Colon polyps Sister jonathan      Diabetes Sister jonathan      Hypertension Sister jonathan      Irritable bowel syndrome Sister yury      Diabetes Sister yury      Hypertension Sister yury      Diabetes Sister prerna      Hypertension Sister prerna      Diabetes Brother clau      Breast cancer Neg Hx        Esophageal cancer Neg Hx        Stomach cancer Neg Hx        Liver cancer Neg Hx        Liver disease Neg Hx        Crohn's disease Neg Hx        Celiac disease Neg Hx        Pancreatic cancer Neg Hx          Social History   Social History            Tobacco Use    Smoking status: Never    Smokeless tobacco: Never   Substance Use Topics    Alcohol use: Not Currently       Comment: no drinking anymore    Drug use: Never            Review of Systems:  Review of Systems   Constitutional:  Negative for fever.   Respiratory:  Negative for chest tightness and shortness of breath.    Cardiovascular:  Negative for chest  "pain.   Genitourinary:  Negative for difficulty urinating.   Hematological:  Does not bruise/bleed easily.         OBJECTIVE:      Vital Signs (Most Recent)  Pulse: 86 (01/16/25 1522)  BP: 138/63 (01/16/25 1522)  5' 5" (1.651 m)  75.5 kg (166 lb 8.9 oz)      Physical Exam:  Physical Exam  Vitals reviewed.   Constitutional:       Appearance: Normal appearance.   Abdominal:      General: There is no distension.      Palpations: Abdomen is soft. There is no mass.   Skin:     General: Skin is warm and dry.   Neurological:      General: No focal deficit present.      Mental Status: She is oriented to person, place, and time.   Psychiatric:         Mood and Affect: Mood normal.         Behavior: Behavior normal.         Thought Content: Thought content normal.         Judgment: Judgment normal.            Laboratory  CBC: Reviewed  CMP: Reviewed  Elevated glc c/w dm, anemia of chronic disease     Diagnostic Results:  Ct 2024 reviewed, films viewed, stomach and vessels appear ok, other findings in report  Egd 2023 reviewed, possible candidiasis, gastritis, other findings in report  Ges 2022 reviewed, at 228 minutes the percentage of retention is 32 %, T1/2 gastric emptying time is calculated at 176 minutes.      ASSESSMENT/PLAN:      Severe gastroparesis despite appropriate medications.  Dysphagia, gerd and constipation symptoms also.  Significant but not severe weight loss.     PLAN:     For robotic gastric stimulator with egd.  Pcp clearance.  I discussed pyloric procedures and gastrectomy.     "

## 2025-02-19 NOTE — ANESTHESIA POSTPROCEDURE EVALUATION
Anesthesia Post Evaluation    Patient: Luz Arias    Procedure(s) Performed: Procedure(s) (LRB):  XI ROBOTIC INSERTION, GASTRIC ELECTRICAL STIMULATOR with leads and EGD (N/A)    Final Anesthesia Type: general      Patient location during evaluation: PACU  Patient participation: Yes- Able to Participate  Level of consciousness: awake and alert  Post-procedure vital signs: reviewed and stable  Pain management: adequate  Airway patency: patent    PONV status at discharge: No PONV  Anesthetic complications: no      Cardiovascular status: blood pressure returned to baseline  Respiratory status: spontaneous ventilation and room air  Hydration status: euvolemic  Follow-up not needed.              Vitals Value Taken Time   /61 02/19/25 12:32   Temp 36.3 °C (97.3 °F) 02/19/25 10:05   Pulse 73 02/19/25 12:39   Resp 31 02/19/25 12:39   SpO2 100 % 02/19/25 12:39   Vitals shown include unfiled device data.      No case tracking events are documented in the log.      Pain/Yamila Score: Pain Rating Prior to Med Admin: 10 (2/19/2025 10:30 AM)  Pain Rating Post Med Admin: 0 (2/19/2025 10:02 AM)  Yamila Score: 8 (2/19/2025 10:02 AM)

## 2025-02-19 NOTE — NURSING TRANSFER
Nursing Transfer Note      2/19/2025   1:36 PM    Nurse giving handoff:Ayah  Nurse receiving handoff:Dennis    Reason patient is being transferred: admit    Transfer To: 528    Transfer via bed    Transfer with     Transported by techs    Order for Tele Monitor? No    Additional Lines:     Medicines sent: mivf     Any special needs or follow-up needed: na    Patient belongings transferred with patient: No    Chart send with patient: Yes    Notified: spouse    BERNIE Luz MD aware of pts consistent pain.

## 2025-02-19 NOTE — NURSING
Patient arrived to room 528 via bed, VS taken and documented, admission documentation completed, SCD's applied, instructed on IS use, oriented to room and equipment, allowed time for questions, all questions answered, no further concerns voiced at this time, safety measures in place, call light within reach, instructed to call with any needs, verbalized understanding, continue current plan of care.

## 2025-02-19 NOTE — ANESTHESIA PROCEDURE NOTES
Intubation    Date/Time: 2/19/2025 8:10 AM    Performed by: Ria Gay MD  Authorized by: Blu Spencer Jr., MD    Intubation:     Induction:  Rapid sequence induction    Intubated:  Postinduction    Mask Ventilation:  Not attempted    Attempts:  1    Attempted By:  Resident anesthesiologist    Method of Intubation:  Video laryngoscopy    Blade:  Jerome 3    Laryngeal View Grade: Grade I - full view of cords      Difficult Airway Encountered?: No      Complications:  None    Airway Device:  Oral endotracheal tube    Airway Device Size:  7.0    Style/Cuff Inflation:  Cuffed (inflated to minimal occlusive pressure)    Tube secured:  21    Secured at:  The lips    Placement Verified By:  Capnometry    Complicating Factors:  Anterior larynx    Findings Post-Intubation:  BS equal bilateral

## 2025-02-19 NOTE — TRANSFER OF CARE
"Anesthesia Transfer of Care Note    Patient: Luz Arias    Procedure(s) Performed: Procedure(s) (LRB):  XI ROBOTIC INSERTION, GASTRIC ELECTRICAL STIMULATOR with leads and EGD (N/A)    Patient location: PACU    Anesthesia Type: general    Transport from OR: Transported from OR on 6-10 L/min O2 by face mask with adequate spontaneous ventilation    Post pain: adequate analgesia    Post assessment: no apparent anesthetic complications    Post vital signs: stable    Level of consciousness: awake and responds to stimulation    Nausea/Vomiting: no nausea/vomiting    Complications: none    Transfer of care protocol was followed    Last vitals: Visit Vitals  BP (!) 147/66   Pulse 62   Temp 36.3 °C (97.3 °F) (Temporal)   Resp 18   Ht 5' 5" (1.651 m)   Wt 73.5 kg (162 lb)   SpO2 100%   Breastfeeding No   BMI 26.96 kg/m²     "

## 2025-02-19 NOTE — BRIEF OP NOTE
[facility] - [facility] ([facility])  Brief Operative Note    SUMMARY     Surgery Date: 2/19/2025     Surgeons and Role:     * Jasmeet Franz MD - Primary     * Natty Stein MD - Resident - Assisting        Pre-op Diagnosis:  Gastroparesis [K31.84]    Post-op Diagnosis:  Post-Op Diagnosis Codes:     * Gastroparesis [K31.84]    Procedure(s) (LRB):  XI ROBOTIC INSERTION, GASTRIC ELECTRICAL STIMULATOR with leads and EGD (N/A)    Anesthesia: General/Regional    Implants:  Implant Name Type Inv. Item Serial No.  Lot No. LRB No. Used Action   GENERATOR NEUROSTIM GASTRIC - [id number]  GENERATOR NEUROSTIM GASTRIC [id number] [employer]  N/A 1 Implanted       Operative Findings: XI gastric stimulator placement with intra-op EGD. No intraoperative complications. Stimulator check and working well at the end of the case.    Estimated Blood Loss: * No values recorded between 2/19/2025  8:31 AM and 2/19/2025 10:01 AM *    Estimated Blood Loss has been documented.         Specimens:   Specimen (24h ago, onward)      None            [id number]

## 2025-02-19 NOTE — ANESTHESIA PROCEDURE NOTES
Bilateral SUSIE SS    Patient location during procedure: pre-op   Block not for primary anesthetic.  Reason for block: at surgeon's request and post-op pain management   Post-op Pain Location: bilateral abdominal pain   Start time: 2/19/2025 7:40 AM  Timeout: 2/19/2025 7:39 AM   End time: 2/19/2025 7:45 AM    Staffing  Authorizing Provider: Gregoria Kaplan MD  Performing Provider: Slim Lundberg MD    Staffing  Performed by: Slim Lundberg MD  Authorized by: Gregoria Kaplan MD    Preanesthetic Checklist  Completed: patient identified, IV checked, site marked, risks and benefits discussed, surgical consent, monitors and equipment checked, pre-op evaluation and timeout performed  Peripheral Block  Patient position: sitting  Prep: ChloraPrep  Patient monitoring: heart rate, cardiac monitor, continuous pulse ox, continuous capnometry and frequent blood pressure checks  Block type: erector spinae plane  Laterality: bilateral  Injection technique: single shot  Interspace: T8-9    Needle  Needle type: Stimuplex   Needle gauge: 20 G  Needle length: 4 in  Needle localization: anatomical landmarks and ultrasound guidance   -ultrasound image captured on disc.  Assessment  Injection assessment: negative aspiration, negative parasthesia and local visualized surrounding nerve  Paresthesia pain: none  Heart rate change: no  Slow fractionated injection: yes  Pain Tolerance: comfortable throughout block and no complaints  Medications:    Medications: bupivacaine (pf) (MARCAINE) injection 0.75% - Perineural   30 mL - 2/19/2025 7:45:00 AM    Additional Notes  A time out was conducted. Site robin confirmed with team and patient. Allergies reviewed.   Vital signs stable throughout block. RN monitoring vitals throughout.   Needle advanced under continuous ultrasound guidance.  Local injected incrementally after confirming negative aspiration. No signs or symptoms of intravascular or intraneural injection noted.   No persistent  paresthesias elicited or expressed. Patient tolerated procedure well.  60cc of 0.375% bupi with epinephrine 1:300K, PF dexamethasone 1 mg, and clonidine 50 mcg used for the block.

## 2025-02-19 NOTE — OP NOTE
DATE OF PROCEDURE: 2/19/2025    PRE OP DIAGNOSIS: Gastroparesis [K31.84]    POST OP DIAGNOSIS: Gastroparesis [K31.84]    PROCEDURE: Procedure(s) (LRB):  XI ROBOTIC INSERTION, GASTRIC ELECTRICAL STIMULATOR with leads and EGD (N/A)  Intraoperative programming gastric neurostimulator    Surgeons and Role:     * Jasmeet Franz MD - Primary     * Natty Stein MD - Resident - Assisting    ANESTHESIA: General.     Procedure:    The patient was placed under general anesthesia and the abdomen prepped and draped in usual manner.  Access to the peritoneum was gained through the umbilicus using a 5 mm Optiview trocar under direct vision and pneumoperitoneum to 15 mmHg CO2 gas was obtained.  The patient was placed in moderate reverse Trendelenburg and robotic trocars were placed under direct vision at 7 cm directly to the left and right of the primary incision and the 5 mm trocar was replaced with a robotic trochar.  A 12 mm laparoscopic port was placed in the right mid abdomen.  Using a caudier and long needle  and after placing the leads into the abdomen through the 12 mm port site, we placed the leads into the stomach 6 to 9cm from the pylorus and adjacent to the greater curve.  We checked endoscopically to make sure that the leads did not penetrate directly into the stomach and were actually in the abdominal wall and we then attached the stimulator to the leads and checked impedance and it was okay.  We then placed the cuff at the end of the leads and placed 2 robotic hemoclips to the leads.  Two 9 in 3-0 silks were then used to suture the leads x2 at the proximal leads and x2 at the distal leads.  The area was inspected for hemostasis and the suture material was removed.  We then made an incision for the gastric stimulator starting at the 12 mm port site and heading medially.  We took this down to the abdominal wall fascia and created our pocket with the Bovie.  Trocars were then removed under direct vision  and prior to removing last trocar all pneumoperitoneum was allowed to escape.  The fascia at the navel was closed with 0 Vicryl.  We attached the stimulator to the ends of the leads and then sutured the stimulator into the pocket fascia with 2 0 Prolenes making sure the excess lead material was under the stimulator.  That incision was closed with 3-0 Vicryl then 4-0 Monocryl and the rest of the incisions were closed with 4-0 plain catgut.  They were reinforced with Dermabond.  A Aquacel Ag dressing was then placed over the stimulator site.  An abdominal binder was placed.  The patient tolerated the procedure well and was brought to the recovery room in stable condition.  Sponge and needle counts were correct at the end of the case.  Blood loss was minimal, complications none and pathology none.    Gastric stimulator adjustment: imp 639 voltage 3.5 Current 5.5 Pulse Width 330 Rate 14 Cycle on 0.1 Cycle off 5, on

## 2025-02-20 VITALS
TEMPERATURE: 98 F | RESPIRATION RATE: 17 BRPM | WEIGHT: 162.06 LBS | BODY MASS INDEX: 27 KG/M2 | OXYGEN SATURATION: 97 % | DIASTOLIC BLOOD PRESSURE: 58 MMHG | HEART RATE: 72 BPM | SYSTOLIC BLOOD PRESSURE: 116 MMHG | HEIGHT: 65 IN

## 2025-02-20 LAB
ANION GAP SERPL CALC-SCNC: 12 MMOL/L (ref 8–16)
BASOPHILS # BLD AUTO: 0.01 K/UL (ref 0–0.2)
BASOPHILS NFR BLD: 0.1 % (ref 0–1.9)
BUN SERPL-MCNC: 23 MG/DL (ref 6–20)
CALCIUM SERPL-MCNC: 8.8 MG/DL (ref 8.7–10.5)
CHLORIDE SERPL-SCNC: 108 MMOL/L (ref 95–110)
CO2 SERPL-SCNC: 22 MMOL/L (ref 23–29)
CREAT SERPL-MCNC: 0.8 MG/DL (ref 0.5–1.4)
DIFFERENTIAL METHOD BLD: ABNORMAL
EOSINOPHIL # BLD AUTO: 0 K/UL (ref 0–0.5)
EOSINOPHIL NFR BLD: 0 % (ref 0–8)
ERYTHROCYTE [DISTWIDTH] IN BLOOD BY AUTOMATED COUNT: 12.7 % (ref 11.5–14.5)
EST. GFR  (NO RACE VARIABLE): >60 ML/MIN/1.73 M^2
GLUCOSE SERPL-MCNC: 82 MG/DL (ref 70–110)
HCT VFR BLD AUTO: 25.8 % (ref 37–48.5)
HGB BLD-MCNC: 8.2 G/DL (ref 12–16)
IMM GRANULOCYTES # BLD AUTO: 0.02 K/UL (ref 0–0.04)
IMM GRANULOCYTES NFR BLD AUTO: 0.2 % (ref 0–0.5)
LYMPHOCYTES # BLD AUTO: 2.7 K/UL (ref 1–4.8)
LYMPHOCYTES NFR BLD: 29.5 % (ref 18–48)
MAGNESIUM SERPL-MCNC: 1.6 MG/DL (ref 1.6–2.6)
MCH RBC QN AUTO: 28.1 PG (ref 27–31)
MCHC RBC AUTO-ENTMCNC: 31.8 G/DL (ref 32–36)
MCV RBC AUTO: 88 FL (ref 82–98)
MONOCYTES # BLD AUTO: 0.8 K/UL (ref 0.3–1)
MONOCYTES NFR BLD: 8.6 % (ref 4–15)
NEUTROPHILS # BLD AUTO: 5.6 K/UL (ref 1.8–7.7)
NEUTROPHILS NFR BLD: 61.6 % (ref 38–73)
NRBC BLD-RTO: 0 /100 WBC
PHOSPHATE SERPL-MCNC: 4.2 MG/DL (ref 2.7–4.5)
PLATELET # BLD AUTO: 259 K/UL (ref 150–450)
PMV BLD AUTO: 9.7 FL (ref 9.2–12.9)
POCT GLUCOSE: 111 MG/DL (ref 70–110)
POCT GLUCOSE: 125 MG/DL (ref 70–110)
POCT GLUCOSE: 87 MG/DL (ref 70–110)
POTASSIUM SERPL-SCNC: 3.6 MMOL/L (ref 3.5–5.1)
RBC # BLD AUTO: 2.92 M/UL (ref 4–5.4)
SODIUM SERPL-SCNC: 142 MMOL/L (ref 136–145)
WBC # BLD AUTO: 9.15 K/UL (ref 3.9–12.7)

## 2025-02-20 PROCEDURE — 25000242 PHARM REV CODE 250 ALT 637 W/ HCPCS

## 2025-02-20 PROCEDURE — 25000003 PHARM REV CODE 250: Performed by: STUDENT IN AN ORGANIZED HEALTH CARE EDUCATION/TRAINING PROGRAM

## 2025-02-20 PROCEDURE — 94761 N-INVAS EAR/PLS OXIMETRY MLT: CPT

## 2025-02-20 PROCEDURE — 63600175 PHARM REV CODE 636 W HCPCS

## 2025-02-20 PROCEDURE — 25000003 PHARM REV CODE 250

## 2025-02-20 PROCEDURE — 94640 AIRWAY INHALATION TREATMENT: CPT

## 2025-02-20 PROCEDURE — 27000221 HC OXYGEN, UP TO 24 HOURS

## 2025-02-20 PROCEDURE — 80048 BASIC METABOLIC PNL TOTAL CA: CPT

## 2025-02-20 PROCEDURE — 36415 COLL VENOUS BLD VENIPUNCTURE: CPT

## 2025-02-20 PROCEDURE — 83735 ASSAY OF MAGNESIUM: CPT

## 2025-02-20 PROCEDURE — 85025 COMPLETE CBC W/AUTO DIFF WBC: CPT

## 2025-02-20 PROCEDURE — 84100 ASSAY OF PHOSPHORUS: CPT

## 2025-02-20 PROCEDURE — 99900035 HC TECH TIME PER 15 MIN (STAT)

## 2025-02-20 RX ORDER — ACETAMINOPHEN 325 MG/1
650 TABLET ORAL EVERY 6 HOURS
Status: DISCONTINUED | OUTPATIENT
Start: 2025-02-20 | End: 2025-02-20 | Stop reason: HOSPADM

## 2025-02-20 RX ORDER — KETOROLAC TROMETHAMINE 10 MG/1
10 TABLET, FILM COATED ORAL EVERY 6 HOURS
Qty: 12 TABLET | Refills: 0 | Status: SHIPPED | OUTPATIENT
Start: 2025-02-20 | End: 2025-02-23

## 2025-02-20 RX ORDER — METHOCARBAMOL 500 MG/1
500 TABLET, FILM COATED ORAL 4 TIMES DAILY
Status: DISCONTINUED | OUTPATIENT
Start: 2025-02-20 | End: 2025-02-20 | Stop reason: HOSPADM

## 2025-02-20 RX ORDER — METHOCARBAMOL 500 MG/1
500 TABLET, FILM COATED ORAL 3 TIMES DAILY
Qty: 15 TABLET | Refills: 0 | Status: SHIPPED | OUTPATIENT
Start: 2025-02-20 | End: 2025-02-25

## 2025-02-20 RX ADMIN — ACETAMINOPHEN 1000 MG: 10 INJECTION, SOLUTION INTRAVENOUS at 06:02

## 2025-02-20 RX ADMIN — IPRATROPIUM BROMIDE AND ALBUTEROL SULFATE 3 ML: 2.5; .5 SOLUTION RESPIRATORY (INHALATION) at 12:02

## 2025-02-20 RX ADMIN — BENZONATATE 100 MG: 100 CAPSULE ORAL at 06:02

## 2025-02-20 RX ADMIN — METHOCARBAMOL 500 MG: 100 INJECTION INTRAMUSCULAR; INTRAVENOUS at 12:02

## 2025-02-20 RX ADMIN — KETOROLAC TROMETHAMINE 15 MG: 15 INJECTION, SOLUTION INTRAMUSCULAR; INTRAVENOUS at 07:02

## 2025-02-20 RX ADMIN — ASPIRIN 81 MG: 81 TABLET, COATED ORAL at 08:02

## 2025-02-20 RX ADMIN — METHOCARBAMOL 500 MG: 500 TABLET ORAL at 08:02

## 2025-02-20 RX ADMIN — FAMOTIDINE 40 MG: 10 INJECTION, SOLUTION INTRAVENOUS at 08:02

## 2025-02-20 RX ADMIN — METOPROLOL TARTRATE 50 MG: 50 TABLET, FILM COATED ORAL at 08:02

## 2025-02-20 RX ADMIN — ACETAMINOPHEN 650 MG: 325 TABLET ORAL at 11:02

## 2025-02-20 RX ADMIN — ONDANSETRON 8 MG: 8 TABLET, ORALLY DISINTEGRATING ORAL at 08:02

## 2025-02-20 RX ADMIN — VALACYCLOVIR HYDROCHLORIDE 500 MG: 500 TABLET, FILM COATED ORAL at 08:02

## 2025-02-20 RX ADMIN — METHOCARBAMOL 500 MG: 500 TABLET ORAL at 12:02

## 2025-02-20 RX ADMIN — KETOROLAC TROMETHAMINE 15 MG: 15 INJECTION, SOLUTION INTRAMUSCULAR; INTRAVENOUS at 01:02

## 2025-02-20 RX ADMIN — KETOROLAC TROMETHAMINE 15 MG: 15 INJECTION, SOLUTION INTRAMUSCULAR; INTRAVENOUS at 12:02

## 2025-02-20 NOTE — DISCHARGE SUMMARY
"[facility]  MIS  Discharge Summary      Patient Name: Luz Garvin  MRN: [id number]  Admission Date: 2/19/2025  Hospital Length of Stay: 0 days  Discharge Date and Time:  02/20/2025 1:35 PM  Attending Physician: Jasmeet Franz, *   Discharging Provider: Natty Stein MD  Primary Care Provider: Gab Levy MD     HPI: Patient is a 59 y.o. female presents with bmi 27, kenneth on cpap, anemia of chronic disease, hx bronchitis, pilmonary sarcoidosis, essential htn, dm2 with long term insulin, right cva with imparied function, dysphagia, gerd and gastroparesis.  She has had trouble for a number of years.  She says symptoms are worsening despite medication.  She also has reflux despite medication.  She also has severe constipation despite medication and has tried linzess and is changing to amitiza.  She has bee to the ER 7/2024 in [place] for symptoms and has had other cardiac work ups for chest pain.  She hasn't had this recently.     She is on zofran qam and sometimes at night, phenergan rarely (makes her tired), nexium daily, and gaviscon qhs.  She has tried reglan but it made her "crazy".  She thinks she has tried erythromycin.  She take a a lidocaine combination/gi coctail to help her swallow.    Procedure(s) (LRB):  XI ROBOTIC INSERTION, GASTRIC ELECTRICAL STIMULATOR with leads and EGD (N/A)     Hospital Course:   Patient was admitted to the MIS service. she was made NPO, given IVF, as well as pain and nausea control. Started on antibiotics. SANIA GARVIN AKSHAT 59 y.o.female underwent: Procedure(s) (LRB):  XI ROBOTIC INSERTION, GASTRIC ELECTRICAL STIMULATOR with leads and EGD (N/A). The patient tolerated the procedure well, was transferred to recovery post-op, and then transferred to the [facility] for continuation of medical care. The patient's clinical condition progressively improved. Patient was HDS throughout admission. By the time of discharge, she was meeting all post op milestones, tolerating a " clear liquid diet without nausea or vomiting, pain was well controlled with oral medications, and she was ambulating without difficulty. Voiding appropriately. On POD 1 the patient was discharged to home. On discharge, the patient's incisions were c/d/i and the surgical site was soft and appropriately tender to palpation. The patient will follow up in Dr. Franz's clinic in 2 weeks. Discussed POC and ED precautions with patient. Patient verbalized understanding and is agreeable to plan. All questions answered.    Please see hospital and op notes for further detail regarding patient's admission.    Patient's discharge was discussed with Dr. Franz.       Consults (From admission, onward)          Status Ordering Provider     Inpatient consult to Registered Dietitian/Nutritionist  Once        Provider:  (Not yet assigned)    Acknowledged BIPIN FOSTER     Inpatient consult to Social Work/Case Management  Once        Provider:  (Not yet assigned)    Acknowledged ORDERS, INSTANT              Indwelling Lines/Drains at time of discharge:   Lines/Drains/Airways       None                   Significant Diagnostic Studies: Labs: BMP:   Recent Labs   Lab 02/19/25  1018 02/20/25  0325   * 82    142   K 3.8 3.6    108   CO2 24 22*   BUN 19 23*   CREATININE 0.8 0.8   CALCIUM 9.0 8.8   MG  --  1.6    and CBC   Recent Labs   Lab 02/20/25  0325   WBC 9.15   HGB 8.2*   HCT 25.8*          Pending Diagnostic Studies:       None            Final Active Diagnoses:    Diagnosis Date Noted POA    Gastroparesis [K31.84] 01/16/2025 Yes      Problems Resolved During this Admission:        Discharged Condition: good    Disposition: Home or Self Care    Follow Up:   Follow-up Information       Jasmeet Franz MD Follow up in 2 week(s).    Specialties: General Surgery, Bariatrics  Contact information:  [address]  [facility] [id number] [phone]                             Patient  Instructions:      Diet full liquid   Order Comments: Full liquid diet 1 week   1 week soft diet     Notify your health care provider if you experience any of the following:  increased confusion or weakness     Notify your health care provider if you experience any of the following:  persistent dizziness, light-headedness, or visual disturbances     Notify your health care provider if you experience any of the following:  worsening rash     Notify your health care provider if you experience any of the following:  severe persistent headache     Notify your health care provider if you experience any of the following:  difficulty breathing or increased cough     Notify your health care provider if you experience any of the following:  redness, tenderness, or signs of infection (pain, swelling, redness, odor or green/yellow discharge around incision site)     Notify your health care provider if you experience any of the following:  severe uncontrolled pain     Notify your health care provider if you experience any of the following:  persistent nausea and vomiting or diarrhea     Notify your health care provider if you experience any of the following:  temperature >100.4     Lifting restrictions   Order Comments: No lifting greater than 10 lbs for 6 weeks     Sponge bath only until clinic visit   Order Comments: Do not remove surgical dressing until clinic visit       Medications:  Reconciled Home Medications:      Medication List        START taking these medications      ketorolac 10 mg tablet  Commonly known as: TORADOL  Take 1 tablet (10 mg total) by mouth every 6 (six) hours. for 3 days     methocarbamoL 500 MG Tab  Commonly known as: Robaxin  Take 1 tablet (500 mg total) by mouth 3 (three) times daily. for 5 days            CONTINUE taking these medications      acyclovir 400 MG tablet  Commonly known as: ZOVIRAX  Take 1 tablet (400 mg total) by mouth once daily.     ALBUTEROL INHL  Inhale into the lungs. Every 6  hrs prn     aspirin 81 MG EC tablet  Commonly known as: ECOTRIN  Take 81 mg by mouth once daily.     benzonatate 100 MG capsule  Commonly known as: TESSALON  Take 100 mg by mouth 3 (three) times daily as needed.     biotin 10,000 mcg Cap  Take by mouth once daily.     BREO ELLIPTA 100-25 mcg/dose diskus inhaler  Generic drug: fluticasone furoate-vilanteroL  Inhale 1 puff into the lungs.     candesartan 32 MG tablet  Commonly known as: ATACAND  Take 32 mg by mouth once daily.     cloNIDine 0.1 MG tablet  Commonly known as: CATAPRES  Take 0.1 mg by mouth 2 (two) times daily as needed (HBP).     diclofenac sodium 1 % Gel  Commonly known as: VOLTAREN  daily as needed.     diphenhydrAMINE-aluminum-magnesium hydroxide-simethicone-LIDOcaine HCl 2%  Swish and swallow 5 mLs every 6 (six) hours as needed (throat irritation).     EASY TOUCH ALCOHOL PREP PADS Padm  Generic drug: alcohol swabs  Apply 1 each topically 3 (three) times daily.     ergocalciferol 50,000 unit Cap  Commonly known as: ERGOCALCIFEROL     esomeprazole 40 MG capsule  Commonly known as: NEXIUM  Take 1 capsule (40 mg total) by mouth once daily.     fluticasone propionate 50 mcg/actuation nasal spray  Commonly known as: FLONASE     FREESTYLE FREEDOM LITE kit  Generic drug: blood-glucose meter     FREESTYLE LANCETS 28 gauge lancets  Generic drug: lancets  Apply topically 3 (three) times daily.     FREESTYLE LITE STRIPS Strp  Generic drug: blood sugar diagnostic  1 strip 3 (three) times daily.     GAVISCON EXTRA STRENGTH 254-237.5 mg/5 mL Susp  Generic drug: aluminum hydrox-magnesium carb  10 mLs.     ipratropium 42 mcg (0.06 %) nasal spray  Commonly known as: ATROVENT  2 sprays 2 (two) times daily.     LANTUS SOLOSTAR U-100 INSULIN 100 unit/mL (3 mL) Inpn pen  Generic drug: insulin glargine U-100 (Lantus)  Inject 24 Units into the skin every evening.     levalbuterol 0.63 mg/3 mL nebulizer solution  Commonly known as: XOPENEX  Take 1 ampule by nebulization  every 4 (four) hours as needed for Wheezing. Rescue     LIDOcaine 5 %  Commonly known as: LIDODERM  daily as needed.     lubiprostone 24 MCG Cap  Commonly known as: AMITIZA  Take 1 capsule (24 mcg total) by mouth 2 (two) times daily with meals.     metFORMIN 500 MG ER 24hr tablet  Commonly known as: GLUCOPHAGE-XR  Take 500 mg by mouth 2 (two) times daily with meals.     metoprolol tartrate 50 MG tablet  Commonly known as: LOPRESSOR  Take 50 mg by mouth 2 (two) times daily.     nortriptyline 75 MG Cap  Commonly known as: PAMELOR  TAKE 1 CAPSULE BY MOUTH EVERY NIGHT 1 HOUR BEFORE BEDTIME     rosuvastatin 40 MG Tab  Commonly known as: CRESTOR  Take 40 mg by mouth once daily.     tiotropium 18 mcg inhalation capsule  Commonly known as: SPIRIVA  Inhale 36 mcg into the lungs once daily. Controller     triamterene-hydrochlorothiazide 75-50 mg 75-50 mg per tablet  Commonly known as: MAXZIDE  Take 1 tablet by mouth once daily.     TYLENOL EXTRA STRENGTH 500 mg tablet  Generic drug: acetaminophen  Take 500 mg by mouth daily as needed.     valACYclovir 1000 MG tablet  Commonly known as: VALTREX  Take 0.5 tablets (500 mg total) by mouth 2 (two) times daily.     VITAMIN D ORAL  Take by mouth once daily.     vitamin E 600 UNIT capsule  Take 600 Units by mouth once daily.            ASK your doctor about these medications      carBAMazepine 400 MG Tb12  Commonly known as: TEGRETOL XR  Take 400 mg by mouth 2 (two) times daily.     promethazine-dextromethorphan 6.25-15 mg/5 mL Syrp  Commonly known as: PROMETHAZINE-DM  5 mLs daily as needed.              Natty Stein MD           Patient was seen and examined on the date of discharge and determined to be suitable for discharge.  Total time spent preparing discharge services: 20 minutes.  Time was spent speaking with consultants and case management, reviewing records, and/or discussing the plan of care with patient/family.    Natty Stein MD  General Surgery   PGY-3

## 2025-02-20 NOTE — PROGRESS NOTES
[facility]  General Surgery  Progress Note    Subjective:     History of Present Illness:  No notes on file    Post-Op Info:  Procedure(s) (LRB):  XI ROBOTIC INSERTION, GASTRIC ELECTRICAL STIMULATOR with leads and EGD (N/A)   1 Day Post-Op     Interval History: NAEON. AF. HDS. Some pain with movement controlled with medication. No N/V. Voiding appropriately.    Medications:  Continuous Infusions:   0.9% NaCl   Intravenous Continuous 70 mL/hr at 02/19/25 0659 New Bag at 02/19/25 0659    lactated ringers   Intravenous Continuous 100 mL/hr at 02/19/25 2013 New Bag at 02/19/25 2013     Scheduled Meds:   acetaminophen  1,000 mg Intravenous Q8H    aspirin  81 mg Oral Daily    enoxparin  40 mg Subcutaneous Q24H (prophylaxis, 1700)    famotidine (PF)  40 mg Intravenous Daily    gabapentin  300 mg Oral TID    metoprolol tartrate  50 mg Oral BID    nortriptyline  75 mg Oral QHS    scopolamine  1 patch Transdermal Once    valACYclovir  500 mg Oral BID     PRN Meds:  Current Facility-Administered Medications:     acetaminophen, 650 mg, Oral, Q8H PRN    albuterol-ipratropium, 3 mL, Nebulization, Q4H PRN    benzonatate, 100 mg, Oral, TID PRN    dextrose 50%, 12.5 g, Intravenous, PRN    glucagon (human recombinant), 1 mg, Intramuscular, PRN    insulin aspart U-100, 0-10 Units, Subcutaneous, Q6H PRN    ketorolac, 15 mg, Intravenous, Q6H PRN    LIDOcaine (PF) 10 mg/ml (1%), 1 mL, Intradermal, Once PRN    melatonin, 6 mg, Oral, Nightly PRN    ondansetron, 8 mg, Oral, Q8H PRN    prochlorperazine, 5 mg, Intravenous, Q6H PRN    sodium chloride 0.9%, 10 mL, Intravenous, PRN     Review of patient's allergies indicates:   Allergen Reactions    Sulfa (sulfonamide antibiotics) Anaphylaxis    Sulfamethoxazole-trimethoprim Anaphylaxis and Diarrhea     Other reaction(s): Anaphylaxis, Finding of vomiting, Diarrhea    Temazepam      Sleep walking   Other reaction(s): Sleep related hallucinations    Pantoprazole Nausea And Vomiting     Pregabalin Other (See Comments)    Verapamil      Other reaction(s): Constipation    Zonisamide      Other reaction(s): Anaphylaxis, Diarrhea, Finding of vomiting, Anaphylaxis, Diarrhea, Finding of vomiting, Anaphylaxis, Diarrhea, Finding of vomiting    Metoclopramide Rash and Anxiety    Sucralfate Other (See Comments) and Nausea Only     Objective:     Vital Signs (Most Recent):  Temp: 97.8 °F (36.6 °C) (02/20/25 0500)  Pulse: 85 (02/20/25 0500)  Resp: 18 (02/20/25 0500)  BP: 132/60 (02/20/25 0500)  SpO2: 100 % (02/20/25 0500) Vital Signs (24h Range):  Temp:  [96.3 °F (35.7 °C)-97.8 °F (36.6 °C)] 97.8 °F (36.6 °C)  Pulse:  [62-85] 85  Resp:  [15-27] 18  SpO2:  [94 %-100 %] 100 %  BP: (118-167)/(56-72) 132/60     Weight: 73.5 kg (162 lb 0.6 oz)  Body mass index is 26.96 kg/m².    Intake/Output - Last 3 Shifts         02/18 0700  02/19 0659 02/19 0700  02/20 0659 02/20 0700  02/21 0659    IV Piggyback  700     Total Intake(mL/kg)  700 (9.5)     Net  +700                     Physical Exam  Vitals and nursing note reviewed.   Constitutional:       General: She is not in acute distress.  HENT:      Head: Normocephalic and atraumatic.   Eyes:      Extraocular Movements: Extraocular movements intact.      Pupils: Pupils are equal, round, and reactive to light.   Cardiovascular:      Rate and Rhythm: Normal rate and regular rhythm.   Abdominal:      Palpations: Abdomen is soft.      Tenderness: There is no abdominal tenderness.      Comments: Incisions cdi  Stimulator covered in dressing  No ttp   Musculoskeletal:         General: Normal range of motion.      Cervical back: Normal range of motion.   Skin:     General: Skin is warm and dry.   Neurological:      General: No focal deficit present.      Mental Status: She is alert.          Significant Labs:  I have reviewed all pertinent lab results within the past 24 hours.  CBC:   Recent Labs   Lab 02/20/25  0325   WBC 9.15   RBC 2.92*   HGB 8.2*   HCT 25.8*      MCV  88   MCH 28.1   MCHC 31.8*     CMP:   Recent Labs   Lab 02/19/25  1018 02/20/25  0325   * 82   CALCIUM 9.0 8.8   ALBUMIN 3.7  --    PROT 6.9  --     142   K 3.8 3.6   CO2 24 22*    108   BUN 19 23*   CREATININE 0.8 0.8   ALKPHOS 61  --    ALT 15  --    AST 27  --    BILITOT 0.1  --        Significant Diagnostics:  I have reviewed all pertinent imaging results/findings within the past 24 hours.  Assessment/Plan:     Gastroparesis  59 y.o. female s/p robotic gastric stimulator placement performed on 2/19/2025.     - Okay for CLD  - MMPC; no narcotics   - d/c IVF  - restart home meds  - SCDs/DVT ppx  - ambulate in halls   - Nutrition consult     Dispo: likely home later today        Yolanda Valdez MD  General Surgery  [facility]

## 2025-02-20 NOTE — ASSESSMENT & PLAN NOTE
59 y.o. female s/p robotic gastric stimulator placement performed on 2/19/2025.     - Okay for CLD  - MMPC; no narcotics   - d/c IVF  - restart home meds  - SCDs/DVT ppx  - ambulate in halls   - Nutrition consult     Dispo: likely home later today

## 2025-02-20 NOTE — SUBJECTIVE & OBJECTIVE
Interval History: NAEON. AF. HDS. Some pain with movement controlled with medication. No N/V. Voiding appropriately.    Medications:  Continuous Infusions:   0.9% NaCl   Intravenous Continuous 70 mL/hr at 02/19/25 0659 New Bag at 02/19/25 0659    lactated ringers   Intravenous Continuous 100 mL/hr at 02/19/25 2013 New Bag at 02/19/25 2013     Scheduled Meds:   acetaminophen  1,000 mg Intravenous Q8H    aspirin  81 mg Oral Daily    enoxparin  40 mg Subcutaneous Q24H (prophylaxis, 1700)    famotidine (PF)  40 mg Intravenous Daily    gabapentin  300 mg Oral TID    metoprolol tartrate  50 mg Oral BID    nortriptyline  75 mg Oral QHS    scopolamine  1 patch Transdermal Once    valACYclovir  500 mg Oral BID     PRN Meds:  Current Facility-Administered Medications:     acetaminophen, 650 mg, Oral, Q8H PRN    albuterol-ipratropium, 3 mL, Nebulization, Q4H PRN    benzonatate, 100 mg, Oral, TID PRN    dextrose 50%, 12.5 g, Intravenous, PRN    glucagon (human recombinant), 1 mg, Intramuscular, PRN    insulin aspart U-100, 0-10 Units, Subcutaneous, Q6H PRN    ketorolac, 15 mg, Intravenous, Q6H PRN    LIDOcaine (PF) 10 mg/ml (1%), 1 mL, Intradermal, Once PRN    melatonin, 6 mg, Oral, Nightly PRN    ondansetron, 8 mg, Oral, Q8H PRN    prochlorperazine, 5 mg, Intravenous, Q6H PRN    sodium chloride 0.9%, 10 mL, Intravenous, PRN     Review of patient's allergies indicates:   Allergen Reactions    Sulfa (sulfonamide antibiotics) Anaphylaxis    Sulfamethoxazole-trimethoprim Anaphylaxis and Diarrhea     Other reaction(s): Anaphylaxis, Finding of vomiting, Diarrhea    Temazepam      Sleep walking   Other reaction(s): Sleep related hallucinations    Pantoprazole Nausea And Vomiting    Pregabalin Other (See Comments)    Verapamil      Other reaction(s): Constipation    Zonisamide      Other reaction(s): Anaphylaxis, Diarrhea, Finding of vomiting, Anaphylaxis, Diarrhea, Finding of vomiting, Anaphylaxis, Diarrhea, Finding of vomiting     Metoclopramide Rash and Anxiety    Sucralfate Other (See Comments) and Nausea Only     Objective:     Vital Signs (Most Recent):  Temp: 97.8 °F (36.6 °C) (02/20/25 0500)  Pulse: 85 (02/20/25 0500)  Resp: 18 (02/20/25 0500)  BP: 132/60 (02/20/25 0500)  SpO2: 100 % (02/20/25 0500) Vital Signs (24h Range):  Temp:  [96.3 °F (35.7 °C)-97.8 °F (36.6 °C)] 97.8 °F (36.6 °C)  Pulse:  [62-85] 85  Resp:  [15-27] 18  SpO2:  [94 %-100 %] 100 %  BP: (118-167)/(56-72) 132/60     Weight: 73.5 kg (162 lb 0.6 oz)  Body mass index is 26.96 kg/m².    Intake/Output - Last 3 Shifts         02/18 0700  02/19 0659 02/19 0700 02/20 0659 02/20 0700 02/21 0659    IV Piggyback  700     Total Intake(mL/kg)  700 (9.5)     Net  +700                     Physical Exam  Vitals and nursing note reviewed.   Constitutional:       General: She is not in acute distress.  HENT:      Head: Normocephalic and atraumatic.   Eyes:      Extraocular Movements: Extraocular movements intact.      Pupils: Pupils are equal, round, and reactive to light.   Cardiovascular:      Rate and Rhythm: Normal rate and regular rhythm.   Abdominal:      Palpations: Abdomen is soft.      Tenderness: There is no abdominal tenderness.      Comments: Incisions cdi  Stimulator covered in dressing  No ttp   Musculoskeletal:         General: Normal range of motion.      Cervical back: Normal range of motion.   Skin:     General: Skin is warm and dry.   Neurological:      General: No focal deficit present.      Mental Status: She is alert.          Significant Labs:  I have reviewed all pertinent lab results within the past 24 hours.  CBC:   Recent Labs   Lab 02/20/25  0325   WBC 9.15   RBC 2.92*   HGB 8.2*   HCT 25.8*      MCV 88   MCH 28.1   MCHC 31.8*     CMP:   Recent Labs   Lab 02/19/25  1018 02/20/25  0325   * 82   CALCIUM 9.0 8.8   ALBUMIN 3.7  --    PROT 6.9  --     142   K 3.8 3.6   CO2 24 22*    108   BUN 19 23*   CREATININE 0.8 0.8   ALKPHOS 61   --    ALT 15  --    AST 27  --    BILITOT 0.1  --        Significant Diagnostics:  I have reviewed all pertinent imaging results/findings within the past 24 hours.

## 2025-02-20 NOTE — PLAN OF CARE
Problem: Adult Inpatient Plan of Care  Goal: Plan of Care Review  Outcome: Progressing  Goal: Patient-Specific Goal (Individualized)  Outcome: Progressing  Goal: Absence of Hospital-Acquired Illness or Injury  Outcome: Progressing  Goal: Optimal Comfort and Wellbeing  Outcome: Progressing  Goal: Readiness for Transition of Care  Outcome: Progressing     Patient reports increased pain throughout shift especially upon movement. Educated patient on patient regime. Scheduled and prn pain medication given. Patient complains of cough, prn medication given and cough has subsided. Educated patient to call staff for mobility assistance when feeling dizzy or weak. Patient verbalized understanding. Explained plan of care. No injury during shift. Safety measure and fall precaution in place. Call light within reach. Spouse remains at bedside.

## 2025-02-20 NOTE — CONSULTS
Food & Nutrition  Education    Diet Education: Post-op Gastric electrical stimulator  Time Spent: 10  Learners: patient    Nutrition Education provided with handouts: Diet After Nissen Fundoplication    Current Diet: CLD    Comments: Pt endorses good appetite/intake. No N/V. Pt agreed to review education materials - encouraged full liquid diet x 1 week, followed by post-nissen diet 1 week/ until clinic follow up. Discussed ways to prevent stomach stretching and excess gas to avoid possible complications. All questions and concerns answered. Dietitian's contact information provided.       Please Re-consult as needed.    Thanks!  Marti Nugent RD, LDN

## 2025-02-20 NOTE — NURSING
Pt is being discharged from the unit. Pt received and understood discharge instructions. IV has been taken out. Pt received medicine via pharmacy.

## 2025-02-24 NOTE — PLAN OF CARE
[facility]  Discharge Final Note    Primary Care Provider: Gab Levy MD    Expected Discharge Date: 2/20/2025    Final Discharge Note (most recent)       Final Note - 02/20/25 1749          Final Note    Assessment Type Final Discharge Note     Anticipated Discharge Disposition Home or Self Care     Hospital Resources/Appts/Education Provided Provided patient/caregiver with written discharge plan information;Provided education on problems/symptoms using teachback                   Future Appointments   Date Time Provider Department Center   2/26/2025 12:45 PM Bennett Thrasher MD [facility]   3/6/2025  1:15 PM Jasmeet Franz MD [facility]     Contact Info       Jasmeet Franz MD   Specialty: General Surgery, Bariatrics    [address]  [facility] [address]   Phone: [phone]       Next Steps: Follow up in 2 week(s)

## 2025-02-25 ENCOUNTER — PATIENT MESSAGE (OUTPATIENT)
Dept: SURGERY | Facility: CLINIC | Age: 60
End: 2025-02-25
Payer: MEDICARE

## 2025-02-25 ENCOUNTER — LAB VISIT (OUTPATIENT)
Dept: LAB | Facility: HOSPITAL | Age: 60
End: 2025-02-25
Attending: SURGERY
Payer: MEDICARE

## 2025-02-25 DIAGNOSIS — T81.40XA POSTOPERATIVE INFECTION, UNSPECIFIED TYPE, INITIAL ENCOUNTER: Primary | ICD-10-CM

## 2025-02-25 DIAGNOSIS — R50.9 FEVER AND CHILLS: ICD-10-CM

## 2025-02-25 DIAGNOSIS — T81.40XA POSTOPERATIVE INFECTION, UNSPECIFIED TYPE, INITIAL ENCOUNTER: ICD-10-CM

## 2025-02-25 LAB
BASOPHILS # BLD AUTO: 0.03 K/UL (ref 0–0.2)
BASOPHILS NFR BLD: 0.3 % (ref 0–1.9)
DIFFERENTIAL METHOD BLD: ABNORMAL
EOSINOPHIL # BLD AUTO: 0.3 K/UL (ref 0–0.5)
EOSINOPHIL NFR BLD: 2.7 % (ref 0–8)
ERYTHROCYTE [DISTWIDTH] IN BLOOD BY AUTOMATED COUNT: 12.7 % (ref 11.5–14.5)
HCT VFR BLD AUTO: 31.5 % (ref 37–48.5)
HGB BLD-MCNC: 10 G/DL (ref 12–16)
IMM GRANULOCYTES # BLD AUTO: 0.01 K/UL (ref 0–0.04)
IMM GRANULOCYTES NFR BLD AUTO: 0.1 % (ref 0–0.5)
LYMPHOCYTES # BLD AUTO: 3.5 K/UL (ref 1–4.8)
LYMPHOCYTES NFR BLD: 38.5 % (ref 18–48)
MCH RBC QN AUTO: 27.6 PG (ref 27–31)
MCHC RBC AUTO-ENTMCNC: 31.7 G/DL (ref 32–36)
MCV RBC AUTO: 87 FL (ref 82–98)
MONOCYTES # BLD AUTO: 0.8 K/UL (ref 0.3–1)
MONOCYTES NFR BLD: 8.5 % (ref 4–15)
NEUTROPHILS # BLD AUTO: 4.6 K/UL (ref 1.8–7.7)
NEUTROPHILS NFR BLD: 49.9 % (ref 38–73)
NRBC BLD-RTO: 0 /100 WBC
PLATELET # BLD AUTO: 340 K/UL (ref 150–450)
PMV BLD AUTO: 8.8 FL (ref 9.2–12.9)
RBC # BLD AUTO: 3.62 M/UL (ref 4–5.4)
WBC # BLD AUTO: 9.2 K/UL (ref 3.9–12.7)

## 2025-02-25 PROCEDURE — 85025 COMPLETE CBC W/AUTO DIFF WBC: CPT | Performed by: SURGERY

## 2025-02-25 PROCEDURE — 36415 COLL VENOUS BLD VENIPUNCTURE: CPT | Performed by: SURGERY

## 2025-02-27 ENCOUNTER — OFFICE VISIT (OUTPATIENT)
Dept: SURGERY | Facility: CLINIC | Age: 60
End: 2025-02-27
Payer: MEDICARE

## 2025-02-27 ENCOUNTER — HOSPITAL ENCOUNTER (OUTPATIENT)
Dept: RADIOLOGY | Facility: HOSPITAL | Age: 60
Discharge: HOME | End: 2025-02-27
Attending: SURGERY
Payer: MEDICARE

## 2025-02-27 VITALS
HEIGHT: 65 IN | BODY MASS INDEX: 27 KG/M2 | WEIGHT: 162.06 LBS | DIASTOLIC BLOOD PRESSURE: 51 MMHG | HEART RATE: 79 BPM | SYSTOLIC BLOOD PRESSURE: 100 MMHG

## 2025-02-27 DIAGNOSIS — R07.9 CHEST PAIN, UNSPECIFIED TYPE: ICD-10-CM

## 2025-02-27 DIAGNOSIS — K31.84 GASTROPARESIS: Primary | ICD-10-CM

## 2025-02-27 PROCEDURE — 99999 PR PBB SHADOW E&M-EST. PATIENT-LVL V: CPT | Mod: PBBFAC,,, | Performed by: SURGERY

## 2025-02-27 PROCEDURE — 99024 POSTOP FOLLOW-UP VISIT: CPT | Mod: POP,,, | Performed by: SURGERY

## 2025-02-27 PROCEDURE — 71046 X-RAY EXAM CHEST 2 VIEWS: CPT | Mod: 26,,, | Performed by: RADIOLOGY

## 2025-02-27 PROCEDURE — 99215 OFFICE O/P EST HI 40 MIN: CPT | Mod: PBBFAC,25 | Performed by: SURGERY

## 2025-02-27 PROCEDURE — 71046 X-RAY EXAM CHEST 2 VIEWS: CPT | Mod: TC

## 2025-02-27 RX ORDER — DOXYCYCLINE 100 MG/1
100 CAPSULE ORAL 2 TIMES DAILY
Qty: 28 CAPSULE | Refills: 1 | Status: SHIPPED | OUTPATIENT
Start: 2025-02-27

## 2025-02-27 NOTE — LETTER
February 27, 2025        Gab Levy MD  [address] [facility] [place] [address]             [facility] 2[facility]  [address]  [facility] [address]  Phone: [phone]   Patient: Luz Arias   MR Number: [id number]   YOB: 1965   Date of Visit: 2/27/2025       Dear Dr. Levy:    Thank you for referring Luz Arias to me for evaluation. Attached you will find relevant portions of my assessment and plan of care.    If you have questions, please do not hesitate to call me. I look forward to following Luz Arias along with you.    Sincerely,      Jasmeet Franz MD            CC  No Recipients    Enclosure

## 2025-02-27 NOTE — PROGRESS NOTES
"[facility]  General Surgery  Post-operative Note    Subjective:       Luz Arias presents to the clinic 8 weeks following robotic insertion of gastric eletrical stimulator with leads and EGD. Eating a regular diet with difficulty. Bowel movements are Abnormal- constipation for the past 3 days.  .  Pain is controlled without any medications.. She reports chills and fatigue over the past 3 days and a cough, though she attributes it to her history of sarcoidosis. She denies any fever. She describes drainage from all her incision sites.      Objective:      BP (!) 100/51 (BP Location: Left arm, Patient Position: Sitting)   Pulse 79   Ht 5' 5" (1.651 m)   Wt 73.5 kg (162 lb 0.6 oz)   BMI 26.96 kg/m²     General:  alert, appears stated age, cooperative, and mild distress   Abdomen: soft, tender near incision sites   Incision:   no hernia, incision well approximated, erythema and swelling, yellow-white drainage on the dressing       Gastroparesis Questionnaire (Severity /4, Frequency /4)  Vomiting - 0, 0   Nausea - 0,0   Early Satiety - 3,4   Bloating - 3, 4  Postprandial fullness - 3, 4  Epigastric pain - 1, 2   Epigastric burning - 0,0   Assessment:      Mrs. Arias seems to feeling worse in the past 3 days and there are concerns for infection at the incision sites given the drainage and erythema.      Plan:      Begin treatment for possible infection with consideration that this might be a hypersensitivity reaction at the incision sites instead.     1. Continue miralax for constipation and her other current medications  2. Wound care discussed.  3. Start course of empirical antibiotics with doxycycline  4. Follow up in 1 week   "

## 2025-02-27 NOTE — PROGRESS NOTES
"I have seen the patient, reviewed the Student's history and physical, assessment and plan. I have personally interviewed and examined the patient at bedside and: agree with the findings.      59 y.o. female presents with bmi 27, kenneth on cpap, anemia of chronic disease, hx bronchitis, pilmonary sarcoidosis, essential htn, dm2 with long term insulin, right cva with imparied function, dysphagia, gerd and gastroparesis.      Preop history 1/16/25: She has had trouble for a number of years.  She says symptoms are worsening despite medication.  She also has reflux despite medication.  She also has severe constipation despite medication and has tried linzess and is changing to amitiza.  She has bee to the ER 7/2024 in [place] for symptoms and has had other cardiac work ups for chest pain.  She hasn't had this recently.     She is on zofran qam and sometimes at night, phenergan rarely (makes her tired), nexium daily, and gaviscon qhs.  She has tried reglan but it made her "crazy".  She thinks she has tried erythromycin.  She take a a lidocaine combination/gi coctail to help her swallow.     Post Gastric Pacemaker Symptom Monitor     Severity/Frequency     Vomiting- 3/2  Nausea-3/4  Early Satiety-3/4  Bloating-3/4  Postprandial fullness-3/4  Epigastric pain-3/3  Epigastric burning-3/4     GERD Questionnaire      daily PPI with occasional gaviscon  has Typical heartburn  has Regurgitation  has Dysphagia solids  has Dysphagia liquids  has Hoarseness  has Sore throat  has Cough  Possibly has Asthma  no Chest pain  no Water brash, has dry mouth  has Globus  has Nausea  has Vomiting     Eckardt Score (none =0, occ=1, daily=2, every meal=3, weight: 0=0, <5=1, 5-10=2, >10=3)  Dysphagia=2  Regurgitation=2  Chest pain=0  Weight loss (kg)=0  Total=4     Nutrition: she eats little, is bmi 27/166lb today.  Note 4/4/23 shows weight 183/bmi 30.    Interval history, postop:  She is not feeling well with chills, fatigue.  Her gastroparesis " symptoms are greatly improved.  She is taking nexium daily and gaviscon at night.  She hasn't needed zofran or phenergan.  She has some pain on there right side.  She is not eating very much but is drinking 2 boost a day.      shows no narcotics.     PE erythema at the smaller wound sites and some drainage at all sites     Laboratory  CBC: Reviewed  CMP: Reviewed  Elevated glc c/w dm, anemia of chronic disease     Diagnostic Results:  Ct 2024 reviewed, films viewed, stomach and vessels appear ok, other findings in report  Egd 2023 reviewed, possible candidiasis, gastritis, other findings in report  Ges 2022 reviewed, at 228 minutes the percentage of retention is 32 %, T1/2 gastric emptying time is calculated at 176 minutes.      ASSESSMENT/PLAN:      S/p gstim for gastroparesis with improved gastroparesis symptoms but not otherwise feeling well.  Likely wound infection vs allergic rxn due to skin glue.     PLAN:     Start doxy, obtain cbc and cxr.  Keep on dry dressing and binder.  Use mirilax for constipation.  Advance to soft diet.  Follow up one week.

## 2025-03-05 RX ORDER — CIPROFLOXACIN 500 MG/1
500 TABLET ORAL 2 TIMES DAILY
Qty: 20 TABLET | Refills: 0 | Status: SHIPPED | OUTPATIENT
Start: 2025-03-05 | End: 2025-03-06

## 2025-03-06 ENCOUNTER — TELEPHONE (OUTPATIENT)
Dept: SURGERY | Facility: CLINIC | Age: 60
End: 2025-03-06
Payer: MEDICARE

## 2025-03-06 ENCOUNTER — OFFICE VISIT (OUTPATIENT)
Dept: SURGERY | Facility: CLINIC | Age: 60
End: 2025-03-06
Payer: MEDICARE

## 2025-03-06 VITALS
WEIGHT: 162.69 LBS | HEART RATE: 72 BPM | SYSTOLIC BLOOD PRESSURE: 128 MMHG | BODY MASS INDEX: 27.1 KG/M2 | DIASTOLIC BLOOD PRESSURE: 60 MMHG | HEIGHT: 65 IN

## 2025-03-06 DIAGNOSIS — K31.84 GASTROPARESIS: Primary | ICD-10-CM

## 2025-03-06 PROCEDURE — 99215 OFFICE O/P EST HI 40 MIN: CPT | Mod: PBBFAC | Performed by: SURGERY

## 2025-03-06 PROCEDURE — 99999 PR PBB SHADOW E&M-EST. PATIENT-LVL V: CPT | Mod: PBBFAC,,, | Performed by: SURGERY

## 2025-03-06 PROCEDURE — 99024 POSTOP FOLLOW-UP VISIT: CPT | Mod: POP,,, | Performed by: SURGERY

## 2025-03-06 RX ORDER — CIPROFLOXACIN 500 MG/1
500 TABLET ORAL 2 TIMES DAILY
Qty: 20 TABLET | Refills: 0 | Status: SHIPPED | OUTPATIENT
Start: 2025-03-06 | End: 2025-03-16

## 2025-03-06 NOTE — LETTER
March 6, 2025        Gab Levy MD  [address] [facility] [place] [address]             [facility]  [address]  [facility] [address]  Phone: [phone]   Patient: Luz Arias   MR Number: [id number]   YOB: 1965   Date of Visit: 3/6/2025       Dear Dr. Levy:    Thank you for referring Luz Arias to me for evaluation. Attached you will find relevant portions of my assessment and plan of care.    If you have questions, please do not hesitate to call me. I look forward to following Luz Arias along with you.    Sincerely,      Jasmeet Franz MD            CC  No Recipients    Enclosure

## 2025-03-06 NOTE — PROGRESS NOTES
I have seen the patient, reviewed the Resident's history and physical, assessment and plan. I have personally interviewed and examined the patient at bedside and: agree with the findings.     She is improved with gstim.  She denies nausea and vomiting.  She has some pain around the gstim improved with robaxin and itching improved with benadryl.  She says the wound drainage is improved.  She stopped zofran and is taking ppi once a day.    PE wounds clear with only slight skin opening over stimulator    Doing well.  Light duty one more month.  Follow up one week and 2 months with ges.

## 2025-03-06 NOTE — PROGRESS NOTES
General Surgery Office Visit  Gastroparesis Follow Up    SUBJECTIVE:     History of Present Illness:  Patient is a 59 y.o. female presents for follow up of gastroparesis s/p gastric stimulator insertion on 2/19/2025. Patient states that their symptoms have improved. No emesis since stimulator. Stomach feels less bloated. She has some deep pain in the right epigastric region. She has noticed some drainage from port and incision site but has slowed down. Superior incision tender. Will start course of cipro today. She denies n/v, fever, chills, change in bowel habits    Medications:  Promotility: none   Nausea Control: zofran every other day  Acid Control: PPI daily   Antispasmodics: none  Pain Control: Toradol and robaxin post op    Gastroparesis scoring:  Vomting:  Severity (0) /Frequency (0)  Nausea: Severity (0) /Frequency (0)  Early satiety: Severity (0) /Frequency (0)  Bloating: Severity (2) /Frequency (2)  Postprandial fullness: Severity (0) /Frequency (0)  Epigastric pain: Severity (1) / Frequency (1)  Epigastric burning: Severity (0) / Frequency (0)      GERD Questionnaire  Meds: PPI with occasional gaviscon   no Typical heartburn  no Regurgitation  has Dysphagia solids  has Dysphagia liquids  has Hoarseness  no Sore throat  ocassional Cough  no Asthma  no Chest pain  no Water brash  has Globus  no Nausea  no Vomiting     Review of patient's allergies indicates:   Allergen Reactions    Sulfa (sulfonamide antibiotics) Anaphylaxis    Sulfamethoxazole-trimethoprim Anaphylaxis and Diarrhea     Other reaction(s): Anaphylaxis, Finding of vomiting, Diarrhea    Temazepam      Sleep walking   Other reaction(s): Sleep related hallucinations    Pantoprazole Nausea And Vomiting    Pregabalin Other (See Comments)    Verapamil      Other reaction(s): Constipation    Zonisamide      Other reaction(s): Anaphylaxis, Diarrhea, Finding of vomiting, Anaphylaxis, Diarrhea, Finding of vomiting, Anaphylaxis, Diarrhea, Finding of  "vomiting    Metoclopramide Rash and Anxiety    Sucralfate Other (See Comments) and Nausea Only       Current Medications[1]      OBJECTIVE:     Vital Signs (Most Recent)  Pulse: 72 (03/06/25 1321)  BP: 128/60 (03/06/25 1321)  5' 5" (1.651 m)  73.8 kg (162 lb 11.2 oz)     Physical Exam:  Physical Exam  Constitutional:       Appearance: Normal appearance.   Cardiovascular:      Rate and Rhythm: Normal rate and regular rhythm.      Pulses: Normal pulses.      Heart sounds: Normal heart sounds.   Pulmonary:      Breath sounds: Normal breath sounds.   Abdominal:      Comments: Port site incisions c/d/I. Some tenderness around superior incision. Some dried drainage around superior incision   Neurological:      Mental Status: She is alert.         Laboratory  CBC: Reviewed      Diagnostic Results:  CXR: Result Reviewed    ASSESSMENT/PLAN:     Luz Arias is a 59 y.o. female with history of gastroparesis s/p gastric stimulator insertion 2/19/2025    PLAN:    - Increase stimulator setting  - Eat small, frequent meals (4-6 per day)  - Avoid foods high in fat and fiber.  - Chew foods well before swallowing.  - Drink fluids throughout meals.  Sit upright or walk after meals.   - Follow up in Clinic PRN. Call clinic for any questions, concerns or change in symptoms.     Jarrod Tucker MD  General Surgery PGY-1         [1]   Current Outpatient Medications   Medication Sig Dispense Refill    acyclovir (ZOVIRAX) 400 MG tablet Take 1 tablet (400 mg total) by mouth once daily. 90 tablet 4    ALBUTEROL INHL Inhale into the lungs. Every 6 hrs prn      aluminum hydrox-magnesium carb (GAVISCON EXTRA STRENGTH) 254-237.5 mg/5 mL Susp 10 mLs.      aspirin (ECOTRIN) 81 MG EC tablet Take 81 mg by mouth once daily.      benzonatate (TESSALON) 100 MG capsule Take 100 mg by mouth 3 (three) times daily as needed.      biotin 10,000 mcg Cap Take by mouth once daily.      BREO ELLIPTA 100-25 mcg/dose diskus inhaler Inhale 1 puff into the lungs. "      candesartan (ATACAND) 32 MG tablet Take 32 mg by mouth once daily.      ciprofloxacin HCl (CIPRO) 500 MG tablet Take 1 tablet (500 mg total) by mouth 2 (two) times daily. for 10 days 20 tablet 0    cloNIDine (CATAPRES) 0.1 MG tablet Take 0.1 mg by mouth 2 (two) times daily as needed (HBP).      diclofenac sodium (VOLTAREN) 1 % Gel daily as needed.      diphenhydrAMINE-aluminum-magnesium hydroxide-simethicone-LIDOcaine HCl 2% Swish and swallow 5 mLs every 6 (six) hours as needed (throat irritation). 120 mL 2    doxycycline (VIBRAMYCIN) 100 MG Cap Take 1 capsule (100 mg total) by mouth 2 (two) times daily. 28 capsule 1    EASY TOUCH ALCOHOL PREP PADS PadM Apply 1 each topically 3 (three) times daily.      ergocalciferol (ERGOCALCIFEROL) 50,000 unit Cap       ergocalciferol, vitamin D2, (VITAMIN D ORAL) Take by mouth once daily.      esomeprazole (NEXIUM) 40 MG capsule Take 1 capsule (40 mg total) by mouth once daily. 30 capsule 6    fluticasone propionate (FLONASE) 50 mcg/actuation nasal spray       FREESTYLE FREEDOM LITE kit       FREESTYLE LANCETS 28 gauge lancets Apply topically 3 (three) times daily.      FREESTYLE LITE STRIPS Strp 1 strip 3 (three) times daily.      insulin glargine U-100, Lantus, (LANTUS SOLOSTAR U-100 INSULIN) 100 unit/mL (3 mL) InPn pen Inject 24 Units into the skin every evening.      ipratropium (ATROVENT) 42 mcg (0.06 %) nasal spray 2 sprays 2 (two) times daily.      levalbuterol (XOPENEX) 0.63 mg/3 mL nebulizer solution Take 1 ampule by nebulization every 4 (four) hours as needed for Wheezing. Rescue      lidocaine (LIDODERM) 5 % daily as needed.      lubiprostone (AMITIZA) 24 MCG Cap Take 1 capsule (24 mcg total) by mouth 2 (two) times daily with meals. 60 capsule 11    metFORMIN (GLUCOPHAGE-XR) 500 MG XR 24hr tablet Take 500 mg by mouth 2 (two) times daily with meals.      metoprolol tartrate (LOPRESSOR) 50 MG tablet Take 50 mg by mouth 2 (two) times daily.      nortriptyline  (PAMELOR) 75 MG Cap TAKE 1 CAPSULE BY MOUTH EVERY NIGHT 1 HOUR BEFORE BEDTIME      promethazine-dextromethorphan (PROMETHAZINE-DM) 6.25-15 mg/5 mL Syrp 5 mLs daily as needed.      rosuvastatin (CRESTOR) 40 MG Tab Take 40 mg by mouth once daily.      tiotropium (SPIRIVA) 18 mcg inhalation capsule Inhale 36 mcg into the lungs once daily. Controller      triamterene-hydrochlorothiazide 75-50 mg (MAXZIDE) 75-50 mg per tablet Take 1 tablet by mouth once daily.      TYLENOL EXTRA STRENGTH 500 mg tablet Take 500 mg by mouth daily as needed.      valACYclovir (VALTREX) 1000 MG tablet Take 0.5 tablets (500 mg total) by mouth 2 (two) times daily. 60 tablet 1    vitamin E 600 UNIT capsule Take 600 Units by mouth once daily.       No current facility-administered medications for this visit.

## 2025-03-13 ENCOUNTER — OFFICE VISIT (OUTPATIENT)
Dept: SURGERY | Facility: CLINIC | Age: 60
End: 2025-03-13
Payer: MEDICARE

## 2025-03-13 VITALS
OXYGEN SATURATION: 99 % | BODY MASS INDEX: 26.89 KG/M2 | HEART RATE: 82 BPM | WEIGHT: 161.38 LBS | SYSTOLIC BLOOD PRESSURE: 123 MMHG | HEIGHT: 65 IN | DIASTOLIC BLOOD PRESSURE: 59 MMHG

## 2025-03-13 DIAGNOSIS — Z98.890 POST-OPERATIVE STATE: Primary | ICD-10-CM

## 2025-03-13 DIAGNOSIS — Z96.82 GASTRIC NEUROSTIMULATOR DEVICE IN SITU: ICD-10-CM

## 2025-03-13 DIAGNOSIS — G89.18 POSTOPERATIVE PAIN: ICD-10-CM

## 2025-03-13 DIAGNOSIS — K21.9 GASTROESOPHAGEAL REFLUX DISEASE, UNSPECIFIED WHETHER ESOPHAGITIS PRESENT: ICD-10-CM

## 2025-03-13 DIAGNOSIS — K31.84 GASTROPARESIS: ICD-10-CM

## 2025-03-13 PROCEDURE — 99999 PR PBB SHADOW E&M-EST. PATIENT-LVL V: CPT | Mod: PBBFAC,,,

## 2025-03-13 PROCEDURE — 99215 OFFICE O/P EST HI 40 MIN: CPT | Mod: PBBFAC

## 2025-03-13 RX ORDER — METHOCARBAMOL 500 MG/1
500 TABLET, FILM COATED ORAL 4 TIMES DAILY PRN
Qty: 40 TABLET | Refills: 0 | Status: SHIPPED | OUTPATIENT
Start: 2025-03-13 | End: 2025-03-23

## 2025-03-13 RX ORDER — LAMOTRIGINE 100 MG/1
100 TABLET ORAL DAILY
COMMUNITY

## 2025-03-13 NOTE — PROGRESS NOTES
"  Minimally Invasive Surgery  Gastroparesis Post-Op    ASSESSMENT AND PLAN:     Luz Arias is a 59 y.o. y/o female who presents to clinic today for f/u XI ROBOTIC INSERTION, GASTRIC ELECTRICAL STIMULATOR with leads and EGD on 2/19/2025.  She is not clinically improving and meeting all post op milestones.      Gastroparesis symptoms are improving from moderate to mild.    Increased to 4.5 v / 8.8 ma / 511 ohms   Constipation - already taking Miralax - recommended "calm" supplement and "smooth move" tea - KUB today or tomorrow in [place]  Severe epigastric pain likely 2/2 increasing activity too quickly after surgery.  Advised slowing down and listening to her body, continue weight restrictions until March 27. Refilled robaxin.   Encouraged PO intake as much as possible and reviewed s/sx dehydration.     Wound is significantly improving  ER precautions provided for acute worsening but unlikely at this time       She is advised to continue lifting restrictions and strenuous activity for 6 total weeks post-op, or until 3/27/25.  Other normal activities may be resumed.      ED precautions were provided.  Please call our clinic with any questions or concerns: [phone]    All questions answered; patient verbalized understanding and comfort with the discussed plan.       RTC in May s/p repeat GES, or sooner PRN: worsening symptoms, change in medication needed, desire stimulator assessment or change.     Due to gastroparesis pathophysiology, this patient should not take GLP-1 agonist or narcotic medications.       ______________________________________________________________________  SUBJECTIVE:       Chief Complaint:  Wound assessment    HPI:  Luz Arias is a 59 y.o. y/o female who presents to clinic today for post op follow up after XI ROBOTIC INSERTION, GASTRIC ELECTRICAL STIMULATOR with leads and EGD on 2/19/2025.  She provided the history.      Denies fever, chills, nausea, and vomiting.    Endorses pain and " constipation.    Pain is intermittent, stretching, 4/10 at worst, robaxin helps.  Moving around makes it worse.   Miralax for constipation, not really helping    She is returning to usual activity level.     She is not tolerating diet with normal appetite and bowel function.   Decreased appetite  Eating things that are easy to digest and that she enjoys  Eating does not give her GP symptoms     She confirms redness around or drainage from incisions.   One spot at 9 o'clock appears to be healing more slowly.    Trace drainage, clear, serosanguinous         Gastroparesis Symptom Scores 3/13/25   Severity Frequency   Vomiting 0 0   Nausea 0 0   Early satiety 0 0   Bloating 0 0   Postprandial fullness 1 3   Epigastric pain 2 4   Epigastric burning 0 0   # vomiting in last 24 hours   0   Impression: Gastroparesis symptoms are mild.     GERD Questionnaire   - PPI - gaviscon nightly   + Typical heartburn  + Regurgitation  + Dysphagia solids   - Dysphagia liquids  + Hoarseness  - Sore throat  - Cough (yes but can isolate to sarcoidosis)  + Asthma  + Chest pain - CAMPA, moving around (stable angina), out of NTG tablets   + Water brash  + Globus  - Nausea  - Vomiting    Eckardt Score:   Weight Loss (kg): 0 - None  Dysphagia: 1 - Occasional   Retrosternal pain: 1 - Occasional   Regurgitation: 1 - Occasional   Total Score: 3   Impression: GERD symptoms are moderate with mild dysphagia.           Review of Systems:  Review of Systems   Constitutional:  Positive for malaise/fatigue and weight loss. Negative for chills and fever.   HENT:  Negative for sore throat (hoarseness).    Eyes: Negative.    Respiratory:  Positive for cough (hx sarcoidosis) and shortness of breath (sometimes with CP).    Cardiovascular:  Positive for chest pain (known stable angina, recent cardiac testing normal, out of NTG tablets).   Gastrointestinal:  Positive for abdominal pain, constipation and heartburn. Negative for diarrhea, nausea and vomiting.    Genitourinary: Negative.    Musculoskeletal: Negative.    Skin: Negative.    Neurological:  Positive for weakness (s/p activity around the house).   Psychiatric/Behavioral:  Negative for depression and suicidal ideas. The patient is not nervous/anxious.            OBJECTIVE:     Vitals  Pulse: 82 (03/13/25 1312)  BP: (!) 123/59 (Reading from patient at 10:00 this morning.) (03/13/25 1312)  SpO2: 99 % (03/13/25 1312)    Physical Exam  Physical Exam  Vitals reviewed.   Constitutional:       General: She is awake. She is not in acute distress.     Appearance: Normal appearance.   HENT:      Head: Normocephalic and atraumatic.   Cardiovascular:      Rate and Rhythm: Normal rate.   Pulmonary:      Effort: Pulmonary effort is normal.   Abdominal:      General: Abdomen is flat. A surgical scar is present. Bowel sounds are normal. There is distension (pt states it feels swollen but not bloated, today).      Palpations: Abdomen is soft.      Tenderness: There is abdominal tenderness (superior to stimulator site) in the epigastric area. There is no guarding or rebound.       Skin:     General: Skin is warm and dry.   Neurological:      General: No focal deficit present.      Mental Status: She is alert and oriented to person, place, and time.   Psychiatric:         Attention and Perception: Attention normal.         Mood and Affect: Mood and affect normal.         Behavior: Behavior normal. Behavior is cooperative.         Thought Content: Thought content does not include suicidal ideation.         Cognition and Memory: Cognition normal.           Baseline Data Follow-Up Data   Date 2/19/25 3/13/25         Voltage (V) 3.5 3.5         Current (mA) 5.5 5.5         Cycle On/Off Time (s) 0.1 / 5 0.1 / 5         Rate (Hz) 14 14         Impedance (ohms) [phone]         Battery Status ok ok         Nausea Diary (Avg) Baseline          # Nausea in 24 hrs. 0          Severity of Nausea   (Mild, Moderate or Severe) 0           Vomiting Diary Baseline          # Episodes in a day or a week 0                Postop data reviewed (2/19/25):   CBC 2/27/25, anemic, appropriately decreased in postop period.   BMP 2/20/25, slightly elevated BUN.   Phos & mag 2/20/25 WNL      Preop data reviewed:   EGD 2/6/25.  Report reviewed by me.  Benign-appearing esophageal stenosis, dilated.  Small HH.  Congestive gastropathy (bx: antral-type gastric mucosa with mild to focally moderate chronic inactive gastritis and reactive gastropathy, neg HP, negative for intestinal metaplasia, dysplasia, malignancy).  Normal examined duodenum.   CT 8/8/24.  Report reviewed by me.  Previous cholecystectomy, no renal calculi, prior hysterectomy, trace free fluid in pelvis.   Modified barium swallow study 6/12/23.  Negative for aspiration.   Esophagram 6/29/22.  Normal, no significant abnormalities.   GES 4/6/22.  Report reviewed by me.  32% retained at 4 hours (normal is < 10% at 4 hours). T-1/2 calculated at 176 minutes (normal is  minutes).   Colonoscopy 2/6/25.  Report reviewed by me.  One 2mm polyp in rectum, resected and retrieved (bx: fragment of polypoid colonic mucosa with lymphoid aggregate, negative for dysplasia or malignancy).  Examined ileum normal.  Otherwise normal exam on direct and retroflexion views.   Hgb A1C on 2/19/25: 6.3%, avg glucose 134 (improved)        Thank you for including me in the care of Mrs. Arias.  It was a pleasure speaking with her today.     Za Head, MSN, APRN, FNP-C  General Surgery   [facility]

## 2025-03-14 ENCOUNTER — HOSPITAL ENCOUNTER (OUTPATIENT)
Dept: RADIOLOGY | Facility: HOSPITAL | Age: 60
Discharge: HOME | End: 2025-03-14
Payer: MEDICARE

## 2025-03-14 DIAGNOSIS — Z98.890 POST-OPERATIVE STATE: ICD-10-CM

## 2025-03-14 PROCEDURE — 74019 RADEX ABDOMEN 2 VIEWS: CPT | Mod: 26,,, | Performed by: RADIOLOGY

## 2025-03-14 PROCEDURE — 74019 RADEX ABDOMEN 2 VIEWS: CPT | Mod: TC

## 2025-03-19 ENCOUNTER — RESULTS FOLLOW-UP (OUTPATIENT)
Dept: SURGERY | Facility: CLINIC | Age: 60
End: 2025-03-19

## 2025-03-31 RX ORDER — ACYCLOVIR 400 MG/1
400 TABLET ORAL DAILY
Qty: 90 TABLET | Refills: 4 | Status: SHIPPED | OUTPATIENT
Start: 2025-03-31 | End: 2026-06-24

## 2025-04-01 ENCOUNTER — PATIENT MESSAGE (OUTPATIENT)
Dept: SURGERY | Facility: CLINIC | Age: 60
End: 2025-04-01
Payer: MEDICARE

## 2025-04-08 ENCOUNTER — HOSPITAL ENCOUNTER (OUTPATIENT)
Dept: RADIOLOGY | Facility: HOSPITAL | Age: 60
Discharge: HOME | End: 2025-04-08
Attending: SURGERY
Payer: MEDICARE

## 2025-04-08 DIAGNOSIS — K31.84 GASTROPARESIS: ICD-10-CM

## 2025-04-08 PROCEDURE — 78264 GASTRIC EMPTYING IMG STUDY: CPT | Mod: TC

## 2025-04-08 PROCEDURE — A9541 TC99M SULFUR COLLOID: HCPCS | Performed by: SURGERY

## 2025-04-08 PROCEDURE — 78264 GASTRIC EMPTYING IMG STUDY: CPT | Mod: 26,,, | Performed by: RADIOLOGY

## 2025-04-08 RX ORDER — TECHNETIUM TC 99M SULFUR COLLOID 2 MG
1 KIT MISCELLANEOUS
Status: COMPLETED | OUTPATIENT
Start: 2025-04-08 | End: 2025-04-08

## 2025-04-08 RX ADMIN — TECHNETIUM TC 99M SULFUR COLLOID KIT 1 MILLICURIE: KIT at 08:04

## 2025-04-09 ENCOUNTER — RESULTS FOLLOW-UP (OUTPATIENT)
Dept: SURGERY | Facility: CLINIC | Age: 60
End: 2025-04-09
Payer: MEDICARE

## 2025-04-09 NOTE — TELEPHONE ENCOUNTER
"Soft diet since we changed the stimulator, such as mac n cheese, water, stuffing, baked chicken thigh.  Was tolerating liquids better.  Dentition is healthy when asked, but also states, "I don't know, there's something wrong with my mouth."  Scheduled appointment for Monday morning for stimulator check and diet discussion.  Will send diet details to pt's chart.  & Mrs. Arias verbalized understanding to all information provided and voiced no further questions, comments, or concerns.    "

## 2025-04-11 ENCOUNTER — PATIENT MESSAGE (OUTPATIENT)
Dept: OBSTETRICS AND GYNECOLOGY | Facility: CLINIC | Age: 60
End: 2025-04-11
Payer: MEDICARE

## 2025-04-14 ENCOUNTER — OFFICE VISIT (OUTPATIENT)
Dept: SURGERY | Facility: CLINIC | Age: 60
End: 2025-04-14
Payer: MEDICARE

## 2025-04-14 VITALS
BODY MASS INDEX: 26.7 KG/M2 | WEIGHT: 160.25 LBS | SYSTOLIC BLOOD PRESSURE: 118 MMHG | HEART RATE: 88 BPM | HEIGHT: 65 IN | DIASTOLIC BLOOD PRESSURE: 58 MMHG

## 2025-04-14 DIAGNOSIS — Z98.890 POST-OPERATIVE STATE: ICD-10-CM

## 2025-04-14 DIAGNOSIS — K21.9 GASTROESOPHAGEAL REFLUX DISEASE, UNSPECIFIED WHETHER ESOPHAGITIS PRESENT: ICD-10-CM

## 2025-04-14 DIAGNOSIS — R11.2 NAUSEA AND VOMITING, UNSPECIFIED VOMITING TYPE: Primary | ICD-10-CM

## 2025-04-14 DIAGNOSIS — B00.9 HSV (HERPES SIMPLEX VIRUS) INFECTION: Primary | ICD-10-CM

## 2025-04-14 DIAGNOSIS — Z96.82 GASTRIC NEUROSTIMULATOR DEVICE IN SITU: ICD-10-CM

## 2025-04-14 PROCEDURE — 99024 POSTOP FOLLOW-UP VISIT: CPT | Mod: POP,,,

## 2025-04-14 PROCEDURE — 99999 PR PBB SHADOW E&M-EST. PATIENT-LVL IV: CPT | Mod: PBBFAC,,,

## 2025-04-14 PROCEDURE — 99214 OFFICE O/P EST MOD 30 MIN: CPT | Mod: PBBFAC

## 2025-04-14 RX ORDER — POTASSIUM CHLORIDE 20 MEQ/1
20 TABLET, EXTENDED RELEASE ORAL DAILY
COMMUNITY
Start: 2025-04-11

## 2025-04-14 RX ORDER — MUPIROCIN CALCIUM 20 MG/G
CREAM TOPICAL 3 TIMES DAILY PRN
COMMUNITY
Start: 2025-02-24

## 2025-04-14 RX ORDER — ALBUTEROL SULFATE 90 UG/1
1 INHALANT RESPIRATORY (INHALATION) EVERY 6 HOURS PRN
COMMUNITY

## 2025-04-14 RX ORDER — ONDANSETRON 8 MG/1
8 TABLET, ORALLY DISINTEGRATING ORAL EVERY 6 HOURS PRN
COMMUNITY
Start: 2025-04-09

## 2025-04-14 RX ORDER — ESOMEPRAZOLE MAGNESIUM 40 MG/1
40 CAPSULE, DELAYED RELEASE ORAL DAILY
Qty: 30 CAPSULE | Refills: 11 | Status: SHIPPED | OUTPATIENT
Start: 2025-04-14 | End: 2026-04-13

## 2025-04-14 RX ORDER — FLUTICASONE FUROATE, UMECLIDINIUM BROMIDE AND VILANTEROL TRIFENATATE 100; 62.5; 25 UG/1; UG/1; UG/1
POWDER RESPIRATORY (INHALATION)
COMMUNITY
Start: 2025-03-31

## 2025-04-14 RX ORDER — BUTALBITAL, ACETAMINOPHEN AND CAFFEINE 300; 40; 50 MG/1; MG/1; MG/1
2 CAPSULE ORAL EVERY 4 HOURS PRN
COMMUNITY
Start: 2025-01-24

## 2025-04-14 RX ORDER — CHLORHEXIDINE GLUCONATE ORAL RINSE 1.2 MG/ML
SOLUTION DENTAL
COMMUNITY
Start: 2025-04-07

## 2025-04-14 RX ORDER — PEN NEEDLE, DIABETIC 30 GX3/16"
NEEDLE, DISPOSABLE MISCELLANEOUS
COMMUNITY
Start: 2024-08-01 | End: 2025-07-31

## 2025-04-14 RX ORDER — NITROGLYCERIN 0.4 MG/1
0.4 TABLET SUBLINGUAL EVERY 5 MIN PRN
COMMUNITY
Start: 2024-07-18

## 2025-04-14 RX ORDER — SCOPOLAMINE 1 MG/3D
1 PATCH, EXTENDED RELEASE TRANSDERMAL
Qty: 10 PATCH | Refills: 0 | Status: SHIPPED | OUTPATIENT
Start: 2025-04-14 | End: 2025-05-12

## 2025-04-14 RX ORDER — POLYETHYLENE GLYCOL 3350 17 G/17G
17 POWDER, FOR SOLUTION ORAL
COMMUNITY

## 2025-04-14 RX ORDER — PROMETHAZINE HYDROCHLORIDE 25 MG/1
25 TABLET ORAL EVERY 6 HOURS PRN
Qty: 30 TABLET | Refills: 0 | Status: SHIPPED | OUTPATIENT
Start: 2025-04-14

## 2025-04-14 RX ORDER — ACYCLOVIR 400 MG/1
400 TABLET ORAL DAILY
Qty: 90 TABLET | Refills: 4 | Status: SHIPPED | OUTPATIENT
Start: 2025-04-14 | End: 2026-07-08

## 2025-04-14 NOTE — PROGRESS NOTES
Minimally Invasive Surgery  Gastroparesis Post-Op    ASSESSMENT AND PLAN:     Luz Arias is a 59 y.o. y/o female who presents to clinic today for f/u XI ROBOTIC INSERTION, GASTRIC ELECTRICAL STIMULATOR with leads and EGD on 2/19/2025.  She is clinically improving and meeting all post op milestones.      Gastroparesis symptoms are worsening from mild to moderate, with extremely severe vomiting and early satiety.   Symptoms have worsened since LOV.  Mrs. Arias reports the symptoms worsened before she was hospitalized last week for hypokalemia.  She has been started on a potassium supplement.  She has a hospital follow-up appointment later this week.    We did not change the settings of her stimulator today.  There is a possibility the settings were not causing her to feel poorly between our last visit and her hospitalization.    Repeat GES was abnormal.  Will continue to follow her symptoms to see if setting can be adjusted for max benefit.    Epigastric pain is still present but she is tolerating it.    PO intake is improved.   Wrote prescription for scopolamine patches and phenergan PO, and re-wrote nexium for another year.  Reviewed side effects of the patches and phenergan.    She will see her PCP or cardiologist to renew nitroglycerin tablet prescription     Constipation is stable.   AXR ordered at LOV showed no acute findings and was positive for large quantity of retained stool.   She has identified a medication regimen that relieves this issue.       Wound is healed.     Frequent urination and back pain  New, frequent urination with urgency and without hematuria and dysuria.   Endorses bilateral back pain.  Pain is not in either flank.    She will follow up with her PCP about this.     She is advised that activity restrictions are lifted at this time per our requirements, but emphasized     ED precautions were provided.  Please call our clinic with any questions or concerns: [phone]    All  questions answered; patient verbalized understanding and comfort with the discussed plan.       RTC  2 months , or sooner PRN: Concern regarding postop recovery; concern for new or worsening symptoms; need for new or change in medication interventions; desire to assess and/or change stimulator settings     Due to gastroparesis pathophysiology, this patient should not take GLP-1 agonist or narcotic medications.       ______________________________________________________________________  SUBJECTIVE:       Chief Complaint:  Follow up stimulator setting changes     HPI:  Luz Arias is a 59 y.o. y/o female who presents to clinic today for post op follow up after XI ROBOTIC INSERTION, GASTRIC ELECTRICAL STIMULATOR with leads and EGD on 2/19/2025.  She provided the history.      Denies fever, chills, and pain.  States the site is itchy.     She is returning to usual activity level.     She is tolerating diet with normal appetite and bowel function.     She denies redness around or drainage from incisions.        Gastroparesis Symptom Scores 4/14/25   Severity Frequency   Vomiting 4 2   Nausea 3 2   Early satiety 1 4   Bloating 1 1   Postprandial fullness 1 3   Epigastric pain 1 2   Epigastric burning 2 3   # vomiting in last 24 hours   0   Impression: Gastroparesis symptoms are moderate.     GERD Questionnaire   + PPI - gaviscon nightly, prilosec daily in the morning   - Typical heartburn  - Regurgitation  + Dysphagia solids   + Dysphagia liquids  + Hoarseness  - Sore throat  - Cough (yes but can isolate to sarcoidosis as the cause)  + Asthma  + Chest pain - CAMPA, breathing treatments and albuterol inhalers helps.  She is still out of NTG tablets, states she will get them refilled.  + Water brash  + Globus  + Nausea  + Vomiting    Eckardt Score:   Weight Loss (kg): 1 - Up to 5 kg  Dysphagia: 2 - Daily  Retrosternal pain: 1 - Occasional   Regurgitation: 0 - None  Total Score: 4   Impression: GERD symptoms are severe  with mild dysphagia.       Medications:  She has tried:   metoclopramide (Reglan) for motility  ondansetron, promethazine, scopolamine patches, and herbal preparations (sam tea) for nausea and vomiting  nothing for epigastric or abdominal pain  pantoprazole, esomeprazole, and famotidine for epigastric burning or GERD  Miralax, magnesium supplement, and Amitiza  for constipation   nothing for diarrhea  THC: No  Of these, she is currently taking zofran, nexium, gaviscon, miralax, and amitiza.  She is satisfied with symptom control, but feels there are some gaps in coverage.    reviewed 4/14/25: fiorcet, tramadol rx'd and filled 4/7/25.  Denies taking tramadol.  Fiorcet per PCP.       Review of Systems:  Review of Systems   Constitutional:  Positive for malaise/fatigue and weight loss. Negative for chills and fever.   HENT:  Negative for sore throat.    Eyes: Negative.    Respiratory:  Positive for cough (2/2 sarcoidosis) and shortness of breath.    Cardiovascular:  Positive for chest pain (see HPI).   Gastrointestinal:  Positive for abdominal pain, constipation, heartburn, nausea and vomiting. Negative for diarrhea.   Genitourinary:  Positive for flank pain (bilateral), frequency and urgency. Negative for dysuria and hematuria.   Musculoskeletal:  Positive for myalgias (neck, bilateral shoulders; uses neck pillow which helps; biofreeze helps).   Skin: Negative.    Neurological:  Positive for dizziness (after discharge) and headaches (last migraine yesterday, lamictal can help). Negative for seizures and weakness.   Endo/Heme/Allergies:  Does not bruise/bleed easily.   Psychiatric/Behavioral:  Positive for depression (taking antidepressants (nortriptyline), helps; regular therapy also helps). Negative for suicidal ideas. The patient is not nervous/anxious.            OBJECTIVE:     Vitals  Pulse: 88 (04/14/25 0956)  BP: (!) 118/58 (04/14/25 0956)    Physical Exam  Physical Exam  Vitals reviewed.   Constitutional:        General: She is awake. She is not in acute distress.     Appearance: Normal appearance.   HENT:      Head: Normocephalic and atraumatic.   Cardiovascular:      Rate and Rhythm: Normal rate and regular rhythm.      Heart sounds: Normal heart sounds.   Pulmonary:      Effort: Pulmonary effort is normal.      Breath sounds: Transmitted upper airway sounds present. No decreased breath sounds.   Abdominal:      General: A surgical scar is present. Bowel sounds are normal. There is distension.      Palpations: Abdomen is soft.      Tenderness: There is abdominal tenderness in the epigastric area.   Musculoskeletal:      Cervical back: Tenderness present.      Lumbar back: Tenderness present.        Back:    Skin:     General: Skin is warm and dry.   Neurological:      General: No focal deficit present.      Mental Status: She is alert.   Psychiatric:         Attention and Perception: Attention normal.         Mood and Affect: Mood and affect normal.         Behavior: Behavior normal. Behavior is cooperative.         Thought Content: Thought content does not include suicidal ideation.         Cognition and Memory: Cognition normal.           Baseline Data Follow-Up Data   Date 2/19/25 3/13/25 4/14/25        Voltage (V) 3.5 3.5 4.5        Current (mA) 5.5 5.5 9.6        Cycle On/Off Time (s) 0.1 / 5 0.1 / 5 0.1 / 5        Rate (Hz) 14 14 14        Impedance (ohms) [phone]        Battery Status ok ok ok        Nausea Diary (Avg) Baseline          # Nausea in 24 hrs. 0 0 1        Severity of Nausea   (Mild, Moderate or Severe) 0 0 mild        Vomiting Diary Baseline          # Episodes in a day or a week 0 0 0 / day          Additional impedance assessed:   Stimulator - Lead 2: 309 ohms  Stimulator - Lead 3: 298 ohms  Lead 2 - Lead 3: 469 ohms      Data (new data in bold):    CBC 2/27/25, anemic, appropriately decreased in postop period.   BMP 2/20/25, slightly elevated BUN.   Phos & mag 2/20/25 WNL  Hgb A1C on  2/19/25: 6.3%, avg glucose 134 (improved)  EGD 2/6/25.  Report reviewed by me.  Benign-appearing esophageal stenosis, dilated.  Small HH.  Congestive gastropathy (bx: antral-type gastric mucosa with mild to focally moderate chronic inactive gastritis and reactive gastropathy, neg HP, negative for intestinal metaplasia, dysplasia, malignancy).  Normal examined duodenum.   CT 8/8/24.  Report reviewed by me.  Previous cholecystectomy, no renal calculi, prior hysterectomy, trace free fluid in pelvis.   Modified barium swallow study 6/12/23.  Negative for aspiration.   Esophagram 6/29/22.  Normal, no significant abnormalities.   GES 4/8/25.  Report reviewed by me.  48% retained at 4 hours, delayed gastric emptying.   GES 4/6/22.  Report reviewed by me.  32% retained at 4 hours (normal is < 10% at 4 hours). T-1/2 calculated at 176 minutes (normal is  minutes).   Colonoscopy 2/6/25.  Report reviewed by me.  One 2mm polyp in rectum, resected and retrieved (bx: fragment of polypoid colonic mucosa with lymphoid aggregate, negative for dysplasia or malignancy).  Examined ileum normal.  Otherwise normal exam on direct and retroflexion views.   AXR 3/14/25.  Report reviewed by me.  Large quantity of stool throughout colon c/w constipation, no acute findings.        Thank you for including me in the care of Mrs. Arias.  It was a pleasure speaking with her today.     Za Head, MSN, APRN, FNP-C  General Surgery   [facility]

## 2025-04-25 ENCOUNTER — OFFICE VISIT (OUTPATIENT)
Dept: HEMATOLOGY/ONCOLOGY | Facility: CLINIC | Age: 60
End: 2025-04-25
Payer: MEDICARE

## 2025-04-25 VITALS
WEIGHT: 158.94 LBS | BODY MASS INDEX: 26.48 KG/M2 | RESPIRATION RATE: 18 BRPM | DIASTOLIC BLOOD PRESSURE: 65 MMHG | HEIGHT: 65 IN | TEMPERATURE: 97 F | OXYGEN SATURATION: 99 % | HEART RATE: 88 BPM | SYSTOLIC BLOOD PRESSURE: 154 MMHG

## 2025-04-25 DIAGNOSIS — D50.9 IRON DEFICIENCY ANEMIA, UNSPECIFIED IRON DEFICIENCY ANEMIA TYPE: Primary | ICD-10-CM

## 2025-04-25 DIAGNOSIS — E53.8 B12 DEFICIENCY: ICD-10-CM

## 2025-04-25 PROCEDURE — 99215 OFFICE O/P EST HI 40 MIN: CPT | Mod: PBBFAC,PN | Performed by: NURSE PRACTITIONER

## 2025-04-25 PROCEDURE — 99999 PR PBB SHADOW E&M-EST. PATIENT-LVL V: CPT | Mod: PBBFAC,,, | Performed by: NURSE PRACTITIONER

## 2025-04-25 NOTE — PROGRESS NOTES
HPI    58 years old female referred to Hematology Clinic for evaluation of iron deficiency anemia.    4/25/2025:  She returns today follow-up.  She has been lost to follow-up for almost a year.   Past Medical History:   Diagnosis Date    Aortic regurgitation     Asthma     Chronic fatigue     Chronic pain     Depression     Diabetes     Dysphagia     Gastritis     Gastroparesis     GERD (gastroesophageal reflux disease)     Hypertension     Intestinal metaplasia of gastric cardia     Irritable bowel syndrome     Sarcoidosis     Sleep apnea     Stroke     12/2015, mini stroke 2/2023; right sided weakness    TIA (transient ischemic attack)      Social History     Socioeconomic History    Marital status:    Tobacco Use    Smoking status: Never    Smokeless tobacco: Never   Substance and Sexual Activity    Alcohol use: Not Currently     Comment: no drinking anymore    Drug use: Never    Sexual activity: Not Currently     Social Drivers of Health     Financial Resource Strain: Low Risk  (2/26/2025)    Overall Financial Resource Strain (CARDIA)     Difficulty of Paying Living Expenses: Not hard at all   Recent Concern: Financial Resource Strain - Medium Risk (2/19/2025)    Overall Financial Resource Strain (CARDIA)     Difficulty of Paying Living Expenses: Somewhat hard   Food Insecurity: No Food Insecurity (2/26/2025)    Hunger Vital Sign     Worried About Running Out of Food in the Last Year: Never true     Ran Out of Food in the Last Year: Never true   Transportation Needs: No Transportation Needs (2/26/2025)    PRAPARE - Transportation     Lack of Transportation (Medical): No     Lack of Transportation (Non-Medical): No   Physical Activity: Sufficiently Active (2/26/2025)    Exercise Vital Sign     Days of Exercise per Week: 3 days     Minutes of Exercise per Session: 60 min   Stress: Stress Concern Present (2/26/2025)    [employer] - Occupational Stress Questionnaire      Feeling of Stress : Very much   Housing Stability: Low Risk  (2/26/2025)    Housing Stability Vital Sign     Unable to Pay for Housing in the Last Year: No     Homeless in the Last Year: No         Subjective      Review of Systems   Constitutional: +Fatigue   HENT: Negative for mouth sores.   Eyes: Negative for visual disturbance.   Respiratory: Negative for cough and shortness of breath.   Cardiovascular: Negative for chest pain.   Gastrointestinal: Negative for diarrhea.   Genitourinary: Negative for frequency.   Musculoskeletal: Negative for back pain.   Skin: Negative for rash.   Neurological: Negative for headaches.   Hematological: Negative for adenopathy.   Psychiatric/Behavioral: The patient is not nervous/anxious.   All other systems reviewed and are negative.     Objective    Physical Exam   There were no vitals filed for this visit.      Constitutional: patient is oriented to person, place, and time. patient appears well-developed and well-nourished. No distress.   HENT:   Right Ear: External ear normal.   Left Ear: External ear normal.   Nose: Nose normal.   Mouth/Throat: Oropharynx is clear and moist. No oropharyngeal exudate.   Teeth, gums and lips are normal   No sinus tenderness   Palate, tongue, posterior pharynx are normal   Eyes: Conjunctivae and lids are normal.   Neck: Trachea normal and normal range of motion. No thyromegaly   Cardiovascular: Normal rate, regular rhythm, normal heart sounds, intact distal pulses and normal pulses.   No murmur heard.   No edema, no tenderness in the extremities.   Pulmonary/Chest: Effort normal and breath sounds normal. No accessory muscle usage. patient has no wheezes..   Abdominal: Soft. Normal appearance and bowel sounds are normal. patient exhibits no distension and no mass. There is no hepatosplenomegaly. There is no tenderness.   Musculoskeletal: Normal range of motion.   Gait is normal   No clubbing, cyanosis     Lymphadenopathy:   Head (right side): No  submental and no submandibular adenopathy present.   Head (left side): No submental and no submandibular adenopathy present.   patient has no cervical adenopathy.   Right: No supraclavicular adenopathy present.   Left: No supraclavicular adenopathy present.   Neurological: patient is alert and oriented to person, place, and time. patient has normal strength and normal reflexes. No sensory deficit. Gait normal.   Skin: Skin is warm, dry and intact. No bruising, no lesion and no rash noted. No cyanosis. Nails show no clubbing.   No lesions   Psychiatric: patient has a normal mood and affect. patient speech is normal and behavior is normal. Judgment normal. Cognition and memory are normal.   Vitals reviewed.     Lab Results   Component Value Date    WBC 8.71 02/27/2025    HGB 9.0 (L) 02/27/2025    HCT 28.8 (L) 02/27/2025    MCV 90 02/27/2025     02/27/2025       CMP  Sodium   Date Value Ref Range Status   02/20/2025 142 136 - 145 mmol/L Final     Potassium   Date Value Ref Range Status   02/20/2025 3.6 3.5 - 5.1 mmol/L Final     Chloride   Date Value Ref Range Status   02/20/2025 108 95 - 110 mmol/L Final     CO2   Date Value Ref Range Status   02/20/2025 22 (L) 23 - 29 mmol/L Final     Glucose   Date Value Ref Range Status   02/20/2025 82 70 - 110 mg/dL Final     BUN   Date Value Ref Range Status   02/20/2025 23 (H) 6 - 20 mg/dL Final     Creatinine   Date Value Ref Range Status   02/20/2025 0.8 0.5 - 1.4 mg/dL Final     Calcium   Date Value Ref Range Status   02/20/2025 8.8 8.7 - 10.5 mg/dL Final     Total Protein   Date Value Ref Range Status   02/19/2025 6.9 6.0 - 8.4 g/dL Final     Albumin   Date Value Ref Range Status   02/19/2025 3.7 3.5 - 5.2 g/dL Final     Total Bilirubin   Date Value Ref Range Status   02/19/2025 0.1 0.1 - 1.0 mg/dL Final     Comment:     For infants and newborns, interpretation of results should be based  on gestational age, weight and in agreement with  clinical  observations.    Premature Infant recommended reference ranges:  Up to 24 hours.............<8.0 mg/dL  Up to 48 hours............<12.0 mg/dL  3-5 days..................<15.0 mg/dL  6-29 days.................<15.0 mg/dL       Alkaline Phosphatase   Date Value Ref Range Status   02/19/2025 61 40 - 150 U/L Final     AST   Date Value Ref Range Status   02/19/2025 27 10 - 40 U/L Final     ALT   Date Value Ref Range Status   02/19/2025 15 10 - 44 U/L Final     Anion Gap   Date Value Ref Range Status   02/20/2025 12 8 - 16 mmol/L Final     eGFR   Date Value Ref Range Status   02/20/2025 >60.0 >60 mL/min/1.73 m^2 Final         Assessment/plan    Iron deficiency anemia:  Upper EGD 06/13/2023:  Esophageal plaque found.  Suspicious for candidiasis.  Erythematous mucosa in the atrium.  Colonoscopy.  04/10/2023 Entire colonoscopy examination is normal recommendation RTC 5 years for screening.  Patient lives in [facility]  I will have her get labs Monday in [place]  See me in 2 weeks in [place] to discuss results

## 2025-04-28 ENCOUNTER — LAB VISIT (OUTPATIENT)
Dept: LAB | Facility: HOSPITAL | Age: 60
End: 2025-04-28
Attending: NURSE PRACTITIONER
Payer: MEDICARE

## 2025-04-28 DIAGNOSIS — E53.8 B12 DEFICIENCY: ICD-10-CM

## 2025-04-28 DIAGNOSIS — D50.9 IRON DEFICIENCY ANEMIA, UNSPECIFIED IRON DEFICIENCY ANEMIA TYPE: ICD-10-CM

## 2025-04-28 LAB
ABSOLUTE EOSINOPHIL (OHS): 0.16 K/UL
ABSOLUTE MONOCYTE (OHS): 0.87 K/UL (ref 0.3–1)
ABSOLUTE NEUTROPHIL COUNT (OHS): 4.54 K/UL (ref 1.8–7.7)
ALBUMIN SERPL BCP-MCNC: 4.2 G/DL (ref 3.5–5.2)
ALP SERPL-CCNC: 61 UNIT/L (ref 40–150)
ALT SERPL W/O P-5'-P-CCNC: 19 UNIT/L (ref 10–44)
ANION GAP (OHS): 11 MMOL/L (ref 8–16)
AST SERPL-CCNC: 23 UNIT/L (ref 11–45)
BASOPHILS # BLD AUTO: 0.05 K/UL
BASOPHILS NFR BLD AUTO: 0.5 %
BILIRUB SERPL-MCNC: 0.3 MG/DL (ref 0.1–1)
BUN SERPL-MCNC: 14 MG/DL (ref 6–20)
CALCIUM SERPL-MCNC: 9.8 MG/DL (ref 8.7–10.5)
CHLORIDE SERPL-SCNC: 100 MMOL/L (ref 95–110)
CO2 SERPL-SCNC: 28 MMOL/L (ref 23–29)
CREAT SERPL-MCNC: 0.8 MG/DL (ref 0.5–1.4)
ERYTHROCYTE [DISTWIDTH] IN BLOOD BY AUTOMATED COUNT: 14.3 % (ref 11.5–14.5)
FERRITIN SERPL-MCNC: 50 NG/ML (ref 20–300)
FOLATE SERPL-MCNC: 14.2 NG/ML (ref 4–24)
GFR SERPLBLD CREATININE-BSD FMLA CKD-EPI: >60 ML/MIN/1.73/M2
GLUCOSE SERPL-MCNC: 108 MG/DL (ref 70–110)
HCT VFR BLD AUTO: 29 % (ref 37–48.5)
HGB BLD-MCNC: 9.1 GM/DL (ref 12–16)
IMM GRANULOCYTES # BLD AUTO: 0.01 K/UL (ref 0–0.04)
IMM GRANULOCYTES NFR BLD AUTO: 0.1 % (ref 0–0.5)
IRON SATN MFR SERPL: 11 % (ref 20–50)
IRON SERPL-MCNC: 55 UG/DL (ref 30–160)
LYMPHOCYTES # BLD AUTO: 4.2 K/UL (ref 1–4.8)
MCH RBC QN AUTO: 26.8 PG (ref 27–31)
MCHC RBC AUTO-ENTMCNC: 31.4 G/DL (ref 32–36)
MCV RBC AUTO: 86 FL (ref 82–98)
NUCLEATED RBC (/100WBC) (OHS): 0 /100 WBC
PLATELET # BLD AUTO: 364 K/UL (ref 150–450)
PMV BLD AUTO: 8.6 FL (ref 9.2–12.9)
POTASSIUM SERPL-SCNC: 3.4 MMOL/L (ref 3.5–5.1)
PROT SERPL-MCNC: 7.9 GM/DL (ref 6–8.4)
RBC # BLD AUTO: 3.39 M/UL (ref 4–5.4)
RELATIVE EOSINOPHIL (OHS): 1.6 %
RELATIVE LYMPHOCYTE (OHS): 42.7 % (ref 18–48)
RELATIVE MONOCYTE (OHS): 8.9 % (ref 4–15)
RELATIVE NEUTROPHIL (OHS): 46.2 % (ref 38–73)
SODIUM SERPL-SCNC: 139 MMOL/L (ref 136–145)
TIBC SERPL-MCNC: 515 UG/DL (ref 250–450)
TRANSFERRIN SERPL-MCNC: 348 MG/DL (ref 200–375)
VIT B12 SERPL-MCNC: 323 PG/ML (ref 210–950)
WBC # BLD AUTO: 9.83 K/UL (ref 3.9–12.7)

## 2025-04-28 PROCEDURE — 36415 COLL VENOUS BLD VENIPUNCTURE: CPT

## 2025-04-28 PROCEDURE — 82607 VITAMIN B-12: CPT

## 2025-04-28 PROCEDURE — 83521 IG LIGHT CHAINS FREE EACH: CPT

## 2025-04-28 PROCEDURE — 84165 PROTEIN E-PHORESIS SERUM: CPT

## 2025-04-28 PROCEDURE — 82746 ASSAY OF FOLIC ACID SERUM: CPT

## 2025-04-28 PROCEDURE — 82728 ASSAY OF FERRITIN: CPT

## 2025-04-28 PROCEDURE — 85025 COMPLETE CBC W/AUTO DIFF WBC: CPT

## 2025-04-28 PROCEDURE — 83540 ASSAY OF IRON: CPT

## 2025-04-28 PROCEDURE — 86334 IMMUNOFIX E-PHORESIS SERUM: CPT

## 2025-04-28 PROCEDURE — 80053 COMPREHEN METABOLIC PANEL: CPT

## 2025-04-29 LAB
ALBUMIN, SPE (OHS): 4.37 G/DL (ref 3.35–5.55)
ALPHA 1 GLOB (OHS): 0.27 GM/DL (ref 0.17–0.41)
ALPHA 2 GLOB (OHS): 0.84 GM/DL (ref 0.43–0.99)
BETA GLOB (OHS): 1.04 GM/DL (ref 0.5–1.1)
GAMMA GLOBULIN (OHS): 0.87 GM/DL (ref 0.67–1.58)
KAPPA LC FREE SER-MCNC: 1.54 MG/L (ref 0.26–1.65)
KAPPA LC FREE/LAMBDA FREE SER: 2.11 MG/DL (ref 0.33–1.94)
LAMBDA LC FREE SERPL-MCNC: 1.37 MG/DL (ref 0.57–2.63)
PATHOLOGIST INTERPRETATION - IFE SERUM (OHS): NORMAL
PATHOLOGIST REVIEW - SPE (OHS): NORMAL
PROT SERPL-MCNC: 7.4 GM/DL (ref 6–8.4)

## 2025-05-08 ENCOUNTER — TELEPHONE (OUTPATIENT)
Dept: SURGERY | Facility: CLINIC | Age: 60
End: 2025-05-08
Payer: MEDICARE

## 2025-05-08 NOTE — TELEPHONE ENCOUNTER
Called to reschedule appointment due to being out of office.    Mrs. Alvarado requested appointment be moved to July.    Scheduled for July 13 at 10:30.    Mrs. Arias verbalized understanding to all information provided and voiced no further questions, comments, or concerns.

## 2025-05-12 ENCOUNTER — OFFICE VISIT (OUTPATIENT)
Dept: HEMATOLOGY/ONCOLOGY | Facility: CLINIC | Age: 60
End: 2025-05-12
Payer: MEDICARE

## 2025-05-12 VITALS
DIASTOLIC BLOOD PRESSURE: 66 MMHG | RESPIRATION RATE: 18 BRPM | SYSTOLIC BLOOD PRESSURE: 120 MMHG | OXYGEN SATURATION: 97 % | HEART RATE: 80 BPM

## 2025-05-12 DIAGNOSIS — D50.0 IRON DEFICIENCY ANEMIA DUE TO CHRONIC BLOOD LOSS: Primary | ICD-10-CM

## 2025-05-12 PROBLEM — D50.9 IRON DEFICIENCY ANEMIA, UNSPECIFIED: Status: ACTIVE | Noted: 2025-05-12

## 2025-05-12 PROCEDURE — 99214 OFFICE O/P EST MOD 30 MIN: CPT | Mod: S$PBB,,, | Performed by: NURSE PRACTITIONER

## 2025-05-12 PROCEDURE — 99215 OFFICE O/P EST HI 40 MIN: CPT | Mod: PBBFAC | Performed by: NURSE PRACTITIONER

## 2025-05-12 PROCEDURE — 99999 PR PBB SHADOW E&M-EST. PATIENT-LVL V: CPT | Mod: PBBFAC,,, | Performed by: NURSE PRACTITIONER

## 2025-05-12 RX ORDER — EPINEPHRINE 0.3 MG/.3ML
0.3 INJECTION SUBCUTANEOUS ONCE AS NEEDED
OUTPATIENT
Start: 2025-05-12

## 2025-05-12 RX ORDER — HEPARIN 100 UNIT/ML
500 SYRINGE INTRAVENOUS
OUTPATIENT
Start: 2025-05-12

## 2025-05-12 RX ORDER — SODIUM CHLORIDE 0.9 % (FLUSH) 0.9 %
10 SYRINGE (ML) INJECTION
OUTPATIENT
Start: 2025-05-12

## 2025-05-12 NOTE — PROGRESS NOTES
HPI    58 years old female referred to [facility] for evaluation of iron deficiency anemia.    4/25/2025:  She returns today follow-up.  She has been lost to follow-up for almost a year.     05/12/2025: She returns today for follow-up.  Her workup revealed iron-deficiency anemia.  She is unable to take oral iron due to GI side effects  Past Medical History:   Diagnosis Date    Aortic regurgitation     Asthma     Chronic fatigue     Chronic pain     Depression     Diabetes     Dysphagia     Gastritis     Gastroparesis     GERD (gastroesophageal reflux disease)     Hypertension     Intestinal metaplasia of gastric cardia     Irritable bowel syndrome     Sarcoidosis     Sleep apnea     Stroke     12/2015, mini stroke 2/2023; right sided weakness    TIA (transient ischemic attack)      Social History     Socioeconomic History    Marital status:    Tobacco Use    Smoking status: Never    Smokeless tobacco: Never   Substance and Sexual Activity    Alcohol use: Not Currently     Comment: no drinking anymore    Drug use: Never    Sexual activity: Not Currently     Social Drivers of Health     Financial Resource Strain: Low Risk  (2/26/2025)    Overall Financial Resource Strain (CARDIA)     Difficulty of Paying Living Expenses: Not hard at all   Recent Concern: Financial Resource Strain - Medium Risk (2/19/2025)    Overall Financial Resource Strain (CARDIA)     Difficulty of Paying Living Expenses: Somewhat hard   Food Insecurity: No Food Insecurity (2/26/2025)    Hunger Vital Sign     Worried About Running Out of Food in the Last Year: Never true     Ran Out of Food in the Last Year: Never true   Transportation Needs: No Transportation Needs (2/26/2025)    PRAPARE - Transportation     Lack of Transportation (Medical): No     Lack of Transportation (Non-Medical): No   Physical Activity: Sufficiently Active (2/26/2025)    Exercise Vital Sign     Days of Exercise per Week: 3 days     Minutes of Exercise per  Session: 60 min   Stress: Stress Concern Present (2/26/2025)    [employer] - Occupational Stress Questionnaire     Feeling of Stress : Very much   Housing Stability: Low Risk  (2/26/2025)    Housing Stability Vital Sign     Unable to Pay for Housing in the Last Year: No     Homeless in the Last Year: No         Subjective      Review of Systems   Constitutional: +Fatigue   HENT: Negative for mouth sores.   Eyes: Negative for visual disturbance.   Respiratory: Negative for cough and shortness of breath.   Cardiovascular: Negative for chest pain.   Gastrointestinal: Negative for diarrhea.   Genitourinary: Negative for frequency.   Musculoskeletal: Negative for back pain.   Skin: Negative for rash.   Neurological: Negative for headaches.   Hematological: Negative for adenopathy.   Psychiatric/Behavioral: The patient is not nervous/anxious.   All other systems reviewed and are negative.     Objective    Physical Exam   There were no vitals filed for this visit.      Constitutional: patient is oriented to person, place, and time. patient appears well-developed and well-nourished. No distress.   HENT:   Right Ear: External ear normal.   Left Ear: External ear normal.   Nose: Nose normal.   Mouth/Throat: Oropharynx is clear and moist. No oropharyngeal exudate.   Teeth, gums and lips are normal   No sinus tenderness   Palate, tongue, posterior pharynx are normal   Eyes: Conjunctivae and lids are normal.   Neck: Trachea normal and normal range of motion. No thyromegaly   Cardiovascular: Normal rate, regular rhythm, normal heart sounds, intact distal pulses and normal pulses.   No murmur heard.   No edema, no tenderness in the extremities.   Pulmonary/Chest: Effort normal and breath sounds normal. No accessory muscle usage. patient has no wheezes..   Abdominal: Soft. Normal appearance and bowel sounds are normal. patient exhibits no distension and no mass. There is no hepatosplenomegaly. There is  no tenderness.   Musculoskeletal: Normal range of motion.   Gait is normal   No clubbing, cyanosis     Lymphadenopathy:   Head (right side): No submental and no submandibular adenopathy present.   Head (left side): No submental and no submandibular adenopathy present.   patient has no cervical adenopathy.   Right: No supraclavicular adenopathy present.   Left: No supraclavicular adenopathy present.   Neurological: patient is alert and oriented to person, place, and time. patient has normal strength and normal reflexes. No sensory deficit. Gait normal.   Skin: Skin is warm, dry and intact. No bruising, no lesion and no rash noted. No cyanosis. Nails show no clubbing.   No lesions   Psychiatric: patient has a normal mood and affect. patient speech is normal and behavior is normal. Judgment normal. Cognition and memory are normal.   Vitals reviewed.     Lab Results   Component Value Date    WBC 9.83 04/28/2025    HGB 9.1 (L) 04/28/2025    HCT 29.0 (L) 04/28/2025    MCV 86 04/28/2025     04/28/2025       CMP  Sodium   Date Value Ref Range Status   04/28/2025 139 136 - 145 mmol/L Final   02/20/2025 142 136 - 145 mmol/L Final     Potassium   Date Value Ref Range Status   04/28/2025 3.4 (L) 3.5 - 5.1 mmol/L Final   02/20/2025 3.6 3.5 - 5.1 mmol/L Final     Chloride   Date Value Ref Range Status   04/28/2025 100 95 - 110 mmol/L Final   02/20/2025 108 95 - 110 mmol/L Final     CO2   Date Value Ref Range Status   04/28/2025 28 23 - 29 mmol/L Final   02/20/2025 22 (L) 23 - 29 mmol/L Final     Glucose   Date Value Ref Range Status   04/28/2025 108 70 - 110 mg/dL Final   02/20/2025 82 70 - 110 mg/dL Final     BUN   Date Value Ref Range Status   04/28/2025 14 6 - 20 mg/dL Final     Creatinine   Date Value Ref Range Status   04/28/2025 0.8 0.5 - 1.4 mg/dL Final     Calcium   Date Value Ref Range Status   04/28/2025 9.8 8.7 - 10.5 mg/dL Final   02/20/2025 8.8 8.7 - 10.5 mg/dL Final     Protein Total   Date Value Ref Range  Status   04/28/2025 7.9 6.0 - 8.4 gm/dL Final   04/28/2025 7.4 6.0 - 8.4 gm/dL Final     Comment:     Serum protein electrophoresis and immunofixation results should be interpreted in clinical context in that some therapeutic agents can result in false positive results (example, daratumumab). Correlation with the patient's therapeutic regimen is required.     Total Protein   Date Value Ref Range Status   02/19/2025 6.9 6.0 - 8.4 g/dL Final     Albumin   Date Value Ref Range Status   04/28/2025 4.2 3.5 - 5.2 g/dL Final   02/19/2025 3.7 3.5 - 5.2 g/dL Final     Total Bilirubin   Date Value Ref Range Status   02/19/2025 0.1 0.1 - 1.0 mg/dL Final     Comment:     For infants and newborns, interpretation of results should be based  on gestational age, weight and in agreement with clinical  observations.    Premature Infant recommended reference ranges:  Up to 24 hours.............<8.0 mg/dL  Up to 48 hours............<12.0 mg/dL  3-5 days..................<15.0 mg/dL  6-29 days.................<15.0 mg/dL       Bilirubin Total   Date Value Ref Range Status   04/28/2025 0.3 0.1 - 1.0 mg/dL Final     Comment:     For infants and newborns, interpretation of results should be based   on gestational age, weight and in agreement with clinical   observations.    Premature Infant recommended reference ranges:   0-24 hours:  <8.0 mg/dL   24-48 hours: <12.0 mg/dL   3-5 days:    <15.0 mg/dL   6-29 days:   <15.0 mg/dL     Alkaline Phosphatase   Date Value Ref Range Status   02/19/2025 61 40 - 150 U/L Final     ALP   Date Value Ref Range Status   04/28/2025 61 40 - 150 unit/L Final     AST   Date Value Ref Range Status   04/28/2025 23 11 - 45 unit/L Final   02/19/2025 27 10 - 40 U/L Final     ALT   Date Value Ref Range Status   04/28/2025 19 10 - 44 unit/L Final   02/19/2025 15 10 - 44 U/L Final     Anion Gap   Date Value Ref Range Status   04/28/2025 11 8 - 16 mmol/L Final     eGFR   Date Value Ref Range Status   04/28/2025 >60 >60  mL/min/1.73/m2 Final     Comment:     Estimated GFR calculated using the CKD-EPI creatinine (2021) equation.   02/20/2025 >60.0 >60 mL/min/1.73 m^2 Final         Assessment/plan    Iron deficiency anemia:  Upper EGD 06/13/2023:  Esophageal plaque found.  Suspicious for candidiasis.  Erythematous mucosa in the atrium.  Colonoscopy.  04/10/2023 Entire colonoscopy examination is normal recommendation RTC 5 years for screening.  Unable to take oral iron due to GI side effects  Arrange Feraheme IV x2  See me in 3 months with labs

## 2025-05-14 DIAGNOSIS — M54.59 LUMBAR FACET JOINT PAIN: Primary | ICD-10-CM

## 2025-05-23 ENCOUNTER — PATIENT MESSAGE (OUTPATIENT)
Dept: HEMATOLOGY/ONCOLOGY | Facility: CLINIC | Age: 60
End: 2025-05-23
Payer: MEDICARE

## 2025-05-23 ENCOUNTER — INFUSION (OUTPATIENT)
Dept: INFUSION THERAPY | Facility: HOSPITAL | Age: 60
End: 2025-05-23
Attending: NURSE PRACTITIONER
Payer: MEDICARE

## 2025-05-23 VITALS
HEIGHT: 65 IN | SYSTOLIC BLOOD PRESSURE: 126 MMHG | TEMPERATURE: 98 F | HEART RATE: 80 BPM | WEIGHT: 158.94 LBS | BODY MASS INDEX: 26.48 KG/M2 | OXYGEN SATURATION: 99 % | RESPIRATION RATE: 16 BRPM | DIASTOLIC BLOOD PRESSURE: 65 MMHG

## 2025-05-23 DIAGNOSIS — D50.0 IRON DEFICIENCY ANEMIA DUE TO CHRONIC BLOOD LOSS: Primary | ICD-10-CM

## 2025-05-23 PROCEDURE — 25000003 PHARM REV CODE 250: Performed by: NURSE PRACTITIONER

## 2025-05-23 PROCEDURE — 63600175 PHARM REV CODE 636 W HCPCS: Mod: JZ,TB | Performed by: NURSE PRACTITIONER

## 2025-05-23 PROCEDURE — 96365 THER/PROPH/DIAG IV INF INIT: CPT

## 2025-05-23 RX ORDER — HEPARIN 100 UNIT/ML
500 SYRINGE INTRAVENOUS
OUTPATIENT
Start: 2025-05-23

## 2025-05-23 RX ORDER — EPINEPHRINE 0.3 MG/.3ML
0.3 INJECTION SUBCUTANEOUS ONCE AS NEEDED
OUTPATIENT
Start: 2025-05-23

## 2025-05-23 RX ORDER — SODIUM CHLORIDE 0.9 % (FLUSH) 0.9 %
10 SYRINGE (ML) INJECTION
OUTPATIENT
Start: 2025-05-23

## 2025-05-23 RX ORDER — SODIUM CHLORIDE 0.9 % (FLUSH) 0.9 %
10 SYRINGE (ML) INJECTION
Status: DISCONTINUED | OUTPATIENT
Start: 2025-05-23 | End: 2025-05-23 | Stop reason: HOSPADM

## 2025-05-23 RX ORDER — HEPARIN 100 UNIT/ML
500 SYRINGE INTRAVENOUS
Status: DISCONTINUED | OUTPATIENT
Start: 2025-05-23 | End: 2025-05-23 | Stop reason: HOSPADM

## 2025-05-23 RX ADMIN — FERUMOXYTOL 510 MG: 510 INJECTION INTRAVENOUS at 01:05

## 2025-05-23 NOTE — PLAN OF CARE
Problem: Fatigue  Goal: Improved Activity Tolerance  5/23/2025 1316 by Yolanda Gordillo, RN  Outcome: Progressing  5/23/2025 1255 by Yolanda Gordillo, RN  Outcome: Progressing

## 2025-05-30 ENCOUNTER — INFUSION (OUTPATIENT)
Dept: INFUSION THERAPY | Facility: HOSPITAL | Age: 60
End: 2025-05-30
Attending: NURSE PRACTITIONER
Payer: MEDICARE

## 2025-05-30 VITALS
WEIGHT: 158.94 LBS | OXYGEN SATURATION: 98 % | DIASTOLIC BLOOD PRESSURE: 78 MMHG | BODY MASS INDEX: 26.48 KG/M2 | SYSTOLIC BLOOD PRESSURE: 134 MMHG | HEIGHT: 65 IN | HEART RATE: 68 BPM | RESPIRATION RATE: 16 BRPM | TEMPERATURE: 98 F

## 2025-05-30 DIAGNOSIS — D50.0 IRON DEFICIENCY ANEMIA DUE TO CHRONIC BLOOD LOSS: Primary | ICD-10-CM

## 2025-05-30 PROCEDURE — 25000003 PHARM REV CODE 250: Performed by: NURSE PRACTITIONER

## 2025-05-30 PROCEDURE — 96374 THER/PROPH/DIAG INJ IV PUSH: CPT

## 2025-05-30 RX ORDER — EPINEPHRINE 0.3 MG/.3ML
0.3 INJECTION SUBCUTANEOUS ONCE AS NEEDED
OUTPATIENT
Start: 2025-05-30

## 2025-05-30 RX ORDER — HEPARIN 100 UNIT/ML
500 SYRINGE INTRAVENOUS
Status: DISCONTINUED | OUTPATIENT
Start: 2025-05-30 | End: 2025-05-30 | Stop reason: HOSPADM

## 2025-05-30 RX ORDER — HEPARIN 100 UNIT/ML
500 SYRINGE INTRAVENOUS
OUTPATIENT
Start: 2025-05-30

## 2025-05-30 RX ORDER — SODIUM CHLORIDE 0.9 % (FLUSH) 0.9 %
10 SYRINGE (ML) INJECTION
OUTPATIENT
Start: 2025-05-30

## 2025-05-30 RX ORDER — SODIUM CHLORIDE 0.9 % (FLUSH) 0.9 %
10 SYRINGE (ML) INJECTION
Status: DISCONTINUED | OUTPATIENT
Start: 2025-05-30 | End: 2025-05-30 | Stop reason: HOSPADM

## 2025-05-30 RX ADMIN — DEXTROSE MONOHYDRATE 510 MG: 50 INJECTION, SOLUTION INTRAVENOUS at 12:05

## 2025-06-19 ENCOUNTER — OFFICE VISIT (OUTPATIENT)
Dept: SURGERY | Facility: CLINIC | Age: 60
End: 2025-06-19
Payer: MEDICARE

## 2025-06-19 VITALS
WEIGHT: 162.06 LBS | HEIGHT: 65 IN | HEART RATE: 77 BPM | TEMPERATURE: 97 F | DIASTOLIC BLOOD PRESSURE: 61 MMHG | SYSTOLIC BLOOD PRESSURE: 138 MMHG | BODY MASS INDEX: 27 KG/M2

## 2025-06-19 DIAGNOSIS — K62.5 RECTAL BLEEDING: ICD-10-CM

## 2025-06-19 DIAGNOSIS — K59.04 CHRONIC IDIOPATHIC CONSTIPATION: ICD-10-CM

## 2025-06-19 PROCEDURE — 99215 OFFICE O/P EST HI 40 MIN: CPT | Mod: PBBFAC,PO | Performed by: SURGERY

## 2025-06-19 PROCEDURE — 46600 DIAGNOSTIC ANOSCOPY SPX: CPT | Mod: PBBFAC,PO | Performed by: SURGERY

## 2025-06-19 PROCEDURE — 99999 PR PBB SHADOW E&M-EST. PATIENT-LVL V: CPT | Mod: PBBFAC,,, | Performed by: SURGERY

## 2025-06-19 RX ORDER — FLUTICASONE PROPIONATE AND SALMETEROL 250; 50 UG/1; UG/1
1 POWDER RESPIRATORY (INHALATION) 2 TIMES DAILY
COMMUNITY
Start: 2025-06-17

## 2025-06-19 RX ORDER — FLUTICASONE PROPIONATE AND SALMETEROL 250; 50 UG/1; UG/1
1 POWDER RESPIRATORY (INHALATION) 2 TIMES DAILY
COMMUNITY

## 2025-06-19 NOTE — PROGRESS NOTES
Subjective     Patient ID: Luz Arias is a 59 y.o. female.    Chief Complaint: Consult (Chronic idiopathic constipation/Rectal bleeding)    HPI  60 yo F with IBS- constipation.  Pt has gastric stimulator.  Pt referred to me for evaluation of her hemorrhoids.  She describes tissue prolapse with straining.  Notes discomfort.  No sharp stabbing pain.  No blood per rectum.  PMHx significant for HTN, DM, gastroparesis. PSHx significant for gastric stimulator    Review of Systems   Constitutional:  Negative for activity change, appetite change, fever and unexpected weight change.   Respiratory:  Negative for chest tightness, shortness of breath and wheezing.    Cardiovascular:  Negative for chest pain.   Gastrointestinal:  Negative for abdominal distention, abdominal pain, anal bleeding, blood in stool, constipation, diarrhea, nausea, rectal pain and vomiting.   Genitourinary:  Negative for difficulty urinating, dysuria and frequency.   Integumentary:  Negative for wound.   Neurological:  Negative for dizziness.   Hematological:  Negative for adenopathy.   Psychiatric/Behavioral:  Negative for agitation.         Objective     Physical Exam  Vitals reviewed.   Cardiovascular:      Rate and Rhythm: Normal rate.      Pulses: Normal pulses.   Pulmonary:      Effort: Pulmonary effort is normal.   Abdominal:      General: Abdomen is flat. There is no distension.      Tenderness: There is no abdominal tenderness.      Hernia: No hernia is present.   Genitourinary:     Comments: Rectal exam performed in prone maria ines-knife position.  No significant external hemorrhoids noted.  No fissure or fistula noted.  Digital rectal exam with no palpable abnormalities.  Anoscopy is performed demonstrating mild internal hemorrhoids in the right anterior right posterior left lateral position.  Neurological:      Mental Status: She is alert.   Psychiatric:         Mood and Affect: Mood normal.        Assessment and Plan     1. Chronic  idiopathic constipation  -     Ambulatory referral/consult to Colorectal Surgery    2. Rectal bleeding  -     Ambulatory referral/consult to Colorectal Surgery        D/w pt.  I have recommended increasing fiber in female diet and to also begin using a fiber supplement (metamucil or psyillium husk).  Will monitor for improvement and if no significant improvement will consider more aggressive treatment.           No follow-ups on file.

## 2025-07-10 ENCOUNTER — CLINICAL SUPPORT (OUTPATIENT)
Dept: REHABILITATION | Facility: HOSPITAL | Age: 60
End: 2025-07-10
Attending: NURSE PRACTITIONER
Payer: MEDICARE

## 2025-07-10 DIAGNOSIS — Z74.09 IMPAIRED FUNCTIONAL MOBILITY AND ACTIVITY TOLERANCE: Primary | ICD-10-CM

## 2025-07-10 PROCEDURE — 97530 THERAPEUTIC ACTIVITIES: CPT

## 2025-07-10 PROCEDURE — 97161 PT EVAL LOW COMPLEX 20 MIN: CPT

## 2025-07-10 NOTE — PROGRESS NOTES
Outpatient Rehab    Physical Therapy Evaluation    Patient Name: Jenny Arias  MRN: [id number]  YOB: 1965  Encounter Date: 7/10/2025    Therapy Diagnosis:   Encounter Diagnosis   Name Primary?    Impaired functional mobility and activity tolerance Yes     Physician: Watson Sanchez NP    Physician Orders: Eval and Treat  Medical Diagnosis: Lumbar facet joint pain  Surgical Diagnosis: Not applicable for this Episode   Surgical Date: Not applicable for this Episode  Days Since Last Surgery: Not applicable for this Episode    Visit # / Visits Authorized:  1 / 1  Insurance Authorization Period: 5/14/2025 to 12/31/2025  Date of Evaluation: 7/10/2025  Plan of Care Certification: 7/10/2025 to 9/4/2025     Time In: 1100   Time Out: 1145  Total Time (in minutes): 45   Total Billable Time (in minutes):      Intake Outcome Measure for FOTO Survey    Therapist reviewed FOTO scores for Jenny Arias on 7/10/2025.   FOTO report - see Media section or FOTO account episode details.     Intake Score:  %    Precautions:       Subjective   History of Present Illness  Jenny is a 59 y.o. female who reports to physical therapy with a chief concern of The patient is a 59 year old female presenting with impaired balance and back pain. PMH includes CVA 10 years ago and with residual R sided hemiplegia, and also sarcoidosis impairing her ability to breath. She reports 4 falls in the past 6 months, difficulty walking and more falls over the past 2 years. She notes decreased gait speed, and difficulty reaching objects off of the floor..     The patient reports a medical diagnosis of CVA R sided hemiplegia.                 Pain     Patient reports a current pain level of 5/10. Pain at best is reported as 5/10. Pain at worst is reported as 10/10.   Location: R side of low back  Clinical Progression (since onset): Stable  Pain Qualities: Aching, Discomfort, Radiating, Tenderness, Tightness, Pressure  Pain-Relieving Factors: Activity  modification, Change in position  Pain-Aggravating Factors: Bending, Cooking, Exercise, Holding objects, Lifting, Sitting, Sleeping, Standing, Walking         Living Arrangements  Living Situation  Housing: Home independently  Living Arrangements: Spouse/significant other        Employment  Employment Status: Retired          Past Medical History/Physical Systems Review:   Luz Arias  has a past medical history of Aortic regurgitation, Asthma, Chronic fatigue, Chronic pain, Depression, Diabetes, Dysphagia, Gastritis, Gastroparesis, GERD (gastroesophageal reflux disease), Hypertension, Intestinal metaplasia of gastric cardia, Irritable bowel syndrome, Sarcoidosis, Sleep apnea, Stroke, and TIA (transient ischemic attack).    Luz Arias  has a past surgical history that includes Colonoscopy (05/17/2019); Upper gastrointestinal endoscopy (05/17/2019); Esophagogastroduodenoscopy (N/A, 5/5/2020); Colonoscopy (N/A, 5/5/2020); Esophagogastroduodenoscopy (N/A, 6/2/2021); Neck surgery; Cholecystectomy; Appendectomy; Hand surgery; Wrist surgery; Elbow surgery; Mediastinoscopy; Hysterectomy; Esophagogastroduodenoscopy (N/A, 6/28/2022); Esophageal manometry with measurement of impedance (N/A, 6/29/2022); Colonoscopy (N/A, 4/10/2023); Esophagogastroduodenoscopy (N/A, 6/13/2023); Esophagogastroduodenoscopy (N/A, 2/6/2025); Colonoscopy (N/A, 2/6/2025); and xi robotic insertion, gastric electrical stimulator (N/A, 2/19/2025).    Luz has a current medication list which includes the following prescription(s): acyclovir, albuterol, albuterol, gaviscon extra strength, aspirin, benzonatate, biotin, candesartan, chlorhexidine, clonidine, diclofenac sodium, diphenhydrAMINE-aluminum-magnesium hydroxide-simethicone-LIDOcaine HCl 2%, easy touch alcohol prep pads, ergocalciferol, ergocalciferol (vitamin d2), esomeprazole, fluticasone propionate, fluticasone-salmeterol 250-50 mcg/dose, freestyle freedom lite, freestyle lancets,  freestyle lite strips, lantus solostar u-100 insulin, ipratropium, lamotrigine, levalbuterol, lidocaine, lubiprostone, metformin, metoprolol tartrate, mupirocin calcium 2%, nitroglycerin, nortriptyline, ondansetron, pen needle, diabetic, polyethylene glycol, promethazine, rosuvastatin, tiotropium, triamterene-hydrochlorothiazide 75-50 mg, tylenol extra strength, valacyclovir, vitamin e, and wixela inhub.    Review of patient's allergies indicates:   Allergen Reactions    Doxycycline Itching and Rash    Sulfa (sulfonamide antibiotics) Anaphylaxis and Other (See Comments)     SWELLING OF THE AIRWAYS, HARD TIME BREATHING    Sulfamethoxazole-trimethoprim Anaphylaxis and Diarrhea     Other reaction(s): Anaphylaxis, Finding of vomiting, Diarrhea    Temazepam      Sleep walking     Other reaction(s): Sleep related hallucinations    Other Reaction(s): Sleep related hallucinations    Sleep walking  Other reaction(s): Sleep related hallucinations    Pantoprazole Nausea And Vomiting    Pregabalin Other (See Comments)    Verapamil      Other reaction(s): Constipation    Zonisamide      Other reaction(s): Anaphylaxis, Diarrhea, Finding of vomiting, Anaphylaxis, Diarrhea, Finding of vomiting, Anaphylaxis, Diarrhea, Finding of vomiting    Metoclopramide Rash and Anxiety    Sucralfate Nausea Only and Other (See Comments)    Vibramycin [doxycycline hyclate] Rash        Objective   Posture                 Forward head, forward shoulders, anterior trunk    Lower Extremity Sensation  General Lumbar/Lower Extremity Sensation  Intact: Right  Impaired: Left  Right Lumbar/Lower Extremity Sensation  Intact: Light Touch, Sharp/Dull Discrimination, Static Two Point Discrimination, Dynamic Two Point Discrimination, Kinesthesia, and Proprioception  Right Lumbar/Lower Extremity Sensation Stocking Glove Pattern: No    Left Lumbar/Lower Extremity Sensation  Diminished: Light Touch            Left Dermatomes  Left Lumbar Dermatome Light  Touch  Intact: L4  Diminished: L2, L3, and L5  Left Sacral Dermatome Light Touch  Diminished: S1-2           Lumbar Range of Motion   Active (deg) Passive (deg) Pain   Flexion 55   Yes   Extension 30   Yes   Right Lateral Flexion 35       Right Rotation 40   Yes   Left Lateral Flexion 35   Yes   Left Rotation 40   Yes     Numerical value represents % of movement                 Hip Strength - Planes of Motion   Right Strength Right Pain Left Strength Left  Pain   Flexion (L2) 3-   3+     Extension 3-   3+     ABduction 3-   3+     ADduction 3-   3+     Internal Rotation 3-   3+     External Rotation 3-   3+         Knee Strength   Right Strength Right Pain Left Strength Left  Pain   Flexion (S2) 3-   4     Prone Flexion           Extension (L3) 3-   4            Ankle/Foot Strength - Planes of Motion   Right Strength Right Pain Left Strength Left  Pain   Dorsiflexion (L4) 3-   4     Plantar Flexion (S1)     4     Inversion     4     Eversion     4     Great Toe Flexion     4     Great Toe Extension (L5)     4     Lesser Toes Flexion           Lesser Toes Extension                     Fall Risk  Functional mobility test results suggest the patient is: At Risk for Falls  Four Stage Balance Test  Narrow Base of Support: 10 sec sec  Tandem Stand - Right Foot in Front: 5 sec  Tandem Stand - Left Foot in Front: 4 sec  Semi-Tandem Stand - Right Foot in Front: 10 sec  Semi-Tandem Stand - Left Foot in Front: 10 sec  Single Leg Stand - Right Foot: 0 sec  Single Leg Stand - Left Foot: 2 sec       Timed Up & Go (TUG)  Time: 20.8 seconds  Observations: Short strides, Loss of balance, Little or no arm swing, and Slow tentative pace  An older adult who takes >=12 seconds to complete the TUG is at risk for falling.       Sit to Stand Testing      The patient completed 5 repetitions of a sit to stand transfer in 30 seconds. back pain and use of hands for stability              Treatment:  Therapeutic Activity  TA 1: Supported  sitting with lumbar roll  TA 2: Standing lumbar extension at table x 10  TA 3: Patient education regarding HEP.  TA 4: Patient education regarding symptom managment.  TA 5: Patient education regarding activity modification.    Time Entry(in minutes):  PT Evaluation (Low) Time Entry: 15  Therapeutic Activity Time Entry: 30    Assessment & Plan   Assessment  Jenny presents with a condition of Moderate complexity.   Presentation of Symptoms: Stable  Will Comorbidities Impact Care: Yes       Functional Limitations: Activity tolerance, Ambulating on uneven surfaces, Carrying objects, Completing work/school activities, Completing self-care activities, Decreased ambulation distance/endurance, Disrupted sleep pattern, Functional mobility, Gait limitations, Getting off the floor, Increased risk of fall, Maintaining balance, Participating in leisure activities, Participating in sports, Performing household chores, Range of motion, Proprioception, Sitting tolerance, Squatting, Standing tolerance, Transfers, Pain with ADLs/IADLs  Impairments: Abnormal coordination, Abnormal gait, Abnormal muscle firing, Abnormal muscle tone, Activity intolerance, Impaired balance, Abnormal or restricted range of motion, Impaired physical strength, Lack of appropriate home exercise program, Pain with functional activity, Safety issue  Personal Factors Affecting Prognosis: Physical limitations, Schedule, Pain    Patient Goal for Therapy (PT): Improve ability to walk and reduce risk of falls  Prognosis: Good  Assessment Details: The patient presents with signs and symptoms consistent with impaired balance, functional mobility, gait, and endurance secondary to a medical dx of R sided hemiplegia resultant for CVA roughly 10 years ago. She reports over 4 falls in the past 6 months and reports fear of falling. Gross muscle weakness and LE/trunk spasticity impair her ability to walk, transfer, lift, carry, perform ADLs, and take care of her home. Skilled  physical therapy intervention is medically necessary is medically necessary to improve her current level of function.     Plan  From a physical therapy perspective, the patient would benefit from: Skilled Rehab Services    Planned therapy interventions include: Therapeutic exercise, Therapeutic activities, Neuromuscular re-education, Manual therapy, ADLs/IADLs, Other (Comment), and Gait training.    Planned modalities to include: Biofeedback, Electrical stimulation - attended, Electrical stimulation - passive/unattended, Thermotherapy (hot pack), and Cryotherapy (cold pack).        Visit Frequency: 2 times Per Week for 8 Weeks.       This plan was discussed with Patient.   Discussion participants: Agreed Upon Plan of Care  Plan details: Frequency and duration of treatment to be adjusted as needed          The patient's spiritual, cultural, and educational needs were considered, and the patient is agreeable to the plan of care and goals.           Goals:   Active       8 weeks LTGs       Patient to improve B SLS to 8 seconds       Start:  07/10/25    Expected End:  09/04/25            Patient to improve 30 second sit to stand to 10 reps       Start:  07/10/25    Expected End:  09/04/25            Patient to improve lumbar ROM by 25%       Start:  07/10/25    Expected End:  09/04/25            Patient to improve TUG time to 12.5 seconds       Start:  07/10/25    Expected End:  09/04/25               STGs 3 weeks       Patient to demonstrate knowledge and understanding of HEP.        Start:  07/10/25    Expected End:  09/04/25            Patient to improve B SLS to 4 seconds       Start:  07/10/25    Expected End:  09/04/25            Patient to improve 30 second sit to stand to 7 reps       Start:  07/10/25    Expected End:  09/04/25            Patient to improve lumbar ROM by 15%.        Start:  07/10/25    Expected End:  09/04/25                Luigi Horta PT     DISCHARGE

## 2025-07-15 ENCOUNTER — OFFICE VISIT (OUTPATIENT)
Dept: SURGERY | Facility: CLINIC | Age: 60
End: 2025-07-15
Payer: MEDICARE

## 2025-07-15 VITALS
DIASTOLIC BLOOD PRESSURE: 58 MMHG | SYSTOLIC BLOOD PRESSURE: 122 MMHG | WEIGHT: 162.69 LBS | HEIGHT: 65 IN | HEART RATE: 82 BPM | BODY MASS INDEX: 27.1 KG/M2

## 2025-07-15 DIAGNOSIS — K31.84 GASTROPARESIS: Primary | ICD-10-CM

## 2025-07-15 DIAGNOSIS — R11.2 NAUSEA AND VOMITING, UNSPECIFIED VOMITING TYPE: ICD-10-CM

## 2025-07-15 DIAGNOSIS — R11.0 NAUSEA: ICD-10-CM

## 2025-07-15 DIAGNOSIS — Z96.82 GASTRIC NEUROSTIMULATOR DEVICE IN SITU: ICD-10-CM

## 2025-07-15 PROCEDURE — 99999 PR PBB SHADOW E&M-EST. PATIENT-LVL V: CPT | Mod: PBBFAC,,,

## 2025-07-15 PROCEDURE — 99215 OFFICE O/P EST HI 40 MIN: CPT | Mod: PBBFAC

## 2025-07-15 PROCEDURE — 99214 OFFICE O/P EST MOD 30 MIN: CPT | Mod: S$PBB,,,

## 2025-07-15 RX ORDER — PROMETHAZINE HYDROCHLORIDE 25 MG/1
25 TABLET ORAL EVERY 6 HOURS PRN
Qty: 30 TABLET | Refills: 5 | Status: SHIPPED | OUTPATIENT
Start: 2025-07-15

## 2025-07-15 RX ORDER — ONDANSETRON 8 MG/1
8 TABLET, ORALLY DISINTEGRATING ORAL EVERY 6 HOURS PRN
Qty: 30 TABLET | Refills: 5 | Status: SHIPPED | OUTPATIENT
Start: 2025-07-15

## 2025-07-15 NOTE — PATIENT INSTRUCTIONS
"Discontinuing Nexium  Over 1 month (4 weeks)  Week 1: Tues/Thurs/Sat  Week 2: Tues/Thurs/Sat  Week 3: Tues/Sat  Week 4: Tues/Sat    Symptoms to watch out for  Diarrhea and mid-upper abdominal pain after eating (later than what happens with gastroparesis)  Breakthrough symptoms: Pepcid 20mg every 12 hours PRN, or 40mg daily PRN    Protein shakes  Look for vegan protein shakes or "hydrolyzed whey"   TactoTek milk - lower sugar/no lactose  [employer] Power protein shakes (also TactoTek)   Amplify XL - Vanilla ice cream flavor  Smoothie Tunde: Gladiator protein or vegan protein  "

## 2025-07-15 NOTE — PROGRESS NOTES
Minimally Invasive Surgery  Gastroparesis Follow-Up      ASSESSMENT AND PLAN:     Ms. Arias is doing well from a gastroparesis standpoint.     Gastroparesis symptoms are mild, which is improved.  Stimulator settings were not changed today  Medications: zofran and phenergan written with refills  Testing ordered: None      GERD symptoms are severe with mild dysphagia, which is unchanged.  Medications: Discontinued Nexium.  Created gradual taper plan, shared with patient (T/Th/Sa x 2 weeks, then T/Sa x 2 weeks, then stop), sent to [facility].  S/sx of concern reviewed.  Discussed managing breakthrough symptoms with pepcid.      Constipation is stable.   Relief with use of Amitiza.   Continue to follow with gastroenterologist.     Nutrition: Luz Arias is not likely to be malnourished.    Discussed protein supplements and information provided on low lactose or lactose-free options          RTC 1 year, recall created.       Ms. Arias verbalized understanding to all information provided and voiced no further questions, comments, or concerns.        Due to gastroparesis pathophysiology, this patient should not take GLP-1 agonists and opioids.      ______________________________________________________________________  SUBJECTIVE:     Ms. Arias is a known patient of this clinic.  She provided the history.    Chief Complaint:  Follow-up and GI Problem (Nausea )      HPI:  Luz Arias is a 59 y.o. y/o female whose body mass index is 27.07 kg/m². (weight of 162 lbs).  History includes stroke(s), aortic regurgitation, SILVESTRE, asthma, HTN, chronic pain (managing with lidocaine patches), GERD and dysphagia and IBS, cholecystitis (with surgical repair), depression, anemia, and gastroparesis with stimulator placement (2025).  She is currently employed.  She has gained weight since 5/30/25.  She has not visited an emergency facility in the last year.      Ms. Arias is primarily concerned with nausea today.  This is a chronic  "problem. The current episode started more than 1 year ago. The problem occurs rarely. The problem has been gradually improving. Associated symptoms include abdominal pain, coughing, headaches, nausea and a sore throat. Pertinent negatives include no chest pain, chills, fever, vomiting or weakness. Nothing aggravates the symptoms. Treatments tried: stimulator settings changed. The treatment provided significant relief.       Gastroparesis Assessments  GCSI 7/15/25:    Severity Frequency   Vomiting 0 0   Nausea 1 1   Early satiety 1 1   Bloating 1 1   Postprandial fullness 1 2   Epigastric pain 1 2   Epigastric burning 1 1   # episodes of vomiting in last 24 hours   0   Impression: Gastroparesis symptoms are mild.       GERD Symptoms:  + PPI - daily in the morning   + Typical heartburn - rare  + Regurgitation - rare  + Dysphagia (no solids, yes liquids).  If yes, see Eckardt score, below.  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    Diet:  Avoids foods that worsen reflux   Ginger ale, water, and tea   Nutrition supplements: None   Impression: PO intake is stable.      Medications:  Motility: Reglan (allergic reaction)   Nausea and vomiting: zofran (currently taking 1-2 times weekly), phenergan (currently taking 1-2 times monthly), scopolamine patches (did not help), and herbal preparations: ginger tea (currently taking PRN)   Abdominal pain or cramping: nothing   GERD: PPI: protonix and nexium (currently taking nexium 1 times daily) and famotidine (currently taking PRN)   Constipation: Miralax, " magnesium supplement, and Amitiza (currently taking)  Diarrhea: Nothing  Pain: heat, ice, and gabapentin and lidocaine patches   GLP-1: None   THC: No   She is satisfied with symptom control.    reviewed 7/15/25: Tramadol and Percocet 5 (5/15 and , respectively)         Review of Systems   Constitutional:  Negative for appetite change, chills, fever and unexpected weight change.   HENT:  Positive for sore throat and trouble swallowing. Negative for dental problem.    Respiratory:  Positive for cough. Negative for choking, chest tightness and shortness of breath.    Cardiovascular:  Negative for chest pain.   Gastrointestinal:  Positive for abdominal distention (bloating, but it's better than prior), abdominal pain, constipation, nausea and reflux. Negative for blood in stool, diarrhea and vomiting.   Genitourinary:  Negative for difficulty urinating.   Integumentary:  Negative for pallor.   Neurological:  Positive for headaches. Negative for weakness and light-headedness.   Hematological:  Does not bruise/bleed easily.   Psychiatric/Behavioral:  Negative for depressed mood. The patient is nervous/anxious (her baseline).          Past Medical History:  Past Medical History[1]       Past Surgical History:   Past Surgical History[2]       Social History:   Tobacco Use    Smoking status: Never    Smokeless tobacco: Never   Substance and Sexual Activity    Alcohol use: Not Currently     Comment: no drinking anymore    Drug use: Never    Sexual activity: Not Currently          PHQ Screenin/15/2025    10:53 AM 2025    10:02 AM   PHQ2 & PHQ9 Depression Results   Over the last two weeks how often have you been bothered by little interest or pleasure in doing things 1 1   Over the last two weeks how often have you been bothered by feeling down, depressed or hopeless 1 1   PHQ-2 Total Score 2 2           OBJECTIVE:       Vitals:  Vitals:    07/15/25 1050   BP: (!) 122/58   Pulse: 82   Weight: 73.8 kg (162  "lb 11.2 oz)   Height: 5' 5" (1.651 m)   PainSc:   5         Physical Exam  Vitals reviewed.   Constitutional:       General: She is not in acute distress.     Appearance: Normal appearance. She is not ill-appearing.   HENT:      Mouth/Throat:      Lips: Pink. No lesions.   Cardiovascular:      Rate and Rhythm: Normal rate.   Pulmonary:      Effort: Pulmonary effort is normal. No respiratory distress.   Abdominal:      General: A surgical scar is present. There is no distension. There are no signs of injury.      Palpations: Abdomen is soft. There is no fluid wave or mass.      Tenderness: There is no abdominal tenderness. There is no guarding or rebound.       Skin:     General: Skin is warm and dry.      Coloration: Skin is not jaundiced or pale.   Neurological:      Mental Status: She is alert and oriented to person, place, and time.   Psychiatric:         Attention and Perception: Attention normal.         Mood and Affect: Mood normal.         Speech: Speech normal.         Behavior: Behavior normal.             Baseline Data Follow-Up Data   Date 2/19/25 3/13/25 4/14/25 7/15/25       Voltage (V) 3.5 3.5 4.5 4.5        Current (mA) 5.5 5.5 9.6 8.7       Cycle On/Off Time (s) 0.1 / 5 0.1 / 5 0.1 / 5 0.1 / 5       Rate (Hz) 14 14 14 14       Impedance (ohms) [phone]       Battery Status ok ok ok ok       Nausea Diary (Avg) Baseline          # Nausea in 24 hrs. 0 0 1 < 1       Severity of Nausea   (Mild, Moderate or Severe) 0 0 mild mild       Vomiting Diary Baseline          # Episodes in a day or a week 0 0 0 / day 0 / day             Data:   Lab Results   Component Value Date    WBC 9.83 04/28/2025    RBC 3.39 (L) 04/28/2025    HGB 9.1 (L) 04/28/2025    HCT 29.0 (L) 04/28/2025    MCV 86 04/28/2025     04/28/2025     04/28/2025    K 3.4 (L) 04/28/2025     04/28/2025    CALCIUM 9.8 04/28/2025    MG 1.6 02/20/2025    PHOS 4.2 02/20/2025    CREATININE 0.8 04/28/2025    BUN 14 04/28/2025    " EGFRNORACEVR >60 04/28/2025    ALBUMIN 4.2 04/28/2025    BILITOT 0.3 04/28/2025    ALKPHOS 61 04/28/2025    AST 23 04/28/2025    ALT 19 04/28/2025    LIPASE 14 05/30/2023    ESTIMATEDAVG 134 (H) 02/19/2025     Above labs reviewed by Za Head, MSN, APRN, FNP-C, abnormal, addressed by ordering providers.   EGD 2/6/25.  Report reviewed by Dr. Osei MD and myself.  Benign-appearing esophageal stenosis, dilated.  Small HH.  Congestive gastropathy (bx: antral-type gastric mucosa with mild to focally moderate chronic inactive gastritis and reactive gastropathy, neg HP, negative for intestinal metaplasia, dysplasia, malignancy).  Normal examined duodenum.   CT 8/8/24.  Report reviewed by Dr. Osei MD and myself.  Previous cholecystectomy, no renal calculi, prior hysterectomy, trace free fluid in pelvis.   Modified barium swallow study 6/12/23.  Report reviewed by me.  Negative for aspiration.   Esophagram 6/29/22.  Normal, no significant abnormalities.   GES 4/8/25.  Report and images reviewed by Dr. Osei MD and myself.  48% retained at 4 hours, delayed gastric emptying.     GES 4/6/22.  Report and images reviewed by Dr. Osei MD and myself.  32% retained at 4 hours (normal is < 10% at 4 hours). T-1/2 calculated at 176 minutes (normal is  minutes).   Colonoscopy 2/6/25.  Report reviewed by me.  One 2mm polyp in rectum, resected and retrieved (bx: fragment of polypoid colonic mucosa with lymphoid aggregate, negative for dysplasia or malignancy).  Examined ileum normal.  Otherwise normal exam on direct and retroflexion views.  Repeat in 10 years.   AXR 3/14/25.  Report reviewed by me.  Large quantity of stool throughout colon c/w constipation, no acute findings.      Thank you for including me in the care of Ms. Arias.  It was a pleasure speaking with her today.     Za Head, MSN, APRN, FNP-C  General Surgery   [facility]    70 minutes of total time spent on the  encounter, which includes face to face time and non-face to face time preparing to see the patient (e.g., review of tests), obtaining and/or reviewing separately obtained history, documenting clinical information in the electronic or other health record, independently interpreting results (not separately reported) and communicating results to the patient/family/caregiver, and/or care coordination (not separately reported).            [1]   Past Medical History:  Diagnosis Date    Aortic regurgitation     Asthma     Chronic fatigue     Chronic pain     Depression     Diabetes     Dysphagia     Gastritis     Gastroparesis     GERD (gastroesophageal reflux disease)     Hypertension     Intestinal metaplasia of gastric cardia     Irritable bowel syndrome     Sarcoidosis     Sleep apnea     Stroke     12/2015, mini stroke 2/2023; right sided weakness    TIA (transient ischemic attack)    [2]   Past Surgical History:  Procedure Laterality Date    APPENDECTOMY      CHOLECYSTECTOMY      COLONOSCOPY  05/17/2019    COLONOSCOPY N/A 5/5/2020    Procedure: COLONOSCOPY;  Surgeon: Garrick López MD;  Location: [facility] ENDO;  Service: Endoscopy;  Laterality: N/A;  with bx and stool specimen    COLONOSCOPY N/A 4/10/2023    Procedure: COLONOSCOPY;  Surgeon: Forrest Gomes MD;  Location: [facility] ENDO;  Service: Endoscopy;  Laterality: N/A;    COLONOSCOPY N/A 2/6/2025    Procedure: COLONOSCOPY;  Surgeon: Bobbi Landrum MD;  Location: [facility] ENDO;  Service: Endoscopy;  Laterality: N/A;    ELBOW SURGERY      ESOPHAGEAL MANOMETRY WITH MEASUREMENT OF IMPEDANCE N/A 6/29/2022    Procedure: MANOMETRY-ESOPHAGEAL-WITH IMPEDANCE;  Surgeon: Concetta Odonnell MD;  Location: [facility] ([address] [place] [address]);  Service: Endoscopy;  Laterality: N/A;  r/o rumination and supragastric belching  hold nortriptyline and norco 24 hours prior to procedure  fully vaccinated, instructions emailed to [email or web address]    ESOPHAGOGASTRODUODENOSCOPY N/A 5/5/2020     Procedure: EGD (ESOPHAGOGASTRODUODENOSCOPY);  Surgeon: Garrick López MD;  Location: [facility];  Service: Endoscopy;  Laterality: N/A;  with biopsy    ESOPHAGOGASTRODUODENOSCOPY N/A 6/2/2021    Procedure: EGD (ESOPHAGOGASTRODUODENOSCOPY);  Surgeon: Garrick López MD;  Location: [facility];  Service: Endoscopy;  Laterality: N/A;    ESOPHAGOGASTRODUODENOSCOPY N/A 6/28/2022    Procedure: ESOPHAGOGASTRODUODENOSCOPY (EGD);  Surgeon: Concetta Odonnell MD;  Location: [facility] (2ND FLR);  Service: Endoscopy;  Laterality: N/A;  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  full liquid diet x3 days, clear liquid diet x1 day prior to procedure  fully vaccinated, instructions emailed to [email or web address]    ESOPHAGOGASTRODUODENOSCOPY N/A 6/13/2023    Procedure: EGD (ESOPHAGOGASTRODUODENOSCOPY);  Surgeon: Forrest Gomes MD;  Location: [facility];  Service: Endoscopy;  Laterality: N/A;    ESOPHAGOGASTRODUODENOSCOPY N/A 2/6/2025    Procedure: EGD (ESOPHAGOGASTRODUODENOSCOPY);  Surgeon: Bobbi Landrum MD;  Location: [facility];  Service: Endoscopy;  Laterality: N/A;    HAND SURGERY      HYSTERECTOMY      2005, age 39, KRISTAN fibroids    MEDIASTINOSCOPY      NECK SURGERY      UPPER GASTROINTESTINAL ENDOSCOPY  05/17/2019    WRIST SURGERY      XI ROBOTIC INSERTION, GASTRIC ELECTRICAL STIMULATOR N/A 2/19/2025    Procedure: XI ROBOTIC INSERTION, GASTRIC ELECTRICAL STIMULATOR with leads and EGD;  Surgeon: Jasmeet Franz MD;  Location: [facility] OR [address] [place] [address];  Service: General;  Laterality: N/A;  NO NARCOTICS

## 2025-08-19 ENCOUNTER — PATIENT MESSAGE (OUTPATIENT)
Dept: SURGERY | Facility: CLINIC | Age: 60
End: 2025-08-19
Payer: MEDICARE

## 2025-08-20 ENCOUNTER — OFFICE VISIT (OUTPATIENT)
Dept: GASTROENTEROLOGY | Facility: CLINIC | Age: 60
End: 2025-08-20
Payer: MEDICARE

## 2025-08-20 VITALS
HEART RATE: 75 BPM | SYSTOLIC BLOOD PRESSURE: 119 MMHG | WEIGHT: 167.31 LBS | BODY MASS INDEX: 27.85 KG/M2 | DIASTOLIC BLOOD PRESSURE: 58 MMHG

## 2025-08-20 DIAGNOSIS — R13.10 DYSPHAGIA, UNSPECIFIED TYPE: Primary | ICD-10-CM

## 2025-08-20 PROCEDURE — 99999 PR PBB SHADOW E&M-EST. PATIENT-LVL V: CPT | Mod: PBBFAC,,,

## 2025-08-20 PROCEDURE — 99215 OFFICE O/P EST HI 40 MIN: CPT | Mod: PBBFAC,PN

## 2025-08-20 PROCEDURE — 99214 OFFICE O/P EST MOD 30 MIN: CPT | Mod: S$PBB,,,

## 2025-08-20 RX ORDER — ESOMEPRAZOLE MAGNESIUM 40 MG/1
40 CAPSULE, DELAYED RELEASE ORAL
Qty: 90 CAPSULE | Refills: 3 | Status: SHIPPED | OUTPATIENT
Start: 2025-08-20 | End: 2026-08-20

## 2025-08-21 ENCOUNTER — CLINICAL SUPPORT (OUTPATIENT)
Dept: REHABILITATION | Facility: HOSPITAL | Age: 60
End: 2025-08-21
Attending: NURSE PRACTITIONER
Payer: MEDICARE

## 2025-08-21 ENCOUNTER — TELEPHONE (OUTPATIENT)
Dept: HEMATOLOGY/ONCOLOGY | Facility: CLINIC | Age: 60
End: 2025-08-21
Payer: MEDICARE

## 2025-08-21 ENCOUNTER — LAB VISIT (OUTPATIENT)
Dept: LAB | Facility: HOSPITAL | Age: 60
End: 2025-08-21
Attending: NURSE PRACTITIONER
Payer: MEDICARE

## 2025-08-21 DIAGNOSIS — D50.0 IRON DEFICIENCY ANEMIA DUE TO CHRONIC BLOOD LOSS: Primary | ICD-10-CM

## 2025-08-21 DIAGNOSIS — D50.0 IRON DEFICIENCY ANEMIA DUE TO CHRONIC BLOOD LOSS: ICD-10-CM

## 2025-08-21 DIAGNOSIS — Z74.09 IMPAIRED FUNCTIONAL MOBILITY AND ACTIVITY TOLERANCE: Primary | ICD-10-CM

## 2025-08-21 LAB
ABSOLUTE EOSINOPHIL (OHS): 0.14 K/UL
ABSOLUTE MONOCYTE (OHS): 0.64 K/UL (ref 0.3–1)
ABSOLUTE NEUTROPHIL COUNT (OHS): 4.66 K/UL (ref 1.8–7.7)
ALBUMIN SERPL BCP-MCNC: 4.3 G/DL (ref 3.5–5.2)
ALP SERPL-CCNC: 50 UNIT/L (ref 40–150)
ALT SERPL W/O P-5'-P-CCNC: 53 UNIT/L (ref 10–44)
ANION GAP (OHS): 11 MMOL/L (ref 8–16)
AST SERPL-CCNC: 43 UNIT/L (ref 11–45)
BASOPHILS # BLD AUTO: 0.02 K/UL
BASOPHILS NFR BLD AUTO: 0.2 %
BILIRUB SERPL-MCNC: 0.3 MG/DL (ref 0.1–1)
BUN SERPL-MCNC: 17 MG/DL (ref 6–20)
CALCIUM SERPL-MCNC: 9.6 MG/DL (ref 8.7–10.5)
CHLORIDE SERPL-SCNC: 101 MMOL/L (ref 95–110)
CO2 SERPL-SCNC: 28 MMOL/L (ref 23–29)
CREAT SERPL-MCNC: 0.8 MG/DL (ref 0.5–1.4)
ERYTHROCYTE [DISTWIDTH] IN BLOOD BY AUTOMATED COUNT: 14.3 % (ref 11.5–14.5)
GFR SERPLBLD CREATININE-BSD FMLA CKD-EPI: >60 ML/MIN/1.73/M2
GLUCOSE SERPL-MCNC: 123 MG/DL (ref 70–110)
HCT VFR BLD AUTO: 30.9 % (ref 37–48.5)
HGB BLD-MCNC: 10 GM/DL (ref 12–16)
IMM GRANULOCYTES # BLD AUTO: 0.01 K/UL (ref 0–0.04)
IMM GRANULOCYTES NFR BLD AUTO: 0.1 % (ref 0–0.5)
IRON SATN MFR SERPL: 19 % (ref 20–50)
IRON SERPL-MCNC: 71 UG/DL (ref 30–160)
LYMPHOCYTES # BLD AUTO: 2.73 K/UL (ref 1–4.8)
MCH RBC QN AUTO: 27.2 PG (ref 27–31)
MCHC RBC AUTO-ENTMCNC: 32.4 G/DL (ref 32–36)
MCV RBC AUTO: 84 FL (ref 82–98)
NUCLEATED RBC (/100WBC) (OHS): 0 /100 WBC
PLATELET # BLD AUTO: 270 K/UL (ref 150–450)
PMV BLD AUTO: 9.3 FL (ref 9.2–12.9)
POTASSIUM SERPL-SCNC: 3.2 MMOL/L (ref 3.5–5.1)
PROT SERPL-MCNC: 7.4 GM/DL (ref 6–8.4)
RBC # BLD AUTO: 3.68 M/UL (ref 4–5.4)
RELATIVE EOSINOPHIL (OHS): 1.7 %
RELATIVE LYMPHOCYTE (OHS): 33.3 % (ref 18–48)
RELATIVE MONOCYTE (OHS): 7.8 % (ref 4–15)
RELATIVE NEUTROPHIL (OHS): 56.9 % (ref 38–73)
SODIUM SERPL-SCNC: 140 MMOL/L (ref 136–145)
TIBC SERPL-MCNC: 371 UG/DL (ref 250–450)
TRANSFERRIN SERPL-MCNC: 251 MG/DL (ref 200–375)
WBC # BLD AUTO: 8.2 K/UL (ref 3.9–12.7)

## 2025-08-21 PROCEDURE — 85025 COMPLETE CBC W/AUTO DIFF WBC: CPT

## 2025-08-21 PROCEDURE — 82310 ASSAY OF CALCIUM: CPT

## 2025-08-21 PROCEDURE — 82728 ASSAY OF FERRITIN: CPT

## 2025-08-21 PROCEDURE — 97112 NEUROMUSCULAR REEDUCATION: CPT

## 2025-08-21 PROCEDURE — 36415 COLL VENOUS BLD VENIPUNCTURE: CPT

## 2025-08-21 PROCEDURE — 83540 ASSAY OF IRON: CPT

## 2025-08-21 PROCEDURE — 97110 THERAPEUTIC EXERCISES: CPT

## 2025-08-22 LAB — FERRITIN SERPL-MCNC: 350 NG/ML (ref 20–300)

## 2025-08-25 ENCOUNTER — PATIENT MESSAGE (OUTPATIENT)
Dept: HEMATOLOGY/ONCOLOGY | Facility: CLINIC | Age: 60
End: 2025-08-25

## 2025-08-25 ENCOUNTER — TELEPHONE (OUTPATIENT)
Dept: HEMATOLOGY/ONCOLOGY | Facility: CLINIC | Age: 60
End: 2025-08-25

## 2025-08-25 ENCOUNTER — OFFICE VISIT (OUTPATIENT)
Dept: HEMATOLOGY/ONCOLOGY | Facility: CLINIC | Age: 60
End: 2025-08-25
Payer: MEDICARE

## 2025-08-25 VITALS
DIASTOLIC BLOOD PRESSURE: 73 MMHG | HEART RATE: 81 BPM | SYSTOLIC BLOOD PRESSURE: 121 MMHG | OXYGEN SATURATION: 98 % | RESPIRATION RATE: 18 BRPM

## 2025-08-25 DIAGNOSIS — M25.50 ARTHRALGIA, UNSPECIFIED JOINT: ICD-10-CM

## 2025-08-25 DIAGNOSIS — D50.0 IRON DEFICIENCY ANEMIA DUE TO CHRONIC BLOOD LOSS: Primary | ICD-10-CM

## 2025-08-25 DIAGNOSIS — E87.6 HYPOKALEMIA: ICD-10-CM

## 2025-08-25 DIAGNOSIS — R76.8 POSITIVE ANA (ANTINUCLEAR ANTIBODY): ICD-10-CM

## 2025-08-25 PROCEDURE — 99215 OFFICE O/P EST HI 40 MIN: CPT | Mod: PBBFAC | Performed by: NURSE PRACTITIONER

## 2025-08-25 PROCEDURE — 99214 OFFICE O/P EST MOD 30 MIN: CPT | Mod: S$PBB,,, | Performed by: NURSE PRACTITIONER

## 2025-08-25 PROCEDURE — 99999 PR PBB SHADOW E&M-EST. PATIENT-LVL V: CPT | Mod: PBBFAC,,, | Performed by: NURSE PRACTITIONER

## 2025-08-25 RX ORDER — POTASSIUM CHLORIDE 750 MG/1
10 TABLET, EXTENDED RELEASE ORAL DAILY
Qty: 7 TABLET | Refills: 0 | Status: SHIPPED | OUTPATIENT
Start: 2025-08-25

## 2025-08-29 ENCOUNTER — CLINICAL SUPPORT (OUTPATIENT)
Dept: REHABILITATION | Facility: HOSPITAL | Age: 60
End: 2025-08-29
Payer: MEDICARE

## 2025-08-29 DIAGNOSIS — Z74.09 IMPAIRED FUNCTIONAL MOBILITY AND ACTIVITY TOLERANCE: Primary | ICD-10-CM

## 2025-08-29 PROCEDURE — 97110 THERAPEUTIC EXERCISES: CPT

## 2025-08-29 PROCEDURE — 97530 THERAPEUTIC ACTIVITIES: CPT

## 2025-08-29 PROCEDURE — 97112 NEUROMUSCULAR REEDUCATION: CPT

## 2025-09-03 ENCOUNTER — TELEPHONE (OUTPATIENT)
Dept: GASTROENTEROLOGY | Facility: CLINIC | Age: 60
End: 2025-09-03
Payer: MEDICARE

## 2025-09-03 ENCOUNTER — ANESTHESIA EVENT (OUTPATIENT)
Dept: ENDOSCOPY | Facility: HOSPITAL | Age: 60
End: 2025-09-03
Payer: MEDICARE

## 2025-09-03 ENCOUNTER — ANESTHESIA (OUTPATIENT)
Dept: ENDOSCOPY | Facility: HOSPITAL | Age: 60
End: 2025-09-03
Payer: MEDICARE

## 2025-09-03 VITALS
HEART RATE: 68 BPM | DIASTOLIC BLOOD PRESSURE: 64 MMHG | SYSTOLIC BLOOD PRESSURE: 136 MMHG | RESPIRATION RATE: 34 BRPM | OXYGEN SATURATION: 100 %

## 2025-09-03 PROCEDURE — 63600175 PHARM REV CODE 636 W HCPCS

## 2025-09-03 RX ORDER — LIDOCAINE HYDROCHLORIDE 20 MG/ML
INJECTION INTRAVENOUS
Status: DISCONTINUED | OUTPATIENT
Start: 2025-09-03 | End: 2025-09-03

## 2025-09-03 RX ORDER — PROPOFOL 10 MG/ML
VIAL (ML) INTRAVENOUS
Status: DISCONTINUED | OUTPATIENT
Start: 2025-09-03 | End: 2025-09-03

## 2025-09-03 RX ADMIN — PROPOFOL 30 MG: 10 INJECTION, EMULSION INTRAVENOUS at 11:09

## 2025-09-03 RX ADMIN — PROPOFOL 20 MG: 10 INJECTION, EMULSION INTRAVENOUS at 11:09

## 2025-09-03 RX ADMIN — PROPOFOL 80 MG: 10 INJECTION, EMULSION INTRAVENOUS at 11:09

## 2025-09-03 RX ADMIN — LIDOCAINE HYDROCHLORIDE 50 MG: 20 INJECTION, SOLUTION INTRAVENOUS at 11:09

## 2025-09-05 ENCOUNTER — TELEPHONE (OUTPATIENT)
Dept: GASTROENTEROLOGY | Facility: CLINIC | Age: 60
End: 2025-09-05
Payer: MEDICARE

## (undated) DEVICE — KIT ENDOKIT COMPLIANCE CUSTOM

## (undated) DEVICE — SOL NACL 0.9% IV INJ 1000ML

## (undated) DEVICE — Device

## (undated) DEVICE — SEE MEDLINE ITEM 146416

## (undated) DEVICE — SOL WATER STRL IRR 1000ML

## (undated) DEVICE — COVERS PROBE NR-48 STERILE

## (undated) DEVICE — SYR 10CC LUER LOCK

## (undated) DEVICE — DRAPE INCISE IOBAN 2 23X17IN

## (undated) DEVICE — BITE BLOCK ADULT LATEX FREE

## (undated) DEVICE — TRAY MINOR GEN SURG OMC

## (undated) DEVICE — VALVE ORCAPOD AIR WTR JET CONN

## (undated) DEVICE — BLADE SURG CARBON STEEL SZ11

## (undated) DEVICE — FORCEP BIOSY RJ 4 HOT 2.2X240

## (undated) DEVICE — PENCIL ROCKER SWITCH 10FT CORD

## (undated) DEVICE — TROCAR ENDOPATH XCEL 12MM 10CM

## (undated) DEVICE — DRESSING AQUACEL AG ADV 3.5X6

## (undated) DEVICE — CANISTER SUCTION 3000CC

## (undated) DEVICE — SUT MCRYL PLUS 4-0 PS2 27IN

## (undated) DEVICE — BINDER ABDOMINAL 9 30-45

## (undated) DEVICE — SUT VICRYL+ 27 UR-6 VIOL

## (undated) DEVICE — KIT ENDO CARRY-ON PROC 100310

## (undated) DEVICE — ADHESIVE DERMABOND ADVANCED

## (undated) DEVICE — SOL ELECTROLUBE ANTI-STIC

## (undated) DEVICE — TUBE SET SINGLE LUMEN FILTERED

## (undated) DEVICE — GOWN SURGICAL X-LARGE

## (undated) DEVICE — SUT SILK 3-0 SH 18IN BLACK

## (undated) DEVICE — ELECTRODE MEGADYNE RETURN DUAL

## (undated) DEVICE — SEAL UNIVERSAL 5MM-8MM XI

## (undated) DEVICE — CLIP HEMO-LOK ML

## (undated) DEVICE — DRAPE SCOPE PILLOW WARMER

## (undated) DEVICE — SUT VICRYL 3-0 27 SH

## (undated) DEVICE — TUBING SUCTION 3/16X6 2 CONN

## (undated) DEVICE — GOWN POLY REINF BRTH SLV XL

## (undated) DEVICE — OBTURATOR BLADELESS 8MM XI CLR

## (undated) DEVICE — DRAPE COLUMN DAVINCI XI

## (undated) DEVICE — DRAPE ARM DAVINCI XI

## (undated) DEVICE — SUT COATED VICRYL 3-0 1X27

## (undated) DEVICE — TROCAR ENDOPATH XCEL 5X100MM